# Patient Record
Sex: FEMALE | Race: WHITE | NOT HISPANIC OR LATINO | Employment: OTHER | URBAN - METROPOLITAN AREA
[De-identification: names, ages, dates, MRNs, and addresses within clinical notes are randomized per-mention and may not be internally consistent; named-entity substitution may affect disease eponyms.]

---

## 2018-01-04 ENCOUNTER — OFFICE VISIT CONVERTED (OUTPATIENT)
Dept: SURGERY | Facility: CLINIC | Age: 38
End: 2018-01-04
Attending: SURGERY

## 2018-01-11 ENCOUNTER — CONVERSION ENCOUNTER (OUTPATIENT)
Dept: SURGERY | Facility: CLINIC | Age: 38
End: 2018-01-11

## 2018-01-11 ENCOUNTER — OFFICE VISIT CONVERTED (OUTPATIENT)
Dept: SURGERY | Facility: CLINIC | Age: 38
End: 2018-01-11
Attending: SURGERY

## 2018-01-15 ENCOUNTER — OFFICE VISIT CONVERTED (OUTPATIENT)
Dept: GASTROENTEROLOGY | Facility: CLINIC | Age: 38
End: 2018-01-15
Attending: NURSE PRACTITIONER

## 2018-02-19 ENCOUNTER — CONVERSION ENCOUNTER (OUTPATIENT)
Dept: GASTROENTEROLOGY | Facility: CLINIC | Age: 38
End: 2018-02-19

## 2018-02-19 ENCOUNTER — OFFICE VISIT CONVERTED (OUTPATIENT)
Dept: GASTROENTEROLOGY | Facility: CLINIC | Age: 38
End: 2018-02-19
Attending: INTERNAL MEDICINE

## 2018-02-21 ENCOUNTER — OFFICE VISIT CONVERTED (OUTPATIENT)
Dept: SURGERY | Facility: CLINIC | Age: 38
End: 2018-02-21
Attending: SURGERY

## 2018-02-21 ENCOUNTER — CONVERSION ENCOUNTER (OUTPATIENT)
Dept: SURGERY | Facility: CLINIC | Age: 38
End: 2018-02-21

## 2018-05-21 ENCOUNTER — OFFICE VISIT CONVERTED (OUTPATIENT)
Dept: GASTROENTEROLOGY | Facility: CLINIC | Age: 38
End: 2018-05-21
Attending: INTERNAL MEDICINE

## 2018-05-22 ENCOUNTER — CONVERSION ENCOUNTER (OUTPATIENT)
Dept: SURGERY | Facility: CLINIC | Age: 38
End: 2018-05-22

## 2018-05-22 ENCOUNTER — OFFICE VISIT CONVERTED (OUTPATIENT)
Dept: SURGERY | Facility: CLINIC | Age: 38
End: 2018-05-22
Attending: SURGERY

## 2020-01-02 ENCOUNTER — OFFICE VISIT CONVERTED (OUTPATIENT)
Dept: GASTROENTEROLOGY | Facility: CLINIC | Age: 40
End: 2020-01-02
Attending: NURSE PRACTITIONER

## 2020-01-13 ENCOUNTER — HOSPITAL ENCOUNTER (OUTPATIENT)
Dept: OTHER | Facility: HOSPITAL | Age: 40
Discharge: HOME | End: 2020-01-13
Attending: NURSE PRACTITIONER

## 2020-01-13 LAB
25(OH)D3 SERPL-MCNC: 5.5 NG/ML (ref 30–100)
ALBUMIN SERPL-MCNC: 3.5 G/DL (ref 3.5–5)
ALBUMIN/GLOB SERPL: 0.9 {RATIO} (ref 1.4–2.6)
ALP SERPL-CCNC: 137 U/L (ref 42–98)
ALT SERPL-CCNC: 16 U/L (ref 10–40)
ANION GAP SERPL CALC-SCNC: 17 MMOL/L (ref 8–19)
AST SERPL-CCNC: 21 U/L (ref 15–50)
BASOPHILS # BLD AUTO: 0.08 10*3/UL (ref 0–0.2)
BASOPHILS NFR BLD AUTO: 0.9 % (ref 0–3)
BILIRUB SERPL-MCNC: 0.62 MG/DL (ref 0.2–1.3)
BUN SERPL-MCNC: 10 MG/DL (ref 5–25)
BUN/CREAT SERPL: 12 {RATIO} (ref 6–20)
CALCIUM SERPL-MCNC: 10.3 MG/DL (ref 8.7–10.4)
CHLORIDE SERPL-SCNC: 102 MMOL/L (ref 99–111)
CONV ABS IMM GRAN: 0.05 10*3/UL (ref 0–0.2)
CONV CO2: 22 MMOL/L (ref 22–32)
CONV IMMATURE GRAN: 0.6 % (ref 0–1.8)
CONV TOTAL PROTEIN: 7.5 G/DL (ref 6.3–8.2)
CREAT UR-MCNC: 0.84 MG/DL (ref 0.5–0.9)
DEPRECATED RDW RBC AUTO: 46.7 FL (ref 36.4–46.3)
EOSINOPHIL # BLD AUTO: 0.2 10*3/UL (ref 0–0.7)
EOSINOPHIL # BLD AUTO: 2.4 % (ref 0–7)
ERYTHROCYTE [DISTWIDTH] IN BLOOD BY AUTOMATED COUNT: 15.3 % (ref 11.7–14.4)
GFR SERPLBLD BASED ON 1.73 SQ M-ARVRAT: >60 ML/MIN/{1.73_M2}
GLOBULIN UR ELPH-MCNC: 4 G/DL (ref 2–3.5)
GLUCOSE SERPL-MCNC: 89 MG/DL (ref 65–99)
HCT VFR BLD AUTO: 43.4 % (ref 37–47)
HGB BLD-MCNC: 13.9 G/DL (ref 12–16)
IRON SATN MFR SERPL: 9 % (ref 20–55)
IRON SERPL-MCNC: 35 UG/DL (ref 60–170)
LYMPHOCYTES # BLD AUTO: 2.05 10*3/UL (ref 1–5)
LYMPHOCYTES NFR BLD AUTO: 24.3 % (ref 20–45)
MCH RBC QN AUTO: 27.5 PG (ref 27–31)
MCHC RBC AUTO-ENTMCNC: 32 G/DL (ref 33–37)
MCV RBC AUTO: 85.9 FL (ref 81–99)
MONOCYTES # BLD AUTO: 0.58 10*3/UL (ref 0.2–1.2)
MONOCYTES NFR BLD AUTO: 6.9 % (ref 3–10)
NEUTROPHILS # BLD AUTO: 5.48 10*3/UL (ref 2–8)
NEUTROPHILS NFR BLD AUTO: 64.9 % (ref 30–85)
NRBC CBCN: 0 % (ref 0–0.7)
OSMOLALITY SERPL CALC.SUM OF ELEC: 283 MOSM/KG (ref 273–304)
PLATELET # BLD AUTO: 311 10*3/UL (ref 130–400)
PMV BLD AUTO: 9.9 FL (ref 9.4–12.3)
POTASSIUM SERPL-SCNC: 3.5 MMOL/L (ref 3.5–5.3)
RBC # BLD AUTO: 5.05 10*6/UL (ref 4.2–5.4)
SODIUM SERPL-SCNC: 137 MMOL/L (ref 135–147)
TIBC SERPL-MCNC: 395 UG/DL (ref 245–450)
TRANSFERRIN SERPL-MCNC: 276 MG/DL (ref 250–380)
WBC # BLD AUTO: 8.44 10*3/UL (ref 4.8–10.8)

## 2020-01-15 ENCOUNTER — HOSPITAL ENCOUNTER (OUTPATIENT)
Dept: GASTROENTEROLOGY | Facility: HOSPITAL | Age: 40
Setting detail: HOSPITAL OUTPATIENT SURGERY
Discharge: HOME | End: 2020-01-15
Attending: INTERNAL MEDICINE

## 2020-01-15 LAB — HCG SERPL-SCNC: NEGATIVE MMOL/L

## 2020-01-16 LAB
CONV QUANTIFERON TB GOLD: NEGATIVE
QUANTIFERON CRITERIA: NORMAL
QUANTIFERON MITOGEN VALUE: >10 IU/ML
QUANTIFERON NIL VALUE: 0.05 IU/ML
QUANTIFERON TB1 AG VALUE: 0.05 IU/ML
QUANTIFERON TB2 AG VALUE: 0.06 IU/ML

## 2020-08-18 ENCOUNTER — OFFICE VISIT CONVERTED (OUTPATIENT)
Dept: GASTROENTEROLOGY | Facility: CLINIC | Age: 40
End: 2020-08-18
Attending: NURSE PRACTITIONER

## 2020-08-18 ENCOUNTER — HOSPITAL ENCOUNTER (OUTPATIENT)
Dept: LAB | Facility: HOSPITAL | Age: 40
Discharge: HOME | End: 2020-08-18
Attending: NURSE PRACTITIONER

## 2020-08-18 LAB
25(OH)D3 SERPL-MCNC: 8.6 NG/ML (ref 30–100)
IRON SATN MFR SERPL: 14 % (ref 20–55)
IRON SERPL-MCNC: 53 UG/DL (ref 60–170)
TIBC SERPL-MCNC: 370 UG/DL (ref 245–450)
TRANSFERRIN SERPL-MCNC: 259 MG/DL (ref 250–380)

## 2021-05-15 VITALS
SYSTOLIC BLOOD PRESSURE: 123 MMHG | BODY MASS INDEX: 37.05 KG/M2 | DIASTOLIC BLOOD PRESSURE: 82 MMHG | HEIGHT: 61 IN | WEIGHT: 196.25 LBS | HEART RATE: 88 BPM

## 2021-05-15 VITALS
SYSTOLIC BLOOD PRESSURE: 124 MMHG | WEIGHT: 197.37 LBS | HEIGHT: 62 IN | DIASTOLIC BLOOD PRESSURE: 82 MMHG | BODY MASS INDEX: 36.32 KG/M2 | TEMPERATURE: 97.5 F

## 2021-05-16 VITALS — RESPIRATION RATE: 14 BRPM | HEIGHT: 62 IN | BODY MASS INDEX: 36.65 KG/M2 | WEIGHT: 199.19 LBS

## 2021-05-16 VITALS — RESPIRATION RATE: 12 BRPM | BODY MASS INDEX: 36.62 KG/M2 | WEIGHT: 199 LBS | HEIGHT: 62 IN

## 2021-05-16 VITALS
BODY MASS INDEX: 36.31 KG/M2 | HEIGHT: 62 IN | SYSTOLIC BLOOD PRESSURE: 117 MMHG | WEIGHT: 197.31 LBS | DIASTOLIC BLOOD PRESSURE: 79 MMHG

## 2021-05-16 VITALS — HEIGHT: 62 IN | WEIGHT: 21 LBS | RESPIRATION RATE: 14 BRPM | BODY MASS INDEX: 3.87 KG/M2

## 2021-05-16 VITALS — HEIGHT: 62 IN | RESPIRATION RATE: 16 BRPM | WEIGHT: 205 LBS | BODY MASS INDEX: 37.73 KG/M2

## 2021-05-16 VITALS
BODY MASS INDEX: 39.06 KG/M2 | HEIGHT: 62 IN | SYSTOLIC BLOOD PRESSURE: 119 MMHG | DIASTOLIC BLOOD PRESSURE: 81 MMHG | WEIGHT: 212.25 LBS

## 2021-05-16 VITALS
SYSTOLIC BLOOD PRESSURE: 115 MMHG | BODY MASS INDEX: 37.54 KG/M2 | WEIGHT: 204 LBS | HEIGHT: 62 IN | DIASTOLIC BLOOD PRESSURE: 73 MMHG

## 2022-09-01 ENCOUNTER — HOSPITAL ENCOUNTER (INPATIENT)
Facility: HOSPITAL | Age: 42
LOS: 4 days | Discharge: HOME HEALTH | End: 2022-09-05
Attending: EMERGENCY MEDICINE | Admitting: INTERNAL MEDICINE

## 2022-09-01 ENCOUNTER — APPOINTMENT (OUTPATIENT)
Dept: GENERAL RADIOLOGY | Facility: HOSPITAL | Age: 42
End: 2022-09-01

## 2022-09-01 DIAGNOSIS — D64.9 SEVERE ANEMIA: ICD-10-CM

## 2022-09-01 DIAGNOSIS — E87.6 HYPOKALEMIA: ICD-10-CM

## 2022-09-01 DIAGNOSIS — D50.0 IRON DEFICIENCY ANEMIA DUE TO CHRONIC BLOOD LOSS: ICD-10-CM

## 2022-09-01 DIAGNOSIS — I95.9 HYPOTENSION, UNSPECIFIED HYPOTENSION TYPE: Primary | ICD-10-CM

## 2022-09-01 DIAGNOSIS — D62 ACUTE BLOOD LOSS ANEMIA: ICD-10-CM

## 2022-09-01 DIAGNOSIS — E83.42 HYPOMAGNESEMIA: ICD-10-CM

## 2022-09-01 LAB
ABO GROUP BLD: NORMAL
ABO GROUP BLD: NORMAL
ALBUMIN SERPL-MCNC: 2.6 G/DL (ref 3.5–5.2)
ALBUMIN/GLOB SERPL: 0.8 G/DL
ALP SERPL-CCNC: 115 U/L (ref 39–117)
ALT SERPL W P-5'-P-CCNC: 12 U/L (ref 1–33)
ANION GAP SERPL CALCULATED.3IONS-SCNC: 8.5 MMOL/L (ref 5–15)
ANISOCYTOSIS BLD QL: NORMAL
AST SERPL-CCNC: 27 U/L (ref 1–32)
B-HCG UR QL: NEGATIVE
BACTERIA UR QL AUTO: ABNORMAL /HPF
BASOPHILS # BLD AUTO: 0.01 10*3/MM3 (ref 0–0.2)
BASOPHILS NFR BLD AUTO: 0.3 % (ref 0–1.5)
BILIRUB SERPL-MCNC: 1.1 MG/DL (ref 0–1.2)
BILIRUB UR QL STRIP: NEGATIVE
BLD GP AB SCN SERPL QL: NEGATIVE
BUN SERPL-MCNC: 15 MG/DL (ref 6–20)
BUN/CREAT SERPL: 15.6 (ref 7–25)
CALCIUM SPEC-SCNC: 9.3 MG/DL (ref 8.6–10.5)
CHLORIDE SERPL-SCNC: 107 MMOL/L (ref 98–107)
CLARITY UR: CLEAR
CO2 SERPL-SCNC: 21.5 MMOL/L (ref 22–29)
COLOR UR: YELLOW
CREAT SERPL-MCNC: 0.96 MG/DL (ref 0.57–1)
D-LACTATE SERPL-SCNC: 0.9 MMOL/L (ref 0.5–2)
DACRYOCYTES BLD QL SMEAR: NORMAL
DEPRECATED RDW RBC AUTO: 84.7 FL (ref 37–54)
EGFRCR SERPLBLD CKD-EPI 2021: 75 ML/MIN/1.73
EOSINOPHIL # BLD AUTO: 0.03 10*3/MM3 (ref 0–0.4)
EOSINOPHIL NFR BLD AUTO: 1 % (ref 0.3–6.2)
ERYTHROCYTE [DISTWIDTH] IN BLOOD BY AUTOMATED COUNT: 20.9 % (ref 12.3–15.4)
GLOBULIN UR ELPH-MCNC: 3.2 GM/DL
GLUCOSE BLDC GLUCOMTR-MCNC: 84 MG/DL (ref 70–99)
GLUCOSE SERPL-MCNC: 94 MG/DL (ref 65–99)
GLUCOSE UR STRIP-MCNC: NEGATIVE MG/DL
HCT VFR BLD AUTO: 15.7 % (ref 34–46.6)
HCT VFR BLD AUTO: 28 % (ref 34–46.6)
HCT VFR BLD AUTO: 29 % (ref 34–46.6)
HCT VFR BLD AUTO: 29.7 % (ref 34–46.6)
HEMOCCULT STL QL IA: NEGATIVE
HGB BLD-MCNC: 10.1 G/DL (ref 12–15.9)
HGB BLD-MCNC: 10.1 G/DL (ref 12–15.9)
HGB BLD-MCNC: 5.5 G/DL (ref 12–15.9)
HGB BLD-MCNC: 9.8 G/DL (ref 12–15.9)
HGB UR QL STRIP.AUTO: NEGATIVE
HOLD SPECIMEN: NORMAL
HOLD SPECIMEN: NORMAL
HYALINE CASTS UR QL AUTO: ABNORMAL /LPF
HYPOCHROMIA BLD QL: NORMAL
IMM GRANULOCYTES # BLD AUTO: 0.02 10*3/MM3 (ref 0–0.05)
IMM GRANULOCYTES NFR BLD AUTO: 0.7 % (ref 0–0.5)
IRON 24H UR-MRATE: 46 MCG/DL (ref 37–145)
IRON SATN MFR SERPL: 25 % (ref 20–50)
KETONES UR QL STRIP: NEGATIVE
LEUKOCYTE ESTERASE UR QL STRIP.AUTO: ABNORMAL
LYMPHOCYTES # BLD AUTO: 0.88 10*3/MM3 (ref 0.7–3.1)
LYMPHOCYTES NFR BLD AUTO: 29.2 % (ref 19.6–45.3)
MACROCYTES BLD QL SMEAR: NORMAL
MAGNESIUM SERPL-MCNC: 0.8 MG/DL (ref 1.6–2.6)
MAGNESIUM SERPL-MCNC: 1.5 MG/DL (ref 1.6–2.6)
MCH RBC QN AUTO: 40.1 PG (ref 26.6–33)
MCHC RBC AUTO-ENTMCNC: 35 G/DL (ref 31.5–35.7)
MCV RBC AUTO: 114.6 FL (ref 79–97)
MONOCYTES # BLD AUTO: 0.04 10*3/MM3 (ref 0.1–0.9)
MONOCYTES NFR BLD AUTO: 1.3 % (ref 5–12)
NEUTROPHILS NFR BLD AUTO: 2.03 10*3/MM3 (ref 1.7–7)
NEUTROPHILS NFR BLD AUTO: 67.5 % (ref 42.7–76)
NITRITE UR QL STRIP: NEGATIVE
NRBC BLD AUTO-RTO: 0 /100 WBC (ref 0–0.2)
OVALOCYTES BLD QL SMEAR: NORMAL
PH UR STRIP.AUTO: 6 [PH] (ref 5–8)
PLAT MORPH BLD: NORMAL
PLATELET # BLD AUTO: 186 10*3/MM3 (ref 140–450)
PMV BLD AUTO: 10.3 FL (ref 6–12)
POIKILOCYTOSIS BLD QL SMEAR: NORMAL
POTASSIUM SERPL-SCNC: 2.7 MMOL/L (ref 3.5–5.2)
POTASSIUM SERPL-SCNC: 3.4 MMOL/L (ref 3.5–5.2)
PROT SERPL-MCNC: 5.8 G/DL (ref 6–8.5)
PROT UR QL STRIP: ABNORMAL
QT INTERVAL: 355 MS
RBC # BLD AUTO: 1.37 10*6/MM3 (ref 3.77–5.28)
RBC # UR STRIP: ABNORMAL /HPF
REF LAB TEST METHOD: ABNORMAL
RH BLD: POSITIVE
RH BLD: POSITIVE
SODIUM SERPL-SCNC: 137 MMOL/L (ref 136–145)
SP GR UR STRIP: 1.01 (ref 1–1.03)
SQUAMOUS #/AREA URNS HPF: ABNORMAL /HPF
T&S EXPIRATION DATE: NORMAL
TIBC SERPL-MCNC: 186 MCG/DL (ref 298–536)
TRANSFERRIN SERPL-MCNC: 125 MG/DL (ref 200–360)
TROPONIN T SERPL-MCNC: <0.01 NG/ML (ref 0–0.03)
UROBILINOGEN UR QL STRIP: ABNORMAL
WBC # UR STRIP: ABNORMAL /HPF
WBC MORPH BLD: NORMAL
WBC NRBC COR # BLD: 3.01 10*3/MM3 (ref 3.4–10.8)
WHOLE BLOOD HOLD COAG: NORMAL
WHOLE BLOOD HOLD SPECIMEN: NORMAL

## 2022-09-01 PROCEDURE — 86900 BLOOD TYPING SEROLOGIC ABO: CPT | Performed by: EMERGENCY MEDICINE

## 2022-09-01 PROCEDURE — 83540 ASSAY OF IRON: CPT | Performed by: EMERGENCY MEDICINE

## 2022-09-01 PROCEDURE — 0 MAGNESIUM SULFATE 4 GM/100ML SOLUTION: Performed by: PHYSICIAN ASSISTANT

## 2022-09-01 PROCEDURE — 81001 URINALYSIS AUTO W/SCOPE: CPT | Performed by: EMERGENCY MEDICINE

## 2022-09-01 PROCEDURE — 71045 X-RAY EXAM CHEST 1 VIEW: CPT

## 2022-09-01 PROCEDURE — 86900 BLOOD TYPING SEROLOGIC ABO: CPT

## 2022-09-01 PROCEDURE — 82746 ASSAY OF FOLIC ACID SERUM: CPT | Performed by: NURSE PRACTITIONER

## 2022-09-01 PROCEDURE — 93005 ELECTROCARDIOGRAM TRACING: CPT | Performed by: EMERGENCY MEDICINE

## 2022-09-01 PROCEDURE — 86901 BLOOD TYPING SEROLOGIC RH(D): CPT

## 2022-09-01 PROCEDURE — 85025 COMPLETE CBC W/AUTO DIFF WBC: CPT | Performed by: EMERGENCY MEDICINE

## 2022-09-01 PROCEDURE — 25010000002 MAGNESIUM SULFATE 2 GM/50ML SOLUTION: Performed by: EMERGENCY MEDICINE

## 2022-09-01 PROCEDURE — 25010000002 ONDANSETRON PER 1 MG: Performed by: EMERGENCY MEDICINE

## 2022-09-01 PROCEDURE — 84484 ASSAY OF TROPONIN QUANT: CPT | Performed by: EMERGENCY MEDICINE

## 2022-09-01 PROCEDURE — 84466 ASSAY OF TRANSFERRIN: CPT | Performed by: EMERGENCY MEDICINE

## 2022-09-01 PROCEDURE — 99285 EMERGENCY DEPT VISIT HI MDM: CPT

## 2022-09-01 PROCEDURE — 93010 ELECTROCARDIOGRAM REPORT: CPT | Performed by: INTERNAL MEDICINE

## 2022-09-01 PROCEDURE — 83735 ASSAY OF MAGNESIUM: CPT | Performed by: EMERGENCY MEDICINE

## 2022-09-01 PROCEDURE — 82274 ASSAY TEST FOR BLOOD FECAL: CPT | Performed by: EMERGENCY MEDICINE

## 2022-09-01 PROCEDURE — 80053 COMPREHEN METABOLIC PANEL: CPT | Performed by: EMERGENCY MEDICINE

## 2022-09-01 PROCEDURE — 0 POTASSIUM CHLORIDE 10 MEQ/100ML SOLUTION: Performed by: EMERGENCY MEDICINE

## 2022-09-01 PROCEDURE — 93005 ELECTROCARDIOGRAM TRACING: CPT

## 2022-09-01 PROCEDURE — 84132 ASSAY OF SERUM POTASSIUM: CPT | Performed by: INTERNAL MEDICINE

## 2022-09-01 PROCEDURE — 36415 COLL VENOUS BLD VENIPUNCTURE: CPT | Performed by: EMERGENCY MEDICINE

## 2022-09-01 PROCEDURE — 85018 HEMOGLOBIN: CPT | Performed by: PHYSICIAN ASSISTANT

## 2022-09-01 PROCEDURE — 25010000002 METOCLOPRAMIDE PER 10 MG: Performed by: EMERGENCY MEDICINE

## 2022-09-01 PROCEDURE — 99291 CRITICAL CARE FIRST HOUR: CPT | Performed by: INTERNAL MEDICINE

## 2022-09-01 PROCEDURE — 85018 HEMOGLOBIN: CPT | Performed by: EMERGENCY MEDICINE

## 2022-09-01 PROCEDURE — 87040 BLOOD CULTURE FOR BACTERIA: CPT | Performed by: EMERGENCY MEDICINE

## 2022-09-01 PROCEDURE — 36430 TRANSFUSION BLD/BLD COMPNT: CPT

## 2022-09-01 PROCEDURE — P9016 RBC LEUKOCYTES REDUCED: HCPCS

## 2022-09-01 PROCEDURE — 85007 BL SMEAR W/DIFF WBC COUNT: CPT | Performed by: EMERGENCY MEDICINE

## 2022-09-01 PROCEDURE — 85014 HEMATOCRIT: CPT | Performed by: EMERGENCY MEDICINE

## 2022-09-01 PROCEDURE — 86923 COMPATIBILITY TEST ELECTRIC: CPT

## 2022-09-01 PROCEDURE — 85014 HEMATOCRIT: CPT | Performed by: INTERNAL MEDICINE

## 2022-09-01 PROCEDURE — 86901 BLOOD TYPING SEROLOGIC RH(D): CPT | Performed by: EMERGENCY MEDICINE

## 2022-09-01 PROCEDURE — 83605 ASSAY OF LACTIC ACID: CPT | Performed by: EMERGENCY MEDICINE

## 2022-09-01 PROCEDURE — 82962 GLUCOSE BLOOD TEST: CPT

## 2022-09-01 PROCEDURE — 85018 HEMOGLOBIN: CPT | Performed by: INTERNAL MEDICINE

## 2022-09-01 PROCEDURE — 25010000002 HYDROMORPHONE 1 MG/ML SOLUTION: Performed by: INTERNAL MEDICINE

## 2022-09-01 PROCEDURE — 81025 URINE PREGNANCY TEST: CPT | Performed by: INTERNAL MEDICINE

## 2022-09-01 PROCEDURE — 83735 ASSAY OF MAGNESIUM: CPT | Performed by: INTERNAL MEDICINE

## 2022-09-01 PROCEDURE — 85014 HEMATOCRIT: CPT | Performed by: PHYSICIAN ASSISTANT

## 2022-09-01 PROCEDURE — 86850 RBC ANTIBODY SCREEN: CPT | Performed by: EMERGENCY MEDICINE

## 2022-09-01 RX ORDER — MORPHINE SULFATE 2 MG/ML
2 INJECTION, SOLUTION INTRAMUSCULAR; INTRAVENOUS
Status: ACTIVE | OUTPATIENT
Start: 2022-09-01 | End: 2022-09-04

## 2022-09-01 RX ORDER — PANTOPRAZOLE SODIUM 40 MG/10ML
40 INJECTION, POWDER, LYOPHILIZED, FOR SOLUTION INTRAVENOUS
Status: DISCONTINUED | OUTPATIENT
Start: 2022-09-01 | End: 2022-09-05 | Stop reason: HOSPADM

## 2022-09-01 RX ORDER — POLYETHYLENE GLYCOL 3350 17 G/17G
17 POWDER, FOR SOLUTION ORAL DAILY PRN
Status: DISCONTINUED | OUTPATIENT
Start: 2022-09-01 | End: 2022-09-01

## 2022-09-01 RX ORDER — SODIUM CHLORIDE 0.9 % (FLUSH) 0.9 %
10 SYRINGE (ML) INJECTION AS NEEDED
Status: DISCONTINUED | OUTPATIENT
Start: 2022-09-01 | End: 2022-09-05 | Stop reason: HOSPADM

## 2022-09-01 RX ORDER — POTASSIUM CHLORIDE 750 MG/1
40 CAPSULE, EXTENDED RELEASE ORAL ONCE
Status: COMPLETED | OUTPATIENT
Start: 2022-09-01 | End: 2022-09-01

## 2022-09-01 RX ORDER — NOREPINEPHRINE BIT/0.9 % NACL 8 MG/250ML
.02-.3 INFUSION BOTTLE (ML) INTRAVENOUS
Status: DISCONTINUED | OUTPATIENT
Start: 2022-09-01 | End: 2022-09-05

## 2022-09-01 RX ORDER — BISACODYL 10 MG
10 SUPPOSITORY, RECTAL RECTAL DAILY PRN
Status: DISCONTINUED | OUTPATIENT
Start: 2022-09-01 | End: 2022-09-01

## 2022-09-01 RX ORDER — POTASSIUM CHLORIDE 7.45 MG/ML
10 INJECTION INTRAVENOUS ONCE
Status: COMPLETED | OUTPATIENT
Start: 2022-09-01 | End: 2022-09-01

## 2022-09-01 RX ORDER — MAGNESIUM SULFATE HEPTAHYDRATE 40 MG/ML
2 INJECTION, SOLUTION INTRAVENOUS ONCE
Status: COMPLETED | OUTPATIENT
Start: 2022-09-01 | End: 2022-09-01

## 2022-09-01 RX ORDER — METOCLOPRAMIDE HYDROCHLORIDE 5 MG/ML
10 INJECTION INTRAMUSCULAR; INTRAVENOUS ONCE
Status: COMPLETED | OUTPATIENT
Start: 2022-09-01 | End: 2022-09-01

## 2022-09-01 RX ORDER — NALOXONE HCL 0.4 MG/ML
0.4 VIAL (ML) INJECTION
Status: DISCONTINUED | OUTPATIENT
Start: 2022-09-01 | End: 2022-09-05 | Stop reason: HOSPADM

## 2022-09-01 RX ORDER — ONDANSETRON 2 MG/ML
4 INJECTION INTRAMUSCULAR; INTRAVENOUS ONCE
Status: COMPLETED | OUTPATIENT
Start: 2022-09-01 | End: 2022-09-01

## 2022-09-01 RX ORDER — HYDROCODONE BITARTRATE AND ACETAMINOPHEN 5; 325 MG/1; MG/1
1 TABLET ORAL EVERY 4 HOURS PRN
Status: DISCONTINUED | OUTPATIENT
Start: 2022-09-01 | End: 2022-09-05 | Stop reason: HOSPADM

## 2022-09-01 RX ORDER — ONDANSETRON 2 MG/ML
4 INJECTION INTRAMUSCULAR; INTRAVENOUS EVERY 4 HOURS PRN
Status: DISCONTINUED | OUTPATIENT
Start: 2022-09-01 | End: 2022-09-05 | Stop reason: HOSPADM

## 2022-09-01 RX ORDER — ALUMINA, MAGNESIA, AND SIMETHICONE 2400; 2400; 240 MG/30ML; MG/30ML; MG/30ML
15 SUSPENSION ORAL EVERY 6 HOURS PRN
Status: DISCONTINUED | OUTPATIENT
Start: 2022-09-01 | End: 2022-09-05 | Stop reason: HOSPADM

## 2022-09-01 RX ORDER — BISACODYL 5 MG/1
5 TABLET, DELAYED RELEASE ORAL DAILY PRN
Status: DISCONTINUED | OUTPATIENT
Start: 2022-09-01 | End: 2022-09-01

## 2022-09-01 RX ORDER — SODIUM CHLORIDE 9 MG/ML
INJECTION, SOLUTION INTRAVENOUS
Status: DISPENSED
Start: 2022-09-01 | End: 2022-09-01

## 2022-09-01 RX ORDER — ALPRAZOLAM 0.25 MG/1
0.25 TABLET ORAL EVERY 6 HOURS PRN
Status: DISCONTINUED | OUTPATIENT
Start: 2022-09-01 | End: 2022-09-05 | Stop reason: HOSPADM

## 2022-09-01 RX ORDER — TEMAZEPAM 15 MG/1
15 CAPSULE ORAL NIGHTLY PRN
Status: DISCONTINUED | OUTPATIENT
Start: 2022-09-01 | End: 2022-09-05 | Stop reason: HOSPADM

## 2022-09-01 RX ORDER — NITROGLYCERIN 0.4 MG/1
0.4 TABLET SUBLINGUAL
Status: DISCONTINUED | OUTPATIENT
Start: 2022-09-01 | End: 2022-09-05 | Stop reason: HOSPADM

## 2022-09-01 RX ORDER — NOREPINEPHRINE BIT/0.9 % NACL 8 MG/250ML
INFUSION BOTTLE (ML) INTRAVENOUS
Status: DISPENSED
Start: 2022-09-01 | End: 2022-09-01

## 2022-09-01 RX ORDER — FAMOTIDINE 20 MG/1
40 TABLET, FILM COATED ORAL DAILY
Status: DISCONTINUED | OUTPATIENT
Start: 2022-09-01 | End: 2022-09-01

## 2022-09-01 RX ORDER — MAGNESIUM SULFATE HEPTAHYDRATE 40 MG/ML
4 INJECTION, SOLUTION INTRAVENOUS ONCE
Status: COMPLETED | OUTPATIENT
Start: 2022-09-01 | End: 2022-09-01

## 2022-09-01 RX ORDER — ACETAMINOPHEN 325 MG/1
650 TABLET ORAL EVERY 4 HOURS PRN
Status: DISCONTINUED | OUTPATIENT
Start: 2022-09-01 | End: 2022-09-05 | Stop reason: HOSPADM

## 2022-09-01 RX ORDER — AMOXICILLIN 250 MG
1 CAPSULE ORAL 2 TIMES DAILY
Status: DISCONTINUED | OUTPATIENT
Start: 2022-09-01 | End: 2022-09-01

## 2022-09-01 RX ADMIN — POTASSIUM CHLORIDE 40 MEQ: 750 CAPSULE, EXTENDED RELEASE ORAL at 18:34

## 2022-09-01 RX ADMIN — PANTOPRAZOLE SODIUM 40 MG: 40 INJECTION, POWDER, FOR SOLUTION INTRAVENOUS at 18:34

## 2022-09-01 RX ADMIN — HYDROMORPHONE HYDROCHLORIDE 0.5 MG: 1 INJECTION, SOLUTION INTRAMUSCULAR; INTRAVENOUS; SUBCUTANEOUS at 22:53

## 2022-09-01 RX ADMIN — Medication 0.04 MCG/KG/MIN: at 09:19

## 2022-09-01 RX ADMIN — ONDANSETRON 4 MG: 2 INJECTION INTRAMUSCULAR; INTRAVENOUS at 07:17

## 2022-09-01 RX ADMIN — POTASSIUM CHLORIDE 40 MEQ: 750 CAPSULE, EXTENDED RELEASE ORAL at 06:51

## 2022-09-01 RX ADMIN — METOCLOPRAMIDE HYDROCHLORIDE 10 MG: 5 INJECTION INTRAMUSCULAR; INTRAVENOUS at 09:32

## 2022-09-01 RX ADMIN — ONDANSETRON 4 MG: 2 INJECTION INTRAMUSCULAR; INTRAVENOUS at 05:36

## 2022-09-01 RX ADMIN — POTASSIUM CHLORIDE 10 MEQ: 7.46 INJECTION, SOLUTION INTRAVENOUS at 06:56

## 2022-09-01 RX ADMIN — SODIUM CHLORIDE 2178 ML: 9 INJECTION, SOLUTION INTRAVENOUS at 05:37

## 2022-09-01 RX ADMIN — MAGNESIUM SULFATE 4 G: 4 INJECTION INTRAVENOUS at 18:14

## 2022-09-01 RX ADMIN — MAGNESIUM SULFATE HEPTAHYDRATE 2 G: 2 INJECTION, SOLUTION INTRAVENOUS at 07:04

## 2022-09-02 ENCOUNTER — TELEPHONE (OUTPATIENT)
Dept: GASTROENTEROLOGY | Facility: CLINIC | Age: 42
End: 2022-09-02

## 2022-09-02 PROBLEM — D50.0 IRON DEFICIENCY ANEMIA DUE TO CHRONIC BLOOD LOSS: Status: ACTIVE | Noted: 2022-09-01

## 2022-09-02 LAB
ALBUMIN SERPL-MCNC: 2.5 G/DL (ref 3.5–5.2)
ALBUMIN SERPL-MCNC: 2.5 G/DL (ref 3.5–5.2)
ALBUMIN/GLOB SERPL: 0.9 G/DL
ALP SERPL-CCNC: 116 U/L (ref 39–117)
ALT SERPL W P-5'-P-CCNC: 12 U/L (ref 1–33)
ANION GAP SERPL CALCULATED.3IONS-SCNC: 8 MMOL/L (ref 5–15)
ANION GAP SERPL CALCULATED.3IONS-SCNC: 8 MMOL/L (ref 5–15)
AST SERPL-CCNC: 24 U/L (ref 1–32)
BASOPHILS # BLD AUTO: 0.03 10*3/MM3 (ref 0–0.2)
BASOPHILS NFR BLD AUTO: 0.4 % (ref 0–1.5)
BH BB BLOOD EXPIRATION DATE: NORMAL
BH BB BLOOD EXPIRATION DATE: NORMAL
BH BB BLOOD TYPE BARCODE: 5100
BH BB BLOOD TYPE BARCODE: 5100
BH BB DISPENSE STATUS: NORMAL
BH BB DISPENSE STATUS: NORMAL
BH BB PRODUCT CODE: NORMAL
BH BB PRODUCT CODE: NORMAL
BH BB UNIT NUMBER: NORMAL
BH BB UNIT NUMBER: NORMAL
BILIRUB SERPL-MCNC: 1.8 MG/DL (ref 0–1.2)
BUN SERPL-MCNC: 14 MG/DL (ref 6–20)
BUN SERPL-MCNC: 14 MG/DL (ref 6–20)
BUN/CREAT SERPL: 13.5 (ref 7–25)
BUN/CREAT SERPL: 13.5 (ref 7–25)
CALCIUM SPEC-SCNC: 8.3 MG/DL (ref 8.6–10.5)
CALCIUM SPEC-SCNC: 8.3 MG/DL (ref 8.6–10.5)
CHLORIDE SERPL-SCNC: 115 MMOL/L (ref 98–107)
CHLORIDE SERPL-SCNC: 115 MMOL/L (ref 98–107)
CO2 SERPL-SCNC: 17 MMOL/L (ref 22–29)
CO2 SERPL-SCNC: 17 MMOL/L (ref 22–29)
CREAT SERPL-MCNC: 1.04 MG/DL (ref 0.57–1)
CREAT SERPL-MCNC: 1.04 MG/DL (ref 0.57–1)
CROSSMATCH INTERPRETATION: NORMAL
CROSSMATCH INTERPRETATION: NORMAL
DEPRECATED RDW RBC AUTO: 109.8 FL (ref 37–54)
EGFRCR SERPLBLD CKD-EPI 2021: 68.1 ML/MIN/1.73
EGFRCR SERPLBLD CKD-EPI 2021: 68.1 ML/MIN/1.73
EOSINOPHIL # BLD AUTO: 0.06 10*3/MM3 (ref 0–0.4)
EOSINOPHIL NFR BLD AUTO: 0.8 % (ref 0.3–6.2)
ERYTHROCYTE [DISTWIDTH] IN BLOOD BY AUTOMATED COUNT: 30.7 % (ref 12.3–15.4)
FOLATE SERPL-MCNC: >20 NG/ML (ref 4.78–24.2)
GLOBULIN UR ELPH-MCNC: 2.9 GM/DL
GLUCOSE SERPL-MCNC: 103 MG/DL (ref 65–99)
GLUCOSE SERPL-MCNC: 103 MG/DL (ref 65–99)
HCT VFR BLD AUTO: 21.1 % (ref 34–46.6)
HCT VFR BLD AUTO: 21.4 % (ref 34–46.6)
HCT VFR BLD AUTO: 25.4 % (ref 34–46.6)
HCT VFR BLD AUTO: 26.2 % (ref 34–46.6)
HCT VFR BLD AUTO: 26.4 % (ref 34–46.6)
HGB BLD-MCNC: 7.1 G/DL (ref 12–15.9)
HGB BLD-MCNC: 7.1 G/DL (ref 12–15.9)
HGB BLD-MCNC: 8.7 G/DL (ref 12–15.9)
HGB BLD-MCNC: 8.9 G/DL (ref 12–15.9)
HGB BLD-MCNC: 9.1 G/DL (ref 12–15.9)
IMM GRANULOCYTES # BLD AUTO: 0.04 10*3/MM3 (ref 0–0.05)
IMM GRANULOCYTES NFR BLD AUTO: 0.5 % (ref 0–0.5)
LYMPHOCYTES # BLD AUTO: 1.8 10*3/MM3 (ref 0.7–3.1)
LYMPHOCYTES NFR BLD AUTO: 24.1 % (ref 19.6–45.3)
MAGNESIUM SERPL-MCNC: 2.4 MG/DL (ref 1.6–2.6)
MCH RBC QN AUTO: 32.6 PG (ref 26.6–33)
MCHC RBC AUTO-ENTMCNC: 34 G/DL (ref 31.5–35.7)
MCV RBC AUTO: 96 FL (ref 79–97)
MONOCYTES # BLD AUTO: 0.12 10*3/MM3 (ref 0.1–0.9)
MONOCYTES NFR BLD AUTO: 1.6 % (ref 5–12)
NEUTROPHILS NFR BLD AUTO: 5.42 10*3/MM3 (ref 1.7–7)
NEUTROPHILS NFR BLD AUTO: 72.6 % (ref 42.7–76)
NRBC BLD AUTO-RTO: 0 /100 WBC (ref 0–0.2)
PHOSPHATE SERPL-MCNC: 1.4 MG/DL (ref 2.5–4.5)
PLATELET # BLD AUTO: 259 10*3/MM3 (ref 140–450)
PMV BLD AUTO: 9.9 FL (ref 6–12)
POTASSIUM SERPL-SCNC: 3.9 MMOL/L (ref 3.5–5.2)
POTASSIUM SERPL-SCNC: 3.9 MMOL/L (ref 3.5–5.2)
PROT SERPL-MCNC: 5.4 G/DL (ref 6–8.5)
RBC # BLD AUTO: 2.73 10*6/MM3 (ref 3.77–5.28)
SODIUM SERPL-SCNC: 140 MMOL/L (ref 136–145)
SODIUM SERPL-SCNC: 140 MMOL/L (ref 136–145)
TSH SERPL DL<=0.05 MIU/L-ACNC: 2.44 UIU/ML (ref 0.27–4.2)
UNIT  ABO: NORMAL
UNIT  ABO: NORMAL
UNIT  RH: NORMAL
UNIT  RH: NORMAL
WBC NRBC COR # BLD: 7.47 10*3/MM3 (ref 3.4–10.8)

## 2022-09-02 PROCEDURE — 85014 HEMATOCRIT: CPT | Performed by: INTERNAL MEDICINE

## 2022-09-02 PROCEDURE — 84443 ASSAY THYROID STIM HORMONE: CPT | Performed by: NURSE PRACTITIONER

## 2022-09-02 PROCEDURE — 36430 TRANSFUSION BLD/BLD COMPNT: CPT

## 2022-09-02 PROCEDURE — 02HV33Z INSERTION OF INFUSION DEVICE INTO SUPERIOR VENA CAVA, PERCUTANEOUS APPROACH: ICD-10-PCS | Performed by: INTERNAL MEDICINE

## 2022-09-02 PROCEDURE — 83735 ASSAY OF MAGNESIUM: CPT | Performed by: NURSE PRACTITIONER

## 2022-09-02 PROCEDURE — 86900 BLOOD TYPING SEROLOGIC ABO: CPT

## 2022-09-02 PROCEDURE — P9047 ALBUMIN (HUMAN), 25%, 50ML: HCPCS | Performed by: NURSE PRACTITIONER

## 2022-09-02 PROCEDURE — C1751 CATH, INF, PER/CENT/MIDLINE: HCPCS

## 2022-09-02 PROCEDURE — 25010000002 ALBUMIN HUMAN 25% PER 50 ML: Performed by: NURSE PRACTITIONER

## 2022-09-02 PROCEDURE — 99291 CRITICAL CARE FIRST HOUR: CPT | Performed by: INTERNAL MEDICINE

## 2022-09-02 PROCEDURE — 86923 COMPATIBILITY TEST ELECTRIC: CPT

## 2022-09-02 PROCEDURE — 85025 COMPLETE CBC W/AUTO DIFF WBC: CPT | Performed by: INTERNAL MEDICINE

## 2022-09-02 PROCEDURE — 85018 HEMOGLOBIN: CPT | Performed by: INTERNAL MEDICINE

## 2022-09-02 PROCEDURE — 80053 COMPREHEN METABOLIC PANEL: CPT | Performed by: INTERNAL MEDICINE

## 2022-09-02 PROCEDURE — P9016 RBC LEUKOCYTES REDUCED: HCPCS

## 2022-09-02 PROCEDURE — 84100 ASSAY OF PHOSPHORUS: CPT | Performed by: INTERNAL MEDICINE

## 2022-09-02 RX ORDER — SODIUM CHLORIDE 0.9 % (FLUSH) 0.9 %
10 SYRINGE (ML) INJECTION AS NEEDED
Status: DISCONTINUED | OUTPATIENT
Start: 2022-09-02 | End: 2022-09-05 | Stop reason: HOSPADM

## 2022-09-02 RX ORDER — HYDRALAZINE HYDROCHLORIDE 20 MG/ML
5-10 INJECTION INTRAMUSCULAR; INTRAVENOUS
Status: CANCELLED | OUTPATIENT
Start: 2022-09-02

## 2022-09-02 RX ORDER — MIDODRINE HYDROCHLORIDE 2.5 MG/1
5 TABLET ORAL ONCE
Status: COMPLETED | OUTPATIENT
Start: 2022-09-02 | End: 2022-09-02

## 2022-09-02 RX ORDER — MIDODRINE HYDROCHLORIDE 10 MG/1
10 TABLET ORAL
Status: DISCONTINUED | OUTPATIENT
Start: 2022-09-02 | End: 2022-09-05 | Stop reason: HOSPADM

## 2022-09-02 RX ORDER — LABETALOL HYDROCHLORIDE 5 MG/ML
20-80 INJECTION, SOLUTION INTRAVENOUS
Status: CANCELLED | OUTPATIENT
Start: 2022-09-02

## 2022-09-02 RX ORDER — SODIUM CHLORIDE 0.9 % (FLUSH) 0.9 %
20 SYRINGE (ML) INJECTION AS NEEDED
Status: DISCONTINUED | OUTPATIENT
Start: 2022-09-02 | End: 2022-09-05 | Stop reason: HOSPADM

## 2022-09-02 RX ORDER — NIFEDIPINE 10 MG/1
10-20 CAPSULE ORAL
Status: CANCELLED | OUTPATIENT
Start: 2022-09-02

## 2022-09-02 RX ORDER — MIDODRINE HYDROCHLORIDE 2.5 MG/1
5 TABLET ORAL
Status: DISCONTINUED | OUTPATIENT
Start: 2022-09-02 | End: 2022-09-02

## 2022-09-02 RX ORDER — ALBUMIN (HUMAN) 12.5 G/50ML
25 SOLUTION INTRAVENOUS ONCE
Status: COMPLETED | OUTPATIENT
Start: 2022-09-02 | End: 2022-09-02

## 2022-09-02 RX ORDER — SODIUM PHOSPHATE IN D5W 15MMOL/250
15 PLASTIC BAG, INJECTION (ML) INTRAVENOUS EVERY 4 HOURS
Status: COMPLETED | OUTPATIENT
Start: 2022-09-02 | End: 2022-09-02

## 2022-09-02 RX ADMIN — PANTOPRAZOLE SODIUM 40 MG: 40 INJECTION, POWDER, FOR SOLUTION INTRAVENOUS at 08:15

## 2022-09-02 RX ADMIN — MIDODRINE HYDROCHLORIDE 10 MG: 10 TABLET ORAL at 18:36

## 2022-09-02 RX ADMIN — ALBUMIN HUMAN 25 G: 0.25 SOLUTION INTRAVENOUS at 12:08

## 2022-09-02 RX ADMIN — PANTOPRAZOLE SODIUM 40 MG: 40 INJECTION, POWDER, FOR SOLUTION INTRAVENOUS at 16:31

## 2022-09-02 RX ADMIN — SODIUM PHOSPHATE, MONOBASIC, MONOHYDRATE AND SODIUM PHOSPHATE, DIBASIC, ANHYDROUS 15 MMOL: 276; 142 INJECTION, SOLUTION INTRAVENOUS at 11:49

## 2022-09-02 RX ADMIN — Medication 0.08 MCG/KG/MIN: at 09:11

## 2022-09-02 RX ADMIN — SODIUM PHOSPHATE, MONOBASIC, MONOHYDRATE AND SODIUM PHOSPHATE, DIBASIC, ANHYDROUS 15 MMOL: 276; 142 INJECTION, SOLUTION INTRAVENOUS at 12:36

## 2022-09-02 RX ADMIN — MIDODRINE HYDROCHLORIDE 5 MG: 2.5 TABLET ORAL at 12:08

## 2022-09-02 RX ADMIN — MIDODRINE HYDROCHLORIDE 5 MG: 2.5 TABLET ORAL at 15:41

## 2022-09-02 RX ADMIN — ACETAMINOPHEN 650 MG: 325 TABLET ORAL at 12:35

## 2022-09-03 ENCOUNTER — APPOINTMENT (OUTPATIENT)
Dept: CT IMAGING | Facility: HOSPITAL | Age: 42
End: 2022-09-03

## 2022-09-03 LAB
ALBUMIN SERPL-MCNC: 2.4 G/DL (ref 3.5–5.2)
ALBUMIN/GLOB SERPL: 1 G/DL
ALP SERPL-CCNC: 97 U/L (ref 39–117)
ALT SERPL W P-5'-P-CCNC: 10 U/L (ref 1–33)
ANION GAP SERPL CALCULATED.3IONS-SCNC: 7.9 MMOL/L (ref 5–15)
ANISOCYTOSIS BLD QL: NORMAL
AST SERPL-CCNC: 17 U/L (ref 1–32)
BASOPHILS # BLD AUTO: 0.02 10*3/MM3 (ref 0–0.2)
BASOPHILS NFR BLD AUTO: 0.5 % (ref 0–1.5)
BILIRUB SERPL-MCNC: 2.1 MG/DL (ref 0–1.2)
BUN SERPL-MCNC: 19 MG/DL (ref 6–20)
BUN/CREAT SERPL: 20.4 (ref 7–25)
CALCIUM SPEC-SCNC: 8.7 MG/DL (ref 8.6–10.5)
CHLORIDE SERPL-SCNC: 115 MMOL/L (ref 98–107)
CO2 SERPL-SCNC: 17.1 MMOL/L (ref 22–29)
CREAT SERPL-MCNC: 0.93 MG/DL (ref 0.57–1)
DACRYOCYTES BLD QL SMEAR: NORMAL
DEPRECATED RDW RBC AUTO: 80.2 FL (ref 37–54)
DIMORPHIC RBC: PRESENT
EGFRCR SERPLBLD CKD-EPI 2021: 77.9 ML/MIN/1.73
EOSINOPHIL # BLD AUTO: 0.07 10*3/MM3 (ref 0–0.4)
EOSINOPHIL NFR BLD AUTO: 1.6 % (ref 0.3–6.2)
ERYTHROCYTE [DISTWIDTH] IN BLOOD BY AUTOMATED COUNT: 26.5 % (ref 12.3–15.4)
GLOBULIN UR ELPH-MCNC: 2.5 GM/DL
GLUCOSE SERPL-MCNC: 85 MG/DL (ref 65–99)
HCT VFR BLD AUTO: 31.6 % (ref 34–46.6)
HCT VFR BLD AUTO: 32 % (ref 34–46.6)
HGB BLD-MCNC: 10.7 G/DL (ref 12–15.9)
HGB BLD-MCNC: 10.7 G/DL (ref 12–15.9)
IMM GRANULOCYTES # BLD AUTO: 0.02 10*3/MM3 (ref 0–0.05)
IMM GRANULOCYTES NFR BLD AUTO: 0.5 % (ref 0–0.5)
LYMPHOCYTES # BLD AUTO: 1.14 10*3/MM3 (ref 0.7–3.1)
LYMPHOCYTES NFR BLD AUTO: 26.3 % (ref 19.6–45.3)
MACROCYTES BLD QL SMEAR: NORMAL
MAGNESIUM SERPL-MCNC: 1.8 MG/DL (ref 1.6–2.6)
MCH RBC QN AUTO: 31.8 PG (ref 26.6–33)
MCHC RBC AUTO-ENTMCNC: 33.9 G/DL (ref 31.5–35.7)
MCV RBC AUTO: 94 FL (ref 79–97)
MONOCYTES # BLD AUTO: 0.08 10*3/MM3 (ref 0.1–0.9)
MONOCYTES NFR BLD AUTO: 1.8 % (ref 5–12)
NEUTROPHILS NFR BLD AUTO: 3.01 10*3/MM3 (ref 1.7–7)
NEUTROPHILS NFR BLD AUTO: 69.3 % (ref 42.7–76)
NRBC BLD AUTO-RTO: 0 /100 WBC (ref 0–0.2)
OVALOCYTES BLD QL SMEAR: NORMAL
PHOSPHATE SERPL-MCNC: 3 MG/DL (ref 2.5–4.5)
PLAT MORPH BLD: NORMAL
PLATELET # BLD AUTO: 125 10*3/MM3 (ref 140–450)
PMV BLD AUTO: 9.7 FL (ref 6–12)
POIKILOCYTOSIS BLD QL SMEAR: NORMAL
POTASSIUM SERPL-SCNC: 4.1 MMOL/L (ref 3.5–5.2)
PROT SERPL-MCNC: 4.9 G/DL (ref 6–8.5)
RBC # BLD AUTO: 3.36 10*6/MM3 (ref 3.77–5.28)
SODIUM SERPL-SCNC: 140 MMOL/L (ref 136–145)
WBC MORPH BLD: NORMAL
WBC NRBC COR # BLD: 4.34 10*3/MM3 (ref 3.4–10.8)

## 2022-09-03 PROCEDURE — 84100 ASSAY OF PHOSPHORUS: CPT | Performed by: INTERNAL MEDICINE

## 2022-09-03 PROCEDURE — 85018 HEMOGLOBIN: CPT | Performed by: INTERNAL MEDICINE

## 2022-09-03 PROCEDURE — 99222 1ST HOSP IP/OBS MODERATE 55: CPT | Performed by: INTERNAL MEDICINE

## 2022-09-03 PROCEDURE — 80053 COMPREHEN METABOLIC PANEL: CPT | Performed by: INTERNAL MEDICINE

## 2022-09-03 PROCEDURE — 99291 CRITICAL CARE FIRST HOUR: CPT | Performed by: INTERNAL MEDICINE

## 2022-09-03 PROCEDURE — 85014 HEMATOCRIT: CPT | Performed by: INTERNAL MEDICINE

## 2022-09-03 PROCEDURE — 74176 CT ABD & PELVIS W/O CONTRAST: CPT

## 2022-09-03 PROCEDURE — 85007 BL SMEAR W/DIFF WBC COUNT: CPT | Performed by: INTERNAL MEDICINE

## 2022-09-03 PROCEDURE — 85025 COMPLETE CBC W/AUTO DIFF WBC: CPT | Performed by: INTERNAL MEDICINE

## 2022-09-03 PROCEDURE — 25010000002 ONDANSETRON PER 1 MG: Performed by: INTERNAL MEDICINE

## 2022-09-03 PROCEDURE — 83735 ASSAY OF MAGNESIUM: CPT | Performed by: INTERNAL MEDICINE

## 2022-09-03 RX ADMIN — PANTOPRAZOLE SODIUM 40 MG: 40 INJECTION, POWDER, FOR SOLUTION INTRAVENOUS at 08:45

## 2022-09-03 RX ADMIN — MIDODRINE HYDROCHLORIDE 10 MG: 10 TABLET ORAL at 11:39

## 2022-09-03 RX ADMIN — ONDANSETRON 4 MG: 2 INJECTION INTRAMUSCULAR; INTRAVENOUS at 16:38

## 2022-09-03 RX ADMIN — POLYETHYLENE GLYCOL 3350, SODIUM SULFATE ANHYDROUS, SODIUM BICARBONATE, SODIUM CHLORIDE, POTASSIUM CHLORIDE 4000 ML: 236; 22.74; 6.74; 5.86; 2.97 POWDER, FOR SOLUTION ORAL at 16:07

## 2022-09-03 RX ADMIN — PANTOPRAZOLE SODIUM 40 MG: 40 INJECTION, POWDER, FOR SOLUTION INTRAVENOUS at 16:38

## 2022-09-03 RX ADMIN — MIDODRINE HYDROCHLORIDE 10 MG: 10 TABLET ORAL at 08:45

## 2022-09-04 LAB
ALBUMIN SERPL-MCNC: 3 G/DL (ref 3.5–5.2)
ALBUMIN/GLOB SERPL: 1 G/DL
ALP SERPL-CCNC: 122 U/L (ref 39–117)
ALT SERPL W P-5'-P-CCNC: 10 U/L (ref 1–33)
ANION GAP SERPL CALCULATED.3IONS-SCNC: 10.8 MMOL/L (ref 5–15)
AST SERPL-CCNC: 20 U/L (ref 1–32)
BASOPHILS # BLD AUTO: 0.02 10*3/MM3 (ref 0–0.2)
BASOPHILS NFR BLD AUTO: 0.4 % (ref 0–1.5)
BH BB BLOOD EXPIRATION DATE: NORMAL
BH BB BLOOD EXPIRATION DATE: NORMAL
BH BB BLOOD TYPE BARCODE: 5100
BH BB BLOOD TYPE BARCODE: 5100
BH BB DISPENSE STATUS: NORMAL
BH BB DISPENSE STATUS: NORMAL
BH BB PRODUCT CODE: NORMAL
BH BB PRODUCT CODE: NORMAL
BH BB UNIT NUMBER: NORMAL
BH BB UNIT NUMBER: NORMAL
BILIRUB SERPL-MCNC: 2.1 MG/DL (ref 0–1.2)
BUN SERPL-MCNC: 26 MG/DL (ref 6–20)
BUN/CREAT SERPL: 25.5 (ref 7–25)
CALCIUM SPEC-SCNC: 9.1 MG/DL (ref 8.6–10.5)
CHLORIDE SERPL-SCNC: 110 MMOL/L (ref 98–107)
CO2 SERPL-SCNC: 16.2 MMOL/L (ref 22–29)
CREAT SERPL-MCNC: 1.02 MG/DL (ref 0.57–1)
CROSSMATCH INTERPRETATION: NORMAL
CROSSMATCH INTERPRETATION: NORMAL
DEPRECATED RDW RBC AUTO: 82.8 FL (ref 37–54)
EGFRCR SERPLBLD CKD-EPI 2021: 69.7 ML/MIN/1.73
EOSINOPHIL # BLD AUTO: 0.1 10*3/MM3 (ref 0–0.4)
EOSINOPHIL NFR BLD AUTO: 2.1 % (ref 0.3–6.2)
ERYTHROCYTE [DISTWIDTH] IN BLOOD BY AUTOMATED COUNT: 26.9 % (ref 12.3–15.4)
GLOBULIN UR ELPH-MCNC: 2.9 GM/DL
GLUCOSE SERPL-MCNC: 70 MG/DL (ref 65–99)
HCT VFR BLD AUTO: 33 % (ref 34–46.6)
HGB BLD-MCNC: 11.2 G/DL (ref 12–15.9)
IMM GRANULOCYTES # BLD AUTO: 0.02 10*3/MM3 (ref 0–0.05)
IMM GRANULOCYTES NFR BLD AUTO: 0.4 % (ref 0–0.5)
LYMPHOCYTES # BLD AUTO: 1.45 10*3/MM3 (ref 0.7–3.1)
LYMPHOCYTES NFR BLD AUTO: 30.3 % (ref 19.6–45.3)
MAGNESIUM SERPL-MCNC: 1.6 MG/DL (ref 1.6–2.6)
MCH RBC QN AUTO: 31.5 PG (ref 26.6–33)
MCHC RBC AUTO-ENTMCNC: 33.9 G/DL (ref 31.5–35.7)
MCV RBC AUTO: 93 FL (ref 79–97)
MONOCYTES # BLD AUTO: 0.08 10*3/MM3 (ref 0.1–0.9)
MONOCYTES NFR BLD AUTO: 1.7 % (ref 5–12)
NEUTROPHILS NFR BLD AUTO: 3.12 10*3/MM3 (ref 1.7–7)
NEUTROPHILS NFR BLD AUTO: 65.1 % (ref 42.7–76)
NRBC BLD AUTO-RTO: 0 /100 WBC (ref 0–0.2)
PHOSPHATE SERPL-MCNC: 2.2 MG/DL (ref 2.5–4.5)
PLATELET # BLD AUTO: 140 10*3/MM3 (ref 140–450)
PMV BLD AUTO: 10 FL (ref 6–12)
POTASSIUM SERPL-SCNC: 4.2 MMOL/L (ref 3.5–5.2)
PROT SERPL-MCNC: 5.9 G/DL (ref 6–8.5)
RBC # BLD AUTO: 3.55 10*6/MM3 (ref 3.77–5.28)
SODIUM SERPL-SCNC: 137 MMOL/L (ref 136–145)
UNIT  ABO: NORMAL
UNIT  ABO: NORMAL
UNIT  RH: NORMAL
UNIT  RH: NORMAL
WBC NRBC COR # BLD: 4.79 10*3/MM3 (ref 3.4–10.8)

## 2022-09-04 PROCEDURE — 83735 ASSAY OF MAGNESIUM: CPT | Performed by: INTERNAL MEDICINE

## 2022-09-04 PROCEDURE — 85025 COMPLETE CBC W/AUTO DIFF WBC: CPT | Performed by: INTERNAL MEDICINE

## 2022-09-04 PROCEDURE — 99233 SBSQ HOSP IP/OBS HIGH 50: CPT | Performed by: INTERNAL MEDICINE

## 2022-09-04 PROCEDURE — 80053 COMPREHEN METABOLIC PANEL: CPT | Performed by: INTERNAL MEDICINE

## 2022-09-04 PROCEDURE — 84100 ASSAY OF PHOSPHORUS: CPT | Performed by: INTERNAL MEDICINE

## 2022-09-04 RX ADMIN — MIDODRINE HYDROCHLORIDE 10 MG: 10 TABLET ORAL at 06:42

## 2022-09-04 RX ADMIN — MIDODRINE HYDROCHLORIDE 10 MG: 10 TABLET ORAL at 11:45

## 2022-09-04 RX ADMIN — PANTOPRAZOLE SODIUM 40 MG: 40 INJECTION, POWDER, FOR SOLUTION INTRAVENOUS at 06:43

## 2022-09-04 RX ADMIN — ALPRAZOLAM 0.25 MG: 0.25 TABLET ORAL at 20:20

## 2022-09-04 RX ADMIN — PANTOPRAZOLE SODIUM 40 MG: 40 INJECTION, POWDER, FOR SOLUTION INTRAVENOUS at 17:20

## 2022-09-04 RX ADMIN — MIDODRINE HYDROCHLORIDE 10 MG: 10 TABLET ORAL at 17:20

## 2022-09-05 ENCOUNTER — ANESTHESIA EVENT (OUTPATIENT)
Dept: GASTROENTEROLOGY | Facility: HOSPITAL | Age: 42
End: 2022-09-05

## 2022-09-05 ENCOUNTER — ANESTHESIA (OUTPATIENT)
Dept: GASTROENTEROLOGY | Facility: HOSPITAL | Age: 42
End: 2022-09-05

## 2022-09-05 VITALS
OXYGEN SATURATION: 96 % | HEART RATE: 132 BPM | BODY MASS INDEX: 29.44 KG/M2 | TEMPERATURE: 97.5 F | SYSTOLIC BLOOD PRESSURE: 84 MMHG | HEIGHT: 62 IN | WEIGHT: 160 LBS | DIASTOLIC BLOOD PRESSURE: 51 MMHG | RESPIRATION RATE: 20 BRPM

## 2022-09-05 PROBLEM — D64.9 SEVERE ANEMIA: Status: ACTIVE | Noted: 2022-09-01

## 2022-09-05 PROCEDURE — 25010000002 PROPOFOL 10 MG/ML EMULSION: Performed by: ANESTHESIOLOGY

## 2022-09-05 PROCEDURE — 0DBE8ZX EXCISION OF LARGE INTESTINE, VIA NATURAL OR ARTIFICIAL OPENING ENDOSCOPIC, DIAGNOSTIC: ICD-10-PCS | Performed by: INTERNAL MEDICINE

## 2022-09-05 PROCEDURE — 43239 EGD BIOPSY SINGLE/MULTIPLE: CPT | Performed by: INTERNAL MEDICINE

## 2022-09-05 PROCEDURE — 45380 COLONOSCOPY AND BIOPSY: CPT | Performed by: INTERNAL MEDICINE

## 2022-09-05 PROCEDURE — 88305 TISSUE EXAM BY PATHOLOGIST: CPT | Performed by: INTERNAL MEDICINE

## 2022-09-05 PROCEDURE — 0DBB8ZX EXCISION OF ILEUM, VIA NATURAL OR ARTIFICIAL OPENING ENDOSCOPIC, DIAGNOSTIC: ICD-10-PCS | Performed by: INTERNAL MEDICINE

## 2022-09-05 PROCEDURE — 25010000002 MIDAZOLAM PER 1 MG: Performed by: ANESTHESIOLOGY

## 2022-09-05 PROCEDURE — 0DB68ZX EXCISION OF STOMACH, VIA NATURAL OR ARTIFICIAL OPENING ENDOSCOPIC, DIAGNOSTIC: ICD-10-PCS | Performed by: INTERNAL MEDICINE

## 2022-09-05 PROCEDURE — 25010000002 ATROPINE PER 0.01 MG: Performed by: ANESTHESIOLOGY

## 2022-09-05 PROCEDURE — 88342 IMHCHEM/IMCYTCHM 1ST ANTB: CPT | Performed by: INTERNAL MEDICINE

## 2022-09-05 PROCEDURE — 0DBP8ZX EXCISION OF RECTUM, VIA NATURAL OR ARTIFICIAL OPENING ENDOSCOPIC, DIAGNOSTIC: ICD-10-PCS | Performed by: INTERNAL MEDICINE

## 2022-09-05 RX ORDER — EPHEDRINE SULFATE 50 MG/ML
INJECTION, SOLUTION INTRAVENOUS AS NEEDED
Status: DISCONTINUED | OUTPATIENT
Start: 2022-09-05 | End: 2022-09-05 | Stop reason: SURG

## 2022-09-05 RX ORDER — SUCRALFATE 1 G/1
1 TABLET ORAL
Status: DISCONTINUED | OUTPATIENT
Start: 2022-09-05 | End: 2022-09-05 | Stop reason: HOSPADM

## 2022-09-05 RX ORDER — SUCRALFATE 1 G/1
1 TABLET ORAL
Qty: 60 TABLET | Refills: 0 | Status: SHIPPED | OUTPATIENT
Start: 2022-09-05 | End: 2022-10-05

## 2022-09-05 RX ORDER — LIDOCAINE HYDROCHLORIDE 20 MG/ML
INJECTION, SOLUTION EPIDURAL; INFILTRATION; INTRACAUDAL; PERINEURAL AS NEEDED
Status: DISCONTINUED | OUTPATIENT
Start: 2022-09-05 | End: 2022-09-05 | Stop reason: SURG

## 2022-09-05 RX ORDER — PANTOPRAZOLE SODIUM 40 MG/1
40 TABLET, DELAYED RELEASE ORAL DAILY
Qty: 30 TABLET | Refills: 2 | Status: SHIPPED | OUTPATIENT
Start: 2022-09-05 | End: 2022-10-05

## 2022-09-05 RX ORDER — PHENYLEPHRINE HCL IN 0.9% NACL 1 MG/10 ML
SYRINGE (ML) INTRAVENOUS AS NEEDED
Status: DISCONTINUED | OUTPATIENT
Start: 2022-09-05 | End: 2022-09-05 | Stop reason: SURG

## 2022-09-05 RX ORDER — SODIUM CHLORIDE, SODIUM LACTATE, POTASSIUM CHLORIDE, CALCIUM CHLORIDE 600; 310; 30; 20 MG/100ML; MG/100ML; MG/100ML; MG/100ML
30 INJECTION, SOLUTION INTRAVENOUS CONTINUOUS
Status: DISCONTINUED | OUTPATIENT
Start: 2022-09-05 | End: 2022-09-05

## 2022-09-05 RX ORDER — MIDAZOLAM HYDROCHLORIDE 1 MG/ML
INJECTION INTRAMUSCULAR; INTRAVENOUS AS NEEDED
Status: DISCONTINUED | OUTPATIENT
Start: 2022-09-05 | End: 2022-09-05 | Stop reason: SURG

## 2022-09-05 RX ORDER — ATROPINE SULFATE 0.4 MG/ML
AMPUL (ML) INJECTION AS NEEDED
Status: DISCONTINUED | OUTPATIENT
Start: 2022-09-05 | End: 2022-09-05 | Stop reason: SURG

## 2022-09-05 RX ADMIN — Medication 100 MCG: at 08:59

## 2022-09-05 RX ADMIN — MIDODRINE HYDROCHLORIDE 10 MG: 10 TABLET ORAL at 10:46

## 2022-09-05 RX ADMIN — EPHEDRINE SULFATE 25 MG: 50 INJECTION INTRAVENOUS at 09:18

## 2022-09-05 RX ADMIN — Medication 100 MCG: at 09:03

## 2022-09-05 RX ADMIN — SUCRALFATE 1 G: 1 TABLET ORAL at 11:09

## 2022-09-05 RX ADMIN — MIDAZOLAM HYDROCHLORIDE 2 MG: 1 INJECTION, SOLUTION INTRAMUSCULAR; INTRAVENOUS at 09:00

## 2022-09-05 RX ADMIN — EPHEDRINE SULFATE 25 MG: 50 INJECTION INTRAVENOUS at 09:24

## 2022-09-05 RX ADMIN — LIDOCAINE HYDROCHLORIDE 40 MG: 20 INJECTION, SOLUTION EPIDURAL; INFILTRATION; INTRACAUDAL; PERINEURAL at 08:57

## 2022-09-05 RX ADMIN — PROPOFOL 100 MCG/KG/MIN: 10 INJECTION, EMULSION INTRAVENOUS at 08:56

## 2022-09-05 RX ADMIN — ATROPINE SULFATE 0.4 MG: 0.4 INJECTION, SOLUTION INTRAMUSCULAR; INTRAVENOUS; SUBCUTANEOUS at 08:59

## 2022-09-05 RX ADMIN — PANTOPRAZOLE SODIUM 40 MG: 40 INJECTION, POWDER, FOR SOLUTION INTRAVENOUS at 10:46

## 2022-09-05 RX ADMIN — EPHEDRINE SULFATE 25 MG: 50 INJECTION INTRAVENOUS at 09:16

## 2022-09-05 RX ADMIN — SODIUM CHLORIDE, POTASSIUM CHLORIDE, SODIUM LACTATE AND CALCIUM CHLORIDE: 600; 310; 30; 20 INJECTION, SOLUTION INTRAVENOUS at 08:44

## 2022-09-05 RX ADMIN — Medication 200 MCG: at 09:06

## 2022-09-06 ENCOUNTER — HOSPITAL ENCOUNTER (EMERGENCY)
Facility: HOSPITAL | Age: 42
Discharge: HOME | End: 2022-09-07
Attending: EMERGENCY MEDICINE | Admitting: EMERGENCY MEDICINE

## 2022-09-06 ENCOUNTER — APPOINTMENT (OUTPATIENT)
Dept: CT IMAGING | Facility: HOSPITAL | Age: 42
End: 2022-09-06

## 2022-09-06 ENCOUNTER — READMISSION MANAGEMENT (OUTPATIENT)
Dept: CALL CENTER | Facility: HOSPITAL | Age: 42
End: 2022-09-06

## 2022-09-06 DIAGNOSIS — S00.83XA CONTUSION OF FACE, INITIAL ENCOUNTER: ICD-10-CM
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
## 2022-09-06 LAB
BACTERIA SPEC AEROBE CULT: NORMAL
BACTERIA SPEC AEROBE CULT: NORMAL

## 2022-09-06 PROCEDURE — 72125 CT NECK SPINE W/O DYE: CPT

## 2022-09-06 PROCEDURE — 70486 CT MAXILLOFACIAL W/O DYE: CPT

## 2022-09-06 PROCEDURE — 99283 EMERGENCY DEPT VISIT LOW MDM: CPT

## 2022-09-06 PROCEDURE — 70450 CT HEAD/BRAIN W/O DYE: CPT

## 2022-09-07 VITALS
BODY MASS INDEX: 21.26 KG/M2 | RESPIRATION RATE: 16 BRPM | TEMPERATURE: 98.3 F | OXYGEN SATURATION: 98 % | SYSTOLIC BLOOD PRESSURE: 103 MMHG | HEART RATE: 82 BPM | WEIGHT: 115.52 LBS | HEIGHT: 62 IN | DIASTOLIC BLOOD PRESSURE: 64 MMHG

## 2022-09-07 LAB
CYTO UR: NORMAL
LAB AP CASE REPORT: NORMAL
LAB AP CLINICAL INFORMATION: NORMAL
LAB AP SPECIAL STAINS: NORMAL
PATH REPORT.FINAL DX SPEC: NORMAL
PATH REPORT.GROSS SPEC: NORMAL

## 2022-09-08 ENCOUNTER — TELEPHONE (OUTPATIENT)
Dept: GASTROENTEROLOGY | Facility: CLINIC | Age: 42
End: 2022-09-08

## 2022-09-08 ENCOUNTER — PREP FOR SURGERY (OUTPATIENT)
Dept: OTHER | Facility: HOSPITAL | Age: 42
End: 2022-09-08

## 2022-09-08 DIAGNOSIS — K26.9 DUODENAL ULCER: Primary | ICD-10-CM

## 2022-09-09 ENCOUNTER — READMISSION MANAGEMENT (OUTPATIENT)
Dept: CALL CENTER | Facility: HOSPITAL | Age: 42
End: 2022-09-09

## 2022-09-13 ENCOUNTER — APPOINTMENT (OUTPATIENT)
Dept: CT IMAGING | Facility: HOSPITAL | Age: 42
End: 2022-09-13

## 2022-09-13 ENCOUNTER — APPOINTMENT (OUTPATIENT)
Dept: GENERAL RADIOLOGY | Facility: HOSPITAL | Age: 42
End: 2022-09-13

## 2022-09-13 ENCOUNTER — HOSPITAL ENCOUNTER (INPATIENT)
Facility: HOSPITAL | Age: 42
LOS: 9 days | Discharge: NURSING, REHAB OR LONG-TERM CARE | End: 2022-09-22
Attending: EMERGENCY MEDICINE | Admitting: INTERNAL MEDICINE

## 2022-09-13 DIAGNOSIS — Z78.9 DECREASED ACTIVITIES OF DAILY LIVING (ADL): ICD-10-CM

## 2022-09-13 DIAGNOSIS — R13.12 OROPHARYNGEAL DYSPHAGIA: ICD-10-CM

## 2022-09-13 DIAGNOSIS — R26.2 DIFFICULTY WALKING: ICD-10-CM

## 2022-09-13 DIAGNOSIS — I95.9 HYPOTENSION, UNSPECIFIED HYPOTENSION TYPE: ICD-10-CM

## 2022-09-13 DIAGNOSIS — N39.0 ACUTE UTI: Primary | ICD-10-CM

## 2022-09-13 LAB
ALBUMIN SERPL-MCNC: 3.3 G/DL (ref 3.5–5.2)
ALBUMIN/GLOB SERPL: 0.9 G/DL
ALP SERPL-CCNC: 122 U/L (ref 39–117)
ALT SERPL W P-5'-P-CCNC: 13 U/L (ref 1–33)
AMPHET+METHAMPHET UR QL: NEGATIVE
ANION GAP SERPL CALCULATED.3IONS-SCNC: 19.5 MMOL/L (ref 5–15)
ANISOCYTOSIS BLD QL: NORMAL
AST SERPL-CCNC: 28 U/L (ref 1–32)
BACTERIA UR QL AUTO: ABNORMAL /HPF
BARBITURATES UR QL SCN: NEGATIVE
BASOPHILS # BLD AUTO: 0.01 10*3/MM3 (ref 0–0.2)
BASOPHILS NFR BLD AUTO: 0.2 % (ref 0–1.5)
BENZODIAZ UR QL SCN: NEGATIVE
BILIRUB SERPL-MCNC: 1.3 MG/DL (ref 0–1.2)
BILIRUB UR QL STRIP: NEGATIVE
BUN SERPL-MCNC: 38 MG/DL (ref 6–20)
BUN/CREAT SERPL: 28.4 (ref 7–25)
BURR CELLS BLD QL SMEAR: NORMAL
CALCIUM SPEC-SCNC: 10.5 MG/DL (ref 8.6–10.5)
CANNABINOIDS SERPL QL: NEGATIVE
CHLORIDE SERPL-SCNC: 111 MMOL/L (ref 98–107)
CK SERPL-CCNC: 132 U/L (ref 20–180)
CLARITY UR: CLEAR
CO2 SERPL-SCNC: 16.5 MMOL/L (ref 22–29)
COCAINE UR QL: NEGATIVE
COLOR UR: ABNORMAL
CREAT SERPL-MCNC: 1.34 MG/DL (ref 0.57–1)
D-LACTATE SERPL-SCNC: 0.9 MMOL/L (ref 0.5–2)
DACRYOCYTES BLD QL SMEAR: NORMAL
DEPRECATED RDW RBC AUTO: 100.1 FL (ref 37–54)
EGFRCR SERPLBLD CKD-EPI 2021: 50.9 ML/MIN/1.73
EOSINOPHIL # BLD AUTO: 0.06 10*3/MM3 (ref 0–0.4)
EOSINOPHIL NFR BLD AUTO: 1.2 % (ref 0.3–6.2)
ERYTHROCYTE [DISTWIDTH] IN BLOOD BY AUTOMATED COUNT: 28.5 % (ref 12.3–15.4)
GLOBULIN UR ELPH-MCNC: 3.5 GM/DL
GLUCOSE BLDC GLUCOMTR-MCNC: 122 MG/DL (ref 70–99)
GLUCOSE BLDC GLUCOMTR-MCNC: 35 MG/DL (ref 70–99)
GLUCOSE SERPL-MCNC: 57 MG/DL (ref 65–99)
GLUCOSE UR STRIP-MCNC: NEGATIVE MG/DL
HCT VFR BLD AUTO: 31.6 % (ref 34–46.6)
HGB BLD-MCNC: 10.3 G/DL (ref 12–15.9)
HGB UR QL STRIP.AUTO: NEGATIVE
HYALINE CASTS UR QL AUTO: ABNORMAL /LPF
IMM GRANULOCYTES # BLD AUTO: 0.02 10*3/MM3 (ref 0–0.05)
IMM GRANULOCYTES NFR BLD AUTO: 0.4 % (ref 0–0.5)
INR PPP: 1.45 (ref 0.86–1.15)
KETONES UR QL STRIP: ABNORMAL
LEUKOCYTE ESTERASE UR QL STRIP.AUTO: ABNORMAL
LYMPHOCYTES # BLD AUTO: 1.39 10*3/MM3 (ref 0.7–3.1)
LYMPHOCYTES NFR BLD AUTO: 27.4 % (ref 19.6–45.3)
MACROCYTES BLD QL SMEAR: NORMAL
MAGNESIUM SERPL-MCNC: 1.8 MG/DL (ref 1.6–2.6)
MCH RBC QN AUTO: 32.4 PG (ref 26.6–33)
MCHC RBC AUTO-ENTMCNC: 32.6 G/DL (ref 31.5–35.7)
MCV RBC AUTO: 99.4 FL (ref 79–97)
METHADONE UR QL SCN: NEGATIVE
MONOCYTES # BLD AUTO: 0.09 10*3/MM3 (ref 0.1–0.9)
MONOCYTES NFR BLD AUTO: 1.8 % (ref 5–12)
NEUTROPHILS NFR BLD AUTO: 3.51 10*3/MM3 (ref 1.7–7)
NEUTROPHILS NFR BLD AUTO: 69 % (ref 42.7–76)
NITRITE UR QL STRIP: POSITIVE
NRBC BLD AUTO-RTO: 0 /100 WBC (ref 0–0.2)
OPIATES UR QL: NEGATIVE
OVALOCYTES BLD QL SMEAR: NORMAL
OXYCODONE UR QL SCN: NEGATIVE
PH UR STRIP.AUTO: 6 [PH] (ref 5–8)
PLAT MORPH BLD: NORMAL
PLATELET # BLD AUTO: 117 10*3/MM3 (ref 140–450)
PMV BLD AUTO: 10.3 FL (ref 6–12)
POIKILOCYTOSIS BLD QL SMEAR: NORMAL
POTASSIUM SERPL-SCNC: 3.4 MMOL/L (ref 3.5–5.2)
PROT SERPL-MCNC: 6.8 G/DL (ref 6–8.5)
PROT UR QL STRIP: ABNORMAL
PROTHROMBIN TIME: 17.8 SECONDS (ref 11.8–14.9)
RBC # BLD AUTO: 3.18 10*6/MM3 (ref 3.77–5.28)
RBC # UR STRIP: ABNORMAL /HPF
REF LAB TEST METHOD: ABNORMAL
SODIUM SERPL-SCNC: 147 MMOL/L (ref 136–145)
SP GR UR STRIP: 1.02 (ref 1–1.03)
SQUAMOUS #/AREA URNS HPF: ABNORMAL /HPF
T4 FREE SERPL-MCNC: 1.12 NG/DL (ref 0.93–1.7)
TROPONIN T SERPL-MCNC: <0.01 NG/ML (ref 0–0.03)
TSH SERPL DL<=0.05 MIU/L-ACNC: 0.65 UIU/ML (ref 0.27–4.2)
UROBILINOGEN UR QL STRIP: ABNORMAL
WBC # UR STRIP: ABNORMAL /HPF
WBC MORPH BLD: NORMAL
WBC NRBC COR # BLD: 5.08 10*3/MM3 (ref 3.4–10.8)

## 2022-09-13 PROCEDURE — 84484 ASSAY OF TROPONIN QUANT: CPT | Performed by: FAMILY MEDICINE

## 2022-09-13 PROCEDURE — 80307 DRUG TEST PRSMV CHEM ANLYZR: CPT | Performed by: EMERGENCY MEDICINE

## 2022-09-13 PROCEDURE — 81001 URINALYSIS AUTO W/SCOPE: CPT | Performed by: EMERGENCY MEDICINE

## 2022-09-13 PROCEDURE — 87040 BLOOD CULTURE FOR BACTERIA: CPT | Performed by: EMERGENCY MEDICINE

## 2022-09-13 PROCEDURE — 99223 1ST HOSP IP/OBS HIGH 75: CPT | Performed by: FAMILY MEDICINE

## 2022-09-13 PROCEDURE — 83605 ASSAY OF LACTIC ACID: CPT | Performed by: EMERGENCY MEDICINE

## 2022-09-13 PROCEDURE — 85007 BL SMEAR W/DIFF WBC COUNT: CPT | Performed by: EMERGENCY MEDICINE

## 2022-09-13 PROCEDURE — 80053 COMPREHEN METABOLIC PANEL: CPT | Performed by: EMERGENCY MEDICINE

## 2022-09-13 PROCEDURE — 83735 ASSAY OF MAGNESIUM: CPT | Performed by: EMERGENCY MEDICINE

## 2022-09-13 PROCEDURE — 71045 X-RAY EXAM CHEST 1 VIEW: CPT

## 2022-09-13 PROCEDURE — 99284 EMERGENCY DEPT VISIT MOD MDM: CPT

## 2022-09-13 PROCEDURE — 85025 COMPLETE CBC W/AUTO DIFF WBC: CPT | Performed by: EMERGENCY MEDICINE

## 2022-09-13 PROCEDURE — 87186 SC STD MICRODIL/AGAR DIL: CPT | Performed by: FAMILY MEDICINE

## 2022-09-13 PROCEDURE — 25010000002 CEFTRIAXONE PER 250 MG: Performed by: EMERGENCY MEDICINE

## 2022-09-13 PROCEDURE — 82550 ASSAY OF CK (CPK): CPT | Performed by: EMERGENCY MEDICINE

## 2022-09-13 PROCEDURE — 87077 CULTURE AEROBIC IDENTIFY: CPT | Performed by: FAMILY MEDICINE

## 2022-09-13 PROCEDURE — 84443 ASSAY THYROID STIM HORMONE: CPT | Performed by: EMERGENCY MEDICINE

## 2022-09-13 PROCEDURE — 70486 CT MAXILLOFACIAL W/O DYE: CPT

## 2022-09-13 PROCEDURE — 93005 ELECTROCARDIOGRAM TRACING: CPT | Performed by: FAMILY MEDICINE

## 2022-09-13 PROCEDURE — 82962 GLUCOSE BLOOD TEST: CPT

## 2022-09-13 PROCEDURE — 93010 ELECTROCARDIOGRAM REPORT: CPT | Performed by: INTERNAL MEDICINE

## 2022-09-13 PROCEDURE — 70450 CT HEAD/BRAIN W/O DYE: CPT

## 2022-09-13 PROCEDURE — 85610 PROTHROMBIN TIME: CPT | Performed by: FAMILY MEDICINE

## 2022-09-13 PROCEDURE — 36415 COLL VENOUS BLD VENIPUNCTURE: CPT | Performed by: EMERGENCY MEDICINE

## 2022-09-13 PROCEDURE — P9612 CATHETERIZE FOR URINE SPEC: HCPCS

## 2022-09-13 PROCEDURE — 84439 ASSAY OF FREE THYROXINE: CPT | Performed by: EMERGENCY MEDICINE

## 2022-09-13 PROCEDURE — 87086 URINE CULTURE/COLONY COUNT: CPT | Performed by: FAMILY MEDICINE

## 2022-09-13 RX ORDER — ENOXAPARIN SODIUM 100 MG/ML
40 INJECTION SUBCUTANEOUS DAILY
Status: DISCONTINUED | OUTPATIENT
Start: 2022-09-14 | End: 2022-09-14

## 2022-09-13 RX ORDER — CHOLECALCIFEROL (VITAMIN D3) 125 MCG
5 CAPSULE ORAL NIGHTLY PRN
Status: DISCONTINUED | OUTPATIENT
Start: 2022-09-13 | End: 2022-09-14

## 2022-09-13 RX ORDER — BISACODYL 5 MG/1
5 TABLET, DELAYED RELEASE ORAL DAILY PRN
Status: DISCONTINUED | OUTPATIENT
Start: 2022-09-13 | End: 2022-09-14

## 2022-09-13 RX ORDER — DEXTROSE MONOHYDRATE 25 G/50ML
INJECTION, SOLUTION INTRAVENOUS
Status: COMPLETED
Start: 2022-09-13 | End: 2022-09-13

## 2022-09-13 RX ORDER — POLYETHYLENE GLYCOL 3350 17 G/17G
17 POWDER, FOR SOLUTION ORAL DAILY PRN
Status: DISCONTINUED | OUTPATIENT
Start: 2022-09-13 | End: 2022-09-14

## 2022-09-13 RX ORDER — BISACODYL 10 MG
10 SUPPOSITORY, RECTAL RECTAL DAILY PRN
Status: DISCONTINUED | OUTPATIENT
Start: 2022-09-13 | End: 2022-09-14

## 2022-09-13 RX ORDER — PANTOPRAZOLE SODIUM 40 MG/1
40 TABLET, DELAYED RELEASE ORAL EVERY MORNING
Status: DISCONTINUED | OUTPATIENT
Start: 2022-09-14 | End: 2022-09-14

## 2022-09-13 RX ORDER — SODIUM CHLORIDE, SODIUM LACTATE, POTASSIUM CHLORIDE, CALCIUM CHLORIDE 600; 310; 30; 20 MG/100ML; MG/100ML; MG/100ML; MG/100ML
100 INJECTION, SOLUTION INTRAVENOUS CONTINUOUS
Status: DISCONTINUED | OUTPATIENT
Start: 2022-09-14 | End: 2022-09-14

## 2022-09-13 RX ORDER — ONDANSETRON 2 MG/ML
4 INJECTION INTRAMUSCULAR; INTRAVENOUS EVERY 6 HOURS PRN
Status: DISCONTINUED | OUTPATIENT
Start: 2022-09-13 | End: 2022-09-14

## 2022-09-13 RX ORDER — CALCIUM CARBONATE 200(500)MG
2 TABLET,CHEWABLE ORAL 2 TIMES DAILY PRN
Status: DISCONTINUED | OUTPATIENT
Start: 2022-09-13 | End: 2022-09-14

## 2022-09-13 RX ORDER — ACETAMINOPHEN 160 MG/5ML
650 SOLUTION ORAL EVERY 4 HOURS PRN
Status: DISCONTINUED | OUTPATIENT
Start: 2022-09-13 | End: 2022-09-14

## 2022-09-13 RX ORDER — POTASSIUM CHLORIDE 1.5 G/1.77G
40 POWDER, FOR SOLUTION ORAL ONCE
Status: COMPLETED | OUTPATIENT
Start: 2022-09-13 | End: 2022-09-13

## 2022-09-13 RX ORDER — CEFTRIAXONE SODIUM 1 G/50ML
1 INJECTION, SOLUTION INTRAVENOUS EVERY 24 HOURS
Status: DISCONTINUED | OUTPATIENT
Start: 2022-09-14 | End: 2022-09-14

## 2022-09-13 RX ORDER — AMOXICILLIN 250 MG
2 CAPSULE ORAL 2 TIMES DAILY
Status: DISCONTINUED | OUTPATIENT
Start: 2022-09-14 | End: 2022-09-14

## 2022-09-13 RX ORDER — DEXTROSE MONOHYDRATE 25 G/50ML
25 INJECTION, SOLUTION INTRAVENOUS ONCE
Status: COMPLETED | OUTPATIENT
Start: 2022-09-13 | End: 2022-09-13

## 2022-09-13 RX ORDER — CEFTRIAXONE SODIUM 1 G/50ML
1 INJECTION, SOLUTION INTRAVENOUS ONCE
Status: COMPLETED | OUTPATIENT
Start: 2022-09-13 | End: 2022-09-13

## 2022-09-13 RX ORDER — ONDANSETRON 4 MG/1
4 TABLET, FILM COATED ORAL EVERY 6 HOURS PRN
Status: DISCONTINUED | OUTPATIENT
Start: 2022-09-13 | End: 2022-09-14

## 2022-09-13 RX ORDER — ACETAMINOPHEN 650 MG/1
650 SUPPOSITORY RECTAL EVERY 4 HOURS PRN
Status: DISCONTINUED | OUTPATIENT
Start: 2022-09-13 | End: 2022-09-14

## 2022-09-13 RX ORDER — ACETAMINOPHEN 325 MG/1
650 TABLET ORAL EVERY 4 HOURS PRN
Status: DISCONTINUED | OUTPATIENT
Start: 2022-09-13 | End: 2022-09-14

## 2022-09-13 RX ADMIN — SODIUM BICARBONATE 150 MEQ: 84 INJECTION, SOLUTION INTRAVENOUS at 22:06

## 2022-09-13 RX ADMIN — SODIUM CHLORIDE 1000 ML: 9 INJECTION, SOLUTION INTRAVENOUS at 16:59

## 2022-09-13 RX ADMIN — DEXTROSE MONOHYDRATE 25 G: 25 INJECTION, SOLUTION INTRAVENOUS at 22:49

## 2022-09-13 RX ADMIN — CEFTRIAXONE SODIUM 1 G: 1 INJECTION, SOLUTION INTRAVENOUS at 21:22

## 2022-09-13 RX ADMIN — SODIUM CHLORIDE 1000 ML: 9 INJECTION, SOLUTION INTRAVENOUS at 21:21

## 2022-09-13 RX ADMIN — POTASSIUM CHLORIDE 40 MEQ: 1.5 POWDER, FOR SOLUTION ORAL at 21:25

## 2022-09-14 ENCOUNTER — APPOINTMENT (OUTPATIENT)
Dept: CT IMAGING | Facility: HOSPITAL | Age: 42
End: 2022-09-14

## 2022-09-14 ENCOUNTER — APPOINTMENT (OUTPATIENT)
Dept: CARDIOLOGY | Facility: HOSPITAL | Age: 42
End: 2022-09-14

## 2022-09-14 ENCOUNTER — READMISSION MANAGEMENT (OUTPATIENT)
Dept: CALL CENTER | Facility: HOSPITAL | Age: 42
End: 2022-09-14

## 2022-09-14 PROBLEM — E43 SEVERE MALNUTRITION: Status: ACTIVE | Noted: 2022-09-14

## 2022-09-14 LAB
ABO GROUP BLD: NORMAL
ABO GROUP BLD: NORMAL
ALBUMIN SERPL-MCNC: 2.7 G/DL (ref 3.5–5.2)
ALBUMIN/GLOB SERPL: 0.9 G/DL
ALP SERPL-CCNC: 97 U/L (ref 39–117)
ALT SERPL W P-5'-P-CCNC: 10 U/L (ref 1–33)
AMMONIA BLD-SCNC: 17 UMOL/L (ref 11–51)
ANION GAP SERPL CALCULATED.3IONS-SCNC: 17.5 MMOL/L (ref 5–15)
ANISOCYTOSIS BLD QL: NORMAL
ASCENDING AORTA: 3.1 CM
AST SERPL-CCNC: 24 U/L (ref 1–32)
BASOPHILS # BLD AUTO: 0.01 10*3/MM3 (ref 0–0.2)
BASOPHILS NFR BLD AUTO: 0.2 % (ref 0–1.5)
BH CV ECHO MEAS - AO ROOT DIAM: 2.9 CM
BH CV ECHO MEAS - EDV(MOD-SP2): 115 ML
BH CV ECHO MEAS - EDV(MOD-SP4): 90 ML
BH CV ECHO MEAS - EF(MOD-BP): 71 %
BH CV ECHO MEAS - ESV(MOD-SP2): 33 ML
BH CV ECHO MEAS - ESV(MOD-SP4): 26 ML
BH CV ECHO MEAS - IVSD: 0.9 CM
BH CV ECHO MEAS - LA DIMENSION: 3.6 CM
BH CV ECHO MEAS - LAT PEAK E' VEL: 13.8 CM/SEC
BH CV ECHO MEAS - LVIDD: 4.2 CM
BH CV ECHO MEAS - LVIDS: 2.4 CM
BH CV ECHO MEAS - LVOT DIAM: 2 CM
BH CV ECHO MEAS - LVPWD: 0.8 CM
BH CV ECHO MEAS - MED PEAK E' VEL: 12.4 CM/SEC
BH CV ECHO MEAS - MV A MAX VEL: 89 CM/SEC
BH CV ECHO MEAS - MV DEC TIME: 190 MSEC
BH CV ECHO MEAS - MV E MAX VEL: 131 CM/SEC
BH CV ECHO MEAS - MV E/A: 1.5
BH CV ECHO MEAS - RAP SYSTOLE: 3 MMHG
BH CV ECHO MEAS - RVDD: 2.8 CM
BH CV ECHO MEAS - RVSP: 28 MMHG
BH CV ECHO MEAS - TR MAX PG: 25 MMHG
BH CV ECHO MEAS - TR MAX VEL: 250 CM/SEC
BH CV ECHO MEASUREMENTS AVERAGE E/E' RATIO: 10
BILIRUB SERPL-MCNC: 0.8 MG/DL (ref 0–1.2)
BLD GP AB SCN SERPL QL: NEGATIVE
BUN SERPL-MCNC: 35 MG/DL (ref 6–20)
BUN/CREAT SERPL: 29.7 (ref 7–25)
CALCIUM SPEC-SCNC: 8.9 MG/DL (ref 8.6–10.5)
CHLORIDE SERPL-SCNC: 116 MMOL/L (ref 98–107)
CO2 SERPL-SCNC: 13.5 MMOL/L (ref 22–29)
CREAT SERPL-MCNC: 1.18 MG/DL (ref 0.57–1)
D-LACTATE SERPL-SCNC: 1 MMOL/L (ref 0.5–2)
D-LACTATE SERPL-SCNC: 1 MMOL/L (ref 0.5–2)
DACRYOCYTES BLD QL SMEAR: NORMAL
DEPRECATED RDW RBC AUTO: 97.8 FL (ref 37–54)
EGFRCR SERPLBLD CKD-EPI 2021: 59.3 ML/MIN/1.73
EOSINOPHIL # BLD AUTO: 0.06 10*3/MM3 (ref 0–0.4)
EOSINOPHIL NFR BLD AUTO: 1.4 % (ref 0.3–6.2)
ERYTHROCYTE [DISTWIDTH] IN BLOOD BY AUTOMATED COUNT: 28.5 % (ref 12.3–15.4)
GLOBULIN UR ELPH-MCNC: 2.9 GM/DL
GLUCOSE BLDC GLUCOMTR-MCNC: 106 MG/DL (ref 70–99)
GLUCOSE BLDC GLUCOMTR-MCNC: 113 MG/DL (ref 70–99)
GLUCOSE BLDC GLUCOMTR-MCNC: 93 MG/DL (ref 70–99)
GLUCOSE BLDC GLUCOMTR-MCNC: 96 MG/DL (ref 70–99)
GLUCOSE BLDC GLUCOMTR-MCNC: 99 MG/DL (ref 70–99)
GLUCOSE SERPL-MCNC: 61 MG/DL (ref 65–99)
HCT VFR BLD AUTO: 27.1 % (ref 34–46.6)
HGB BLD-MCNC: 9 G/DL (ref 12–15.9)
IMM GRANULOCYTES # BLD AUTO: 0.01 10*3/MM3 (ref 0–0.05)
IMM GRANULOCYTES NFR BLD AUTO: 0.2 % (ref 0–0.5)
INR PPP: 1.46 (ref 0.86–1.15)
IVRT: 99 MSEC
LEFT ATRIUM VOLUME INDEX: 45.1 ML/M2
LYMPHOCYTES # BLD AUTO: 1.49 10*3/MM3 (ref 0.7–3.1)
LYMPHOCYTES NFR BLD AUTO: 34.6 % (ref 19.6–45.3)
MACROCYTES BLD QL SMEAR: NORMAL
MAGNESIUM SERPL-MCNC: 1.5 MG/DL (ref 1.6–2.6)
MAXIMAL PREDICTED HEART RATE: 178 BPM
MCH RBC QN AUTO: 32.7 PG (ref 26.6–33)
MCHC RBC AUTO-ENTMCNC: 33.2 G/DL (ref 31.5–35.7)
MCV RBC AUTO: 98.5 FL (ref 79–97)
MICROCYTES BLD QL: NORMAL
MONOCYTES # BLD AUTO: 0.07 10*3/MM3 (ref 0.1–0.9)
MONOCYTES NFR BLD AUTO: 1.6 % (ref 5–12)
NEUTROPHILS NFR BLD AUTO: 2.67 10*3/MM3 (ref 1.7–7)
NEUTROPHILS NFR BLD AUTO: 62 % (ref 42.7–76)
NRBC BLD AUTO-RTO: 0 /100 WBC (ref 0–0.2)
OVALOCYTES BLD QL SMEAR: NORMAL
PHOSPHATE SERPL-MCNC: 1.7 MG/DL (ref 2.5–4.5)
PHOSPHATE SERPL-MCNC: 2.9 MG/DL (ref 2.5–4.5)
PLAT MORPH BLD: NORMAL
PLATELET # BLD AUTO: 105 10*3/MM3 (ref 140–450)
PMV BLD AUTO: 10.3 FL (ref 6–12)
POTASSIUM SERPL-SCNC: 3.4 MMOL/L (ref 3.5–5.2)
PROCALCITONIN SERPL-MCNC: 0.64 NG/ML (ref 0–0.25)
PROT SERPL-MCNC: 5.6 G/DL (ref 6–8.5)
PROTHROMBIN TIME: 18 SECONDS (ref 11.8–14.9)
QT INTERVAL: 418 MS
RBC # BLD AUTO: 2.75 10*6/MM3 (ref 3.77–5.28)
RETICS # AUTO: 0.02 10*6/MM3 (ref 0.02–0.13)
RETICS/RBC NFR AUTO: 0.79 % (ref 0.7–1.9)
RH BLD: POSITIVE
RH BLD: POSITIVE
SODIUM SERPL-SCNC: 147 MMOL/L (ref 136–145)
STRESS TARGET HR: 151 BPM
T&S EXPIRATION DATE: NORMAL
TSH SERPL DL<=0.05 MIU/L-ACNC: 0.64 UIU/ML (ref 0.27–4.2)
WBC MORPH BLD: NORMAL
WBC NRBC COR # BLD: 4.31 10*3/MM3 (ref 3.4–10.8)

## 2022-09-14 PROCEDURE — 84100 ASSAY OF PHOSPHORUS: CPT | Performed by: INTERNAL MEDICINE

## 2022-09-14 PROCEDURE — 99291 CRITICAL CARE FIRST HOUR: CPT | Performed by: INTERNAL MEDICINE

## 2022-09-14 PROCEDURE — 82140 ASSAY OF AMMONIA: CPT | Performed by: FAMILY MEDICINE

## 2022-09-14 PROCEDURE — 82962 GLUCOSE BLOOD TEST: CPT

## 2022-09-14 PROCEDURE — 0 MAGNESIUM SULFATE 4 GM/100ML SOLUTION: Performed by: PHYSICIAN ASSISTANT

## 2022-09-14 PROCEDURE — 25010000002 ENOXAPARIN PER 10 MG: Performed by: FAMILY MEDICINE

## 2022-09-14 PROCEDURE — 84100 ASSAY OF PHOSPHORUS: CPT | Performed by: HOSPITALIST

## 2022-09-14 PROCEDURE — 80053 COMPREHEN METABOLIC PANEL: CPT | Performed by: FAMILY MEDICINE

## 2022-09-14 PROCEDURE — 71260 CT THORAX DX C+: CPT

## 2022-09-14 PROCEDURE — 86900 BLOOD TYPING SEROLOGIC ABO: CPT | Performed by: FAMILY MEDICINE

## 2022-09-14 PROCEDURE — 85007 BL SMEAR W/DIFF WBC COUNT: CPT | Performed by: FAMILY MEDICINE

## 2022-09-14 PROCEDURE — 0 IOPAMIDOL PER 1 ML: Performed by: FAMILY MEDICINE

## 2022-09-14 PROCEDURE — 86850 RBC ANTIBODY SCREEN: CPT | Performed by: FAMILY MEDICINE

## 2022-09-14 PROCEDURE — 83605 ASSAY OF LACTIC ACID: CPT | Performed by: FAMILY MEDICINE

## 2022-09-14 PROCEDURE — 87040 BLOOD CULTURE FOR BACTERIA: CPT | Performed by: HOSPITALIST

## 2022-09-14 PROCEDURE — 84443 ASSAY THYROID STIM HORMONE: CPT | Performed by: INTERNAL MEDICINE

## 2022-09-14 PROCEDURE — 25010000002 AZITHROMYCIN PER 500 MG: Performed by: INTERNAL MEDICINE

## 2022-09-14 PROCEDURE — 85025 COMPLETE CBC W/AUTO DIFF WBC: CPT | Performed by: FAMILY MEDICINE

## 2022-09-14 PROCEDURE — 93306 TTE W/DOPPLER COMPLETE: CPT

## 2022-09-14 PROCEDURE — 83735 ASSAY OF MAGNESIUM: CPT | Performed by: INTERNAL MEDICINE

## 2022-09-14 PROCEDURE — 74177 CT ABD & PELVIS W/CONTRAST: CPT

## 2022-09-14 PROCEDURE — 93306 TTE W/DOPPLER COMPLETE: CPT | Performed by: INTERNAL MEDICINE

## 2022-09-14 PROCEDURE — 86901 BLOOD TYPING SEROLOGIC RH(D): CPT | Performed by: FAMILY MEDICINE

## 2022-09-14 PROCEDURE — 94799 UNLISTED PULMONARY SVC/PX: CPT

## 2022-09-14 PROCEDURE — 99233 SBSQ HOSP IP/OBS HIGH 50: CPT | Performed by: INTERNAL MEDICINE

## 2022-09-14 PROCEDURE — 84145 PROCALCITONIN (PCT): CPT | Performed by: FAMILY MEDICINE

## 2022-09-14 PROCEDURE — 85045 AUTOMATED RETICULOCYTE COUNT: CPT | Performed by: INTERNAL MEDICINE

## 2022-09-14 PROCEDURE — 83605 ASSAY OF LACTIC ACID: CPT | Performed by: HOSPITALIST

## 2022-09-14 PROCEDURE — 85610 PROTHROMBIN TIME: CPT | Performed by: FAMILY MEDICINE

## 2022-09-14 RX ORDER — BISACODYL 10 MG
10 SUPPOSITORY, RECTAL RECTAL DAILY PRN
Status: DISCONTINUED | OUTPATIENT
Start: 2022-09-14 | End: 2022-09-22 | Stop reason: HOSPADM

## 2022-09-14 RX ORDER — DEXTROSE MONOHYDRATE 25 G/50ML
25 INJECTION, SOLUTION INTRAVENOUS
Status: DISCONTINUED | OUTPATIENT
Start: 2022-09-14 | End: 2022-09-22 | Stop reason: HOSPADM

## 2022-09-14 RX ORDER — SODIUM CHLORIDE 9 MG/ML
40 INJECTION, SOLUTION INTRAVENOUS AS NEEDED
Status: DISCONTINUED | OUTPATIENT
Start: 2022-09-14 | End: 2022-09-22 | Stop reason: HOSPADM

## 2022-09-14 RX ORDER — NICOTINE POLACRILEX 4 MG
15 LOZENGE BUCCAL
Status: DISCONTINUED | OUTPATIENT
Start: 2022-09-14 | End: 2022-09-22 | Stop reason: HOSPADM

## 2022-09-14 RX ORDER — POTASSIUM CHLORIDE 1.5 G/1.77G
40 POWDER, FOR SOLUTION ORAL ONCE
Status: COMPLETED | OUTPATIENT
Start: 2022-09-14 | End: 2022-09-14

## 2022-09-14 RX ORDER — POLYETHYLENE GLYCOL 3350 17 G/17G
17 POWDER, FOR SOLUTION ORAL DAILY PRN
Status: DISCONTINUED | OUTPATIENT
Start: 2022-09-14 | End: 2022-09-22 | Stop reason: HOSPADM

## 2022-09-14 RX ORDER — ONDANSETRON 4 MG/1
4 TABLET, FILM COATED ORAL EVERY 6 HOURS PRN
Status: DISCONTINUED | OUTPATIENT
Start: 2022-09-14 | End: 2022-09-22 | Stop reason: HOSPADM

## 2022-09-14 RX ORDER — PANTOPRAZOLE SODIUM 40 MG/10ML
40 INJECTION, POWDER, LYOPHILIZED, FOR SOLUTION INTRAVENOUS
Status: DISCONTINUED | OUTPATIENT
Start: 2022-09-15 | End: 2022-09-14

## 2022-09-14 RX ORDER — ACETAMINOPHEN 325 MG/1
650 TABLET ORAL EVERY 6 HOURS PRN
Status: DISCONTINUED | OUTPATIENT
Start: 2022-09-14 | End: 2022-09-22 | Stop reason: HOSPADM

## 2022-09-14 RX ORDER — ACETAMINOPHEN 650 MG/1
650 SUPPOSITORY RECTAL EVERY 4 HOURS PRN
Status: DISCONTINUED | OUTPATIENT
Start: 2022-09-14 | End: 2022-09-22 | Stop reason: HOSPADM

## 2022-09-14 RX ORDER — MAGNESIUM SULFATE HEPTAHYDRATE 40 MG/ML
4 INJECTION, SOLUTION INTRAVENOUS ONCE
Status: COMPLETED | OUTPATIENT
Start: 2022-09-14 | End: 2022-09-14

## 2022-09-14 RX ORDER — NOREPINEPHRINE BIT/0.9 % NACL 8 MG/250ML
.02-.3 INFUSION BOTTLE (ML) INTRAVENOUS
Status: DISCONTINUED | OUTPATIENT
Start: 2022-09-14 | End: 2022-09-14

## 2022-09-14 RX ORDER — AMOXICILLIN 250 MG
2 CAPSULE ORAL 2 TIMES DAILY
Status: DISCONTINUED | OUTPATIENT
Start: 2022-09-14 | End: 2022-09-22 | Stop reason: HOSPADM

## 2022-09-14 RX ORDER — BISACODYL 5 MG/1
5 TABLET, DELAYED RELEASE ORAL DAILY PRN
Status: DISCONTINUED | OUTPATIENT
Start: 2022-09-14 | End: 2022-09-22 | Stop reason: HOSPADM

## 2022-09-14 RX ORDER — HYDROCODONE BITARTRATE AND ACETAMINOPHEN 7.5; 325 MG/1; MG/1
1 TABLET ORAL EVERY 8 HOURS PRN
COMMUNITY
End: 2022-09-14

## 2022-09-14 RX ORDER — LORAZEPAM 2 MG/ML
0.5 INJECTION INTRAMUSCULAR EVERY 6 HOURS PRN
Status: DISCONTINUED | OUTPATIENT
Start: 2022-09-14 | End: 2022-09-22 | Stop reason: HOSPADM

## 2022-09-14 RX ORDER — DEXTROSE MONOHYDRATE 25 G/50ML
50 INJECTION, SOLUTION INTRAVENOUS ONCE
Status: COMPLETED | OUTPATIENT
Start: 2022-09-14 | End: 2022-09-14

## 2022-09-14 RX ORDER — HYDROCODONE BITARTRATE AND ACETAMINOPHEN 5; 325 MG/1; MG/1
1 TABLET ORAL EVERY 4 HOURS PRN
Status: DISCONTINUED | OUTPATIENT
Start: 2022-09-14 | End: 2022-09-22 | Stop reason: HOSPADM

## 2022-09-14 RX ORDER — DEXTROSE MONOHYDRATE 25 G/50ML
INJECTION, SOLUTION INTRAVENOUS
Status: COMPLETED
Start: 2022-09-14 | End: 2022-09-14

## 2022-09-14 RX ORDER — CEFTRIAXONE SODIUM 1 G/50ML
1 INJECTION, SOLUTION INTRAVENOUS EVERY 24 HOURS
Status: COMPLETED | OUTPATIENT
Start: 2022-09-15 | End: 2022-09-18

## 2022-09-14 RX ORDER — ONDANSETRON 2 MG/ML
4 INJECTION INTRAMUSCULAR; INTRAVENOUS EVERY 6 HOURS PRN
Status: DISCONTINUED | OUTPATIENT
Start: 2022-09-14 | End: 2022-09-22 | Stop reason: HOSPADM

## 2022-09-14 RX ORDER — SODIUM CHLORIDE 0.9 % (FLUSH) 0.9 %
10 SYRINGE (ML) INJECTION EVERY 12 HOURS SCHEDULED
Status: DISCONTINUED | OUTPATIENT
Start: 2022-09-14 | End: 2022-09-22 | Stop reason: HOSPADM

## 2022-09-14 RX ORDER — PANTOPRAZOLE SODIUM 40 MG/10ML
40 INJECTION, POWDER, LYOPHILIZED, FOR SOLUTION INTRAVENOUS
Status: DISCONTINUED | OUTPATIENT
Start: 2022-09-15 | End: 2022-09-15

## 2022-09-14 RX ORDER — NOREPINEPHRINE BIT/0.9 % NACL 8 MG/250ML
.02-.5 INFUSION BOTTLE (ML) INTRAVENOUS
Status: DISCONTINUED | OUTPATIENT
Start: 2022-09-14 | End: 2022-09-16

## 2022-09-14 RX ORDER — FENTANYL/ROPIVACAINE/NS/PF 2-625MCG/1
15 PLASTIC BAG, INJECTION (ML) EPIDURAL
Status: COMPLETED | OUTPATIENT
Start: 2022-09-14 | End: 2022-09-14

## 2022-09-14 RX ORDER — SODIUM CHLORIDE 0.9 % (FLUSH) 0.9 %
10 SYRINGE (ML) INJECTION AS NEEDED
Status: DISCONTINUED | OUTPATIENT
Start: 2022-09-14 | End: 2022-09-22 | Stop reason: HOSPADM

## 2022-09-14 RX ADMIN — ENOXAPARIN SODIUM 40 MG: 100 INJECTION SUBCUTANEOUS at 10:30

## 2022-09-14 RX ADMIN — Medication 0.02 MCG/KG/MIN: at 04:11

## 2022-09-14 RX ADMIN — Medication 0.14 MCG/KG/MIN: at 21:57

## 2022-09-14 RX ADMIN — SODIUM CHLORIDE 500 ML: 9 INJECTION, SOLUTION INTRAVENOUS at 01:24

## 2022-09-14 RX ADMIN — POTASSIUM CHLORIDE 40 MEQ: 1.5 POWDER, FOR SOLUTION ORAL at 10:30

## 2022-09-14 RX ADMIN — SODIUM CHLORIDE, POTASSIUM CHLORIDE, SODIUM LACTATE AND CALCIUM CHLORIDE 2000 ML: 600; 310; 30; 20 INJECTION, SOLUTION INTRAVENOUS at 10:31

## 2022-09-14 RX ADMIN — Medication 10 ML: at 23:48

## 2022-09-14 RX ADMIN — DEXTROSE MONOHYDRATE 50 ML: 25 INJECTION, SOLUTION INTRAVENOUS at 02:34

## 2022-09-14 RX ADMIN — POTASSIUM PHOSPHATE, MONOBASIC AND POTASSIUM PHOSPHATE, DIBASIC 15 MMOL: 224; 236 INJECTION, SOLUTION, CONCENTRATE INTRAVENOUS at 09:58

## 2022-09-14 RX ADMIN — SODIUM BICARBONATE 150 MEQ: 84 INJECTION, SOLUTION INTRAVENOUS at 23:32

## 2022-09-14 RX ADMIN — PANTOPRAZOLE SODIUM 40 MG: 40 TABLET, DELAYED RELEASE ORAL at 06:34

## 2022-09-14 RX ADMIN — MAGNESIUM SULFATE 4 G: 4 INJECTION INTRAVENOUS at 09:58

## 2022-09-14 RX ADMIN — POTASSIUM PHOSPHATE, MONOBASIC AND POTASSIUM PHOSPHATE, DIBASIC 15 MMOL: 224; 236 INJECTION, SOLUTION, CONCENTRATE INTRAVENOUS at 13:34

## 2022-09-14 RX ADMIN — HYDROCODONE BITARTRATE AND ACETAMINOPHEN 1 TABLET: 5; 325 TABLET ORAL at 23:32

## 2022-09-14 RX ADMIN — SENNOSIDES AND DOCUSATE SODIUM 2 TABLET: 8.6; 5 TABLET ORAL at 10:31

## 2022-09-14 RX ADMIN — IOPAMIDOL 100 ML: 755 INJECTION, SOLUTION INTRAVENOUS at 03:29

## 2022-09-15 ENCOUNTER — READMISSION MANAGEMENT (OUTPATIENT)
Dept: CALL CENTER | Facility: HOSPITAL | Age: 42
End: 2022-09-15

## 2022-09-15 PROBLEM — A41.9 SEPTIC SHOCK: Status: ACTIVE | Noted: 2022-09-15

## 2022-09-15 PROBLEM — R65.21 SEPTIC SHOCK: Status: ACTIVE | Noted: 2022-09-15

## 2022-09-15 LAB
ALBUMIN SERPL-MCNC: 3 G/DL (ref 3.5–5.2)
ALBUMIN SERPL-MCNC: 3.1 G/DL (ref 3.5–5.2)
ANION GAP SERPL CALCULATED.3IONS-SCNC: 6.7 MMOL/L (ref 5–15)
ANION GAP SERPL CALCULATED.3IONS-SCNC: 8.5 MMOL/L (ref 5–15)
ANION GAP SERPL CALCULATED.3IONS-SCNC: 8.8 MMOL/L (ref 5–15)
ANISOCYTOSIS BLD QL: NORMAL
BACTERIA SPEC AEROBE CULT: ABNORMAL
BASOPHILS # BLD AUTO: 0.02 10*3/MM3 (ref 0–0.2)
BASOPHILS NFR BLD AUTO: 0.3 % (ref 0–1.5)
BUN SERPL-MCNC: 14 MG/DL (ref 6–20)
BUN SERPL-MCNC: 15 MG/DL (ref 6–20)
BUN SERPL-MCNC: 17 MG/DL (ref 6–20)
BUN/CREAT SERPL: 13.9 (ref 7–25)
BUN/CREAT SERPL: 14.3 (ref 7–25)
BUN/CREAT SERPL: 17.2 (ref 7–25)
CALCIUM SPEC-SCNC: 9 MG/DL (ref 8.6–10.5)
CALCIUM SPEC-SCNC: 9.1 MG/DL (ref 8.6–10.5)
CALCIUM SPEC-SCNC: 9.2 MG/DL (ref 8.6–10.5)
CHLORIDE SERPL-SCNC: 116 MMOL/L (ref 98–107)
CHLORIDE SERPL-SCNC: 117 MMOL/L (ref 98–107)
CHLORIDE SERPL-SCNC: 117 MMOL/L (ref 98–107)
CO2 SERPL-SCNC: 22.2 MMOL/L (ref 22–29)
CO2 SERPL-SCNC: 23.5 MMOL/L (ref 22–29)
CO2 SERPL-SCNC: 25.3 MMOL/L (ref 22–29)
CREAT SERPL-MCNC: 0.99 MG/DL (ref 0.57–1)
CREAT SERPL-MCNC: 1.01 MG/DL (ref 0.57–1)
CREAT SERPL-MCNC: 1.05 MG/DL (ref 0.57–1)
DEPRECATED RDW RBC AUTO: 95.3 FL (ref 37–54)
EGFRCR SERPLBLD CKD-EPI 2021: 67.3 ML/MIN/1.73
EGFRCR SERPLBLD CKD-EPI 2021: 70.5 ML/MIN/1.73
EGFRCR SERPLBLD CKD-EPI 2021: 72.3 ML/MIN/1.73
EOSINOPHIL # BLD AUTO: 0.16 10*3/MM3 (ref 0–0.4)
EOSINOPHIL NFR BLD AUTO: 2.5 % (ref 0.3–6.2)
ERYTHROCYTE [DISTWIDTH] IN BLOOD BY AUTOMATED COUNT: 29 % (ref 12.3–15.4)
GLUCOSE SERPL-MCNC: 113 MG/DL (ref 65–99)
GLUCOSE SERPL-MCNC: 121 MG/DL (ref 65–99)
GLUCOSE SERPL-MCNC: 128 MG/DL (ref 65–99)
HCT VFR BLD AUTO: 26.6 % (ref 34–46.6)
HGB BLD-MCNC: 9.1 G/DL (ref 12–15.9)
IMM GRANULOCYTES # BLD AUTO: 0.03 10*3/MM3 (ref 0–0.05)
IMM GRANULOCYTES NFR BLD AUTO: 0.5 % (ref 0–0.5)
LYMPHOCYTES # BLD AUTO: 2.1 10*3/MM3 (ref 0.7–3.1)
LYMPHOCYTES NFR BLD AUTO: 32.9 % (ref 19.6–45.3)
MACROCYTES BLD QL SMEAR: NORMAL
MAGNESIUM SERPL-MCNC: 2 MG/DL (ref 1.6–2.6)
MAGNESIUM SERPL-MCNC: 2.3 MG/DL (ref 1.6–2.6)
MCH RBC QN AUTO: 32.2 PG (ref 26.6–33)
MCHC RBC AUTO-ENTMCNC: 34.2 G/DL (ref 31.5–35.7)
MCV RBC AUTO: 94 FL (ref 79–97)
MONOCYTES # BLD AUTO: 0.1 10*3/MM3 (ref 0.1–0.9)
MONOCYTES NFR BLD AUTO: 1.6 % (ref 5–12)
NEUTROPHILS NFR BLD AUTO: 3.98 10*3/MM3 (ref 1.7–7)
NEUTROPHILS NFR BLD AUTO: 62.2 % (ref 42.7–76)
NRBC BLD AUTO-RTO: 0 /100 WBC (ref 0–0.2)
PHOSPHATE SERPL-MCNC: 2.1 MG/DL (ref 2.5–4.5)
PHOSPHATE SERPL-MCNC: 2.1 MG/DL (ref 2.5–4.5)
PHOSPHATE SERPL-MCNC: 2.3 MG/DL (ref 2.5–4.5)
PHOSPHATE SERPL-MCNC: 3.2 MG/DL (ref 2.5–4.5)
PLATELET # BLD AUTO: 161 10*3/MM3 (ref 140–450)
PMV BLD AUTO: 10 FL (ref 6–12)
POTASSIUM SERPL-SCNC: 2.9 MMOL/L (ref 3.5–5.2)
POTASSIUM SERPL-SCNC: 3 MMOL/L (ref 3.5–5.2)
POTASSIUM SERPL-SCNC: 3.5 MMOL/L (ref 3.5–5.2)
RBC # BLD AUTO: 2.83 10*6/MM3 (ref 3.77–5.28)
SMALL PLATELETS BLD QL SMEAR: ADEQUATE
SODIUM SERPL-SCNC: 148 MMOL/L (ref 136–145)
SODIUM SERPL-SCNC: 148 MMOL/L (ref 136–145)
SODIUM SERPL-SCNC: 149 MMOL/L (ref 136–145)
WBC MORPH BLD: NORMAL
WBC NRBC COR # BLD: 6.39 10*3/MM3 (ref 3.4–10.8)

## 2022-09-15 PROCEDURE — 25010000002 ALBUMIN HUMAN 25% PER 50 ML: Performed by: INTERNAL MEDICINE

## 2022-09-15 PROCEDURE — 99233 SBSQ HOSP IP/OBS HIGH 50: CPT | Performed by: INTERNAL MEDICINE

## 2022-09-15 PROCEDURE — 84100 ASSAY OF PHOSPHORUS: CPT | Performed by: INTERNAL MEDICINE

## 2022-09-15 PROCEDURE — P9047 ALBUMIN (HUMAN), 25%, 50ML: HCPCS | Performed by: INTERNAL MEDICINE

## 2022-09-15 PROCEDURE — 85007 BL SMEAR W/DIFF WBC COUNT: CPT | Performed by: HOSPITALIST

## 2022-09-15 PROCEDURE — 92610 EVALUATE SWALLOWING FUNCTION: CPT

## 2022-09-15 PROCEDURE — 80069 RENAL FUNCTION PANEL: CPT | Performed by: INTERNAL MEDICINE

## 2022-09-15 PROCEDURE — 25010000002 PHENTOLAMINE MESYLATE PER 5 MG: Performed by: INTERNAL MEDICINE

## 2022-09-15 PROCEDURE — 83735 ASSAY OF MAGNESIUM: CPT | Performed by: HOSPITALIST

## 2022-09-15 PROCEDURE — 25010000002 CEFTRIAXONE PER 250 MG: Performed by: HOSPITALIST

## 2022-09-15 PROCEDURE — 84100 ASSAY OF PHOSPHORUS: CPT | Performed by: HOSPITALIST

## 2022-09-15 PROCEDURE — 25010000002 ENOXAPARIN PER 10 MG: Performed by: INTERNAL MEDICINE

## 2022-09-15 PROCEDURE — 85025 COMPLETE CBC W/AUTO DIFF WBC: CPT | Performed by: HOSPITALIST

## 2022-09-15 PROCEDURE — 25010000002 AZITHROMYCIN PER 500 MG: Performed by: HOSPITALIST

## 2022-09-15 PROCEDURE — 80069 RENAL FUNCTION PANEL: CPT | Performed by: PHYSICIAN ASSISTANT

## 2022-09-15 PROCEDURE — 99291 CRITICAL CARE FIRST HOUR: CPT | Performed by: INTERNAL MEDICINE

## 2022-09-15 PROCEDURE — 80048 BASIC METABOLIC PNL TOTAL CA: CPT | Performed by: HOSPITALIST

## 2022-09-15 PROCEDURE — 87040 BLOOD CULTURE FOR BACTERIA: CPT | Performed by: HOSPITALIST

## 2022-09-15 PROCEDURE — 83735 ASSAY OF MAGNESIUM: CPT | Performed by: INTERNAL MEDICINE

## 2022-09-15 RX ORDER — PANTOPRAZOLE SODIUM 40 MG/1
40 TABLET, DELAYED RELEASE ORAL
Status: DISCONTINUED | OUTPATIENT
Start: 2022-09-16 | End: 2022-09-22 | Stop reason: HOSPADM

## 2022-09-15 RX ORDER — AZITHROMYCIN 250 MG/1
250 TABLET, FILM COATED ORAL
Status: COMPLETED | OUTPATIENT
Start: 2022-09-16 | End: 2022-09-19

## 2022-09-15 RX ORDER — ENOXAPARIN SODIUM 100 MG/ML
40 INJECTION SUBCUTANEOUS EVERY 24 HOURS
Status: DISCONTINUED | OUTPATIENT
Start: 2022-09-15 | End: 2022-09-17

## 2022-09-15 RX ORDER — ALBUMIN (HUMAN) 12.5 G/50ML
25 SOLUTION INTRAVENOUS ONCE
Status: COMPLETED | OUTPATIENT
Start: 2022-09-15 | End: 2022-09-15

## 2022-09-15 RX ORDER — CITALOPRAM 20 MG/1
20 TABLET ORAL DAILY
COMMUNITY
End: 2022-09-22 | Stop reason: HOSPADM

## 2022-09-15 RX ORDER — FENTANYL/ROPIVACAINE/NS/PF 2-625MCG/1
15 PLASTIC BAG, INJECTION (ML) EPIDURAL
Status: COMPLETED | OUTPATIENT
Start: 2022-09-15 | End: 2022-09-15

## 2022-09-15 RX ORDER — SODIUM CHLORIDE, SODIUM LACTATE, POTASSIUM CHLORIDE, CALCIUM CHLORIDE 600; 310; 30; 20 MG/100ML; MG/100ML; MG/100ML; MG/100ML
100 INJECTION, SOLUTION INTRAVENOUS CONTINUOUS
Status: DISCONTINUED | OUTPATIENT
Start: 2022-09-15 | End: 2022-09-17

## 2022-09-15 RX ORDER — SUCRALFATE 1 G/1
1 TABLET ORAL
Status: DISCONTINUED | OUTPATIENT
Start: 2022-09-15 | End: 2022-09-22 | Stop reason: HOSPADM

## 2022-09-15 RX ORDER — POTASSIUM CHLORIDE 1.5 G/1.77G
40 POWDER, FOR SOLUTION ORAL ONCE
Status: COMPLETED | OUTPATIENT
Start: 2022-09-15 | End: 2022-09-15

## 2022-09-15 RX ORDER — CITALOPRAM 20 MG/1
20 TABLET ORAL DAILY
Status: DISCONTINUED | OUTPATIENT
Start: 2022-09-15 | End: 2022-09-22 | Stop reason: HOSPADM

## 2022-09-15 RX ORDER — MIDODRINE HYDROCHLORIDE 2.5 MG/1
5 TABLET ORAL
Status: DISCONTINUED | OUTPATIENT
Start: 2022-09-15 | End: 2022-09-21 | Stop reason: SDUPTHER

## 2022-09-15 RX ADMIN — VASOPRESSIN 0.04 UNITS/MIN: 0.2 INJECTION INTRAVENOUS at 17:35

## 2022-09-15 RX ADMIN — POTASSIUM PHOSPHATE, MONOBASIC AND POTASSIUM PHOSPHATE, DIBASIC 15 MMOL: 224; 236 INJECTION, SOLUTION, CONCENTRATE INTRAVENOUS at 22:44

## 2022-09-15 RX ADMIN — Medication 10 ML: at 09:56

## 2022-09-15 RX ADMIN — Medication 10 ML: at 21:00

## 2022-09-15 RX ADMIN — SUCRALFATE 1 G: 1 TABLET ORAL at 18:43

## 2022-09-15 RX ADMIN — HYDROCODONE BITARTRATE AND ACETAMINOPHEN 1 TABLET: 5; 325 TABLET ORAL at 09:55

## 2022-09-15 RX ADMIN — ENOXAPARIN SODIUM 40 MG: 100 INJECTION SUBCUTANEOUS at 09:54

## 2022-09-15 RX ADMIN — POTASSIUM CHLORIDE 40 MEQ: 1.5 POWDER, FOR SOLUTION ORAL at 09:55

## 2022-09-15 RX ADMIN — Medication 0.18 MCG/KG/MIN: at 11:58

## 2022-09-15 RX ADMIN — SODIUM CHLORIDE, POTASSIUM CHLORIDE, SODIUM LACTATE AND CALCIUM CHLORIDE 100 ML/HR: 600; 310; 30; 20 INJECTION, SOLUTION INTRAVENOUS at 22:45

## 2022-09-15 RX ADMIN — CEFTRIAXONE SODIUM 1 G: 1 INJECTION, SOLUTION INTRAVENOUS at 00:15

## 2022-09-15 RX ADMIN — AZITHROMYCIN 500 MG: 500 INJECTION, POWDER, LYOPHILIZED, FOR SOLUTION INTRAVENOUS at 09:55

## 2022-09-15 RX ADMIN — ALBUMIN HUMAN 25 G: 0.25 SOLUTION INTRAVENOUS at 15:04

## 2022-09-15 RX ADMIN — SODIUM CHLORIDE, POTASSIUM CHLORIDE, SODIUM LACTATE AND CALCIUM CHLORIDE 100 ML/HR: 600; 310; 30; 20 INJECTION, SOLUTION INTRAVENOUS at 09:56

## 2022-09-15 RX ADMIN — MIDODRINE HYDROCHLORIDE 5 MG: 2.5 TABLET ORAL at 11:19

## 2022-09-15 RX ADMIN — PANTOPRAZOLE SODIUM 40 MG: 40 INJECTION, POWDER, FOR SOLUTION INTRAVENOUS at 09:54

## 2022-09-15 RX ADMIN — POTASSIUM PHOSPHATE, MONOBASIC AND POTASSIUM PHOSPHATE, DIBASIC 15 MMOL: 224; 236 INJECTION, SOLUTION, CONCENTRATE INTRAVENOUS at 20:04

## 2022-09-15 RX ADMIN — VASOPRESSIN 0.04 UNITS/MIN: 0.2 INJECTION INTRAVENOUS at 09:54

## 2022-09-15 RX ADMIN — SODIUM CHLORIDE 5 MG: 9 INJECTION, SOLUTION INTRAMUSCULAR; INTRAVENOUS; SUBCUTANEOUS at 21:59

## 2022-09-15 RX ADMIN — SODIUM CHLORIDE, POTASSIUM CHLORIDE, SODIUM LACTATE AND CALCIUM CHLORIDE 1000 ML: 600; 310; 30; 20 INJECTION, SOLUTION INTRAVENOUS at 09:56

## 2022-09-15 RX ADMIN — CITALOPRAM HYDROBROMIDE 20 MG: 20 TABLET ORAL at 15:04

## 2022-09-15 RX ADMIN — MIDODRINE HYDROCHLORIDE 5 MG: 2.5 TABLET ORAL at 18:42

## 2022-09-16 PROBLEM — R65.21 SEPTIC SHOCK: Status: RESOLVED | Noted: 2022-09-15 | Resolved: 2022-09-16

## 2022-09-16 PROBLEM — N39.0 ACUTE UTI: Status: ACTIVE | Noted: 2022-09-16

## 2022-09-16 PROBLEM — A41.9 SEPTIC SHOCK: Status: RESOLVED | Noted: 2022-09-15 | Resolved: 2022-09-16

## 2022-09-16 LAB
ANION GAP SERPL CALCULATED.3IONS-SCNC: 8.1 MMOL/L (ref 5–15)
ANISOCYTOSIS BLD QL: NORMAL
BASOPHILS # BLD AUTO: 0.01 10*3/MM3 (ref 0–0.2)
BASOPHILS NFR BLD AUTO: 0.4 % (ref 0–1.5)
BUN SERPL-MCNC: 13 MG/DL (ref 6–20)
BUN/CREAT SERPL: 12.6 (ref 7–25)
CALCIUM SPEC-SCNC: 8.6 MG/DL (ref 8.6–10.5)
CHLORIDE SERPL-SCNC: 116 MMOL/L (ref 98–107)
CO2 SERPL-SCNC: 22.9 MMOL/L (ref 22–29)
CREAT SERPL-MCNC: 1.03 MG/DL (ref 0.57–1)
DEPRECATED RDW RBC AUTO: 95.5 FL (ref 37–54)
EGFRCR SERPLBLD CKD-EPI 2021: 68.9 ML/MIN/1.73
EOSINOPHIL # BLD AUTO: 0.09 10*3/MM3 (ref 0–0.4)
EOSINOPHIL NFR BLD AUTO: 3.7 % (ref 0.3–6.2)
ERYTHROCYTE [DISTWIDTH] IN BLOOD BY AUTOMATED COUNT: 28.8 % (ref 12.3–15.4)
GLUCOSE SERPL-MCNC: 91 MG/DL (ref 65–99)
HCT VFR BLD AUTO: 22.8 % (ref 34–46.6)
HGB BLD-MCNC: 7.7 G/DL (ref 12–15.9)
IMM GRANULOCYTES # BLD AUTO: 0.01 10*3/MM3 (ref 0–0.05)
IMM GRANULOCYTES NFR BLD AUTO: 0.4 % (ref 0–0.5)
LYMPHOCYTES # BLD AUTO: 1.12 10*3/MM3 (ref 0.7–3.1)
LYMPHOCYTES NFR BLD AUTO: 46.1 % (ref 19.6–45.3)
MACROCYTES BLD QL SMEAR: NORMAL
MAGNESIUM SERPL-MCNC: 1.7 MG/DL (ref 1.6–2.6)
MCH RBC QN AUTO: 32 PG (ref 26.6–33)
MCHC RBC AUTO-ENTMCNC: 33.8 G/DL (ref 31.5–35.7)
MCV RBC AUTO: 94.6 FL (ref 79–97)
MICROCYTES BLD QL: NORMAL
MONOCYTES # BLD AUTO: 0.06 10*3/MM3 (ref 0.1–0.9)
MONOCYTES NFR BLD AUTO: 2.5 % (ref 5–12)
NEUTROPHILS NFR BLD AUTO: 1.14 10*3/MM3 (ref 1.7–7)
NEUTROPHILS NFR BLD AUTO: 46.9 % (ref 42.7–76)
NRBC BLD AUTO-RTO: 0 /100 WBC (ref 0–0.2)
PHOSPHATE SERPL-MCNC: 3.5 MG/DL (ref 2.5–4.5)
PLATELET # BLD AUTO: 82 10*3/MM3 (ref 140–450)
PMV BLD AUTO: 10.1 FL (ref 6–12)
POTASSIUM SERPL-SCNC: 3 MMOL/L (ref 3.5–5.2)
RBC # BLD AUTO: 2.41 10*6/MM3 (ref 3.77–5.28)
SMALL PLATELETS BLD QL SMEAR: NORMAL
SODIUM SERPL-SCNC: 147 MMOL/L (ref 136–145)
WBC MORPH BLD: NORMAL
WBC NRBC COR # BLD: 2.43 10*3/MM3 (ref 3.4–10.8)

## 2022-09-16 PROCEDURE — 25010000002 ENOXAPARIN PER 10 MG: Performed by: INTERNAL MEDICINE

## 2022-09-16 PROCEDURE — 99233 SBSQ HOSP IP/OBS HIGH 50: CPT | Performed by: INTERNAL MEDICINE

## 2022-09-16 PROCEDURE — 85007 BL SMEAR W/DIFF WBC COUNT: CPT | Performed by: HOSPITALIST

## 2022-09-16 PROCEDURE — 92526 ORAL FUNCTION THERAPY: CPT

## 2022-09-16 PROCEDURE — 0 POTASSIUM CHLORIDE 10 MEQ/100ML SOLUTION: Performed by: FAMILY MEDICINE

## 2022-09-16 PROCEDURE — 25010000002 CEFTRIAXONE PER 250 MG: Performed by: HOSPITALIST

## 2022-09-16 PROCEDURE — 83735 ASSAY OF MAGNESIUM: CPT | Performed by: HOSPITALIST

## 2022-09-16 PROCEDURE — 84100 ASSAY OF PHOSPHORUS: CPT | Performed by: HOSPITALIST

## 2022-09-16 PROCEDURE — 85025 COMPLETE CBC W/AUTO DIFF WBC: CPT | Performed by: HOSPITALIST

## 2022-09-16 PROCEDURE — 99291 CRITICAL CARE FIRST HOUR: CPT | Performed by: INTERNAL MEDICINE

## 2022-09-16 PROCEDURE — 80048 BASIC METABOLIC PNL TOTAL CA: CPT | Performed by: HOSPITALIST

## 2022-09-16 RX ORDER — POTASSIUM CHLORIDE 750 MG/1
40 CAPSULE, EXTENDED RELEASE ORAL EVERY 4 HOURS
Status: DISPENSED | OUTPATIENT
Start: 2022-09-16 | End: 2022-09-16

## 2022-09-16 RX ORDER — POTASSIUM CHLORIDE 7.45 MG/ML
10 INJECTION INTRAVENOUS
Status: DISCONTINUED | OUTPATIENT
Start: 2022-09-16 | End: 2022-09-16

## 2022-09-16 RX ADMIN — POTASSIUM CHLORIDE 10 MEQ: 7.46 INJECTION, SOLUTION INTRAVENOUS at 07:31

## 2022-09-16 RX ADMIN — CITALOPRAM HYDROBROMIDE 20 MG: 20 TABLET ORAL at 11:36

## 2022-09-16 RX ADMIN — AZITHROMYCIN MONOHYDRATE 250 MG: 250 TABLET ORAL at 11:37

## 2022-09-16 RX ADMIN — MIDODRINE HYDROCHLORIDE 5 MG: 2.5 TABLET ORAL at 11:39

## 2022-09-16 RX ADMIN — PANTOPRAZOLE SODIUM 40 MG: 40 TABLET, DELAYED RELEASE ORAL at 06:14

## 2022-09-16 RX ADMIN — SUCRALFATE 1 G: 1 TABLET ORAL at 17:44

## 2022-09-16 RX ADMIN — ENOXAPARIN SODIUM 40 MG: 100 INJECTION SUBCUTANEOUS at 11:38

## 2022-09-16 RX ADMIN — CEFTRIAXONE SODIUM 1 G: 1 INJECTION, SOLUTION INTRAVENOUS at 00:58

## 2022-09-16 RX ADMIN — MIDODRINE HYDROCHLORIDE 5 MG: 2.5 TABLET ORAL at 17:44

## 2022-09-16 RX ADMIN — POTASSIUM CHLORIDE 10 MEQ: 7.46 INJECTION, SOLUTION INTRAVENOUS at 06:15

## 2022-09-16 RX ADMIN — POTASSIUM CHLORIDE 40 MEQ: 10 CAPSULE, COATED, EXTENDED RELEASE ORAL at 11:39

## 2022-09-16 RX ADMIN — Medication 10 ML: at 11:37

## 2022-09-16 RX ADMIN — SUCRALFATE 1 G: 1 TABLET ORAL at 11:36

## 2022-09-16 RX ADMIN — SODIUM CHLORIDE, POTASSIUM CHLORIDE, SODIUM LACTATE AND CALCIUM CHLORIDE 100 ML/HR: 600; 310; 30; 20 INJECTION, SOLUTION INTRAVENOUS at 19:22

## 2022-09-17 LAB
ANION GAP SERPL CALCULATED.3IONS-SCNC: 5 MMOL/L (ref 5–15)
ANISOCYTOSIS BLD QL: NORMAL
BASOPHILS # BLD AUTO: 0 10*3/MM3 (ref 0–0.2)
BASOPHILS NFR BLD AUTO: 0 % (ref 0–1.5)
BUN SERPL-MCNC: 13 MG/DL (ref 6–20)
BUN/CREAT SERPL: 14 (ref 7–25)
BURR CELLS BLD QL SMEAR: NORMAL
CALCIUM SPEC-SCNC: 9.1 MG/DL (ref 8.6–10.5)
CHLORIDE SERPL-SCNC: 116 MMOL/L (ref 98–107)
CO2 SERPL-SCNC: 24 MMOL/L (ref 22–29)
CREAT SERPL-MCNC: 0.93 MG/DL (ref 0.57–1)
DEPRECATED RDW RBC AUTO: 97 FL (ref 37–54)
EGFRCR SERPLBLD CKD-EPI 2021: 77.9 ML/MIN/1.73
EOSINOPHIL # BLD AUTO: 0.03 10*3/MM3 (ref 0–0.4)
EOSINOPHIL NFR BLD AUTO: 2.2 % (ref 0.3–6.2)
ERYTHROCYTE [DISTWIDTH] IN BLOOD BY AUTOMATED COUNT: 28.8 % (ref 12.3–15.4)
GLUCOSE SERPL-MCNC: 80 MG/DL (ref 65–99)
HCT VFR BLD AUTO: 22.3 % (ref 34–46.6)
HGB BLD-MCNC: 7.5 G/DL (ref 12–15.9)
IMM GRANULOCYTES # BLD AUTO: 0.01 10*3/MM3 (ref 0–0.05)
IMM GRANULOCYTES NFR BLD AUTO: 0.7 % (ref 0–0.5)
LYMPHOCYTES # BLD AUTO: 0.77 10*3/MM3 (ref 0.7–3.1)
LYMPHOCYTES NFR BLD AUTO: 55.8 % (ref 19.6–45.3)
MACROCYTES BLD QL SMEAR: NORMAL
MAGNESIUM SERPL-MCNC: 1.6 MG/DL (ref 1.6–2.6)
MCH RBC QN AUTO: 32.3 PG (ref 26.6–33)
MCHC RBC AUTO-ENTMCNC: 33.6 G/DL (ref 31.5–35.7)
MCV RBC AUTO: 96.1 FL (ref 79–97)
MICROCYTES BLD QL: NORMAL
MONOCYTES # BLD AUTO: 0.04 10*3/MM3 (ref 0.1–0.9)
MONOCYTES NFR BLD AUTO: 2.9 % (ref 5–12)
NEUTROPHILS NFR BLD AUTO: 0.53 10*3/MM3 (ref 1.7–7)
NEUTROPHILS NFR BLD AUTO: 38.4 % (ref 42.7–76)
NRBC BLD AUTO-RTO: 0 /100 WBC (ref 0–0.2)
OVALOCYTES BLD QL SMEAR: NORMAL
PHOSPHATE SERPL-MCNC: 2.7 MG/DL (ref 2.5–4.5)
PLATELET # BLD AUTO: 50 10*3/MM3 (ref 140–450)
PMV BLD AUTO: 10.2 FL (ref 6–12)
POTASSIUM SERPL-SCNC: 3.7 MMOL/L (ref 3.5–5.2)
RBC # BLD AUTO: 2.32 10*6/MM3 (ref 3.77–5.28)
RETICS # AUTO: 0.01 10*6/MM3 (ref 0.02–0.13)
RETICS/RBC NFR AUTO: 0.52 % (ref 0.7–1.9)
SMALL PLATELETS BLD QL SMEAR: NORMAL
SODIUM SERPL-SCNC: 145 MMOL/L (ref 136–145)
WBC MORPH BLD: NORMAL
WBC NRBC COR # BLD: 1.38 10*3/MM3 (ref 3.4–10.8)

## 2022-09-17 PROCEDURE — 97110 THERAPEUTIC EXERCISES: CPT

## 2022-09-17 PROCEDURE — 83735 ASSAY OF MAGNESIUM: CPT | Performed by: HOSPITALIST

## 2022-09-17 PROCEDURE — 85025 COMPLETE CBC W/AUTO DIFF WBC: CPT | Performed by: HOSPITALIST

## 2022-09-17 PROCEDURE — 97161 PT EVAL LOW COMPLEX 20 MIN: CPT

## 2022-09-17 PROCEDURE — 25010000002 ENOXAPARIN PER 10 MG: Performed by: INTERNAL MEDICINE

## 2022-09-17 PROCEDURE — 97530 THERAPEUTIC ACTIVITIES: CPT

## 2022-09-17 PROCEDURE — 84100 ASSAY OF PHOSPHORUS: CPT | Performed by: HOSPITALIST

## 2022-09-17 PROCEDURE — 99233 SBSQ HOSP IP/OBS HIGH 50: CPT | Performed by: INTERNAL MEDICINE

## 2022-09-17 PROCEDURE — 85045 AUTOMATED RETICULOCYTE COUNT: CPT | Performed by: INTERNAL MEDICINE

## 2022-09-17 PROCEDURE — 85007 BL SMEAR W/DIFF WBC COUNT: CPT | Performed by: HOSPITALIST

## 2022-09-17 PROCEDURE — 25010000002 CEFTRIAXONE PER 250 MG: Performed by: HOSPITALIST

## 2022-09-17 PROCEDURE — 97165 OT EVAL LOW COMPLEX 30 MIN: CPT

## 2022-09-17 PROCEDURE — 0 MAGNESIUM SULFATE 4 GM/100ML SOLUTION: Performed by: INTERNAL MEDICINE

## 2022-09-17 PROCEDURE — 80048 BASIC METABOLIC PNL TOTAL CA: CPT | Performed by: HOSPITALIST

## 2022-09-17 RX ORDER — MAGNESIUM SULFATE HEPTAHYDRATE 40 MG/ML
4 INJECTION, SOLUTION INTRAVENOUS ONCE
Status: COMPLETED | OUTPATIENT
Start: 2022-09-17 | End: 2022-09-17

## 2022-09-17 RX ORDER — POTASSIUM CHLORIDE 750 MG/1
40 CAPSULE, EXTENDED RELEASE ORAL ONCE
Status: COMPLETED | OUTPATIENT
Start: 2022-09-17 | End: 2022-09-17

## 2022-09-17 RX ADMIN — SODIUM CHLORIDE, POTASSIUM CHLORIDE, SODIUM LACTATE AND CALCIUM CHLORIDE 100 ML/HR: 600; 310; 30; 20 INJECTION, SOLUTION INTRAVENOUS at 06:16

## 2022-09-17 RX ADMIN — Medication 10 ML: at 21:08

## 2022-09-17 RX ADMIN — SENNOSIDES AND DOCUSATE SODIUM 2 TABLET: 8.6; 5 TABLET ORAL at 21:08

## 2022-09-17 RX ADMIN — SUCRALFATE 1 G: 1 TABLET ORAL at 06:15

## 2022-09-17 RX ADMIN — MIDODRINE HYDROCHLORIDE 5 MG: 2.5 TABLET ORAL at 12:08

## 2022-09-17 RX ADMIN — MIDODRINE HYDROCHLORIDE 5 MG: 2.5 TABLET ORAL at 17:28

## 2022-09-17 RX ADMIN — SENNOSIDES AND DOCUSATE SODIUM 2 TABLET: 8.6; 5 TABLET ORAL at 09:38

## 2022-09-17 RX ADMIN — CEFTRIAXONE SODIUM 1 G: 1 INJECTION, SOLUTION INTRAVENOUS at 01:31

## 2022-09-17 RX ADMIN — ENOXAPARIN SODIUM 40 MG: 100 INJECTION SUBCUTANEOUS at 09:37

## 2022-09-17 RX ADMIN — CITALOPRAM HYDROBROMIDE 20 MG: 20 TABLET ORAL at 09:38

## 2022-09-17 RX ADMIN — MIDODRINE HYDROCHLORIDE 5 MG: 2.5 TABLET ORAL at 06:15

## 2022-09-17 RX ADMIN — SUCRALFATE 1 G: 1 TABLET ORAL at 17:29

## 2022-09-17 RX ADMIN — AZITHROMYCIN MONOHYDRATE 250 MG: 250 TABLET ORAL at 09:38

## 2022-09-17 RX ADMIN — PANTOPRAZOLE SODIUM 40 MG: 40 TABLET, DELAYED RELEASE ORAL at 06:15

## 2022-09-17 RX ADMIN — MAGNESIUM SULFATE 4 G: 4 INJECTION INTRAVENOUS at 09:38

## 2022-09-17 RX ADMIN — POTASSIUM CHLORIDE 40 MEQ: 10 CAPSULE, COATED, EXTENDED RELEASE ORAL at 09:38

## 2022-09-18 LAB
ANION GAP SERPL CALCULATED.3IONS-SCNC: 6.6 MMOL/L (ref 5–15)
APTT PPP: 37.2 SECONDS (ref 24.2–34.2)
BACTERIA SPEC AEROBE CULT: NORMAL
BACTERIA SPEC AEROBE CULT: NORMAL
BASOPHILS # BLD AUTO: 0.01 10*3/MM3 (ref 0–0.2)
BASOPHILS NFR BLD AUTO: 0.6 % (ref 0–1.5)
BUN SERPL-MCNC: 15 MG/DL (ref 6–20)
BUN/CREAT SERPL: 14.4 (ref 7–25)
CALCIUM SPEC-SCNC: 8.7 MG/DL (ref 8.6–10.5)
CHLORIDE SERPL-SCNC: 117 MMOL/L (ref 98–107)
CO2 SERPL-SCNC: 20.4 MMOL/L (ref 22–29)
CREAT SERPL-MCNC: 1.04 MG/DL (ref 0.57–1)
DEPRECATED RDW RBC AUTO: 104.4 FL (ref 37–54)
EGFRCR SERPLBLD CKD-EPI 2021: 68.1 ML/MIN/1.73
EOSINOPHIL # BLD AUTO: 0.04 10*3/MM3 (ref 0–0.4)
EOSINOPHIL NFR BLD AUTO: 2.6 % (ref 0.3–6.2)
ERYTHROCYTE [DISTWIDTH] IN BLOOD BY AUTOMATED COUNT: 28.9 % (ref 12.3–15.4)
GLUCOSE SERPL-MCNC: 80 MG/DL (ref 65–99)
HAPTOGLOB SERPL-MCNC: 21 MG/DL (ref 30–200)
HCT VFR BLD AUTO: 23.7 % (ref 34–46.6)
HGB BLD-MCNC: 7.5 G/DL (ref 12–15.9)
HIV1+2 AB SER QL: NORMAL
IMM GRANULOCYTES # BLD AUTO: 0.01 10*3/MM3 (ref 0–0.05)
IMM GRANULOCYTES NFR BLD AUTO: 0.6 % (ref 0–0.5)
INR PPP: 1.54 (ref 0.86–1.15)
LDH SERPL-CCNC: 248 U/L (ref 135–214)
LYMPHOCYTES # BLD AUTO: 0.72 10*3/MM3 (ref 0.7–3.1)
LYMPHOCYTES NFR BLD AUTO: 46.8 % (ref 19.6–45.3)
MAGNESIUM SERPL-MCNC: 2.4 MG/DL (ref 1.6–2.6)
MCH RBC QN AUTO: 32.8 PG (ref 26.6–33)
MCHC RBC AUTO-ENTMCNC: 31.6 G/DL (ref 31.5–35.7)
MCV RBC AUTO: 103.5 FL (ref 79–97)
MONOCYTES # BLD AUTO: 0.06 10*3/MM3 (ref 0.1–0.9)
MONOCYTES NFR BLD AUTO: 3.9 % (ref 5–12)
NEUTROPHILS NFR BLD AUTO: 0.7 10*3/MM3 (ref 1.7–7)
NEUTROPHILS NFR BLD AUTO: 45.5 % (ref 42.7–76)
NRBC BLD AUTO-RTO: 1.9 /100 WBC (ref 0–0.2)
PHOSPHATE SERPL-MCNC: 2.1 MG/DL (ref 2.5–4.5)
PLATELET # BLD AUTO: 67 10*3/MM3 (ref 140–450)
PMV BLD AUTO: 11.4 FL (ref 6–12)
POTASSIUM SERPL-SCNC: 3.7 MMOL/L (ref 3.5–5.2)
PROTHROMBIN TIME: 18.7 SECONDS (ref 11.8–14.9)
RBC # BLD AUTO: 2.29 10*6/MM3 (ref 3.77–5.28)
SODIUM SERPL-SCNC: 144 MMOL/L (ref 136–145)
VIT B12 BLD-MCNC: <150 PG/ML (ref 211–946)
WBC NRBC COR # BLD: 1.54 10*3/MM3 (ref 3.4–10.8)

## 2022-09-18 PROCEDURE — 83010 ASSAY OF HAPTOGLOBIN QUANT: CPT | Performed by: INTERNAL MEDICINE

## 2022-09-18 PROCEDURE — 85610 PROTHROMBIN TIME: CPT | Performed by: INTERNAL MEDICINE

## 2022-09-18 PROCEDURE — 85730 THROMBOPLASTIN TIME PARTIAL: CPT | Performed by: INTERNAL MEDICINE

## 2022-09-18 PROCEDURE — 82607 VITAMIN B-12: CPT | Performed by: INTERNAL MEDICINE

## 2022-09-18 PROCEDURE — 83615 LACTATE (LD) (LDH) ENZYME: CPT | Performed by: INTERNAL MEDICINE

## 2022-09-18 PROCEDURE — 99232 SBSQ HOSP IP/OBS MODERATE 35: CPT | Performed by: INTERNAL MEDICINE

## 2022-09-18 PROCEDURE — 86225 DNA ANTIBODY NATIVE: CPT | Performed by: INTERNAL MEDICINE

## 2022-09-18 PROCEDURE — 86038 ANTINUCLEAR ANTIBODIES: CPT | Performed by: INTERNAL MEDICINE

## 2022-09-18 PROCEDURE — 25010000002 CEFTRIAXONE PER 250 MG: Performed by: HOSPITALIST

## 2022-09-18 PROCEDURE — 80048 BASIC METABOLIC PNL TOTAL CA: CPT | Performed by: HOSPITALIST

## 2022-09-18 PROCEDURE — G0432 EIA HIV-1/HIV-2 SCREEN: HCPCS | Performed by: INTERNAL MEDICINE

## 2022-09-18 PROCEDURE — 83735 ASSAY OF MAGNESIUM: CPT | Performed by: HOSPITALIST

## 2022-09-18 PROCEDURE — 85025 COMPLETE CBC W/AUTO DIFF WBC: CPT | Performed by: HOSPITALIST

## 2022-09-18 PROCEDURE — 84100 ASSAY OF PHOSPHORUS: CPT | Performed by: HOSPITALIST

## 2022-09-18 RX ORDER — FENTANYL/ROPIVACAINE/NS/PF 2-625MCG/1
15 PLASTIC BAG, INJECTION (ML) EPIDURAL
Status: COMPLETED | OUTPATIENT
Start: 2022-09-18 | End: 2022-09-18

## 2022-09-18 RX ADMIN — POTASSIUM PHOSPHATE, MONOBASIC AND POTASSIUM PHOSPHATE, DIBASIC 15 MMOL: 224; 236 INJECTION, SOLUTION, CONCENTRATE INTRAVENOUS at 08:58

## 2022-09-18 RX ADMIN — HYDROCODONE BITARTRATE AND ACETAMINOPHEN 1 TABLET: 5; 325 TABLET ORAL at 13:33

## 2022-09-18 RX ADMIN — CITALOPRAM HYDROBROMIDE 20 MG: 20 TABLET ORAL at 08:58

## 2022-09-18 RX ADMIN — MIDODRINE HYDROCHLORIDE 5 MG: 2.5 TABLET ORAL at 11:31

## 2022-09-18 RX ADMIN — CEFTRIAXONE SODIUM 1 G: 1 INJECTION, SOLUTION INTRAVENOUS at 23:21

## 2022-09-18 RX ADMIN — POTASSIUM PHOSPHATE, MONOBASIC AND POTASSIUM PHOSPHATE, DIBASIC 15 MMOL: 224; 236 INJECTION, SOLUTION, CONCENTRATE INTRAVENOUS at 11:30

## 2022-09-18 RX ADMIN — Medication 10 ML: at 23:22

## 2022-09-18 RX ADMIN — Medication 10 ML: at 08:58

## 2022-09-18 RX ADMIN — SENNOSIDES AND DOCUSATE SODIUM 2 TABLET: 8.6; 5 TABLET ORAL at 08:58

## 2022-09-18 RX ADMIN — PANTOPRAZOLE SODIUM 40 MG: 40 TABLET, DELAYED RELEASE ORAL at 07:03

## 2022-09-18 RX ADMIN — SUCRALFATE 1 G: 1 TABLET ORAL at 07:03

## 2022-09-18 RX ADMIN — MIDODRINE HYDROCHLORIDE 5 MG: 2.5 TABLET ORAL at 16:49

## 2022-09-18 RX ADMIN — SUCRALFATE 1 G: 1 TABLET ORAL at 16:49

## 2022-09-18 RX ADMIN — MIDODRINE HYDROCHLORIDE 5 MG: 2.5 TABLET ORAL at 07:03

## 2022-09-18 RX ADMIN — AZITHROMYCIN MONOHYDRATE 250 MG: 250 TABLET ORAL at 08:58

## 2022-09-18 RX ADMIN — CEFTRIAXONE SODIUM 1 G: 1 INJECTION, SOLUTION INTRAVENOUS at 00:51

## 2022-09-19 LAB
ANION GAP SERPL CALCULATED.3IONS-SCNC: 7.8 MMOL/L (ref 5–15)
ANISOCYTOSIS BLD QL: NORMAL
BACTERIA SPEC AEROBE CULT: NORMAL
BASOPHILS # BLD AUTO: 0 10*3/MM3 (ref 0–0.2)
BASOPHILS NFR BLD AUTO: 0 % (ref 0–1.5)
BUN SERPL-MCNC: 15 MG/DL (ref 6–20)
BUN/CREAT SERPL: 14.6 (ref 7–25)
BURR CELLS BLD QL SMEAR: NORMAL
CALCIUM SPEC-SCNC: 8.8 MG/DL (ref 8.6–10.5)
CHLORIDE SERPL-SCNC: 116 MMOL/L (ref 98–107)
CO2 SERPL-SCNC: 22.2 MMOL/L (ref 22–29)
CREAT SERPL-MCNC: 1.03 MG/DL (ref 0.57–1)
DEPRECATED RDW RBC AUTO: 97.5 FL (ref 37–54)
DSDNA IGG SERPL IA-ACNC: NEGATIVE [IU]/ML
EGFRCR SERPLBLD CKD-EPI 2021: 68.9 ML/MIN/1.73
EOSINOPHIL # BLD AUTO: 0.04 10*3/MM3 (ref 0–0.4)
EOSINOPHIL NFR BLD AUTO: 2.6 % (ref 0.3–6.2)
ERYTHROCYTE [DISTWIDTH] IN BLOOD BY AUTOMATED COUNT: 28.7 % (ref 12.3–15.4)
GLUCOSE SERPL-MCNC: 69 MG/DL (ref 65–99)
HCT VFR BLD AUTO: 21.3 % (ref 34–46.6)
HGB BLD-MCNC: 7.1 G/DL (ref 12–15.9)
IMM GRANULOCYTES # BLD AUTO: 0.01 10*3/MM3 (ref 0–0.05)
IMM GRANULOCYTES NFR BLD AUTO: 0.6 % (ref 0–0.5)
LYMPHOCYTES # BLD AUTO: 0.85 10*3/MM3 (ref 0.7–3.1)
LYMPHOCYTES NFR BLD AUTO: 55.2 % (ref 19.6–45.3)
MACROCYTES BLD QL SMEAR: NORMAL
MAGNESIUM SERPL-MCNC: 2 MG/DL (ref 1.6–2.6)
MCH RBC QN AUTO: 32.6 PG (ref 26.6–33)
MCHC RBC AUTO-ENTMCNC: 33.3 G/DL (ref 31.5–35.7)
MCV RBC AUTO: 97.7 FL (ref 79–97)
MONOCYTES # BLD AUTO: 0.03 10*3/MM3 (ref 0.1–0.9)
MONOCYTES NFR BLD AUTO: 1.9 % (ref 5–12)
NEUTROPHILS NFR BLD AUTO: 0.61 10*3/MM3 (ref 1.7–7)
NEUTROPHILS NFR BLD AUTO: 39.7 % (ref 42.7–76)
NRBC BLD AUTO-RTO: 0 /100 WBC (ref 0–0.2)
NUCLEAR IGG SER IA-RTO: NEGATIVE
OVALOCYTES BLD QL SMEAR: NORMAL
PHOSPHATE SERPL-MCNC: 3.1 MG/DL (ref 2.5–4.5)
PLATELET # BLD AUTO: 77 10*3/MM3 (ref 140–450)
PMV BLD AUTO: 12.3 FL (ref 6–12)
POIKILOCYTOSIS BLD QL SMEAR: NORMAL
POTASSIUM SERPL-SCNC: 3.9 MMOL/L (ref 3.5–5.2)
RBC # BLD AUTO: 2.18 10*6/MM3 (ref 3.77–5.28)
SMALL PLATELETS BLD QL SMEAR: NORMAL
SODIUM SERPL-SCNC: 146 MMOL/L (ref 136–145)
WBC MORPH BLD: NORMAL
WBC NRBC COR # BLD: 1.54 10*3/MM3 (ref 3.4–10.8)

## 2022-09-19 PROCEDURE — 83735 ASSAY OF MAGNESIUM: CPT | Performed by: HOSPITALIST

## 2022-09-19 PROCEDURE — 99233 SBSQ HOSP IP/OBS HIGH 50: CPT | Performed by: INTERNAL MEDICINE

## 2022-09-19 PROCEDURE — 97110 THERAPEUTIC EXERCISES: CPT

## 2022-09-19 PROCEDURE — 85025 COMPLETE CBC W/AUTO DIFF WBC: CPT | Performed by: HOSPITALIST

## 2022-09-19 PROCEDURE — 97530 THERAPEUTIC ACTIVITIES: CPT

## 2022-09-19 PROCEDURE — 85007 BL SMEAR W/DIFF WBC COUNT: CPT | Performed by: HOSPITALIST

## 2022-09-19 PROCEDURE — 84100 ASSAY OF PHOSPHORUS: CPT | Performed by: HOSPITALIST

## 2022-09-19 PROCEDURE — 80048 BASIC METABOLIC PNL TOTAL CA: CPT | Performed by: HOSPITALIST

## 2022-09-19 RX ORDER — CHOLECALCIFEROL (VITAMIN D3) 125 MCG
1000 CAPSULE ORAL DAILY
Status: DISCONTINUED | OUTPATIENT
Start: 2022-09-19 | End: 2022-09-20

## 2022-09-19 RX ADMIN — HYDROCODONE BITARTRATE AND ACETAMINOPHEN 1 TABLET: 5; 325 TABLET ORAL at 17:08

## 2022-09-19 RX ADMIN — PANTOPRAZOLE SODIUM 40 MG: 40 TABLET, DELAYED RELEASE ORAL at 09:08

## 2022-09-19 RX ADMIN — MIDODRINE HYDROCHLORIDE 5 MG: 2.5 TABLET ORAL at 09:08

## 2022-09-19 RX ADMIN — ZINC OXIDE 1 APPLICATION: 200 OINTMENT TOPICAL at 15:27

## 2022-09-19 RX ADMIN — CYANOCOBALAMIN TAB 500 MCG 1000 MCG: 500 TAB at 12:34

## 2022-09-19 RX ADMIN — MIDODRINE HYDROCHLORIDE 5 MG: 2.5 TABLET ORAL at 12:34

## 2022-09-19 RX ADMIN — MIDODRINE HYDROCHLORIDE 5 MG: 2.5 TABLET ORAL at 17:06

## 2022-09-19 RX ADMIN — SUCRALFATE 1 G: 1 TABLET ORAL at 09:08

## 2022-09-19 RX ADMIN — AZITHROMYCIN MONOHYDRATE 250 MG: 250 TABLET ORAL at 09:08

## 2022-09-19 RX ADMIN — SUCRALFATE 1 G: 1 TABLET ORAL at 17:06

## 2022-09-19 RX ADMIN — SENNOSIDES AND DOCUSATE SODIUM 2 TABLET: 8.6; 5 TABLET ORAL at 09:10

## 2022-09-19 RX ADMIN — Medication 10 ML: at 09:10

## 2022-09-19 RX ADMIN — CITALOPRAM HYDROBROMIDE 20 MG: 20 TABLET ORAL at 09:08

## 2022-09-20 LAB
ANION GAP SERPL CALCULATED.3IONS-SCNC: 7.1 MMOL/L (ref 5–15)
ANISOCYTOSIS BLD QL: NORMAL
BACTERIA SPEC AEROBE CULT: NORMAL
BASOPHILS # BLD AUTO: 0.01 10*3/MM3 (ref 0–0.2)
BASOPHILS NFR BLD AUTO: 0.5 % (ref 0–1.5)
BLD GP AB SCN SERPL QL: NEGATIVE
BUN SERPL-MCNC: 16 MG/DL (ref 6–20)
BUN/CREAT SERPL: 13.9 (ref 7–25)
C3 FRG RBC-MCNC: NORMAL
CALCIUM SPEC-SCNC: 8.8 MG/DL (ref 8.6–10.5)
CHLORIDE SERPL-SCNC: 115 MMOL/L (ref 98–107)
CHROMATIN AB SERPL-ACNC: <10 IU/ML (ref 0–14)
CO2 SERPL-SCNC: 22.9 MMOL/L (ref 22–29)
CREAT SERPL-MCNC: 1.15 MG/DL (ref 0.57–1)
DAT C3: NEGATIVE
DAT IGG GEL: NEGATIVE
DEPRECATED RDW RBC AUTO: 96.9 FL (ref 37–54)
EGFRCR SERPLBLD CKD-EPI 2021: 60.4 ML/MIN/1.73
EOSINOPHIL # BLD AUTO: 0.04 10*3/MM3 (ref 0–0.4)
EOSINOPHIL NFR BLD AUTO: 2 % (ref 0.3–6.2)
ERYTHROCYTE [DISTWIDTH] IN BLOOD BY AUTOMATED COUNT: 28.3 % (ref 12.3–15.4)
GLUCOSE SERPL-MCNC: 78 MG/DL (ref 65–99)
HCT VFR BLD AUTO: 22.5 % (ref 34–46.6)
HGB BLD-MCNC: 7.3 G/DL (ref 12–15.9)
HYPOCHROMIA BLD QL: NORMAL
IMM GRANULOCYTES # BLD AUTO: 0.01 10*3/MM3 (ref 0–0.05)
IMM GRANULOCYTES NFR BLD AUTO: 0.5 % (ref 0–0.5)
LYMPHOCYTES # BLD AUTO: 0.97 10*3/MM3 (ref 0.7–3.1)
LYMPHOCYTES NFR BLD AUTO: 48.3 % (ref 19.6–45.3)
MAGNESIUM SERPL-MCNC: 2 MG/DL (ref 1.6–2.6)
MCH RBC QN AUTO: 32 PG (ref 26.6–33)
MCHC RBC AUTO-ENTMCNC: 32.4 G/DL (ref 31.5–35.7)
MCV RBC AUTO: 98.7 FL (ref 79–97)
MONOCYTES # BLD AUTO: 0.07 10*3/MM3 (ref 0.1–0.9)
MONOCYTES NFR BLD AUTO: 3.5 % (ref 5–12)
NEUTROPHILS NFR BLD AUTO: 0.91 10*3/MM3 (ref 1.7–7)
NEUTROPHILS NFR BLD AUTO: 45.2 % (ref 42.7–76)
NRBC BLD AUTO-RTO: 0 /100 WBC (ref 0–0.2)
OVALOCYTES BLD QL SMEAR: NORMAL
PHOSPHATE SERPL-MCNC: 2.4 MG/DL (ref 2.5–4.5)
PLATELET # BLD AUTO: 87 10*3/MM3 (ref 140–450)
PMV BLD AUTO: 12.4 FL (ref 6–12)
POIKILOCYTOSIS BLD QL SMEAR: NORMAL
POTASSIUM SERPL-SCNC: 3.7 MMOL/L (ref 3.5–5.2)
RBC # BLD AUTO: 2.28 10*6/MM3 (ref 3.77–5.28)
SMALL PLATELETS BLD QL SMEAR: NORMAL
SMUDGE CELLS BLD QL SMEAR: NORMAL
SODIUM SERPL-SCNC: 145 MMOL/L (ref 136–145)
WBC NRBC COR # BLD: 2.01 10*3/MM3 (ref 3.4–10.8)

## 2022-09-20 PROCEDURE — 86880 COOMBS TEST DIRECT: CPT | Performed by: INTERNAL MEDICINE

## 2022-09-20 PROCEDURE — 85025 COMPLETE CBC W/AUTO DIFF WBC: CPT | Performed by: INTERNAL MEDICINE

## 2022-09-20 PROCEDURE — 97110 THERAPEUTIC EXERCISES: CPT

## 2022-09-20 PROCEDURE — 80048 BASIC METABOLIC PNL TOTAL CA: CPT | Performed by: INTERNAL MEDICINE

## 2022-09-20 PROCEDURE — 86431 RHEUMATOID FACTOR QUANT: CPT | Performed by: INTERNAL MEDICINE

## 2022-09-20 PROCEDURE — 86235 NUCLEAR ANTIGEN ANTIBODY: CPT | Performed by: INTERNAL MEDICINE

## 2022-09-20 PROCEDURE — 83735 ASSAY OF MAGNESIUM: CPT | Performed by: INTERNAL MEDICINE

## 2022-09-20 PROCEDURE — 84100 ASSAY OF PHOSPHORUS: CPT | Performed by: INTERNAL MEDICINE

## 2022-09-20 PROCEDURE — 86850 RBC ANTIBODY SCREEN: CPT | Performed by: INTERNAL MEDICINE

## 2022-09-20 PROCEDURE — 99233 SBSQ HOSP IP/OBS HIGH 50: CPT | Performed by: INTERNAL MEDICINE

## 2022-09-20 PROCEDURE — 25010000002 CYANOCOBALAMIN PER 1000 MCG: Performed by: INTERNAL MEDICINE

## 2022-09-20 PROCEDURE — 85007 BL SMEAR W/DIFF WBC COUNT: CPT | Performed by: INTERNAL MEDICINE

## 2022-09-20 PROCEDURE — 97530 THERAPEUTIC ACTIVITIES: CPT

## 2022-09-20 RX ORDER — CYANOCOBALAMIN 1000 UG/ML
1000 INJECTION, SOLUTION INTRAMUSCULAR; SUBCUTANEOUS DAILY
Status: DISCONTINUED | OUTPATIENT
Start: 2022-09-21 | End: 2022-09-22 | Stop reason: HOSPADM

## 2022-09-20 RX ORDER — CYANOCOBALAMIN 1000 UG/ML
1000 INJECTION, SOLUTION INTRAMUSCULAR; SUBCUTANEOUS
Status: DISCONTINUED | OUTPATIENT
Start: 2022-09-20 | End: 2022-09-22 | Stop reason: HOSPADM

## 2022-09-20 RX ADMIN — ZINC OXIDE 1 APPLICATION: 200 OINTMENT TOPICAL at 15:32

## 2022-09-20 RX ADMIN — CITALOPRAM HYDROBROMIDE 20 MG: 20 TABLET ORAL at 09:16

## 2022-09-20 RX ADMIN — MIDODRINE HYDROCHLORIDE 5 MG: 2.5 TABLET ORAL at 04:53

## 2022-09-20 RX ADMIN — PANTOPRAZOLE SODIUM 40 MG: 40 TABLET, DELAYED RELEASE ORAL at 04:52

## 2022-09-20 RX ADMIN — Medication 10 ML: at 09:16

## 2022-09-20 RX ADMIN — MIDODRINE HYDROCHLORIDE 5 MG: 2.5 TABLET ORAL at 11:57

## 2022-09-20 RX ADMIN — CYANOCOBALAMIN 1000 MCG: 1000 INJECTION, SOLUTION INTRAMUSCULAR at 15:32

## 2022-09-20 RX ADMIN — SUCRALFATE 1 G: 1 TABLET ORAL at 04:53

## 2022-09-20 RX ADMIN — MIDODRINE HYDROCHLORIDE 5 MG: 2.5 TABLET ORAL at 18:06

## 2022-09-20 RX ADMIN — ZINC OXIDE 1 APPLICATION: 200 OINTMENT TOPICAL at 09:16

## 2022-09-20 RX ADMIN — SUCRALFATE 1 G: 1 TABLET ORAL at 18:06

## 2022-09-20 RX ADMIN — CYANOCOBALAMIN TAB 500 MCG 1000 MCG: 500 TAB at 09:16

## 2022-09-21 LAB
ALBUMIN SERPL-MCNC: 2.2 G/DL (ref 3.5–5.2)
ALBUMIN/GLOB SERPL: 0.8 G/DL
ALP SERPL-CCNC: 82 U/L (ref 39–117)
ALT SERPL W P-5'-P-CCNC: 10 U/L (ref 1–33)
ANION GAP SERPL CALCULATED.3IONS-SCNC: 9.3 MMOL/L (ref 5–15)
AST SERPL-CCNC: 17 U/L (ref 1–32)
BASOPHILS # BLD MANUAL: 0.03 10*3/MM3 (ref 0–0.2)
BASOPHILS NFR BLD MANUAL: 2 % (ref 0–1.5)
BILIRUB SERPL-MCNC: 0.5 MG/DL (ref 0–1.2)
BUN SERPL-MCNC: 14 MG/DL (ref 6–20)
BUN/CREAT SERPL: 10.6 (ref 7–25)
BURR CELLS BLD QL SMEAR: ABNORMAL
CALCIUM SPEC-SCNC: 9.1 MG/DL (ref 8.6–10.5)
CD59 CELLS BLD QL: NORMAL
CHLORIDE SERPL-SCNC: 117 MMOL/L (ref 98–107)
CHROMATIN AB SERPL-ACNC: <0.2 AI (ref 0–0.9)
CO2 SERPL-SCNC: 21.7 MMOL/L (ref 22–29)
CREAT SERPL-MCNC: 1.32 MG/DL (ref 0.57–1)
DEPRECATED RDW RBC AUTO: 94.5 FL (ref 37–54)
DSDNA AB SER-ACNC: <1 IU/ML (ref 0–9)
EGFRCR SERPLBLD CKD-EPI 2021: 51.2 ML/MIN/1.73
ENA RNP AB SER-ACNC: 0.3 AI (ref 0–0.9)
ENA SM AB SER-ACNC: <0.2 AI (ref 0–0.9)
ENA SS-A AB SER-ACNC: <0.2 AI (ref 0–0.9)
ENA SS-B AB SER-ACNC: <0.2 AI (ref 0–0.9)
ERYTHROCYTE [DISTWIDTH] IN BLOOD BY AUTOMATED COUNT: 28.1 % (ref 12.3–15.4)
FERRITIN SERPL-MCNC: 801.8 NG/ML (ref 13–150)
GLOBULIN UR ELPH-MCNC: 2.8 GM/DL
GLUCOSE SERPL-MCNC: 88 MG/DL (ref 65–99)
HCT VFR BLD AUTO: 21.8 % (ref 34–46.6)
HCYS SERPL-MCNC: 153.1 UMOL/L (ref 0–15)
HGB BLD-MCNC: 7.1 G/DL (ref 12–15.9)
LARGE PLATELETS: ABNORMAL
LYMPHOCYTES # BLD MANUAL: 0.71 10*3/MM3 (ref 0.7–3.1)
MACROCYTES BLD QL SMEAR: ABNORMAL
MAGNESIUM SERPL-MCNC: 1.7 MG/DL (ref 1.6–2.6)
MCH RBC QN AUTO: 31.7 PG (ref 26.6–33)
MCHC RBC AUTO-ENTMCNC: 32.6 G/DL (ref 31.5–35.7)
MCV RBC AUTO: 97.3 FL (ref 79–97)
NEUTROPHILS # BLD AUTO: 0.87 10*3/MM3 (ref 1.7–7)
NEUTROPHILS NFR BLD MANUAL: 52 % (ref 42.7–76)
NEUTS BAND NFR BLD MANUAL: 2 % (ref 0–5)
NRBC SPEC MANUAL: 4 /100 WBC (ref 0–0.2)
PHOSPHATE SERPL-MCNC: 1.4 MG/DL (ref 2.5–4.5)
PLATELET # BLD AUTO: 95 10*3/MM3 (ref 140–450)
PMV BLD AUTO: 12 FL (ref 6–12)
POTASSIUM SERPL-SCNC: 2.9 MMOL/L (ref 3.5–5.2)
PROT SERPL-MCNC: 5 G/DL (ref 6–8.5)
RBC # BLD AUTO: 2.24 10*6/MM3 (ref 3.77–5.28)
RETICS # AUTO: 0.02 10*6/MM3 (ref 0.02–0.13)
RETICS/RBC NFR AUTO: 0.7 % (ref 0.7–1.9)
RHEUMATOID FACT SERPL-ACNC: <10 IU/ML
SCAN SLIDE: NORMAL
SCHISTOCYTES BLD QL SMEAR: ABNORMAL
SMALL PLATELETS BLD QL SMEAR: ABNORMAL
SODIUM SERPL-SCNC: 148 MMOL/L (ref 136–145)
TOXIC GRANULATION: ABNORMAL
VARIANT LYMPHS NFR BLD MANUAL: 44 % (ref 19.6–45.3)
WBC NRBC COR # BLD: 1.61 10*3/MM3 (ref 3.4–10.8)

## 2022-09-21 PROCEDURE — 85045 AUTOMATED RETICULOCYTE COUNT: CPT | Performed by: INTERNAL MEDICINE

## 2022-09-21 PROCEDURE — 83090 ASSAY OF HOMOCYSTEINE: CPT | Performed by: INTERNAL MEDICINE

## 2022-09-21 PROCEDURE — 85025 COMPLETE CBC W/AUTO DIFF WBC: CPT | Performed by: INTERNAL MEDICINE

## 2022-09-21 PROCEDURE — 99239 HOSP IP/OBS DSCHRG MGMT >30: CPT | Performed by: INTERNAL MEDICINE

## 2022-09-21 PROCEDURE — 85007 BL SMEAR W/DIFF WBC COUNT: CPT | Performed by: INTERNAL MEDICINE

## 2022-09-21 PROCEDURE — 88185 FLOWCYTOMETRY/TC ADD-ON: CPT

## 2022-09-21 PROCEDURE — 82728 ASSAY OF FERRITIN: CPT | Performed by: INTERNAL MEDICINE

## 2022-09-21 PROCEDURE — 88184 FLOWCYTOMETRY/ TC 1 MARKER: CPT

## 2022-09-21 PROCEDURE — 86334 IMMUNOFIX E-PHORESIS SERUM: CPT | Performed by: INTERNAL MEDICINE

## 2022-09-21 PROCEDURE — 82784 ASSAY IGA/IGD/IGG/IGM EACH: CPT | Performed by: INTERNAL MEDICINE

## 2022-09-21 PROCEDURE — 83521 IG LIGHT CHAINS FREE EACH: CPT | Performed by: INTERNAL MEDICINE

## 2022-09-21 PROCEDURE — 83735 ASSAY OF MAGNESIUM: CPT | Performed by: INTERNAL MEDICINE

## 2022-09-21 PROCEDURE — 83921 ORGANIC ACID SINGLE QUANT: CPT | Performed by: INTERNAL MEDICINE

## 2022-09-21 PROCEDURE — 84100 ASSAY OF PHOSPHORUS: CPT | Performed by: INTERNAL MEDICINE

## 2022-09-21 PROCEDURE — 80053 COMPREHEN METABOLIC PANEL: CPT | Performed by: INTERNAL MEDICINE

## 2022-09-21 RX ORDER — MULTIVITAMIN
1 CAPSULE ORAL DAILY
Qty: 30 CAPSULE | Refills: 11
Start: 2022-09-21 | End: 2023-09-21

## 2022-09-21 RX ORDER — FENTANYL/ROPIVACAINE/NS/PF 2-625MCG/1
15 PLASTIC BAG, INJECTION (ML) EPIDURAL ONCE
Status: COMPLETED | OUTPATIENT
Start: 2022-09-21 | End: 2022-09-21

## 2022-09-21 RX ORDER — LANOLIN ALCOHOL/MO/W.PET/CERES
1000 CREAM (GRAM) TOPICAL DAILY
Start: 2022-09-21

## 2022-09-21 RX ORDER — MIDODRINE HYDROCHLORIDE 10 MG/1
10 TABLET ORAL
Status: DISCONTINUED | OUTPATIENT
Start: 2022-09-21 | End: 2022-09-22 | Stop reason: HOSPADM

## 2022-09-21 RX ORDER — POTASSIUM CHLORIDE 750 MG/1
40 CAPSULE, EXTENDED RELEASE ORAL ONCE
Status: COMPLETED | OUTPATIENT
Start: 2022-09-21 | End: 2022-09-21

## 2022-09-21 RX ORDER — MIDODRINE HYDROCHLORIDE 5 MG/1
5 TABLET ORAL
Start: 2022-09-21 | End: 2022-09-22 | Stop reason: SDUPTHER

## 2022-09-21 RX ADMIN — ZINC OXIDE 1 APPLICATION: 200 OINTMENT TOPICAL at 09:14

## 2022-09-21 RX ADMIN — SENNOSIDES AND DOCUSATE SODIUM 2 TABLET: 8.6; 5 TABLET ORAL at 09:13

## 2022-09-21 RX ADMIN — SUCRALFATE 1 G: 1 TABLET ORAL at 16:28

## 2022-09-21 RX ADMIN — POTASSIUM CHLORIDE 40 MEQ: 10 CAPSULE, COATED, EXTENDED RELEASE ORAL at 16:26

## 2022-09-21 RX ADMIN — POTASSIUM PHOSPHATE, MONOBASIC AND POTASSIUM PHOSPHATE, DIBASIC 15 MMOL: 224; 236 INJECTION, SOLUTION, CONCENTRATE INTRAVENOUS at 17:24

## 2022-09-21 RX ADMIN — MIDODRINE HYDROCHLORIDE 10 MG: 10 TABLET ORAL at 21:49

## 2022-09-21 RX ADMIN — MIDODRINE HYDROCHLORIDE 5 MG: 2.5 TABLET ORAL at 16:28

## 2022-09-21 RX ADMIN — ZINC OXIDE 1 APPLICATION: 200 OINTMENT TOPICAL at 16:26

## 2022-09-21 RX ADMIN — MIDODRINE HYDROCHLORIDE 5 MG: 2.5 TABLET ORAL at 05:34

## 2022-09-21 RX ADMIN — SODIUM CHLORIDE 500 ML: 9 INJECTION, SOLUTION INTRAVENOUS at 23:02

## 2022-09-21 RX ADMIN — PANTOPRAZOLE SODIUM 40 MG: 40 TABLET, DELAYED RELEASE ORAL at 05:34

## 2022-09-21 RX ADMIN — SUCRALFATE 1 G: 1 TABLET ORAL at 16:32

## 2022-09-21 RX ADMIN — HYDROCODONE BITARTRATE AND ACETAMINOPHEN 1 TABLET: 5; 325 TABLET ORAL at 09:18

## 2022-09-21 RX ADMIN — SODIUM CHLORIDE 500 ML: 9 INJECTION, SOLUTION INTRAVENOUS at 19:16

## 2022-09-21 RX ADMIN — MIDODRINE HYDROCHLORIDE 5 MG: 2.5 TABLET ORAL at 11:12

## 2022-09-21 RX ADMIN — CITALOPRAM HYDROBROMIDE 20 MG: 20 TABLET ORAL at 09:15

## 2022-09-21 RX ADMIN — SUCRALFATE 1 G: 1 TABLET ORAL at 05:34

## 2022-09-21 RX ADMIN — ZINC OXIDE 1 APPLICATION: 200 OINTMENT TOPICAL at 21:04

## 2022-09-21 RX ADMIN — MIDODRINE HYDROCHLORIDE 5 MG: 2.5 TABLET ORAL at 16:32

## 2022-09-21 RX ADMIN — Medication 10 ML: at 21:04

## 2022-09-21 RX ADMIN — Medication 10 ML: at 09:14

## 2022-09-22 VITALS
BODY MASS INDEX: 23.41 KG/M2 | WEIGHT: 127.21 LBS | SYSTOLIC BLOOD PRESSURE: 102 MMHG | OXYGEN SATURATION: 98 % | DIASTOLIC BLOOD PRESSURE: 60 MMHG | TEMPERATURE: 97.8 F | HEART RATE: 62 BPM | RESPIRATION RATE: 16 BRPM | HEIGHT: 62 IN

## 2022-09-22 LAB
ANION GAP SERPL CALCULATED.3IONS-SCNC: 9.7 MMOL/L (ref 5–15)
BASOPHILS # BLD AUTO: 0 10*3/MM3 (ref 0–0.2)
BASOPHILS NFR BLD AUTO: 0 % (ref 0–1.5)
BUN SERPL-MCNC: 14 MG/DL (ref 6–20)
BUN/CREAT SERPL: 11.8 (ref 7–25)
CALCIUM SPEC-SCNC: 8.7 MG/DL (ref 8.6–10.5)
CHLORIDE SERPL-SCNC: 115 MMOL/L (ref 98–107)
CO2 SERPL-SCNC: 19.3 MMOL/L (ref 22–29)
CREAT SERPL-MCNC: 1.19 MG/DL (ref 0.57–1)
DEPRECATED RDW RBC AUTO: 97 FL (ref 37–54)
EGFRCR SERPLBLD CKD-EPI 2021: 57.9 ML/MIN/1.73
EOSINOPHIL # BLD AUTO: 0.06 10*3/MM3 (ref 0–0.4)
EOSINOPHIL NFR BLD AUTO: 2.9 % (ref 0.3–6.2)
ERYTHROCYTE [DISTWIDTH] IN BLOOD BY AUTOMATED COUNT: 28.2 % (ref 12.3–15.4)
GLUCOSE SERPL-MCNC: 79 MG/DL (ref 65–99)
HCT VFR BLD AUTO: 22.4 % (ref 34–46.6)
HGB BLD-MCNC: 7 G/DL (ref 12–15.9)
IGA SERPL-MCNC: 516 MG/DL (ref 87–352)
IGG SERPL-MCNC: 1187 MG/DL (ref 586–1602)
IGM SERPL-MCNC: 112 MG/DL (ref 26–217)
IMM GRANULOCYTES # BLD AUTO: 0.02 10*3/MM3 (ref 0–0.05)
IMM GRANULOCYTES NFR BLD AUTO: 1 % (ref 0–0.5)
LYMPHOCYTES # BLD AUTO: 0.82 10*3/MM3 (ref 0.7–3.1)
LYMPHOCYTES NFR BLD AUTO: 39.6 % (ref 19.6–45.3)
MAGNESIUM SERPL-MCNC: 1.6 MG/DL (ref 1.6–2.6)
MCH RBC QN AUTO: 31.5 PG (ref 26.6–33)
MCHC RBC AUTO-ENTMCNC: 31.3 G/DL (ref 31.5–35.7)
MCV RBC AUTO: 100.9 FL (ref 79–97)
MONOCYTES # BLD AUTO: 0.22 10*3/MM3 (ref 0.1–0.9)
MONOCYTES NFR BLD AUTO: 10.6 % (ref 5–12)
NEUTROPHILS NFR BLD AUTO: 0.95 10*3/MM3 (ref 1.7–7)
NEUTROPHILS NFR BLD AUTO: 45.9 % (ref 42.7–76)
NRBC BLD AUTO-RTO: 1 /100 WBC (ref 0–0.2)
PHOSPHATE SERPL-MCNC: 1.4 MG/DL (ref 2.5–4.5)
PLATELET # BLD AUTO: 108 10*3/MM3 (ref 140–450)
PMV BLD AUTO: 12.8 FL (ref 6–12)
POTASSIUM SERPL-SCNC: 3.4 MMOL/L (ref 3.5–5.2)
PROT PATTERN SERPL IFE-IMP: ABNORMAL
RBC # BLD AUTO: 2.22 10*6/MM3 (ref 3.77–5.28)
SODIUM SERPL-SCNC: 144 MMOL/L (ref 136–145)
WBC NRBC COR # BLD: 2.07 10*3/MM3 (ref 3.4–10.8)

## 2022-09-22 PROCEDURE — 92526 ORAL FUNCTION THERAPY: CPT

## 2022-09-22 PROCEDURE — 83735 ASSAY OF MAGNESIUM: CPT | Performed by: INTERNAL MEDICINE

## 2022-09-22 PROCEDURE — 97110 THERAPEUTIC EXERCISES: CPT

## 2022-09-22 PROCEDURE — 80048 BASIC METABOLIC PNL TOTAL CA: CPT | Performed by: INTERNAL MEDICINE

## 2022-09-22 PROCEDURE — 84100 ASSAY OF PHOSPHORUS: CPT | Performed by: INTERNAL MEDICINE

## 2022-09-22 PROCEDURE — 97530 THERAPEUTIC ACTIVITIES: CPT

## 2022-09-22 PROCEDURE — 25010000002 CYANOCOBALAMIN PER 1000 MCG: Performed by: INTERNAL MEDICINE

## 2022-09-22 PROCEDURE — 97116 GAIT TRAINING THERAPY: CPT

## 2022-09-22 PROCEDURE — 85025 COMPLETE CBC W/AUTO DIFF WBC: CPT | Performed by: INTERNAL MEDICINE

## 2022-09-22 RX ORDER — MIDODRINE HYDROCHLORIDE 10 MG/1
10 TABLET ORAL
Start: 2022-09-22

## 2022-09-22 RX ORDER — FENTANYL/ROPIVACAINE/NS/PF 2-625MCG/1
15 PLASTIC BAG, INJECTION (ML) EPIDURAL ONCE
Status: COMPLETED | OUTPATIENT
Start: 2022-09-22 | End: 2022-09-22

## 2022-09-22 RX ORDER — POTASSIUM CHLORIDE 750 MG/1
20 TABLET, FILM COATED, EXTENDED RELEASE ORAL DAILY
Start: 2022-09-22

## 2022-09-22 RX ADMIN — Medication 10 ML: at 08:27

## 2022-09-22 RX ADMIN — CYANOCOBALAMIN 1000 MCG: 1000 INJECTION, SOLUTION INTRAMUSCULAR at 08:26

## 2022-09-22 RX ADMIN — MIDODRINE HYDROCHLORIDE 10 MG: 10 TABLET ORAL at 06:31

## 2022-09-22 RX ADMIN — SUCRALFATE 1 G: 1 TABLET ORAL at 06:31

## 2022-09-22 RX ADMIN — PANTOPRAZOLE SODIUM 40 MG: 40 TABLET, DELAYED RELEASE ORAL at 06:31

## 2022-09-22 RX ADMIN — CITALOPRAM HYDROBROMIDE 20 MG: 20 TABLET ORAL at 08:26

## 2022-09-22 RX ADMIN — POTASSIUM PHOSPHATE, MONOBASIC AND POTASSIUM PHOSPHATE, DIBASIC 15 MMOL: 224; 236 INJECTION, SOLUTION, CONCENTRATE INTRAVENOUS at 08:25

## 2022-09-22 RX ADMIN — MIDODRINE HYDROCHLORIDE 10 MG: 10 TABLET ORAL at 11:35

## 2022-09-22 RX ADMIN — ZINC OXIDE 1 APPLICATION: 200 OINTMENT TOPICAL at 08:27

## 2022-09-23 LAB
KAPPA LC FREE SER-MCNC: 87 MG/L (ref 3.3–19.4)
KAPPA LC FREE/LAMBDA FREE SER: 1.09 {RATIO} (ref 0.26–1.65)
LAMBDA LC FREE SERPL-MCNC: 79.5 MG/L (ref 5.7–26.3)
REF LAB TEST METHOD: NORMAL

## 2022-10-03 LAB — METHYLMALONATE SERPL-SCNC: ABNORMAL NMOL/L (ref 0–378)

## 2022-10-20 ENCOUNTER — TELEPHONE (OUTPATIENT)
Dept: GASTROENTEROLOGY | Facility: CLINIC | Age: 42
End: 2022-10-20

## 2023-06-29 PROBLEM — R07.89 CHEST PAIN, ATYPICAL: Status: ACTIVE | Noted: 2023-06-29

## 2023-07-04 PROBLEM — E87.6 HYPOKALEMIA: Status: RESOLVED | Noted: 2022-09-01 | Resolved: 2023-07-04

## 2023-07-04 PROBLEM — R07.89 CHEST PAIN, ATYPICAL: Status: RESOLVED | Noted: 2023-06-29 | Resolved: 2023-07-04

## 2023-08-02 ENCOUNTER — TELEPHONE (OUTPATIENT)
Dept: GASTROENTEROLOGY | Facility: CLINIC | Age: 43
End: 2023-08-02
Payer: MEDICARE

## 2023-08-05 ENCOUNTER — APPOINTMENT (OUTPATIENT)
Dept: GENERAL RADIOLOGY | Facility: HOSPITAL | Age: 43
End: 2023-08-05
Payer: MEDICARE

## 2023-08-05 ENCOUNTER — HOSPITAL ENCOUNTER (OUTPATIENT)
Facility: HOSPITAL | Age: 43
Setting detail: OBSERVATION
Discharge: HOME HEALTH | End: 2023-08-07
Attending: EMERGENCY MEDICINE | Admitting: INTERNAL MEDICINE
Payer: MEDICARE

## 2023-08-05 DIAGNOSIS — R26.2 DIFFICULTY WALKING: ICD-10-CM

## 2023-08-05 DIAGNOSIS — E83.42 HYPOMAGNESEMIA: Primary | ICD-10-CM

## 2023-08-05 LAB
ALBUMIN SERPL-MCNC: 3.4 G/DL (ref 3.5–5.2)
ALBUMIN/GLOB SERPL: 0.8 G/DL
ALP SERPL-CCNC: 232 U/L (ref 39–117)
ALT SERPL W P-5'-P-CCNC: 14 U/L (ref 1–33)
ANION GAP SERPL CALCULATED.3IONS-SCNC: 12.9 MMOL/L (ref 5–15)
AST SERPL-CCNC: 21 U/L (ref 1–32)
BACTERIA UR QL AUTO: ABNORMAL /HPF
BASOPHILS # BLD AUTO: 0.07 10*3/MM3 (ref 0–0.2)
BASOPHILS NFR BLD AUTO: 0.9 % (ref 0–1.5)
BILIRUB SERPL-MCNC: 0.2 MG/DL (ref 0–1.2)
BILIRUB UR QL STRIP: NEGATIVE
BUN SERPL-MCNC: 23 MG/DL (ref 6–20)
BUN/CREAT SERPL: 23.7 (ref 7–25)
CALCIUM SPEC-SCNC: 10.7 MG/DL (ref 8.6–10.5)
CHLORIDE SERPL-SCNC: 104 MMOL/L (ref 98–107)
CLARITY UR: ABNORMAL
CO2 SERPL-SCNC: 21.1 MMOL/L (ref 22–29)
COLOR UR: YELLOW
CREAT SERPL-MCNC: 0.97 MG/DL (ref 0.57–1)
CREAT UR-MCNC: 91.4 MG/DL
DEPRECATED RDW RBC AUTO: 45 FL (ref 37–54)
EGFRCR SERPLBLD CKD-EPI 2021: 73.6 ML/MIN/1.73
EOSINOPHIL # BLD AUTO: 0.1 10*3/MM3 (ref 0–0.4)
EOSINOPHIL NFR BLD AUTO: 1.3 % (ref 0.3–6.2)
ERYTHROCYTE [DISTWIDTH] IN BLOOD BY AUTOMATED COUNT: 14.1 % (ref 12.3–15.4)
GEN 5 2HR TROPONIN T REFLEX: 8 NG/L
GLOBULIN UR ELPH-MCNC: 4.5 GM/DL
GLUCOSE SERPL-MCNC: 104 MG/DL (ref 65–99)
GLUCOSE UR STRIP-MCNC: NEGATIVE MG/DL
HCT VFR BLD AUTO: 36.7 % (ref 34–46.6)
HGB BLD-MCNC: 12.1 G/DL (ref 12–15.9)
HGB UR QL STRIP.AUTO: NEGATIVE
HOLD SPECIMEN: NORMAL
HOLD SPECIMEN: NORMAL
HYALINE CASTS UR QL AUTO: ABNORMAL /LPF
IMM GRANULOCYTES # BLD AUTO: 0.03 10*3/MM3 (ref 0–0.05)
IMM GRANULOCYTES NFR BLD AUTO: 0.4 % (ref 0–0.5)
KETONES UR QL STRIP: NEGATIVE
LEUKOCYTE ESTERASE UR QL STRIP.AUTO: ABNORMAL
LIPASE SERPL-CCNC: 23 U/L (ref 13–60)
LYMPHOCYTES # BLD AUTO: 1.66 10*3/MM3 (ref 0.7–3.1)
LYMPHOCYTES NFR BLD AUTO: 21.1 % (ref 19.6–45.3)
MAGNESIUM SERPL-MCNC: 1.2 MG/DL (ref 1.6–2.6)
MCH RBC QN AUTO: 29.1 PG (ref 26.6–33)
MCHC RBC AUTO-ENTMCNC: 33 G/DL (ref 31.5–35.7)
MCV RBC AUTO: 88.2 FL (ref 79–97)
MONOCYTES # BLD AUTO: 0.85 10*3/MM3 (ref 0.1–0.9)
MONOCYTES NFR BLD AUTO: 10.8 % (ref 5–12)
NEUTROPHILS NFR BLD AUTO: 5.16 10*3/MM3 (ref 1.7–7)
NEUTROPHILS NFR BLD AUTO: 65.5 % (ref 42.7–76)
NITRITE UR QL STRIP: POSITIVE
NRBC BLD AUTO-RTO: 0 /100 WBC (ref 0–0.2)
NT-PROBNP SERPL-MCNC: 113.5 PG/ML (ref 0–450)
PH UR STRIP.AUTO: 6 [PH] (ref 5–8)
PLATELET # BLD AUTO: 360 10*3/MM3 (ref 140–450)
PMV BLD AUTO: 9.8 FL (ref 6–12)
POTASSIUM SERPL-SCNC: 2.6 MMOL/L (ref 3.5–5.2)
POTASSIUM UR-SCNC: 26 MMOL/L
PROT ?TM UR-MCNC: 46 MG/DL
PROT SERPL-MCNC: 7.9 G/DL (ref 6–8.5)
PROT UR QL STRIP: ABNORMAL
PROT/CREAT UR: 0.5 MG/G{CREAT}
PTH-INTACT SERPL-MCNC: 103.6 PG/ML (ref 15–65)
RBC # BLD AUTO: 4.16 10*6/MM3 (ref 3.77–5.28)
RBC # UR STRIP: ABNORMAL /HPF
REF LAB TEST METHOD: ABNORMAL
SODIUM SERPL-SCNC: 138 MMOL/L (ref 136–145)
SP GR UR STRIP: 1.02 (ref 1–1.03)
SQUAMOUS #/AREA URNS HPF: ABNORMAL /HPF
TROPONIN T DELTA: -2 NG/L
TROPONIN T SERPL HS-MCNC: 10 NG/L
UROBILINOGEN UR QL STRIP: ABNORMAL
WBC # UR STRIP: ABNORMAL /HPF
WBC NRBC COR # BLD: 7.87 10*3/MM3 (ref 3.4–10.8)
WHOLE BLOOD HOLD COAG: NORMAL
WHOLE BLOOD HOLD SPECIMEN: NORMAL

## 2023-08-05 PROCEDURE — 84484 ASSAY OF TROPONIN QUANT: CPT

## 2023-08-05 PROCEDURE — G0378 HOSPITAL OBSERVATION PER HR: HCPCS

## 2023-08-05 PROCEDURE — 83735 ASSAY OF MAGNESIUM: CPT | Performed by: EMERGENCY MEDICINE

## 2023-08-05 PROCEDURE — 96375 TX/PRO/DX INJ NEW DRUG ADDON: CPT

## 2023-08-05 PROCEDURE — 84133 ASSAY OF URINE POTASSIUM: CPT | Performed by: EMERGENCY MEDICINE

## 2023-08-05 PROCEDURE — 84156 ASSAY OF PROTEIN URINE: CPT | Performed by: STUDENT IN AN ORGANIZED HEALTH CARE EDUCATION/TRAINING PROGRAM

## 2023-08-05 PROCEDURE — 83690 ASSAY OF LIPASE: CPT

## 2023-08-05 PROCEDURE — 96366 THER/PROPH/DIAG IV INF ADDON: CPT

## 2023-08-05 PROCEDURE — 80053 COMPREHEN METABOLIC PANEL: CPT | Performed by: EMERGENCY MEDICINE

## 2023-08-05 PROCEDURE — 93010 ELECTROCARDIOGRAM REPORT: CPT | Performed by: SPECIALIST

## 2023-08-05 PROCEDURE — 93005 ELECTROCARDIOGRAM TRACING: CPT

## 2023-08-05 PROCEDURE — 81001 URINALYSIS AUTO W/SCOPE: CPT | Performed by: EMERGENCY MEDICINE

## 2023-08-05 PROCEDURE — 85025 COMPLETE CBC W/AUTO DIFF WBC: CPT

## 2023-08-05 PROCEDURE — 96367 TX/PROPH/DG ADDL SEQ IV INF: CPT

## 2023-08-05 PROCEDURE — 83880 ASSAY OF NATRIURETIC PEPTIDE: CPT

## 2023-08-05 PROCEDURE — 82570 ASSAY OF URINE CREATININE: CPT | Performed by: STUDENT IN AN ORGANIZED HEALTH CARE EDUCATION/TRAINING PROGRAM

## 2023-08-05 PROCEDURE — 80299 QUANTITATIVE ASSAY DRUG: CPT | Performed by: EMERGENCY MEDICINE

## 2023-08-05 PROCEDURE — 25010000002 MAGNESIUM SULFATE IN D5W 1G/100ML (PREMIX) 1-5 GM/100ML-% SOLUTION: Performed by: INTERNAL MEDICINE

## 2023-08-05 PROCEDURE — 83970 ASSAY OF PARATHORMONE: CPT | Performed by: INTERNAL MEDICINE

## 2023-08-05 PROCEDURE — 25010000002 LORAZEPAM PER 2 MG: Performed by: EMERGENCY MEDICINE

## 2023-08-05 PROCEDURE — 93005 ELECTROCARDIOGRAM TRACING: CPT | Performed by: EMERGENCY MEDICINE

## 2023-08-05 PROCEDURE — 25010000002 MAGNESIUM SULFATE 2 GM/50ML SOLUTION: Performed by: EMERGENCY MEDICINE

## 2023-08-05 PROCEDURE — 96365 THER/PROPH/DIAG IV INF INIT: CPT

## 2023-08-05 PROCEDURE — 99284 EMERGENCY DEPT VISIT MOD MDM: CPT

## 2023-08-05 PROCEDURE — 71045 X-RAY EXAM CHEST 1 VIEW: CPT

## 2023-08-05 PROCEDURE — 36415 COLL VENOUS BLD VENIPUNCTURE: CPT

## 2023-08-05 PROCEDURE — 0 POTASSIUM CHLORIDE 10 MEQ/100ML SOLUTION: Performed by: EMERGENCY MEDICINE

## 2023-08-05 PROCEDURE — 84484 ASSAY OF TROPONIN QUANT: CPT | Performed by: EMERGENCY MEDICINE

## 2023-08-05 RX ORDER — ONDANSETRON 2 MG/ML
4 INJECTION INTRAMUSCULAR; INTRAVENOUS EVERY 6 HOURS PRN
Status: DISCONTINUED | OUTPATIENT
Start: 2023-08-05 | End: 2023-08-07 | Stop reason: HOSPADM

## 2023-08-05 RX ORDER — POTASSIUM CHLORIDE 7.45 MG/ML
10 INJECTION INTRAVENOUS ONCE
Status: COMPLETED | OUTPATIENT
Start: 2023-08-05 | End: 2023-08-05

## 2023-08-05 RX ORDER — MAGNESIUM SULFATE 1 G/100ML
2 INJECTION INTRAVENOUS ONCE
Status: DISCONTINUED | OUTPATIENT
Start: 2023-08-05 | End: 2023-08-05

## 2023-08-05 RX ORDER — ALUMINA, MAGNESIA, AND SIMETHICONE 2400; 2400; 240 MG/30ML; MG/30ML; MG/30ML
15 SUSPENSION ORAL EVERY 6 HOURS PRN
Status: DISCONTINUED | OUTPATIENT
Start: 2023-08-05 | End: 2023-08-07 | Stop reason: HOSPADM

## 2023-08-05 RX ORDER — POTASSIUM CHLORIDE 750 MG/1
40 CAPSULE, EXTENDED RELEASE ORAL ONCE
Status: COMPLETED | OUTPATIENT
Start: 2023-08-05 | End: 2023-08-05

## 2023-08-05 RX ORDER — ACETAMINOPHEN 500 MG
500-1000 TABLET ORAL EVERY 6 HOURS PRN
COMMUNITY

## 2023-08-05 RX ORDER — MAGNESIUM SULFATE 1 G/100ML
2 INJECTION INTRAVENOUS ONCE
Status: COMPLETED | OUTPATIENT
Start: 2023-08-05 | End: 2023-08-06

## 2023-08-05 RX ORDER — LORAZEPAM 2 MG/ML
0.5 INJECTION INTRAMUSCULAR ONCE
Status: COMPLETED | OUTPATIENT
Start: 2023-08-05 | End: 2023-08-05

## 2023-08-05 RX ORDER — SUCRALFATE 1 G/1
1 TABLET ORAL
Status: DISCONTINUED | OUTPATIENT
Start: 2023-08-05 | End: 2023-08-07 | Stop reason: HOSPADM

## 2023-08-05 RX ORDER — PANTOPRAZOLE SODIUM 40 MG/1
40 TABLET, DELAYED RELEASE ORAL
Status: DISCONTINUED | OUTPATIENT
Start: 2023-08-05 | End: 2023-08-07 | Stop reason: HOSPADM

## 2023-08-05 RX ORDER — SODIUM CHLORIDE 0.9 % (FLUSH) 0.9 %
10 SYRINGE (ML) INJECTION AS NEEDED
Status: DISCONTINUED | OUTPATIENT
Start: 2023-08-05 | End: 2023-08-07 | Stop reason: HOSPADM

## 2023-08-05 RX ORDER — MAGNESIUM SULFATE HEPTAHYDRATE 40 MG/ML
2 INJECTION, SOLUTION INTRAVENOUS ONCE
Status: COMPLETED | OUTPATIENT
Start: 2023-08-05 | End: 2023-08-05

## 2023-08-05 RX ADMIN — MAGNESIUM SULFATE 2 G: 1 INJECTION INTRAVENOUS at 21:00

## 2023-08-05 RX ADMIN — MAGNESIUM SULFATE IN WATER 2 G: 40 INJECTION, SOLUTION INTRAVENOUS at 12:09

## 2023-08-05 RX ADMIN — SUCRALFATE 1 G: 1 TABLET ORAL at 17:55

## 2023-08-05 RX ADMIN — PANTOPRAZOLE SODIUM 40 MG: 40 TABLET, DELAYED RELEASE ORAL at 17:55

## 2023-08-05 RX ADMIN — SODIUM CHLORIDE 500 ML: 9 INJECTION, SOLUTION INTRAVENOUS at 11:02

## 2023-08-05 RX ADMIN — POTASSIUM CHLORIDE 10 MEQ: 7.46 INJECTION, SOLUTION INTRAVENOUS at 10:56

## 2023-08-05 RX ADMIN — POTASSIUM CHLORIDE 40 MEQ: 750 CAPSULE, EXTENDED RELEASE ORAL at 12:13

## 2023-08-05 RX ADMIN — LORAZEPAM 0.5 MG: 2 INJECTION INTRAMUSCULAR; INTRAVENOUS at 11:00

## 2023-08-05 RX ADMIN — SUCRALFATE 1 G: 1 TABLET ORAL at 21:08

## 2023-08-06 ENCOUNTER — APPOINTMENT (OUTPATIENT)
Dept: ULTRASOUND IMAGING | Facility: HOSPITAL | Age: 43
End: 2023-08-06
Payer: MEDICARE

## 2023-08-06 LAB
25(OH)D3 SERPL-MCNC: 10.2 NG/ML (ref 30–100)
ALBUMIN SERPL-MCNC: 2.9 G/DL (ref 3.5–5.2)
ALBUMIN/GLOB SERPL: 0.6 G/DL
ALP SERPL-CCNC: 244 U/L (ref 39–117)
ALT SERPL W P-5'-P-CCNC: 17 U/L (ref 1–33)
ANION GAP SERPL CALCULATED.3IONS-SCNC: 12 MMOL/L (ref 5–15)
AST SERPL-CCNC: 34 U/L (ref 1–32)
BILIRUB SERPL-MCNC: 0.3 MG/DL (ref 0–1.2)
BUN SERPL-MCNC: 23 MG/DL (ref 6–20)
BUN/CREAT SERPL: 25 (ref 7–25)
CALCIUM SPEC-SCNC: 9.5 MG/DL (ref 8.6–10.5)
CHLORIDE SERPL-SCNC: 105 MMOL/L (ref 98–107)
CHLORIDE SERPL-SCNC: 111 MMOL/L (ref 98–107)
CO2 SERPL-SCNC: 17 MMOL/L (ref 22–29)
CORTIS SERPL-MCNC: 25.7 MCG/DL
CREAT SERPL-MCNC: 0.92 MG/DL (ref 0.57–1)
EGFRCR SERPLBLD CKD-EPI 2021: 78.4 ML/MIN/1.73
FERRITIN SERPL-MCNC: 454.5 NG/ML (ref 13–150)
FOLATE SERPL-MCNC: 17.8 NG/ML (ref 4.78–24.2)
GLOBULIN UR ELPH-MCNC: 4.5 GM/DL
GLUCOSE SERPL-MCNC: 94 MG/DL (ref 65–99)
IRON 24H UR-MRATE: 22 MCG/DL (ref 37–145)
IRON SATN MFR SERPL: 8 % (ref 20–50)
MAGNESIUM SERPL-MCNC: 2.6 MG/DL (ref 1.6–2.6)
PHOSPHATE SERPL-MCNC: 2 MG/DL (ref 2.5–4.5)
POTASSIUM SERPL-SCNC: 3.6 MMOL/L (ref 3.5–5.2)
PROT SERPL-MCNC: 7.4 G/DL (ref 6–8.5)
SODIUM SERPL-SCNC: 134 MMOL/L (ref 136–145)
TIBC SERPL-MCNC: 289 MCG/DL (ref 298–536)
TRANSFERRIN SERPL-MCNC: 194 MG/DL (ref 200–360)
VIT B12 BLD-MCNC: <150 PG/ML (ref 211–946)

## 2023-08-06 PROCEDURE — 84100 ASSAY OF PHOSPHORUS: CPT | Performed by: INTERNAL MEDICINE

## 2023-08-06 PROCEDURE — 80053 COMPREHEN METABOLIC PANEL: CPT | Performed by: INTERNAL MEDICINE

## 2023-08-06 PROCEDURE — 83516 IMMUNOASSAY NONANTIBODY: CPT | Performed by: INTERNAL MEDICINE

## 2023-08-06 PROCEDURE — 96372 THER/PROPH/DIAG INJ SC/IM: CPT

## 2023-08-06 PROCEDURE — 82306 VITAMIN D 25 HYDROXY: CPT | Performed by: INTERNAL MEDICINE

## 2023-08-06 PROCEDURE — 76775 US EXAM ABDO BACK WALL LIM: CPT

## 2023-08-06 PROCEDURE — 82728 ASSAY OF FERRITIN: CPT | Performed by: INTERNAL MEDICINE

## 2023-08-06 PROCEDURE — 82746 ASSAY OF FOLIC ACID SERUM: CPT | Performed by: INTERNAL MEDICINE

## 2023-08-06 PROCEDURE — 82435 ASSAY OF BLOOD CHLORIDE: CPT | Performed by: STUDENT IN AN ORGANIZED HEALTH CARE EDUCATION/TRAINING PROGRAM

## 2023-08-06 PROCEDURE — 84466 ASSAY OF TRANSFERRIN: CPT | Performed by: INTERNAL MEDICINE

## 2023-08-06 PROCEDURE — 82533 TOTAL CORTISOL: CPT | Performed by: INTERNAL MEDICINE

## 2023-08-06 PROCEDURE — 82607 VITAMIN B-12: CPT | Performed by: INTERNAL MEDICINE

## 2023-08-06 PROCEDURE — 97161 PT EVAL LOW COMPLEX 20 MIN: CPT

## 2023-08-06 PROCEDURE — 84075 ASSAY ALKALINE PHOSPHATASE: CPT | Performed by: INTERNAL MEDICINE

## 2023-08-06 PROCEDURE — G0378 HOSPITAL OBSERVATION PER HR: HCPCS

## 2023-08-06 PROCEDURE — 83735 ASSAY OF MAGNESIUM: CPT | Performed by: INTERNAL MEDICINE

## 2023-08-06 PROCEDURE — 83540 ASSAY OF IRON: CPT | Performed by: INTERNAL MEDICINE

## 2023-08-06 PROCEDURE — 25010000002 CYANOCOBALAMIN PER 1000 MCG: Performed by: INTERNAL MEDICINE

## 2023-08-06 PROCEDURE — 83915 ASSAY OF NUCLEOTIDASE: CPT | Performed by: INTERNAL MEDICINE

## 2023-08-06 PROCEDURE — 83921 ORGANIC ACID SINGLE QUANT: CPT | Performed by: INTERNAL MEDICINE

## 2023-08-06 PROCEDURE — 83090 ASSAY OF HOMOCYSTEINE: CPT | Performed by: INTERNAL MEDICINE

## 2023-08-06 PROCEDURE — 84080 ASSAY ALKALINE PHOSPHATASES: CPT | Performed by: INTERNAL MEDICINE

## 2023-08-06 RX ORDER — AMILORIDE HYDROCHLORIDE 5 MG/1
5 TABLET ORAL DAILY
Status: DISCONTINUED | OUTPATIENT
Start: 2023-08-06 | End: 2023-08-07 | Stop reason: HOSPADM

## 2023-08-06 RX ORDER — EPLERENONE 25 MG/1
25 TABLET, FILM COATED ORAL
Status: DISCONTINUED | OUTPATIENT
Start: 2023-08-06 | End: 2023-08-06 | Stop reason: ALTCHOICE

## 2023-08-06 RX ORDER — AMILORIDE HYDROCHLORIDE 5 MG/1
5 TABLET ORAL DAILY
Status: DISCONTINUED | OUTPATIENT
Start: 2023-08-06 | End: 2023-08-06

## 2023-08-06 RX ORDER — CYANOCOBALAMIN 1000 UG/ML
1000 INJECTION, SOLUTION INTRAMUSCULAR; SUBCUTANEOUS DAILY
Status: DISCONTINUED | OUTPATIENT
Start: 2023-08-06 | End: 2023-08-07 | Stop reason: HOSPADM

## 2023-08-06 RX ORDER — SUCRALFATE 1 G/1
1 TABLET ORAL
Qty: 120 TABLET | Refills: 2 | OUTPATIENT
Start: 2023-08-06 | End: 2023-11-04

## 2023-08-06 RX ORDER — SODIUM CHLORIDE 1 G/1
2 TABLET ORAL
Status: DISCONTINUED | OUTPATIENT
Start: 2023-08-06 | End: 2023-08-07 | Stop reason: HOSPADM

## 2023-08-06 RX ORDER — PANTOPRAZOLE SODIUM 40 MG/1
40 TABLET, DELAYED RELEASE ORAL
Qty: 60 TABLET | Refills: 2 | OUTPATIENT
Start: 2023-08-06 | End: 2023-11-04

## 2023-08-06 RX ADMIN — AMILORIDE HYDROCLORIDE 5 MG: 5 TABLET ORAL at 22:44

## 2023-08-06 RX ADMIN — SUCRALFATE 1 G: 1 TABLET ORAL at 10:40

## 2023-08-06 RX ADMIN — CYANOCOBALAMIN 1000 MCG: 1000 INJECTION, SOLUTION INTRAMUSCULAR at 22:44

## 2023-08-06 RX ADMIN — SODIUM CHLORIDE TAB 1 GM 2 G: 1 TAB at 20:05

## 2023-08-06 RX ADMIN — SUCRALFATE 1 G: 1 TABLET ORAL at 20:05

## 2023-08-06 RX ADMIN — SUCRALFATE 1 G: 1 TABLET ORAL at 07:31

## 2023-08-06 RX ADMIN — PANTOPRAZOLE SODIUM 40 MG: 40 TABLET, DELAYED RELEASE ORAL at 07:31

## 2023-08-06 RX ADMIN — PANTOPRAZOLE SODIUM 40 MG: 40 TABLET, DELAYED RELEASE ORAL at 17:30

## 2023-08-06 RX ADMIN — Medication 5000 UNITS: at 22:44

## 2023-08-06 RX ADMIN — Medication 10 ML: at 07:31

## 2023-08-06 RX ADMIN — SUCRALFATE 1 G: 1 TABLET ORAL at 17:29

## 2023-08-07 ENCOUNTER — READMISSION MANAGEMENT (OUTPATIENT)
Dept: CALL CENTER | Facility: HOSPITAL | Age: 43
End: 2023-08-07
Payer: MEDICARE

## 2023-08-07 VITALS
HEIGHT: 62 IN | DIASTOLIC BLOOD PRESSURE: 70 MMHG | HEART RATE: 93 BPM | WEIGHT: 105 LBS | OXYGEN SATURATION: 100 % | TEMPERATURE: 98.5 F | SYSTOLIC BLOOD PRESSURE: 97 MMHG | BODY MASS INDEX: 19.32 KG/M2 | RESPIRATION RATE: 18 BRPM

## 2023-08-07 PROBLEM — E83.42 HYPOMAGNESEMIA: Status: RESOLVED | Noted: 2023-08-05 | Resolved: 2023-08-07

## 2023-08-07 PROBLEM — I95.9 HYPOTENSION, UNSPECIFIED HYPOTENSION TYPE: Status: RESOLVED | Noted: 2022-09-01 | Resolved: 2023-08-07

## 2023-08-07 PROBLEM — E87.6 HYPOKALEMIA: Status: RESOLVED | Noted: 2022-09-01 | Resolved: 2023-08-07

## 2023-08-07 LAB
ALBUMIN SERPL-MCNC: 2.7 G/DL (ref 3.5–5.2)
ALBUMIN/GLOB SERPL: 0.7 G/DL
ALP SERPL-CCNC: 209 U/L (ref 39–117)
ALT SERPL W P-5'-P-CCNC: 10 U/L (ref 1–33)
ANION GAP SERPL CALCULATED.3IONS-SCNC: 10.7 MMOL/L (ref 5–15)
AST SERPL-CCNC: 15 U/L (ref 1–32)
BILIRUB SERPL-MCNC: 0.2 MG/DL (ref 0–1.2)
BUN SERPL-MCNC: 19 MG/DL (ref 6–20)
BUN/CREAT SERPL: 22.9 (ref 7–25)
CALCIUM SPEC-SCNC: 9.9 MG/DL (ref 8.6–10.5)
CHLORIDE SERPL-SCNC: 114 MMOL/L (ref 98–107)
CO2 SERPL-SCNC: 14.3 MMOL/L (ref 22–29)
CREAT SERPL-MCNC: 0.83 MG/DL (ref 0.57–1)
EGFRCR SERPLBLD CKD-EPI 2021: 88.7 ML/MIN/1.73
GLOBULIN UR ELPH-MCNC: 4 GM/DL
GLUCOSE SERPL-MCNC: 85 MG/DL (ref 65–99)
HCYS SERPL-MCNC: 29.7 UMOL/L (ref 0–15)
MAGNESIUM SERPL-MCNC: 1.8 MG/DL (ref 1.6–2.6)
POTASSIUM SERPL-SCNC: 4.1 MMOL/L (ref 3.5–5.2)
PROT SERPL-MCNC: 6.7 G/DL (ref 6–8.5)
QT INTERVAL: 323 MS
QT INTERVAL: 334 MS
SODIUM SERPL-SCNC: 139 MMOL/L (ref 136–145)

## 2023-08-07 PROCEDURE — G0378 HOSPITAL OBSERVATION PER HR: HCPCS

## 2023-08-07 PROCEDURE — 83735 ASSAY OF MAGNESIUM: CPT | Performed by: STUDENT IN AN ORGANIZED HEALTH CARE EDUCATION/TRAINING PROGRAM

## 2023-08-07 PROCEDURE — 80053 COMPREHEN METABOLIC PANEL: CPT | Performed by: STUDENT IN AN ORGANIZED HEALTH CARE EDUCATION/TRAINING PROGRAM

## 2023-08-07 RX ORDER — MAGNESIUM OXIDE 400 MG/1
400 TABLET ORAL DAILY
Qty: 30 TABLET | Refills: 0 | Status: SHIPPED | OUTPATIENT
Start: 2023-08-07 | End: 2023-09-06

## 2023-08-07 RX ORDER — SUCRALFATE 1 G/1
1 TABLET ORAL
Qty: 120 TABLET | Refills: 0 | Status: SHIPPED | OUTPATIENT
Start: 2023-08-07 | End: 2023-09-06

## 2023-08-07 RX ORDER — MIDODRINE HYDROCHLORIDE 5 MG/1
5 TABLET ORAL
Status: DISCONTINUED | OUTPATIENT
Start: 2023-08-07 | End: 2023-08-07 | Stop reason: HOSPADM

## 2023-08-07 RX ORDER — LANOLIN ALCOHOL/MO/W.PET/CERES
1000 CREAM (GRAM) TOPICAL DAILY
Qty: 30 TABLET | Refills: 0 | Status: SHIPPED | OUTPATIENT
Start: 2023-08-07 | End: 2023-09-06

## 2023-08-07 RX ORDER — MIDODRINE HYDROCHLORIDE 5 MG/1
5 TABLET ORAL 2 TIMES DAILY
Qty: 60 TABLET | Refills: 0 | Status: SHIPPED | OUTPATIENT
Start: 2023-08-07 | End: 2023-09-06

## 2023-08-07 RX ADMIN — SUCRALFATE 1 G: 1 TABLET ORAL at 16:57

## 2023-08-07 RX ADMIN — SODIUM CHLORIDE TAB 1 GM 2 G: 1 TAB at 16:57

## 2023-08-07 RX ADMIN — PANTOPRAZOLE SODIUM 40 MG: 40 TABLET, DELAYED RELEASE ORAL at 16:57

## 2023-08-07 RX ADMIN — SUCRALFATE 1 G: 1 TABLET ORAL at 12:18

## 2023-08-07 RX ADMIN — SODIUM CHLORIDE TAB 1 GM 2 G: 1 TAB at 08:32

## 2023-08-07 RX ADMIN — AMILORIDE HYDROCLORIDE 5 MG: 5 TABLET ORAL at 11:44

## 2023-08-07 RX ADMIN — PANTOPRAZOLE SODIUM 40 MG: 40 TABLET, DELAYED RELEASE ORAL at 08:32

## 2023-08-07 RX ADMIN — SUCRALFATE 1 G: 1 TABLET ORAL at 08:32

## 2023-08-07 RX ADMIN — MIDODRINE HYDROCHLORIDE 5 MG: 5 TABLET ORAL at 16:57

## 2023-08-07 RX ADMIN — SODIUM CHLORIDE TAB 1 GM 2 G: 1 TAB at 11:45

## 2023-08-07 RX ADMIN — MIDODRINE HYDROCHLORIDE 5 MG: 5 TABLET ORAL at 11:45

## 2023-08-08 LAB
MAGNESIUM UR-MCNC: <1.4 MG/DL
PCA AB SER-ACNC: 8.4 UNITS (ref 0–20)

## 2023-08-09 LAB
5NT SERPL-CCNC: 4 IU/L (ref 0–10)
ALP BONE CFR SERPL: 41 % (ref 14–68)
ALP INTEST CFR SERPL: 2 % (ref 0–18)
ALP LIVER CFR SERPL: 57 % (ref 18–85)
ALP SERPL-CCNC: 235 IU/L (ref 44–121)

## 2023-08-10 LAB — ALP BONE SERPL-MCNC: 41.7 UG/L

## 2023-08-11 ENCOUNTER — TELEPHONE (OUTPATIENT)
Dept: GASTROENTEROLOGY | Facility: CLINIC | Age: 43
End: 2023-08-11
Payer: MEDICARE

## 2023-08-11 LAB — METHYLMALONATE SERPL-SCNC: 1622 NMOL/L (ref 0–378)

## 2023-08-16 ENCOUNTER — TELEPHONE (OUTPATIENT)
Dept: GASTROENTEROLOGY | Facility: CLINIC | Age: 43
End: 2023-08-16
Payer: MEDICARE

## 2023-08-24 ENCOUNTER — LAB REQUISITION (OUTPATIENT)
Dept: LAB | Facility: HOSPITAL | Age: 43
End: 2023-08-24
Payer: MEDICARE

## 2023-08-24 DIAGNOSIS — I95.9 HYPOTENSION, UNSPECIFIED: ICD-10-CM

## 2023-08-24 DIAGNOSIS — K50.90 CROHN'S DISEASE, UNSPECIFIED, WITHOUT COMPLICATIONS: ICD-10-CM

## 2023-08-24 DIAGNOSIS — F41.9 ANXIETY DISORDER, UNSPECIFIED: ICD-10-CM

## 2023-08-24 DIAGNOSIS — M62.81 MUSCLE WEAKNESS (GENERALIZED): ICD-10-CM

## 2023-08-24 DIAGNOSIS — E83.42 HYPOMAGNESEMIA: ICD-10-CM

## 2023-08-24 LAB
ALBUMIN SERPL-MCNC: 3.3 G/DL (ref 3.5–5.2)
ALBUMIN/GLOB SERPL: 0.9 G/DL
ALP SERPL-CCNC: 204 U/L (ref 39–117)
ALT SERPL W P-5'-P-CCNC: 10 U/L (ref 1–33)
ANION GAP SERPL CALCULATED.3IONS-SCNC: 10.4 MMOL/L (ref 5–15)
AST SERPL-CCNC: 17 U/L (ref 1–32)
BASOPHILS # BLD AUTO: 0.09 10*3/MM3 (ref 0–0.2)
BASOPHILS NFR BLD AUTO: 1.1 % (ref 0–1.5)
BILIRUB SERPL-MCNC: 0.2 MG/DL (ref 0–1.2)
BUN SERPL-MCNC: 15 MG/DL (ref 6–20)
BUN/CREAT SERPL: 19.7 (ref 7–25)
CALCIUM SPEC-SCNC: 10.4 MG/DL (ref 8.6–10.5)
CHLORIDE SERPL-SCNC: 102 MMOL/L (ref 98–107)
CO2 SERPL-SCNC: 25.6 MMOL/L (ref 22–29)
CREAT SERPL-MCNC: 0.76 MG/DL (ref 0.57–1)
CREAT UR-MCNC: 37.8 MG/DL
DEPRECATED RDW RBC AUTO: 44.2 FL (ref 37–54)
EGFRCR SERPLBLD CKD-EPI 2021: 98.6 ML/MIN/1.73
EOSINOPHIL # BLD AUTO: 0.15 10*3/MM3 (ref 0–0.4)
EOSINOPHIL NFR BLD AUTO: 1.9 % (ref 0.3–6.2)
ERYTHROCYTE [DISTWIDTH] IN BLOOD BY AUTOMATED COUNT: 13.4 % (ref 12.3–15.4)
GLOBULIN UR ELPH-MCNC: 3.6 GM/DL
GLUCOSE SERPL-MCNC: 89 MG/DL (ref 65–99)
HCT VFR BLD AUTO: 35.3 % (ref 34–46.6)
HGB BLD-MCNC: 11.2 G/DL (ref 12–15.9)
IMM GRANULOCYTES # BLD AUTO: 0.02 10*3/MM3 (ref 0–0.05)
IMM GRANULOCYTES NFR BLD AUTO: 0.3 % (ref 0–0.5)
LYMPHOCYTES # BLD AUTO: 1.89 10*3/MM3 (ref 0.7–3.1)
LYMPHOCYTES NFR BLD AUTO: 23.7 % (ref 19.6–45.3)
MCH RBC QN AUTO: 28.4 PG (ref 26.6–33)
MCHC RBC AUTO-ENTMCNC: 31.7 G/DL (ref 31.5–35.7)
MCV RBC AUTO: 89.4 FL (ref 79–97)
MONOCYTES # BLD AUTO: 0.58 10*3/MM3 (ref 0.1–0.9)
MONOCYTES NFR BLD AUTO: 7.3 % (ref 5–12)
NEUTROPHILS NFR BLD AUTO: 5.25 10*3/MM3 (ref 1.7–7)
NEUTROPHILS NFR BLD AUTO: 65.7 % (ref 42.7–76)
NRBC BLD AUTO-RTO: 0 /100 WBC (ref 0–0.2)
PLATELET # BLD AUTO: 375 10*3/MM3 (ref 140–450)
PMV BLD AUTO: 10.9 FL (ref 6–12)
POTASSIUM SERPL-SCNC: 3.5 MMOL/L (ref 3.5–5.2)
PROT ?TM UR-MCNC: 9.7 MG/DL
PROT SERPL-MCNC: 6.9 G/DL (ref 6–8.5)
PROT/CREAT UR: 0.26 MG/G{CREAT}
RBC # BLD AUTO: 3.95 10*6/MM3 (ref 3.77–5.28)
SODIUM SERPL-SCNC: 138 MMOL/L (ref 136–145)
WBC NRBC COR # BLD: 7.98 10*3/MM3 (ref 3.4–10.8)

## 2023-08-24 PROCEDURE — 85025 COMPLETE CBC W/AUTO DIFF WBC: CPT | Performed by: STUDENT IN AN ORGANIZED HEALTH CARE EDUCATION/TRAINING PROGRAM

## 2023-08-24 PROCEDURE — 84133 ASSAY OF URINE POTASSIUM: CPT | Performed by: STUDENT IN AN ORGANIZED HEALTH CARE EDUCATION/TRAINING PROGRAM

## 2023-08-24 PROCEDURE — 84156 ASSAY OF PROTEIN URINE: CPT | Performed by: STUDENT IN AN ORGANIZED HEALTH CARE EDUCATION/TRAINING PROGRAM

## 2023-08-24 PROCEDURE — 82043 UR ALBUMIN QUANTITATIVE: CPT | Performed by: STUDENT IN AN ORGANIZED HEALTH CARE EDUCATION/TRAINING PROGRAM

## 2023-08-24 PROCEDURE — 82570 ASSAY OF URINE CREATININE: CPT | Performed by: STUDENT IN AN ORGANIZED HEALTH CARE EDUCATION/TRAINING PROGRAM

## 2023-08-24 PROCEDURE — 87077 CULTURE AEROBIC IDENTIFY: CPT | Performed by: STUDENT IN AN ORGANIZED HEALTH CARE EDUCATION/TRAINING PROGRAM

## 2023-08-24 PROCEDURE — 82436 ASSAY OF URINE CHLORIDE: CPT | Performed by: STUDENT IN AN ORGANIZED HEALTH CARE EDUCATION/TRAINING PROGRAM

## 2023-08-24 PROCEDURE — 87086 URINE CULTURE/COLONY COUNT: CPT | Performed by: STUDENT IN AN ORGANIZED HEALTH CARE EDUCATION/TRAINING PROGRAM

## 2023-08-24 PROCEDURE — 84300 ASSAY OF URINE SODIUM: CPT | Performed by: STUDENT IN AN ORGANIZED HEALTH CARE EDUCATION/TRAINING PROGRAM

## 2023-08-24 PROCEDURE — 80053 COMPREHEN METABOLIC PANEL: CPT | Performed by: STUDENT IN AN ORGANIZED HEALTH CARE EDUCATION/TRAINING PROGRAM

## 2023-08-24 PROCEDURE — 87186 SC STD MICRODIL/AGAR DIL: CPT | Performed by: STUDENT IN AN ORGANIZED HEALTH CARE EDUCATION/TRAINING PROGRAM

## 2023-08-25 LAB
ALBUMIN UR-MCNC: <1.2 MG/DL
CHLORIDE UR-SCNC: 51 MMOL/L
CREAT UR-MCNC: 33.1 MG/DL
FUROSEMIDE [MASS/VOLUME] IN SERUM OR PLASMA: <0.1 UG/ML
MICROALBUMIN/CREAT UR: NORMAL MG/G{CREAT}
POTASSIUM UR-SCNC: 8.5 MMOL/L
SODIUM UR-SCNC: 74 MMOL/L

## 2023-08-26 LAB — BACTERIA SPEC AEROBE CULT: ABNORMAL

## 2023-09-08 ENCOUNTER — TRANSCRIBE ORDERS (OUTPATIENT)
Dept: LAB | Facility: HOSPITAL | Age: 43
End: 2023-09-08
Payer: MEDICARE

## 2023-09-08 ENCOUNTER — LAB (OUTPATIENT)
Dept: LAB | Facility: HOSPITAL | Age: 43
End: 2023-09-08
Payer: MEDICARE

## 2023-09-08 DIAGNOSIS — N18.30 STAGE 3 CHRONIC KIDNEY DISEASE, UNSPECIFIED WHETHER STAGE 3A OR 3B CKD: Primary | ICD-10-CM

## 2023-09-08 DIAGNOSIS — N18.30 STAGE 3 CHRONIC KIDNEY DISEASE, UNSPECIFIED WHETHER STAGE 3A OR 3B CKD: ICD-10-CM

## 2023-09-08 DIAGNOSIS — I10 HYPERTENSION, UNSPECIFIED TYPE: ICD-10-CM

## 2023-09-08 LAB
ALBUMIN SERPL-MCNC: 3 G/DL (ref 3.5–5.2)
ALBUMIN/GLOB SERPL: 0.8 G/DL
ALP SERPL-CCNC: 193 U/L (ref 39–117)
ALT SERPL W P-5'-P-CCNC: 10 U/L (ref 1–33)
ANION GAP SERPL CALCULATED.3IONS-SCNC: 14 MMOL/L (ref 5–15)
AST SERPL-CCNC: 16 U/L (ref 1–32)
BASOPHILS # BLD AUTO: 0.08 10*3/MM3 (ref 0–0.2)
BASOPHILS NFR BLD AUTO: 1.2 % (ref 0–1.5)
BILIRUB SERPL-MCNC: <0.2 MG/DL (ref 0–1.2)
BUN SERPL-MCNC: 21 MG/DL (ref 6–20)
BUN/CREAT SERPL: 28.8 (ref 7–25)
CALCIUM SPEC-SCNC: 9.9 MG/DL (ref 8.6–10.5)
CHLORIDE 24H UR-SRATE: 154 MMOL/24HR (ref 110–250)
CHLORIDE SERPL-SCNC: 105 MMOL/L (ref 98–107)
CHLORIDE UR-SCNC: 154 MMOL/L
CO2 SERPL-SCNC: 24 MMOL/L (ref 22–29)
COLLECT DURATION TIME UR: 24 HRS
CREAT SERPL-MCNC: 0.73 MG/DL (ref 0.57–1)
CREAT UR-MCNC: 35.3 MG/DL
CREATINE 24H UR-MRATE: 0.35 G/24 HR (ref 0.7–1.6)
DEPRECATED RDW RBC AUTO: 40.4 FL (ref 37–54)
EGFRCR SERPLBLD CKD-EPI 2021: 103.5 ML/MIN/1.73
EOSINOPHIL # BLD AUTO: 0.24 10*3/MM3 (ref 0–0.4)
EOSINOPHIL NFR BLD AUTO: 3.6 % (ref 0.3–6.2)
ERYTHROCYTE [DISTWIDTH] IN BLOOD BY AUTOMATED COUNT: 13 % (ref 12.3–15.4)
GLOBULIN UR ELPH-MCNC: 3.9 GM/DL
GLUCOSE SERPL-MCNC: 85 MG/DL (ref 65–99)
HCT VFR BLD AUTO: 35.3 % (ref 34–46.6)
HGB BLD-MCNC: 11.1 G/DL (ref 12–15.9)
IMM GRANULOCYTES # BLD AUTO: 0.03 10*3/MM3 (ref 0–0.05)
IMM GRANULOCYTES NFR BLD AUTO: 0.4 % (ref 0–0.5)
LYMPHOCYTES # BLD AUTO: 1.92 10*3/MM3 (ref 0.7–3.1)
LYMPHOCYTES NFR BLD AUTO: 28.8 % (ref 19.6–45.3)
MCH RBC QN AUTO: 27.1 PG (ref 26.6–33)
MCHC RBC AUTO-ENTMCNC: 31.4 G/DL (ref 31.5–35.7)
MCV RBC AUTO: 86.1 FL (ref 79–97)
MONOCYTES # BLD AUTO: 0.74 10*3/MM3 (ref 0.1–0.9)
MONOCYTES NFR BLD AUTO: 11.1 % (ref 5–12)
NEUTROPHILS NFR BLD AUTO: 3.66 10*3/MM3 (ref 1.7–7)
NEUTROPHILS NFR BLD AUTO: 54.9 % (ref 42.7–76)
NRBC BLD AUTO-RTO: 0 /100 WBC (ref 0–0.2)
PLATELET # BLD AUTO: 476 10*3/MM3 (ref 140–450)
PMV BLD AUTO: 10.6 FL (ref 6–12)
POTASSIUM 24H UR-SRATE: 24.7 MMOL/24HRS (ref 25–125)
POTASSIUM SERPL-SCNC: 4 MMOL/L (ref 3.5–5.2)
POTASSIUM UR-SCNC: 24.7 MMOL/L
PROT SERPL-MCNC: 6.9 G/DL (ref 6–8.5)
RBC # BLD AUTO: 4.1 10*6/MM3 (ref 3.77–5.28)
SODIUM 24H UR-SRATE: 176 MMOL/24HRS (ref 40–220)
SODIUM SERPL-SCNC: 143 MMOL/L (ref 136–145)
SODIUM UR-SCNC: 176 MMOL/L
SPECIMEN VOL 24H UR: 1000 ML
WBC NRBC COR # BLD: 6.67 10*3/MM3 (ref 3.4–10.8)

## 2023-09-08 PROCEDURE — 84133 ASSAY OF URINE POTASSIUM: CPT

## 2023-09-08 PROCEDURE — 80053 COMPREHEN METABOLIC PANEL: CPT

## 2023-09-08 PROCEDURE — 85025 COMPLETE CBC W/AUTO DIFF WBC: CPT

## 2023-09-08 PROCEDURE — 81050 URINALYSIS VOLUME MEASURE: CPT

## 2023-09-08 PROCEDURE — 84300 ASSAY OF URINE SODIUM: CPT

## 2023-09-08 PROCEDURE — 83735 ASSAY OF MAGNESIUM: CPT

## 2023-09-08 PROCEDURE — 36415 COLL VENOUS BLD VENIPUNCTURE: CPT

## 2023-09-08 PROCEDURE — 82570 ASSAY OF URINE CREATININE: CPT

## 2023-09-08 PROCEDURE — 82436 ASSAY OF URINE CHLORIDE: CPT

## 2023-09-10 LAB
MAGNESIUM 24H UR-MRATE: <14 MG/24 HR (ref 12–293)
MAGNESIUM UR-MCNC: <1.4 MG/DL

## 2023-10-17 ENCOUNTER — ANESTHESIA EVENT (OUTPATIENT)
Dept: GASTROENTEROLOGY | Facility: HOSPITAL | Age: 43
End: 2023-10-17
Payer: MEDICARE

## 2023-10-18 ENCOUNTER — ANESTHESIA (OUTPATIENT)
Dept: GASTROENTEROLOGY | Facility: HOSPITAL | Age: 43
End: 2023-10-18
Payer: MEDICARE

## 2023-10-18 ENCOUNTER — HOSPITAL ENCOUNTER (OUTPATIENT)
Facility: HOSPITAL | Age: 43
Setting detail: HOSPITAL OUTPATIENT SURGERY
Discharge: HOME | End: 2023-10-18
Attending: INTERNAL MEDICINE | Admitting: INTERNAL MEDICINE
Payer: MEDICARE

## 2023-10-18 VITALS
TEMPERATURE: 98.1 F | DIASTOLIC BLOOD PRESSURE: 85 MMHG | BODY MASS INDEX: 21.77 KG/M2 | HEART RATE: 83 BPM | WEIGHT: 119.05 LBS | SYSTOLIC BLOOD PRESSURE: 113 MMHG | RESPIRATION RATE: 16 BRPM | OXYGEN SATURATION: 100 %

## 2023-10-18 DIAGNOSIS — K50.919 CROHN'S DISEASE WITH COMPLICATION, UNSPECIFIED GASTROINTESTINAL TRACT LOCATION: ICD-10-CM

## 2023-10-18 PROCEDURE — 88305 TISSUE EXAM BY PATHOLOGIST: CPT | Performed by: INTERNAL MEDICINE

## 2023-10-18 PROCEDURE — 45380 COLONOSCOPY AND BIOPSY: CPT | Performed by: INTERNAL MEDICINE

## 2023-10-18 PROCEDURE — 25810000003 LACTATED RINGERS PER 1000 ML: Performed by: ANESTHESIOLOGY

## 2023-10-18 PROCEDURE — 25810000003 SODIUM CHLORIDE 0.9 % SOLUTION 250 ML FLEX CONT: Performed by: NURSE ANESTHETIST, CERTIFIED REGISTERED

## 2023-10-18 PROCEDURE — 25010000002 PROPOFOL 10 MG/ML EMULSION: Performed by: NURSE ANESTHETIST, CERTIFIED REGISTERED

## 2023-10-18 PROCEDURE — 25010000002 PHENYLEPHRINE 10 MG/ML SOLUTION 1 ML VIAL: Performed by: NURSE ANESTHETIST, CERTIFIED REGISTERED

## 2023-10-18 RX ORDER — PROPOFOL 10 MG/ML
VIAL (ML) INTRAVENOUS AS NEEDED
Status: DISCONTINUED | OUTPATIENT
Start: 2023-10-18 | End: 2023-10-18 | Stop reason: SURG

## 2023-10-18 RX ORDER — SODIUM CHLORIDE, SODIUM LACTATE, POTASSIUM CHLORIDE, CALCIUM CHLORIDE 600; 310; 30; 20 MG/100ML; MG/100ML; MG/100ML; MG/100ML
30 INJECTION, SOLUTION INTRAVENOUS CONTINUOUS
Status: DISCONTINUED | OUTPATIENT
Start: 2023-10-18 | End: 2023-10-18 | Stop reason: SDUPTHER

## 2023-10-18 RX ORDER — LIDOCAINE HYDROCHLORIDE 20 MG/ML
INJECTION, SOLUTION EPIDURAL; INFILTRATION; INTRACAUDAL; PERINEURAL AS NEEDED
Status: DISCONTINUED | OUTPATIENT
Start: 2023-10-18 | End: 2023-10-18 | Stop reason: SURG

## 2023-10-18 RX ORDER — SODIUM CHLORIDE, SODIUM LACTATE, POTASSIUM CHLORIDE, CALCIUM CHLORIDE 600; 310; 30; 20 MG/100ML; MG/100ML; MG/100ML; MG/100ML
30 INJECTION, SOLUTION INTRAVENOUS CONTINUOUS
Status: DISCONTINUED | OUTPATIENT
Start: 2023-10-18 | End: 2023-10-18 | Stop reason: HOSPADM

## 2023-10-18 RX ADMIN — PROPOFOL 150 MCG/KG/MIN: 10 INJECTION, EMULSION INTRAVENOUS at 12:34

## 2023-10-18 RX ADMIN — PHENYLEPHRINE HYDROCHLORIDE 50 MG: 10 INJECTION INTRAVENOUS at 12:44

## 2023-10-18 RX ADMIN — PHENYLEPHRINE HYDROCHLORIDE 50 MG: 10 INJECTION INTRAVENOUS at 12:52

## 2023-10-18 RX ADMIN — SODIUM CHLORIDE, POTASSIUM CHLORIDE, SODIUM LACTATE AND CALCIUM CHLORIDE 30 ML/HR: 600; 310; 30; 20 INJECTION, SOLUTION INTRAVENOUS at 11:02

## 2023-10-18 RX ADMIN — PHENYLEPHRINE HYDROCHLORIDE 50 MG: 10 INJECTION INTRAVENOUS at 12:48

## 2023-10-18 RX ADMIN — PHENYLEPHRINE HYDROCHLORIDE 50 MG: 10 INJECTION INTRAVENOUS at 12:45

## 2023-10-18 RX ADMIN — PROPOFOL 100 MG: 10 INJECTION, EMULSION INTRAVENOUS at 12:34

## 2023-10-18 RX ADMIN — LIDOCAINE HYDROCHLORIDE 30 MG: 20 INJECTION, SOLUTION EPIDURAL; INFILTRATION; INTRACAUDAL; PERINEURAL at 12:34

## 2023-10-20 DIAGNOSIS — K50.00 CROHN'S DISEASE OF SMALL INTESTINE WITHOUT COMPLICATION: Primary | Chronic | ICD-10-CM

## 2023-10-20 DIAGNOSIS — Z11.59 ENCOUNTER FOR SCREENING FOR OTHER VIRAL DISEASES: ICD-10-CM

## 2023-10-20 LAB
CYTO UR: NORMAL
LAB AP CASE REPORT: NORMAL
LAB AP CLINICAL INFORMATION: NORMAL
PATH REPORT.FINAL DX SPEC: NORMAL
PATH REPORT.GROSS SPEC: NORMAL

## 2023-10-24 ENCOUNTER — TELEPHONE (OUTPATIENT)
Dept: GASTROENTEROLOGY | Facility: CLINIC | Age: 43
End: 2023-10-24
Payer: MEDICARE

## 2023-12-15 ENCOUNTER — TELEPHONE (OUTPATIENT)
Dept: GASTROENTEROLOGY | Facility: CLINIC | Age: 43
End: 2023-12-15
Payer: MEDICARE

## 2024-02-13 ENCOUNTER — HOSPITAL ENCOUNTER (INPATIENT)
Facility: HOSPITAL | Age: 44
LOS: 6 days | Discharge: HOME | DRG: 641 | End: 2024-02-20
Attending: EMERGENCY MEDICINE | Admitting: INTERNAL MEDICINE
Payer: MEDICARE

## 2024-02-13 DIAGNOSIS — R11.2 NAUSEA AND VOMITING, UNSPECIFIED VOMITING TYPE: ICD-10-CM

## 2024-02-13 DIAGNOSIS — R10.84 GENERALIZED ABDOMINAL PAIN: Primary | ICD-10-CM

## 2024-02-13 DIAGNOSIS — R19.7 DIARRHEA, UNSPECIFIED TYPE: ICD-10-CM

## 2024-02-13 DIAGNOSIS — E87.6 HYPOKALEMIA: ICD-10-CM

## 2024-02-13 DIAGNOSIS — I47.10 PSVT (PAROXYSMAL SUPRAVENTRICULAR TACHYCARDIA): ICD-10-CM

## 2024-02-13 DIAGNOSIS — K56.609 SMALL BOWEL OBSTRUCTION: ICD-10-CM

## 2024-02-13 DIAGNOSIS — E83.42 HYPOMAGNESEMIA: ICD-10-CM

## 2024-02-13 DIAGNOSIS — N30.00 ACUTE CYSTITIS WITHOUT HEMATURIA: ICD-10-CM

## 2024-02-13 PROCEDURE — 99285 EMERGENCY DEPT VISIT HI MDM: CPT

## 2024-02-14 ENCOUNTER — APPOINTMENT (OUTPATIENT)
Dept: CT IMAGING | Facility: HOSPITAL | Age: 44
DRG: 641 | End: 2024-02-14
Payer: MEDICARE

## 2024-02-14 PROBLEM — K56.609 SBO (SMALL BOWEL OBSTRUCTION): Status: ACTIVE | Noted: 2024-02-14

## 2024-02-14 PROBLEM — I95.9 HYPOTENSION: Status: ACTIVE | Noted: 2024-02-14

## 2024-02-14 PROBLEM — I47.10 SVT (SUPRAVENTRICULAR TACHYCARDIA): Status: ACTIVE | Noted: 2024-02-14

## 2024-02-14 LAB
ALBUMIN SERPL-MCNC: 2.1 G/DL (ref 3.5–5.2)
ALBUMIN SERPL-MCNC: 2.8 G/DL (ref 3.5–5.2)
ALBUMIN/GLOB SERPL: 0.6 G/DL
ALBUMIN/GLOB SERPL: 0.7 G/DL
ALP SERPL-CCNC: 225 U/L (ref 39–117)
ALP SERPL-CCNC: 273 U/L (ref 39–117)
ALT SERPL W P-5'-P-CCNC: 14 U/L (ref 1–33)
ALT SERPL W P-5'-P-CCNC: 17 U/L (ref 1–33)
ANION GAP SERPL CALCULATED.3IONS-SCNC: 13.6 MMOL/L (ref 5–15)
ANION GAP SERPL CALCULATED.3IONS-SCNC: 17 MMOL/L (ref 5–15)
AST SERPL-CCNC: 43 U/L (ref 1–32)
AST SERPL-CCNC: 45 U/L (ref 1–32)
BACTERIA UR QL AUTO: ABNORMAL /HPF
BASOPHILS # BLD AUTO: 0.05 10*3/MM3 (ref 0–0.2)
BASOPHILS # BLD AUTO: 0.09 10*3/MM3 (ref 0–0.2)
BASOPHILS NFR BLD AUTO: 0.6 % (ref 0–1.5)
BASOPHILS NFR BLD AUTO: 1 % (ref 0–1.5)
BILIRUB SERPL-MCNC: 0.2 MG/DL (ref 0–1.2)
BILIRUB SERPL-MCNC: 0.4 MG/DL (ref 0–1.2)
BILIRUB UR QL STRIP: NEGATIVE
BUN SERPL-MCNC: 14 MG/DL (ref 6–20)
BUN SERPL-MCNC: 17 MG/DL (ref 6–20)
BUN/CREAT SERPL: 13.3 (ref 7–25)
BUN/CREAT SERPL: 16.3 (ref 7–25)
CA-I BLDA-SCNC: 1.06 MMOL/L (ref 1.13–1.32)
CALCIUM SPEC-SCNC: 10 MG/DL (ref 8.6–10.5)
CALCIUM SPEC-SCNC: 8.3 MG/DL (ref 8.6–10.5)
CHLORIDE SERPL-SCNC: 100 MMOL/L (ref 98–107)
CHLORIDE SERPL-SCNC: 95 MMOL/L (ref 98–107)
CLARITY UR: ABNORMAL
CO2 SERPL-SCNC: 23.4 MMOL/L (ref 22–29)
CO2 SERPL-SCNC: 31 MMOL/L (ref 22–29)
COLOR UR: YELLOW
CREAT SERPL-MCNC: 1.04 MG/DL (ref 0.57–1)
CREAT SERPL-MCNC: 1.05 MG/DL (ref 0.57–1)
D-LACTATE SERPL-SCNC: 1.8 MMOL/L (ref 0.5–2)
DEPRECATED RDW RBC AUTO: 45.7 FL (ref 37–54)
DEPRECATED RDW RBC AUTO: 47.8 FL (ref 37–54)
EGFRCR SERPLBLD CKD-EPI 2021: 66.9 ML/MIN/1.73
EGFRCR SERPLBLD CKD-EPI 2021: 67.7 ML/MIN/1.73
EOSINOPHIL # BLD AUTO: 0.01 10*3/MM3 (ref 0–0.4)
EOSINOPHIL # BLD AUTO: 0.05 10*3/MM3 (ref 0–0.4)
EOSINOPHIL NFR BLD AUTO: 0.1 % (ref 0.3–6.2)
EOSINOPHIL NFR BLD AUTO: 0.6 % (ref 0.3–6.2)
ERYTHROCYTE [DISTWIDTH] IN BLOOD BY AUTOMATED COUNT: 15.7 % (ref 12.3–15.4)
ERYTHROCYTE [DISTWIDTH] IN BLOOD BY AUTOMATED COUNT: 15.7 % (ref 12.3–15.4)
GLOBULIN UR ELPH-MCNC: 3.6 GM/DL
GLOBULIN UR ELPH-MCNC: 4.3 GM/DL
GLUCOSE SERPL-MCNC: 100 MG/DL (ref 65–99)
GLUCOSE SERPL-MCNC: 110 MG/DL (ref 65–99)
GLUCOSE UR STRIP-MCNC: NEGATIVE MG/DL
HCT VFR BLD AUTO: 38.9 % (ref 34–46.6)
HCT VFR BLD AUTO: 43 % (ref 34–46.6)
HGB BLD-MCNC: 11.8 G/DL (ref 12–15.9)
HGB BLD-MCNC: 13.7 G/DL (ref 12–15.9)
HGB UR QL STRIP.AUTO: NEGATIVE
HOLD SPECIMEN: NORMAL
HYALINE CASTS UR QL AUTO: ABNORMAL /LPF
IMM GRANULOCYTES # BLD AUTO: 0.04 10*3/MM3 (ref 0–0.05)
IMM GRANULOCYTES # BLD AUTO: 0.05 10*3/MM3 (ref 0–0.05)
IMM GRANULOCYTES NFR BLD AUTO: 0.5 % (ref 0–0.5)
IMM GRANULOCYTES NFR BLD AUTO: 0.6 % (ref 0–0.5)
KETONES UR QL STRIP: NEGATIVE
LEUKOCYTE ESTERASE UR QL STRIP.AUTO: ABNORMAL
LIPASE SERPL-CCNC: 13 U/L (ref 13–60)
LIPASE SERPL-CCNC: 9 U/L (ref 13–60)
LYMPHOCYTES # BLD AUTO: 0.8 10*3/MM3 (ref 0.7–3.1)
LYMPHOCYTES # BLD AUTO: 2.34 10*3/MM3 (ref 0.7–3.1)
LYMPHOCYTES NFR BLD AUTO: 26.5 % (ref 19.6–45.3)
LYMPHOCYTES NFR BLD AUTO: 9.9 % (ref 19.6–45.3)
MAGNESIUM SERPL-MCNC: 0.8 MG/DL (ref 1.6–2.6)
MAGNESIUM SERPL-MCNC: 2.1 MG/DL (ref 1.6–2.6)
MCH RBC QN AUTO: 26.8 PG (ref 26.6–33)
MCH RBC QN AUTO: 27 PG (ref 26.6–33)
MCHC RBC AUTO-ENTMCNC: 30.3 G/DL (ref 31.5–35.7)
MCHC RBC AUTO-ENTMCNC: 31.9 G/DL (ref 31.5–35.7)
MCV RBC AUTO: 84.8 FL (ref 79–97)
MCV RBC AUTO: 88.2 FL (ref 79–97)
MONOCYTES # BLD AUTO: 0.11 10*3/MM3 (ref 0.1–0.9)
MONOCYTES # BLD AUTO: 0.7 10*3/MM3 (ref 0.1–0.9)
MONOCYTES NFR BLD AUTO: 1.4 % (ref 5–12)
MONOCYTES NFR BLD AUTO: 7.9 % (ref 5–12)
NEUTROPHILS NFR BLD AUTO: 5.6 10*3/MM3 (ref 1.7–7)
NEUTROPHILS NFR BLD AUTO: 63.4 % (ref 42.7–76)
NEUTROPHILS NFR BLD AUTO: 7.08 10*3/MM3 (ref 1.7–7)
NEUTROPHILS NFR BLD AUTO: 87.5 % (ref 42.7–76)
NITRITE UR QL STRIP: POSITIVE
NRBC BLD AUTO-RTO: 0 /100 WBC (ref 0–0.2)
NRBC BLD AUTO-RTO: 0 /100 WBC (ref 0–0.2)
PH UR STRIP.AUTO: >=9 [PH] (ref 5–8)
PHOSPHATE SERPL-MCNC: 1.7 MG/DL (ref 2.5–4.5)
PLATELET # BLD AUTO: 303 10*3/MM3 (ref 140–450)
PLATELET # BLD AUTO: 456 10*3/MM3 (ref 140–450)
PMV BLD AUTO: 9.8 FL (ref 6–12)
PMV BLD AUTO: 9.8 FL (ref 6–12)
POTASSIUM SERPL-SCNC: 2.9 MMOL/L (ref 3.5–5.2)
POTASSIUM SERPL-SCNC: 4.2 MMOL/L (ref 3.5–5.2)
PROCALCITONIN SERPL-MCNC: 0.57 NG/ML (ref 0–0.25)
PROT SERPL-MCNC: 5.7 G/DL (ref 6–8.5)
PROT SERPL-MCNC: 7.1 G/DL (ref 6–8.5)
PROT UR QL STRIP: ABNORMAL
QT INTERVAL: 335 MS
QTC INTERVAL: 432 MS
RBC # BLD AUTO: 4.41 10*6/MM3 (ref 3.77–5.28)
RBC # BLD AUTO: 5.07 10*6/MM3 (ref 3.77–5.28)
RBC # UR STRIP: ABNORMAL /HPF
REF LAB TEST METHOD: ABNORMAL
SODIUM SERPL-SCNC: 137 MMOL/L (ref 136–145)
SODIUM SERPL-SCNC: 143 MMOL/L (ref 136–145)
SP GR UR STRIP: 1.02 (ref 1–1.03)
SQUAMOUS #/AREA URNS HPF: ABNORMAL /HPF
UROBILINOGEN UR QL STRIP: ABNORMAL
WBC # UR STRIP: ABNORMAL /HPF
WBC NRBC COR # BLD AUTO: 8.09 10*3/MM3 (ref 3.4–10.8)
WBC NRBC COR # BLD AUTO: 8.83 10*3/MM3 (ref 3.4–10.8)
WHOLE BLOOD HOLD COAG: NORMAL
WHOLE BLOOD HOLD SPECIMEN: NORMAL

## 2024-02-14 PROCEDURE — 99222 1ST HOSP IP/OBS MODERATE 55: CPT | Performed by: INTERNAL MEDICINE

## 2024-02-14 PROCEDURE — 87040 BLOOD CULTURE FOR BACTERIA: CPT | Performed by: NURSE PRACTITIONER

## 2024-02-14 PROCEDURE — 25810000003 LACTATED RINGERS SOLUTION: Performed by: EMERGENCY MEDICINE

## 2024-02-14 PROCEDURE — 36415 COLL VENOUS BLD VENIPUNCTURE: CPT | Performed by: INTERNAL MEDICINE

## 2024-02-14 PROCEDURE — 25010000002 MORPHINE PER 10 MG: Performed by: EMERGENCY MEDICINE

## 2024-02-14 PROCEDURE — 80053 COMPREHEN METABOLIC PANEL: CPT | Performed by: INTERNAL MEDICINE

## 2024-02-14 PROCEDURE — 25010000002 MAGNESIUM SULFATE 2 GM/50ML SOLUTION: Performed by: NURSE PRACTITIONER

## 2024-02-14 PROCEDURE — 83690 ASSAY OF LIPASE: CPT | Performed by: INTERNAL MEDICINE

## 2024-02-14 PROCEDURE — 83735 ASSAY OF MAGNESIUM: CPT | Performed by: NURSE PRACTITIONER

## 2024-02-14 PROCEDURE — 25010000002 POTASSIUM CHLORIDE 10 MEQ/100ML SOLUTION: Performed by: INTERNAL MEDICINE

## 2024-02-14 PROCEDURE — 25010000002 ONDANSETRON PER 1 MG: Performed by: EMERGENCY MEDICINE

## 2024-02-14 PROCEDURE — 74176 CT ABD & PELVIS W/O CONTRAST: CPT

## 2024-02-14 PROCEDURE — 84100 ASSAY OF PHOSPHORUS: CPT | Performed by: INTERNAL MEDICINE

## 2024-02-14 PROCEDURE — 25010000002 ENOXAPARIN PER 10 MG: Performed by: INTERNAL MEDICINE

## 2024-02-14 PROCEDURE — 25010000002 ADENOSINE PER 6 MG

## 2024-02-14 PROCEDURE — 25810000003 SODIUM CHLORIDE 0.9 % SOLUTION: Performed by: INTERNAL MEDICINE

## 2024-02-14 PROCEDURE — 25010000002 CEFTRIAXONE PER 250 MG: Performed by: NURSE PRACTITIONER

## 2024-02-14 PROCEDURE — 25010000002 MORPHINE PER 10 MG: Performed by: INTERNAL MEDICINE

## 2024-02-14 PROCEDURE — 82330 ASSAY OF CALCIUM: CPT | Performed by: INTERNAL MEDICINE

## 2024-02-14 PROCEDURE — 87186 SC STD MICRODIL/AGAR DIL: CPT | Performed by: NURSE PRACTITIONER

## 2024-02-14 PROCEDURE — 93005 ELECTROCARDIOGRAM TRACING: CPT | Performed by: EMERGENCY MEDICINE

## 2024-02-14 PROCEDURE — 87077 CULTURE AEROBIC IDENTIFY: CPT | Performed by: NURSE PRACTITIONER

## 2024-02-14 PROCEDURE — 85025 COMPLETE CBC W/AUTO DIFF WBC: CPT | Performed by: INTERNAL MEDICINE

## 2024-02-14 PROCEDURE — 84145 PROCALCITONIN (PCT): CPT | Performed by: INTERNAL MEDICINE

## 2024-02-14 PROCEDURE — 25010000002 ONDANSETRON PER 1 MG: Performed by: INTERNAL MEDICINE

## 2024-02-14 PROCEDURE — 83690 ASSAY OF LIPASE: CPT | Performed by: EMERGENCY MEDICINE

## 2024-02-14 PROCEDURE — 25010000002 METHYLPREDNISOLONE PER 40 MG: Performed by: INTERNAL MEDICINE

## 2024-02-14 PROCEDURE — 87086 URINE CULTURE/COLONY COUNT: CPT | Performed by: NURSE PRACTITIONER

## 2024-02-14 PROCEDURE — 99291 CRITICAL CARE FIRST HOUR: CPT | Performed by: INTERNAL MEDICINE

## 2024-02-14 PROCEDURE — 25010000002 MAGNESIUM SULFATE 2 GM/50ML SOLUTION: Performed by: INTERNAL MEDICINE

## 2024-02-14 PROCEDURE — 0 DEXTROSE 5 % SOLUTION: Performed by: NURSE PRACTITIONER

## 2024-02-14 PROCEDURE — 83605 ASSAY OF LACTIC ACID: CPT | Performed by: NURSE PRACTITIONER

## 2024-02-14 PROCEDURE — 85025 COMPLETE CBC W/AUTO DIFF WBC: CPT | Performed by: EMERGENCY MEDICINE

## 2024-02-14 PROCEDURE — 25810000003 LACTATED RINGERS SOLUTION: Performed by: NURSE PRACTITIONER

## 2024-02-14 PROCEDURE — 25010000002 ONDANSETRON PER 1 MG: Performed by: NURSE PRACTITIONER

## 2024-02-14 PROCEDURE — 80053 COMPREHEN METABOLIC PANEL: CPT | Performed by: EMERGENCY MEDICINE

## 2024-02-14 PROCEDURE — 81001 URINALYSIS AUTO W/SCOPE: CPT | Performed by: EMERGENCY MEDICINE

## 2024-02-14 PROCEDURE — 25810000003 SODIUM CHLORIDE 0.9 % SOLUTION: Performed by: EMERGENCY MEDICINE

## 2024-02-14 PROCEDURE — 82306 VITAMIN D 25 HYDROXY: CPT | Performed by: INTERNAL MEDICINE

## 2024-02-14 PROCEDURE — 25010000002 POTASSIUM CHLORIDE 10 MEQ/100ML SOLUTION: Performed by: NURSE PRACTITIONER

## 2024-02-14 PROCEDURE — 25810000003 SODIUM CHLORIDE 0.9 % SOLUTION: Performed by: NURSE PRACTITIONER

## 2024-02-14 RX ORDER — SODIUM CHLORIDE 0.9 % (FLUSH) 0.9 %
10 SYRINGE (ML) INJECTION AS NEEDED
Status: DISCONTINUED | OUTPATIENT
Start: 2024-02-14 | End: 2024-02-17

## 2024-02-14 RX ORDER — POTASSIUM CHLORIDE 7.45 MG/ML
10 INJECTION INTRAVENOUS
Status: DISPENSED | OUTPATIENT
Start: 2024-02-14 | End: 2024-02-14

## 2024-02-14 RX ORDER — ACETAMINOPHEN 325 MG/1
650 TABLET ORAL EVERY 4 HOURS PRN
Status: DISCONTINUED | OUTPATIENT
Start: 2024-02-14 | End: 2024-02-20 | Stop reason: HOSPADM

## 2024-02-14 RX ORDER — NOREPINEPHRINE BITARTRATE 0.03 MG/ML
.02-.3 INJECTION, SOLUTION INTRAVENOUS
Status: DISCONTINUED | OUTPATIENT
Start: 2024-02-14 | End: 2024-02-15

## 2024-02-14 RX ORDER — TEMAZEPAM 15 MG/1
15 CAPSULE ORAL NIGHTLY PRN
Status: DISCONTINUED | OUTPATIENT
Start: 2024-02-14 | End: 2024-02-17

## 2024-02-14 RX ORDER — POLYETHYLENE GLYCOL 3350 17 G/17G
17 POWDER, FOR SOLUTION ORAL DAILY PRN
Status: DISCONTINUED | OUTPATIENT
Start: 2024-02-14 | End: 2024-02-17

## 2024-02-14 RX ORDER — DEXTROSE MONOHYDRATE, SODIUM CHLORIDE, AND POTASSIUM CHLORIDE 50; 1.49; 4.5 G/1000ML; G/1000ML; G/1000ML
50 INJECTION, SOLUTION INTRAVENOUS CONTINUOUS
Status: DISCONTINUED | OUTPATIENT
Start: 2024-02-14 | End: 2024-02-17

## 2024-02-14 RX ORDER — ADENOSINE 3 MG/ML
6 INJECTION, SOLUTION INTRAVENOUS ONCE
Status: COMPLETED | OUTPATIENT
Start: 2024-02-14 | End: 2024-02-14

## 2024-02-14 RX ORDER — METHYLPREDNISOLONE SODIUM SUCCINATE 40 MG/ML
40 INJECTION, POWDER, LYOPHILIZED, FOR SOLUTION INTRAMUSCULAR; INTRAVENOUS EVERY 8 HOURS
Status: DISCONTINUED | OUTPATIENT
Start: 2024-02-14 | End: 2024-02-17

## 2024-02-14 RX ORDER — MAGNESIUM SULFATE HEPTAHYDRATE 40 MG/ML
2 INJECTION, SOLUTION INTRAVENOUS ONCE
Status: COMPLETED | OUTPATIENT
Start: 2024-02-14 | End: 2024-02-14

## 2024-02-14 RX ORDER — DICYCLOMINE HYDROCHLORIDE 10 MG/1
40 CAPSULE ORAL ONCE
Status: COMPLETED | OUTPATIENT
Start: 2024-02-14 | End: 2024-02-14

## 2024-02-14 RX ORDER — ADENOSINE 3 MG/ML
INJECTION, SOLUTION INTRAVENOUS
Status: COMPLETED
Start: 2024-02-14 | End: 2024-02-14

## 2024-02-14 RX ORDER — ALPRAZOLAM 0.25 MG/1
0.25 TABLET ORAL EVERY 6 HOURS PRN
Status: DISCONTINUED | OUTPATIENT
Start: 2024-02-14 | End: 2024-02-17

## 2024-02-14 RX ORDER — BISACODYL 10 MG
10 SUPPOSITORY, RECTAL RECTAL DAILY PRN
Status: DISCONTINUED | OUTPATIENT
Start: 2024-02-14 | End: 2024-02-17

## 2024-02-14 RX ORDER — HYDROCODONE BITARTRATE AND ACETAMINOPHEN 5; 325 MG/1; MG/1
1 TABLET ORAL EVERY 4 HOURS PRN
Status: DISCONTINUED | OUTPATIENT
Start: 2024-02-14 | End: 2024-02-17

## 2024-02-14 RX ORDER — SODIUM CHLORIDE 9 MG/ML
125 INJECTION, SOLUTION INTRAVENOUS CONTINUOUS
Status: DISCONTINUED | OUTPATIENT
Start: 2024-02-14 | End: 2024-02-14

## 2024-02-14 RX ORDER — ONDANSETRON 2 MG/ML
4 INJECTION INTRAMUSCULAR; INTRAVENOUS ONCE
Status: COMPLETED | OUTPATIENT
Start: 2024-02-14 | End: 2024-02-14

## 2024-02-14 RX ORDER — SODIUM CHLORIDE 0.9 % (FLUSH) 0.9 %
10 SYRINGE (ML) INJECTION EVERY 12 HOURS SCHEDULED
Status: DISCONTINUED | OUTPATIENT
Start: 2024-02-14 | End: 2024-02-20 | Stop reason: HOSPADM

## 2024-02-14 RX ORDER — ADENOSINE 3 MG/ML
INJECTION, SOLUTION INTRAVENOUS
Status: DISCONTINUED
Start: 2024-02-14 | End: 2024-02-14 | Stop reason: WASHOUT

## 2024-02-14 RX ORDER — POTASSIUM CHLORIDE 7.45 MG/ML
10 INJECTION INTRAVENOUS ONCE
Status: COMPLETED | OUTPATIENT
Start: 2024-02-14 | End: 2024-02-14

## 2024-02-14 RX ORDER — ENOXAPARIN SODIUM 100 MG/ML
40 INJECTION SUBCUTANEOUS NIGHTLY
Status: DISCONTINUED | OUTPATIENT
Start: 2024-02-14 | End: 2024-02-20 | Stop reason: HOSPADM

## 2024-02-14 RX ORDER — FAMOTIDINE 20 MG/1
40 TABLET, FILM COATED ORAL DAILY
Status: DISCONTINUED | OUTPATIENT
Start: 2024-02-14 | End: 2024-02-14 | Stop reason: SDUPTHER

## 2024-02-14 RX ORDER — NALOXONE HCL 0.4 MG/ML
0.4 VIAL (ML) INJECTION
Status: DISCONTINUED | OUTPATIENT
Start: 2024-02-14 | End: 2024-02-17

## 2024-02-14 RX ORDER — SODIUM CHLORIDE 9 MG/ML
40 INJECTION, SOLUTION INTRAVENOUS AS NEEDED
Status: DISCONTINUED | OUTPATIENT
Start: 2024-02-14 | End: 2024-02-17

## 2024-02-14 RX ORDER — ALUMINA, MAGNESIA, AND SIMETHICONE 2400; 2400; 240 MG/30ML; MG/30ML; MG/30ML
15 SUSPENSION ORAL EVERY 6 HOURS PRN
Status: DISCONTINUED | OUTPATIENT
Start: 2024-02-14 | End: 2024-02-17

## 2024-02-14 RX ORDER — FAMOTIDINE 10 MG/ML
20 INJECTION, SOLUTION INTRAVENOUS EVERY 12 HOURS SCHEDULED
Status: DISCONTINUED | OUTPATIENT
Start: 2024-02-14 | End: 2024-02-17

## 2024-02-14 RX ORDER — MORPHINE SULFATE 2 MG/ML
2 INJECTION, SOLUTION INTRAMUSCULAR; INTRAVENOUS
Status: DISCONTINUED | OUTPATIENT
Start: 2024-02-14 | End: 2024-02-17

## 2024-02-14 RX ORDER — BISACODYL 5 MG/1
5 TABLET, DELAYED RELEASE ORAL DAILY PRN
Status: DISCONTINUED | OUTPATIENT
Start: 2024-02-14 | End: 2024-02-17

## 2024-02-14 RX ORDER — AMOXICILLIN 250 MG
2 CAPSULE ORAL 2 TIMES DAILY PRN
Status: DISCONTINUED | OUTPATIENT
Start: 2024-02-14 | End: 2024-02-17

## 2024-02-14 RX ORDER — ONDANSETRON 2 MG/ML
4 INJECTION INTRAMUSCULAR; INTRAVENOUS EVERY 4 HOURS PRN
Status: DISCONTINUED | OUTPATIENT
Start: 2024-02-14 | End: 2024-02-17

## 2024-02-14 RX ADMIN — MAGNESIUM SULFATE HEPTAHYDRATE 2 G: 2 INJECTION, SOLUTION INTRAVENOUS at 05:46

## 2024-02-14 RX ADMIN — FAMOTIDINE 20 MG: 10 INJECTION INTRAVENOUS at 20:45

## 2024-02-14 RX ADMIN — METHYLPREDNISOLONE SODIUM SUCCINATE 40 MG: 40 INJECTION INTRAMUSCULAR; INTRAVENOUS at 22:48

## 2024-02-14 RX ADMIN — POTASSIUM CHLORIDE 10 MEQ: 10 INJECTION, SOLUTION INTRAVENOUS at 04:38

## 2024-02-14 RX ADMIN — ADENOSINE 6 MG: 3 INJECTION INTRAVENOUS at 03:33

## 2024-02-14 RX ADMIN — ONDANSETRON 4 MG: 2 INJECTION INTRAMUSCULAR; INTRAVENOUS at 07:30

## 2024-02-14 RX ADMIN — METHYLPREDNISOLONE SODIUM SUCCINATE 40 MG: 40 INJECTION INTRAMUSCULAR; INTRAVENOUS at 15:48

## 2024-02-14 RX ADMIN — SODIUM CHLORIDE 125 ML/HR: 9 INJECTION, SOLUTION INTRAVENOUS at 01:51

## 2024-02-14 RX ADMIN — FAMOTIDINE 20 MG: 10 INJECTION INTRAVENOUS at 09:35

## 2024-02-14 RX ADMIN — SODIUM CHLORIDE 125 ML/HR: 9 INJECTION, SOLUTION INTRAVENOUS at 11:18

## 2024-02-14 RX ADMIN — POTASSIUM CHLORIDE, DEXTROSE MONOHYDRATE AND SODIUM CHLORIDE 100 ML/HR: 150; 5; 450 INJECTION, SOLUTION INTRAVENOUS at 09:35

## 2024-02-14 RX ADMIN — CEFTRIAXONE SODIUM 1000 MG: 1 INJECTION, POWDER, FOR SOLUTION INTRAMUSCULAR; INTRAVENOUS at 05:15

## 2024-02-14 RX ADMIN — SODIUM PHOSPHATE, MONOBASIC, MONOHYDRATE AND SODIUM PHOSPHATE, DIBASIC, ANHYDROUS 15 MMOL: 142; 276 INJECTION, SOLUTION INTRAVENOUS at 20:11

## 2024-02-14 RX ADMIN — SODIUM PHOSPHATE, MONOBASIC, MONOHYDRATE AND SODIUM PHOSPHATE, DIBASIC, ANHYDROUS 15 MMOL: 142; 276 INJECTION, SOLUTION INTRAVENOUS at 14:59

## 2024-02-14 RX ADMIN — NOREPINEPHRINE BITARTRATE 0.02 MCG/KG/MIN: 0.03 INJECTION, SOLUTION INTRAVENOUS at 16:37

## 2024-02-14 RX ADMIN — POTASSIUM CHLORIDE 10 MEQ: 10 INJECTION, SOLUTION INTRAVENOUS at 07:30

## 2024-02-14 RX ADMIN — POTASSIUM CHLORIDE 10 MEQ: 10 INJECTION, SOLUTION INTRAVENOUS at 09:34

## 2024-02-14 RX ADMIN — SODIUM CHLORIDE, POTASSIUM CHLORIDE, SODIUM LACTATE AND CALCIUM CHLORIDE 500 ML: 600; 310; 30; 20 INJECTION, SOLUTION INTRAVENOUS at 15:47

## 2024-02-14 RX ADMIN — SODIUM CHLORIDE, POTASSIUM CHLORIDE, SODIUM LACTATE AND CALCIUM CHLORIDE 1000 ML: 600; 310; 30; 20 INJECTION, SOLUTION INTRAVENOUS at 09:35

## 2024-02-14 RX ADMIN — MORPHINE SULFATE 4 MG: 4 INJECTION, SOLUTION INTRAMUSCULAR; INTRAVENOUS at 01:59

## 2024-02-14 RX ADMIN — DICYCLOMINE HYDROCHLORIDE 40 MG: 10 CAPSULE ORAL at 00:18

## 2024-02-14 RX ADMIN — POTASSIUM CHLORIDE 10 MEQ: 10 INJECTION, SOLUTION INTRAVENOUS at 06:40

## 2024-02-14 RX ADMIN — POTASSIUM CHLORIDE, DEXTROSE MONOHYDRATE AND SODIUM CHLORIDE 100 ML/HR: 150; 5; 450 INJECTION, SOLUTION INTRAVENOUS at 19:40

## 2024-02-14 RX ADMIN — MORPHINE SULFATE 2 MG: 2 INJECTION, SOLUTION INTRAMUSCULAR; INTRAVENOUS at 08:34

## 2024-02-14 RX ADMIN — POTASSIUM CHLORIDE 10 MEQ: 7.46 INJECTION, SOLUTION INTRAVENOUS at 02:07

## 2024-02-14 RX ADMIN — SODIUM CHLORIDE, POTASSIUM CHLORIDE, SODIUM LACTATE AND CALCIUM CHLORIDE 500 ML: 600; 310; 30; 20 INJECTION, SOLUTION INTRAVENOUS at 16:50

## 2024-02-14 RX ADMIN — ONDANSETRON 4 MG: 2 INJECTION INTRAMUSCULAR; INTRAVENOUS at 01:58

## 2024-02-14 RX ADMIN — SODIUM CHLORIDE 1000 ML: 9 INJECTION, SOLUTION INTRAVENOUS at 00:14

## 2024-02-14 RX ADMIN — ADENOSINE 6 MG: 3 INJECTION, SOLUTION INTRAVENOUS at 03:33

## 2024-02-14 RX ADMIN — ONDANSETRON 4 MG: 2 INJECTION INTRAMUSCULAR; INTRAVENOUS at 00:16

## 2024-02-14 RX ADMIN — MAGNESIUM SULFATE HEPTAHYDRATE 2 G: 2 INJECTION, SOLUTION INTRAVENOUS at 03:13

## 2024-02-14 RX ADMIN — METHYLPREDNISOLONE SODIUM SUCCINATE 40 MG: 40 INJECTION INTRAMUSCULAR; INTRAVENOUS at 07:30

## 2024-02-14 RX ADMIN — ENOXAPARIN SODIUM 40 MG: 100 INJECTION SUBCUTANEOUS at 20:45

## 2024-02-14 RX ADMIN — Medication 10 ML: at 20:44

## 2024-02-15 LAB
ALBUMIN SERPL-MCNC: 2.1 G/DL (ref 3.5–5.2)
ALBUMIN/GLOB SERPL: 0.7 G/DL
ALP SERPL-CCNC: 185 U/L (ref 39–117)
ALT SERPL W P-5'-P-CCNC: 13 U/L (ref 1–33)
ANION GAP SERPL CALCULATED.3IONS-SCNC: 10.2 MMOL/L (ref 5–15)
AST SERPL-CCNC: 26 U/L (ref 1–32)
BILIRUB SERPL-MCNC: 0.2 MG/DL (ref 0–1.2)
BUN SERPL-MCNC: 10 MG/DL (ref 6–20)
BUN/CREAT SERPL: 11.6 (ref 7–25)
CA-I BLDA-SCNC: 1.11 MMOL/L (ref 1.13–1.32)
CALCIUM SPEC-SCNC: 7.4 MG/DL (ref 8.6–10.5)
CHLORIDE SERPL-SCNC: 105 MMOL/L (ref 98–107)
CO2 SERPL-SCNC: 22.8 MMOL/L (ref 22–29)
CREAT SERPL-MCNC: 0.86 MG/DL (ref 0.57–1)
DEPRECATED RDW RBC AUTO: 49.4 FL (ref 37–54)
EGFRCR SERPLBLD CKD-EPI 2021: 85 ML/MIN/1.73
ERYTHROCYTE [DISTWIDTH] IN BLOOD BY AUTOMATED COUNT: 15.9 % (ref 12.3–15.4)
GLOBULIN UR ELPH-MCNC: 3.2 GM/DL
GLUCOSE SERPL-MCNC: 142 MG/DL (ref 65–99)
HCT VFR BLD AUTO: 32.8 % (ref 34–46.6)
HGB BLD-MCNC: 10 G/DL (ref 12–15.9)
MAGNESIUM SERPL-MCNC: 1.6 MG/DL (ref 1.6–2.6)
MCH RBC QN AUTO: 26.9 PG (ref 26.6–33)
MCHC RBC AUTO-ENTMCNC: 30.5 G/DL (ref 31.5–35.7)
MCV RBC AUTO: 88.2 FL (ref 79–97)
PHOSPHATE SERPL-MCNC: 3.8 MG/DL (ref 2.5–4.5)
PLATELET # BLD AUTO: 423 10*3/MM3 (ref 140–450)
PMV BLD AUTO: 9.7 FL (ref 6–12)
POTASSIUM SERPL-SCNC: 4.1 MMOL/L (ref 3.5–5.2)
POTASSIUM SERPL-SCNC: 4.6 MMOL/L (ref 3.5–5.2)
PROT SERPL-MCNC: 5.3 G/DL (ref 6–8.5)
RBC # BLD AUTO: 3.72 10*6/MM3 (ref 3.77–5.28)
SODIUM SERPL-SCNC: 138 MMOL/L (ref 136–145)
WBC NRBC COR # BLD AUTO: 9.51 10*3/MM3 (ref 3.4–10.8)

## 2024-02-15 PROCEDURE — 85027 COMPLETE CBC AUTOMATED: CPT | Performed by: NURSE PRACTITIONER

## 2024-02-15 PROCEDURE — 83735 ASSAY OF MAGNESIUM: CPT | Performed by: INTERNAL MEDICINE

## 2024-02-15 PROCEDURE — 25010000002 ENOXAPARIN PER 10 MG: Performed by: INTERNAL MEDICINE

## 2024-02-15 PROCEDURE — 82330 ASSAY OF CALCIUM: CPT | Performed by: NURSE PRACTITIONER

## 2024-02-15 PROCEDURE — 25010000002 METHYLPREDNISOLONE PER 40 MG: Performed by: INTERNAL MEDICINE

## 2024-02-15 PROCEDURE — 25010000002 CALCIUM GLUCONATE-NACL 1-0.675 GM/50ML-% SOLUTION: Performed by: NURSE PRACTITIONER

## 2024-02-15 PROCEDURE — 84100 ASSAY OF PHOSPHORUS: CPT | Performed by: INTERNAL MEDICINE

## 2024-02-15 PROCEDURE — 99232 SBSQ HOSP IP/OBS MODERATE 35: CPT | Performed by: INTERNAL MEDICINE

## 2024-02-15 PROCEDURE — 80053 COMPREHEN METABOLIC PANEL: CPT | Performed by: NURSE PRACTITIONER

## 2024-02-15 PROCEDURE — 25010000002 CEFTRIAXONE PER 250 MG: Performed by: INTERNAL MEDICINE

## 2024-02-15 PROCEDURE — 25010000002 MAGNESIUM SULFATE 2 GM/50ML SOLUTION: Performed by: NURSE PRACTITIONER

## 2024-02-15 PROCEDURE — 99291 CRITICAL CARE FIRST HOUR: CPT | Performed by: INTERNAL MEDICINE

## 2024-02-15 PROCEDURE — 25010000002 MAGNESIUM SULFATE 2 GM/50ML SOLUTION: Performed by: INTERNAL MEDICINE

## 2024-02-15 PROCEDURE — 25810000003 LACTATED RINGERS SOLUTION: Performed by: NURSE PRACTITIONER

## 2024-02-15 PROCEDURE — 84132 ASSAY OF SERUM POTASSIUM: CPT | Performed by: INTERNAL MEDICINE

## 2024-02-15 RX ORDER — MIDODRINE HYDROCHLORIDE 10 MG/1
10 TABLET ORAL
Status: DISCONTINUED | OUTPATIENT
Start: 2024-02-15 | End: 2024-02-20 | Stop reason: HOSPADM

## 2024-02-15 RX ORDER — NOREPINEPHRINE BITARTRATE 0.03 MG/ML
.02-.3 INJECTION, SOLUTION INTRAVENOUS
Status: DISCONTINUED | OUTPATIENT
Start: 2024-02-15 | End: 2024-02-16

## 2024-02-15 RX ORDER — MAGNESIUM SULFATE HEPTAHYDRATE 40 MG/ML
2 INJECTION, SOLUTION INTRAVENOUS ONCE
Status: COMPLETED | OUTPATIENT
Start: 2024-02-15 | End: 2024-02-15

## 2024-02-15 RX ORDER — CALCIUM GLUCONATE 20 MG/ML
1000 INJECTION, SOLUTION INTRAVENOUS ONCE
Status: COMPLETED | OUTPATIENT
Start: 2024-02-15 | End: 2024-02-15

## 2024-02-15 RX ADMIN — CEFTRIAXONE SODIUM 1000 MG: 1 INJECTION, POWDER, FOR SOLUTION INTRAMUSCULAR; INTRAVENOUS at 05:34

## 2024-02-15 RX ADMIN — SODIUM CHLORIDE, POTASSIUM CHLORIDE, SODIUM LACTATE AND CALCIUM CHLORIDE 1000 ML: 600; 310; 30; 20 INJECTION, SOLUTION INTRAVENOUS at 09:19

## 2024-02-15 RX ADMIN — FAMOTIDINE 20 MG: 10 INJECTION INTRAVENOUS at 08:54

## 2024-02-15 RX ADMIN — METHYLPREDNISOLONE SODIUM SUCCINATE 40 MG: 40 INJECTION INTRAMUSCULAR; INTRAVENOUS at 08:34

## 2024-02-15 RX ADMIN — CALCIUM GLUCONATE 1000 MG: 20 INJECTION, SOLUTION INTRAVENOUS at 18:14

## 2024-02-15 RX ADMIN — METHYLPREDNISOLONE SODIUM SUCCINATE 40 MG: 40 INJECTION INTRAMUSCULAR; INTRAVENOUS at 16:30

## 2024-02-15 RX ADMIN — Medication 10 ML: at 20:40

## 2024-02-15 RX ADMIN — METHYLPREDNISOLONE SODIUM SUCCINATE 40 MG: 40 INJECTION INTRAMUSCULAR; INTRAVENOUS at 23:24

## 2024-02-15 RX ADMIN — MAGNESIUM SULFATE HEPTAHYDRATE 2 G: 2 INJECTION, SOLUTION INTRAVENOUS at 09:24

## 2024-02-15 RX ADMIN — ENOXAPARIN SODIUM 40 MG: 100 INJECTION SUBCUTANEOUS at 20:40

## 2024-02-15 RX ADMIN — POTASSIUM CHLORIDE, DEXTROSE MONOHYDRATE AND SODIUM CHLORIDE 100 ML/HR: 150; 5; 450 INJECTION, SOLUTION INTRAVENOUS at 18:16

## 2024-02-15 RX ADMIN — ACETAMINOPHEN 650 MG: 325 TABLET ORAL at 16:34

## 2024-02-15 RX ADMIN — MAGNESIUM SULFATE HEPTAHYDRATE 2 G: 2 INJECTION, SOLUTION INTRAVENOUS at 01:51

## 2024-02-15 RX ADMIN — FAMOTIDINE 20 MG: 10 INJECTION INTRAVENOUS at 20:40

## 2024-02-15 RX ADMIN — ALPRAZOLAM 0.25 MG: 0.25 TABLET ORAL at 08:54

## 2024-02-15 RX ADMIN — MIDODRINE HYDROCHLORIDE 10 MG: 10 TABLET ORAL at 18:12

## 2024-02-15 RX ADMIN — Medication 10 ML: at 08:55

## 2024-02-16 ENCOUNTER — TELEPHONE (OUTPATIENT)
Dept: GASTROENTEROLOGY | Facility: CLINIC | Age: 44
End: 2024-02-16
Payer: MEDICARE

## 2024-02-16 LAB
25(OH)D3 SERPL-MCNC: 10 NG/ML (ref 30–100)
ALBUMIN SERPL-MCNC: 1.9 G/DL (ref 3.5–5.2)
ALBUMIN/GLOB SERPL: 0.7 G/DL
ALP SERPL-CCNC: 153 U/L (ref 39–117)
ALT SERPL W P-5'-P-CCNC: 10 U/L (ref 1–33)
ANION GAP SERPL CALCULATED.3IONS-SCNC: 7.4 MMOL/L (ref 5–15)
AST SERPL-CCNC: 22 U/L (ref 1–32)
BACTERIA SPEC AEROBE CULT: ABNORMAL
BASOPHILS # BLD AUTO: 0.02 10*3/MM3 (ref 0–0.2)
BASOPHILS NFR BLD AUTO: 0.2 % (ref 0–1.5)
BILIRUB SERPL-MCNC: <0.2 MG/DL (ref 0–1.2)
BUN SERPL-MCNC: 8 MG/DL (ref 6–20)
BUN/CREAT SERPL: 9.2 (ref 7–25)
CA-I BLDA-SCNC: 1.15 MMOL/L (ref 1.13–1.32)
CALCIUM SPEC-SCNC: 8 MG/DL (ref 8.6–10.5)
CHLORIDE SERPL-SCNC: 106 MMOL/L (ref 98–107)
CO2 SERPL-SCNC: 21.6 MMOL/L (ref 22–29)
CREAT SERPL-MCNC: 0.87 MG/DL (ref 0.57–1)
DEPRECATED RDW RBC AUTO: 50.9 FL (ref 37–54)
EGFRCR SERPLBLD CKD-EPI 2021: 83.9 ML/MIN/1.73
EOSINOPHIL # BLD AUTO: 0 10*3/MM3 (ref 0–0.4)
EOSINOPHIL NFR BLD AUTO: 0 % (ref 0.3–6.2)
ERYTHROCYTE [DISTWIDTH] IN BLOOD BY AUTOMATED COUNT: 16.1 % (ref 12.3–15.4)
GLOBULIN UR ELPH-MCNC: 2.8 GM/DL
GLUCOSE SERPL-MCNC: 98 MG/DL (ref 65–99)
HCT VFR BLD AUTO: 31.7 % (ref 34–46.6)
HGB BLD-MCNC: 9.7 G/DL (ref 12–15.9)
IMM GRANULOCYTES # BLD AUTO: 0.08 10*3/MM3 (ref 0–0.05)
IMM GRANULOCYTES NFR BLD AUTO: 1 % (ref 0–0.5)
LYMPHOCYTES # BLD AUTO: 0.88 10*3/MM3 (ref 0.7–3.1)
LYMPHOCYTES NFR BLD AUTO: 10.6 % (ref 19.6–45.3)
MAGNESIUM SERPL-MCNC: 2.2 MG/DL (ref 1.6–2.6)
MCH RBC QN AUTO: 27.6 PG (ref 26.6–33)
MCHC RBC AUTO-ENTMCNC: 30.6 G/DL (ref 31.5–35.7)
MCV RBC AUTO: 90.1 FL (ref 79–97)
MONOCYTES # BLD AUTO: 0.18 10*3/MM3 (ref 0.1–0.9)
MONOCYTES NFR BLD AUTO: 2.2 % (ref 5–12)
NEUTROPHILS NFR BLD AUTO: 7.12 10*3/MM3 (ref 1.7–7)
NEUTROPHILS NFR BLD AUTO: 86 % (ref 42.7–76)
NRBC BLD AUTO-RTO: 0 /100 WBC (ref 0–0.2)
PHOSPHATE SERPL-MCNC: 2.6 MG/DL (ref 2.5–4.5)
PLATELET # BLD AUTO: 277 10*3/MM3 (ref 140–450)
PMV BLD AUTO: 9.7 FL (ref 6–12)
POTASSIUM SERPL-SCNC: 4.7 MMOL/L (ref 3.5–5.2)
PROT SERPL-MCNC: 4.7 G/DL (ref 6–8.5)
RBC # BLD AUTO: 3.52 10*6/MM3 (ref 3.77–5.28)
SODIUM SERPL-SCNC: 135 MMOL/L (ref 136–145)
WBC NRBC COR # BLD AUTO: 8.28 10*3/MM3 (ref 3.4–10.8)

## 2024-02-16 PROCEDURE — 25010000002 CEFTRIAXONE PER 250 MG: Performed by: INTERNAL MEDICINE

## 2024-02-16 PROCEDURE — 25010000002 ENOXAPARIN PER 10 MG: Performed by: INTERNAL MEDICINE

## 2024-02-16 PROCEDURE — 83735 ASSAY OF MAGNESIUM: CPT | Performed by: INTERNAL MEDICINE

## 2024-02-16 PROCEDURE — 99232 SBSQ HOSP IP/OBS MODERATE 35: CPT | Performed by: INTERNAL MEDICINE

## 2024-02-16 PROCEDURE — 84100 ASSAY OF PHOSPHORUS: CPT | Performed by: NURSE PRACTITIONER

## 2024-02-16 PROCEDURE — 80053 COMPREHEN METABOLIC PANEL: CPT | Performed by: INTERNAL MEDICINE

## 2024-02-16 PROCEDURE — 85025 COMPLETE CBC W/AUTO DIFF WBC: CPT | Performed by: INTERNAL MEDICINE

## 2024-02-16 PROCEDURE — 82330 ASSAY OF CALCIUM: CPT | Performed by: NURSE PRACTITIONER

## 2024-02-16 PROCEDURE — 25010000002 METHYLPREDNISOLONE PER 40 MG: Performed by: INTERNAL MEDICINE

## 2024-02-16 RX ADMIN — MIDODRINE HYDROCHLORIDE 10 MG: 10 TABLET ORAL at 16:33

## 2024-02-16 RX ADMIN — Medication 10 ML: at 08:02

## 2024-02-16 RX ADMIN — METHYLPREDNISOLONE SODIUM SUCCINATE 40 MG: 40 INJECTION INTRAMUSCULAR; INTRAVENOUS at 14:57

## 2024-02-16 RX ADMIN — POTASSIUM CHLORIDE, DEXTROSE MONOHYDRATE AND SODIUM CHLORIDE 100 ML/HR: 150; 5; 450 INJECTION, SOLUTION INTRAVENOUS at 03:00

## 2024-02-16 RX ADMIN — MIDODRINE HYDROCHLORIDE 10 MG: 10 TABLET ORAL at 11:10

## 2024-02-16 RX ADMIN — ENOXAPARIN SODIUM 40 MG: 100 INJECTION SUBCUTANEOUS at 20:28

## 2024-02-16 RX ADMIN — FAMOTIDINE 20 MG: 10 INJECTION INTRAVENOUS at 08:01

## 2024-02-16 RX ADMIN — MIDODRINE HYDROCHLORIDE 10 MG: 10 TABLET ORAL at 08:01

## 2024-02-16 RX ADMIN — FAMOTIDINE 20 MG: 10 INJECTION INTRAVENOUS at 20:28

## 2024-02-16 RX ADMIN — CEFTRIAXONE SODIUM 1000 MG: 1 INJECTION, POWDER, FOR SOLUTION INTRAMUSCULAR; INTRAVENOUS at 05:59

## 2024-02-16 RX ADMIN — Medication 10 ML: at 20:28

## 2024-02-16 RX ADMIN — METHYLPREDNISOLONE SODIUM SUCCINATE 40 MG: 40 INJECTION INTRAMUSCULAR; INTRAVENOUS at 08:02

## 2024-02-17 LAB
ALBUMIN SERPL-MCNC: 2.1 G/DL (ref 3.5–5.2)
ALBUMIN/GLOB SERPL: 0.8 G/DL
ALP SERPL-CCNC: 143 U/L (ref 39–117)
ALT SERPL W P-5'-P-CCNC: 9 U/L (ref 1–33)
ANION GAP SERPL CALCULATED.3IONS-SCNC: 7.5 MMOL/L (ref 5–15)
AST SERPL-CCNC: 16 U/L (ref 1–32)
BASOPHILS # BLD AUTO: 0.01 10*3/MM3 (ref 0–0.2)
BASOPHILS NFR BLD AUTO: 0.1 % (ref 0–1.5)
BILIRUB SERPL-MCNC: 0.2 MG/DL (ref 0–1.2)
BUN SERPL-MCNC: 8 MG/DL (ref 6–20)
BUN/CREAT SERPL: 8.2 (ref 7–25)
CALCIUM SPEC-SCNC: 8.6 MG/DL (ref 8.6–10.5)
CHLORIDE SERPL-SCNC: 109 MMOL/L (ref 98–107)
CO2 SERPL-SCNC: 20.5 MMOL/L (ref 22–29)
CREAT SERPL-MCNC: 0.97 MG/DL (ref 0.57–1)
DEPRECATED RDW RBC AUTO: 51.8 FL (ref 37–54)
EGFRCR SERPLBLD CKD-EPI 2021: 73.6 ML/MIN/1.73
EOSINOPHIL # BLD AUTO: 0 10*3/MM3 (ref 0–0.4)
EOSINOPHIL NFR BLD AUTO: 0 % (ref 0.3–6.2)
ERYTHROCYTE [DISTWIDTH] IN BLOOD BY AUTOMATED COUNT: 16.3 % (ref 12.3–15.4)
FOLATE SERPL-MCNC: 12.7 NG/ML (ref 4.78–24.2)
GLOBULIN UR ELPH-MCNC: 2.6 GM/DL
GLUCOSE SERPL-MCNC: 75 MG/DL (ref 65–99)
HCT VFR BLD AUTO: 31.5 % (ref 34–46.6)
HGB BLD-MCNC: 9.4 G/DL (ref 12–15.9)
IMM GRANULOCYTES # BLD AUTO: 0.06 10*3/MM3 (ref 0–0.05)
IMM GRANULOCYTES NFR BLD AUTO: 0.8 % (ref 0–0.5)
LYMPHOCYTES # BLD AUTO: 1.1 10*3/MM3 (ref 0.7–3.1)
LYMPHOCYTES NFR BLD AUTO: 15.5 % (ref 19.6–45.3)
MAGNESIUM SERPL-MCNC: 2 MG/DL (ref 1.6–2.6)
MCH RBC QN AUTO: 27.2 PG (ref 26.6–33)
MCHC RBC AUTO-ENTMCNC: 29.8 G/DL (ref 31.5–35.7)
MCV RBC AUTO: 91 FL (ref 79–97)
MONOCYTES # BLD AUTO: 0.46 10*3/MM3 (ref 0.1–0.9)
MONOCYTES NFR BLD AUTO: 6.5 % (ref 5–12)
NEUTROPHILS NFR BLD AUTO: 5.48 10*3/MM3 (ref 1.7–7)
NEUTROPHILS NFR BLD AUTO: 77.1 % (ref 42.7–76)
NRBC BLD AUTO-RTO: 0 /100 WBC (ref 0–0.2)
PHOSPHATE SERPL-MCNC: 2.3 MG/DL (ref 2.5–4.5)
PLATELET # BLD AUTO: 244 10*3/MM3 (ref 140–450)
PMV BLD AUTO: 9.7 FL (ref 6–12)
POTASSIUM SERPL-SCNC: 4.8 MMOL/L (ref 3.5–5.2)
PROT SERPL-MCNC: 4.7 G/DL (ref 6–8.5)
PTH-INTACT SERPL-MCNC: 281 PG/ML (ref 15–65)
RBC # BLD AUTO: 3.46 10*6/MM3 (ref 3.77–5.28)
SODIUM SERPL-SCNC: 137 MMOL/L (ref 136–145)
VIT B12 BLD-MCNC: 225 PG/ML (ref 211–946)
WBC NRBC COR # BLD AUTO: 7.11 10*3/MM3 (ref 3.4–10.8)

## 2024-02-17 PROCEDURE — 84207 ASSAY OF VITAMIN B-6: CPT | Performed by: INTERNAL MEDICINE

## 2024-02-17 PROCEDURE — 83735 ASSAY OF MAGNESIUM: CPT | Performed by: INTERNAL MEDICINE

## 2024-02-17 PROCEDURE — 82607 VITAMIN B-12: CPT | Performed by: INTERNAL MEDICINE

## 2024-02-17 PROCEDURE — 84630 ASSAY OF ZINC: CPT | Performed by: INTERNAL MEDICINE

## 2024-02-17 PROCEDURE — 84255 ASSAY OF SELENIUM: CPT | Performed by: INTERNAL MEDICINE

## 2024-02-17 PROCEDURE — 25010000002 CYANOCOBALAMIN PER 1000 MCG: Performed by: INTERNAL MEDICINE

## 2024-02-17 PROCEDURE — 25010000002 ENOXAPARIN PER 10 MG: Performed by: INTERNAL MEDICINE

## 2024-02-17 PROCEDURE — 80053 COMPREHEN METABOLIC PANEL: CPT | Performed by: INTERNAL MEDICINE

## 2024-02-17 PROCEDURE — 82746 ASSAY OF FOLIC ACID SERUM: CPT | Performed by: INTERNAL MEDICINE

## 2024-02-17 PROCEDURE — 83970 ASSAY OF PARATHORMONE: CPT | Performed by: INTERNAL MEDICINE

## 2024-02-17 PROCEDURE — 25010000002 METHYLPREDNISOLONE PER 40 MG: Performed by: INTERNAL MEDICINE

## 2024-02-17 PROCEDURE — 85025 COMPLETE CBC W/AUTO DIFF WBC: CPT | Performed by: INTERNAL MEDICINE

## 2024-02-17 PROCEDURE — 84100 ASSAY OF PHOSPHORUS: CPT | Performed by: INTERNAL MEDICINE

## 2024-02-17 PROCEDURE — 82525 ASSAY OF COPPER: CPT | Performed by: INTERNAL MEDICINE

## 2024-02-17 RX ORDER — ESCITALOPRAM OXALATE 10 MG/1
10 TABLET ORAL DAILY
Status: DISCONTINUED | OUTPATIENT
Start: 2024-02-17 | End: 2024-02-20 | Stop reason: HOSPADM

## 2024-02-17 RX ORDER — FOLIC ACID 1 MG/1
1 TABLET ORAL DAILY
Status: DISCONTINUED | OUTPATIENT
Start: 2024-02-17 | End: 2024-02-20 | Stop reason: HOSPADM

## 2024-02-17 RX ORDER — PREDNISONE 20 MG/1
20 TABLET ORAL
Status: DISCONTINUED | OUTPATIENT
Start: 2024-02-18 | End: 2024-02-18

## 2024-02-17 RX ORDER — CYANOCOBALAMIN 1000 UG/ML
1000 INJECTION, SOLUTION INTRAMUSCULAR; SUBCUTANEOUS DAILY
Status: DISCONTINUED | OUTPATIENT
Start: 2024-02-17 | End: 2024-02-20 | Stop reason: HOSPADM

## 2024-02-17 RX ADMIN — ENOXAPARIN SODIUM 40 MG: 100 INJECTION SUBCUTANEOUS at 22:28

## 2024-02-17 RX ADMIN — MIDODRINE HYDROCHLORIDE 10 MG: 10 TABLET ORAL at 08:11

## 2024-02-17 RX ADMIN — ESCITALOPRAM OXALATE 10 MG: 10 TABLET ORAL at 22:28

## 2024-02-17 RX ADMIN — MIDODRINE HYDROCHLORIDE 10 MG: 10 TABLET ORAL at 17:05

## 2024-02-17 RX ADMIN — FOLIC ACID 1 MG: 1 TABLET ORAL at 22:28

## 2024-02-17 RX ADMIN — MIDODRINE HYDROCHLORIDE 10 MG: 10 TABLET ORAL at 11:42

## 2024-02-17 RX ADMIN — CYANOCOBALAMIN 1000 MCG: 1000 INJECTION, SOLUTION INTRAMUSCULAR at 22:28

## 2024-02-17 RX ADMIN — ACETAMINOPHEN 650 MG: 325 TABLET ORAL at 22:27

## 2024-02-17 RX ADMIN — METHYLPREDNISOLONE SODIUM SUCCINATE 40 MG: 40 INJECTION INTRAMUSCULAR; INTRAVENOUS at 01:04

## 2024-02-17 RX ADMIN — Medication 400 MG: at 22:27

## 2024-02-18 LAB
FERRITIN SERPL-MCNC: 147.8 NG/ML (ref 13–150)
IRON 24H UR-MRATE: 18 MCG/DL (ref 37–145)
IRON SATN MFR SERPL: 8 % (ref 20–50)
MAGNESIUM SERPL-MCNC: 1.7 MG/DL (ref 1.6–2.6)
PHOSPHATE SERPL-MCNC: 2.1 MG/DL (ref 2.5–4.5)
TIBC SERPL-MCNC: 219 MCG/DL (ref 298–536)
TRANSFERRIN SERPL-MCNC: 147 MG/DL (ref 200–360)

## 2024-02-18 PROCEDURE — 84466 ASSAY OF TRANSFERRIN: CPT | Performed by: INTERNAL MEDICINE

## 2024-02-18 PROCEDURE — 25010000002 METHYLPREDNISOLONE PER 125 MG: Performed by: INTERNAL MEDICINE

## 2024-02-18 PROCEDURE — 83735 ASSAY OF MAGNESIUM: CPT | Performed by: INTERNAL MEDICINE

## 2024-02-18 PROCEDURE — 25010000002 CEFTRIAXONE PER 250 MG: Performed by: INTERNAL MEDICINE

## 2024-02-18 PROCEDURE — 83540 ASSAY OF IRON: CPT | Performed by: INTERNAL MEDICINE

## 2024-02-18 PROCEDURE — 25010000002 ENOXAPARIN PER 10 MG: Performed by: INTERNAL MEDICINE

## 2024-02-18 PROCEDURE — 25010000002 ONDANSETRON PER 1 MG: Performed by: INTERNAL MEDICINE

## 2024-02-18 PROCEDURE — 63710000001 PREDNISONE PER 1 MG: Performed by: INTERNAL MEDICINE

## 2024-02-18 PROCEDURE — 82728 ASSAY OF FERRITIN: CPT | Performed by: INTERNAL MEDICINE

## 2024-02-18 PROCEDURE — 84100 ASSAY OF PHOSPHORUS: CPT | Performed by: INTERNAL MEDICINE

## 2024-02-18 RX ORDER — METHYLPREDNISOLONE SODIUM SUCCINATE 125 MG/2ML
60 INJECTION, POWDER, LYOPHILIZED, FOR SOLUTION INTRAMUSCULAR; INTRAVENOUS EVERY 6 HOURS
Status: DISCONTINUED | OUTPATIENT
Start: 2024-02-18 | End: 2024-02-20 | Stop reason: HOSPADM

## 2024-02-18 RX ORDER — FENTANYL/ROPIVACAINE/NS/PF 2-625MCG/1
15 PLASTIC BAG, INJECTION (ML) EPIDURAL ONCE
Status: DISCONTINUED | OUTPATIENT
Start: 2024-02-18 | End: 2024-02-18

## 2024-02-18 RX ORDER — ONDANSETRON 2 MG/ML
4 INJECTION INTRAMUSCULAR; INTRAVENOUS EVERY 4 HOURS PRN
Status: DISCONTINUED | OUTPATIENT
Start: 2024-02-18 | End: 2024-02-20 | Stop reason: HOSPADM

## 2024-02-18 RX ADMIN — Medication 5000 UNITS: at 08:15

## 2024-02-18 RX ADMIN — MIDODRINE HYDROCHLORIDE 10 MG: 10 TABLET ORAL at 08:15

## 2024-02-18 RX ADMIN — METHYLPREDNISOLONE SODIUM SUCCINATE 60 MG: 125 INJECTION INTRAMUSCULAR; INTRAVENOUS at 21:00

## 2024-02-18 RX ADMIN — MIDODRINE HYDROCHLORIDE 10 MG: 10 TABLET ORAL at 16:36

## 2024-02-18 RX ADMIN — ONDANSETRON 4 MG: 2 INJECTION INTRAMUSCULAR; INTRAVENOUS at 22:52

## 2024-02-18 RX ADMIN — MIDODRINE HYDROCHLORIDE 10 MG: 10 TABLET ORAL at 10:52

## 2024-02-18 RX ADMIN — ACETAMINOPHEN 650 MG: 325 TABLET ORAL at 15:23

## 2024-02-18 RX ADMIN — PREDNISONE 20 MG: 20 TABLET ORAL at 08:15

## 2024-02-18 RX ADMIN — Medication 400 MG: at 08:15

## 2024-02-18 RX ADMIN — CEFTRIAXONE SODIUM 1000 MG: 1 INJECTION, POWDER, FOR SOLUTION INTRAMUSCULAR; INTRAVENOUS at 06:25

## 2024-02-18 RX ADMIN — ENOXAPARIN SODIUM 40 MG: 100 INJECTION SUBCUTANEOUS at 21:00

## 2024-02-18 RX ADMIN — ACETAMINOPHEN 650 MG: 325 TABLET ORAL at 04:20

## 2024-02-18 RX ADMIN — ACETAMINOPHEN 650 MG: 325 TABLET ORAL at 21:02

## 2024-02-18 RX ADMIN — Medication 10 ML: at 21:01

## 2024-02-18 RX ADMIN — Medication 10 ML: at 08:16

## 2024-02-19 LAB
BACTERIA SPEC AEROBE CULT: NORMAL
BACTERIA SPEC AEROBE CULT: NORMAL
COLLECT DURATION TIME UR: 24 HRS
COLLECT DURATION TIME UR: 24 HRS
POTASSIUM 24H UR-SRATE: 13.6 MMOL/24HRS (ref 25–125)
POTASSIUM UR-SCNC: 10 MMOL/L
SODIUM 24H UR-SRATE: 232 MMOL/24HRS (ref 40–220)
SODIUM UR-SCNC: 171 MMOL/L
SPECIMEN VOL 24H UR: 1355 ML
SPECIMEN VOL 24H UR: 1355 ML

## 2024-02-19 PROCEDURE — 81050 URINALYSIS VOLUME MEASURE: CPT | Performed by: INTERNAL MEDICINE

## 2024-02-19 PROCEDURE — 84300 ASSAY OF URINE SODIUM: CPT | Performed by: INTERNAL MEDICINE

## 2024-02-19 PROCEDURE — 25010000002 ONDANSETRON PER 1 MG: Performed by: INTERNAL MEDICINE

## 2024-02-19 PROCEDURE — 25010000002 CYANOCOBALAMIN PER 1000 MCG: Performed by: INTERNAL MEDICINE

## 2024-02-19 PROCEDURE — 25010000002 METHYLPREDNISOLONE PER 125 MG: Performed by: INTERNAL MEDICINE

## 2024-02-19 PROCEDURE — 83735 ASSAY OF MAGNESIUM: CPT | Performed by: INTERNAL MEDICINE

## 2024-02-19 PROCEDURE — 25010000002 CEFTRIAXONE PER 250 MG: Performed by: INTERNAL MEDICINE

## 2024-02-19 PROCEDURE — 25010000002 ENOXAPARIN PER 10 MG: Performed by: INTERNAL MEDICINE

## 2024-02-19 PROCEDURE — 84133 ASSAY OF URINE POTASSIUM: CPT | Performed by: INTERNAL MEDICINE

## 2024-02-19 RX ORDER — CHOLESTYRAMINE LIGHT 4 G/5.7G
1 POWDER, FOR SUSPENSION ORAL EVERY 12 HOURS SCHEDULED
Status: DISCONTINUED | OUTPATIENT
Start: 2024-02-19 | End: 2024-02-20 | Stop reason: HOSPADM

## 2024-02-19 RX ORDER — SIMETHICONE 80 MG
80 TABLET,CHEWABLE ORAL 4 TIMES DAILY PRN
Status: DISCONTINUED | OUTPATIENT
Start: 2024-02-19 | End: 2024-02-20 | Stop reason: HOSPADM

## 2024-02-19 RX ORDER — SACCHAROMYCES BOULARDII 250 MG
250 CAPSULE ORAL 2 TIMES DAILY
Status: DISCONTINUED | OUTPATIENT
Start: 2024-02-19 | End: 2024-02-20 | Stop reason: HOSPADM

## 2024-02-19 RX ADMIN — MIDODRINE HYDROCHLORIDE 10 MG: 10 TABLET ORAL at 16:54

## 2024-02-19 RX ADMIN — METHYLPREDNISOLONE SODIUM SUCCINATE 60 MG: 125 INJECTION INTRAMUSCULAR; INTRAVENOUS at 13:06

## 2024-02-19 RX ADMIN — METHYLPREDNISOLONE SODIUM SUCCINATE 60 MG: 125 INJECTION INTRAMUSCULAR; INTRAVENOUS at 01:01

## 2024-02-19 RX ADMIN — MIDODRINE HYDROCHLORIDE 10 MG: 10 TABLET ORAL at 06:31

## 2024-02-19 RX ADMIN — ACETAMINOPHEN 650 MG: 325 TABLET ORAL at 22:21

## 2024-02-19 RX ADMIN — ONDANSETRON 4 MG: 2 INJECTION INTRAMUSCULAR; INTRAVENOUS at 20:14

## 2024-02-19 RX ADMIN — ACETAMINOPHEN 650 MG: 325 TABLET ORAL at 06:08

## 2024-02-19 RX ADMIN — CHOLESTYRAMINE 4 G: 4 POWDER, FOR SUSPENSION ORAL at 20:13

## 2024-02-19 RX ADMIN — Medication 10 ML: at 09:00

## 2024-02-19 RX ADMIN — SIMETHICONE 80 MG: 80 TABLET, CHEWABLE ORAL at 16:54

## 2024-02-19 RX ADMIN — CEFTRIAXONE SODIUM 1000 MG: 1 INJECTION, POWDER, FOR SOLUTION INTRAMUSCULAR; INTRAVENOUS at 06:07

## 2024-02-19 RX ADMIN — Medication 250 MG: at 20:13

## 2024-02-19 RX ADMIN — ESCITALOPRAM OXALATE 10 MG: 10 TABLET ORAL at 08:59

## 2024-02-19 RX ADMIN — ACETAMINOPHEN 650 MG: 325 TABLET ORAL at 11:49

## 2024-02-19 RX ADMIN — CYANOCOBALAMIN 1000 MCG: 1000 INJECTION, SOLUTION INTRAMUSCULAR at 08:59

## 2024-02-19 RX ADMIN — ACETAMINOPHEN 650 MG: 325 TABLET ORAL at 01:05

## 2024-02-19 RX ADMIN — Medication 10 ML: at 20:14

## 2024-02-19 RX ADMIN — MIDODRINE HYDROCHLORIDE 10 MG: 10 TABLET ORAL at 11:19

## 2024-02-19 RX ADMIN — Medication 5000 UNITS: at 08:59

## 2024-02-19 RX ADMIN — METHYLPREDNISOLONE SODIUM SUCCINATE 60 MG: 125 INJECTION INTRAMUSCULAR; INTRAVENOUS at 06:31

## 2024-02-19 RX ADMIN — FOLIC ACID 1 MG: 1 TABLET ORAL at 08:59

## 2024-02-19 RX ADMIN — METHYLPREDNISOLONE SODIUM SUCCINATE 60 MG: 125 INJECTION INTRAMUSCULAR; INTRAVENOUS at 18:33

## 2024-02-19 RX ADMIN — ACETAMINOPHEN 650 MG: 325 TABLET ORAL at 17:48

## 2024-02-19 RX ADMIN — ENOXAPARIN SODIUM 40 MG: 100 INJECTION SUBCUTANEOUS at 20:13

## 2024-02-20 ENCOUNTER — READMISSION MANAGEMENT (OUTPATIENT)
Dept: CALL CENTER | Facility: HOSPITAL | Age: 44
End: 2024-02-20
Payer: MEDICARE

## 2024-02-20 VITALS
TEMPERATURE: 98.4 F | BODY MASS INDEX: 25.64 KG/M2 | SYSTOLIC BLOOD PRESSURE: 110 MMHG | OXYGEN SATURATION: 99 % | DIASTOLIC BLOOD PRESSURE: 73 MMHG | HEART RATE: 71 BPM | WEIGHT: 139.33 LBS | HEIGHT: 62 IN | RESPIRATION RATE: 16 BRPM

## 2024-02-20 PROBLEM — I47.10 SVT (SUPRAVENTRICULAR TACHYCARDIA): Status: RESOLVED | Noted: 2024-02-14 | Resolved: 2024-02-20

## 2024-02-20 PROBLEM — K56.609 SBO (SMALL BOWEL OBSTRUCTION): Status: RESOLVED | Noted: 2024-02-14 | Resolved: 2024-02-20

## 2024-02-20 PROBLEM — E87.6 HYPOKALEMIA: Status: RESOLVED | Noted: 2022-09-01 | Resolved: 2024-02-20

## 2024-02-20 PROBLEM — I95.9 HYPOTENSION: Status: RESOLVED | Noted: 2024-02-14 | Resolved: 2024-02-20

## 2024-02-20 PROBLEM — E83.42 HYPOMAGNESEMIA: Status: RESOLVED | Noted: 2023-08-05 | Resolved: 2024-02-20

## 2024-02-20 LAB
ALBUMIN SERPL-MCNC: 2.8 G/DL (ref 3.5–5.2)
ALBUMIN/GLOB SERPL: 0.9 G/DL
ALP SERPL-CCNC: 151 U/L (ref 39–117)
ALT SERPL W P-5'-P-CCNC: 18 U/L (ref 1–33)
ANION GAP SERPL CALCULATED.3IONS-SCNC: 8.9 MMOL/L (ref 5–15)
AST SERPL-CCNC: 23 U/L (ref 1–32)
BASOPHILS # BLD AUTO: 0.01 10*3/MM3 (ref 0–0.2)
BASOPHILS NFR BLD AUTO: 0.1 % (ref 0–1.5)
BILIRUB SERPL-MCNC: 0.2 MG/DL (ref 0–1.2)
BUN SERPL-MCNC: 16 MG/DL (ref 6–20)
BUN/CREAT SERPL: 16.8 (ref 7–25)
CALCIUM SPEC-SCNC: 9.4 MG/DL (ref 8.6–10.5)
CHLORIDE SERPL-SCNC: 105 MMOL/L (ref 98–107)
CO2 SERPL-SCNC: 23.1 MMOL/L (ref 22–29)
CREAT SERPL-MCNC: 0.95 MG/DL (ref 0.57–1)
DEPRECATED RDW RBC AUTO: 53.1 FL (ref 37–54)
EGFRCR SERPLBLD CKD-EPI 2021: 75.5 ML/MIN/1.73
EOSINOPHIL # BLD AUTO: 0 10*3/MM3 (ref 0–0.4)
EOSINOPHIL NFR BLD AUTO: 0 % (ref 0.3–6.2)
ERYTHROCYTE [DISTWIDTH] IN BLOOD BY AUTOMATED COUNT: 16.4 % (ref 12.3–15.4)
GLOBULIN UR ELPH-MCNC: 3.2 GM/DL
GLUCOSE SERPL-MCNC: 110 MG/DL (ref 65–99)
HCT VFR BLD AUTO: 33.1 % (ref 34–46.6)
HGB BLD-MCNC: 9.9 G/DL (ref 12–15.9)
IMM GRANULOCYTES # BLD AUTO: 0.09 10*3/MM3 (ref 0–0.05)
IMM GRANULOCYTES NFR BLD AUTO: 0.9 % (ref 0–0.5)
LYMPHOCYTES # BLD AUTO: 0.59 10*3/MM3 (ref 0.7–3.1)
LYMPHOCYTES NFR BLD AUTO: 5.6 % (ref 19.6–45.3)
MAGNESIUM SERPL-MCNC: 1.6 MG/DL (ref 1.6–2.6)
MCH RBC QN AUTO: 26.8 PG (ref 26.6–33)
MCHC RBC AUTO-ENTMCNC: 29.9 G/DL (ref 31.5–35.7)
MCV RBC AUTO: 89.7 FL (ref 79–97)
MONOCYTES # BLD AUTO: 0.13 10*3/MM3 (ref 0.1–0.9)
MONOCYTES NFR BLD AUTO: 1.2 % (ref 5–12)
NEUTROPHILS NFR BLD AUTO: 9.75 10*3/MM3 (ref 1.7–7)
NEUTROPHILS NFR BLD AUTO: 92.2 % (ref 42.7–76)
NRBC BLD AUTO-RTO: 0 /100 WBC (ref 0–0.2)
PLATELET # BLD AUTO: 301 10*3/MM3 (ref 140–450)
PMV BLD AUTO: 10 FL (ref 6–12)
POTASSIUM SERPL-SCNC: 4.1 MMOL/L (ref 3.5–5.2)
PROT SERPL-MCNC: 6 G/DL (ref 6–8.5)
RBC # BLD AUTO: 3.69 10*6/MM3 (ref 3.77–5.28)
SELENIUM SERPL-MCNC: 79 UG/L (ref 93–198)
SODIUM SERPL-SCNC: 137 MMOL/L (ref 136–145)
WBC NRBC COR # BLD AUTO: 10.57 10*3/MM3 (ref 3.4–10.8)

## 2024-02-20 PROCEDURE — 85025 COMPLETE CBC W/AUTO DIFF WBC: CPT | Performed by: INTERNAL MEDICINE

## 2024-02-20 PROCEDURE — 25010000002 CYANOCOBALAMIN PER 1000 MCG: Performed by: INTERNAL MEDICINE

## 2024-02-20 PROCEDURE — 25010000002 ONDANSETRON PER 1 MG: Performed by: INTERNAL MEDICINE

## 2024-02-20 PROCEDURE — 83735 ASSAY OF MAGNESIUM: CPT | Performed by: INTERNAL MEDICINE

## 2024-02-20 PROCEDURE — 80053 COMPREHEN METABOLIC PANEL: CPT | Performed by: INTERNAL MEDICINE

## 2024-02-20 PROCEDURE — 25010000002 METHYLPREDNISOLONE PER 125 MG: Performed by: INTERNAL MEDICINE

## 2024-02-20 RX ORDER — SACCHAROMYCES BOULARDII 250 MG
250 CAPSULE ORAL 2 TIMES DAILY
Qty: 29 CAPSULE | Refills: 0 | Status: SHIPPED | OUTPATIENT
Start: 2024-02-20 | End: 2024-02-29

## 2024-02-20 RX ORDER — ESCITALOPRAM OXALATE 10 MG/1
10 TABLET ORAL DAILY
Qty: 30 TABLET | Refills: 0 | Status: SHIPPED | OUTPATIENT
Start: 2024-02-20 | End: 2024-02-29

## 2024-02-20 RX ORDER — MIDODRINE HYDROCHLORIDE 10 MG/1
10 TABLET ORAL
Qty: 90 TABLET | Refills: 0 | Status: SHIPPED | OUTPATIENT
Start: 2024-02-20 | End: 2024-02-29

## 2024-02-20 RX ORDER — PREDNISONE 20 MG/1
20 TABLET ORAL 2 TIMES DAILY
Qty: 14 TABLET | Refills: 0 | Status: SHIPPED | OUTPATIENT
Start: 2024-02-20 | End: 2024-02-27

## 2024-02-20 RX ORDER — FOLIC ACID 1 MG/1
1 TABLET ORAL DAILY
Qty: 30 TABLET | Refills: 0 | Status: SHIPPED | OUTPATIENT
Start: 2024-02-20 | End: 2024-02-29

## 2024-02-20 RX ORDER — CHOLESTYRAMINE LIGHT 4 G/5.7G
1 POWDER, FOR SUSPENSION ORAL EVERY 12 HOURS SCHEDULED
Qty: 60 PACKET | Refills: 0 | Status: SHIPPED | OUTPATIENT
Start: 2024-02-20 | End: 2024-02-29

## 2024-02-20 RX ORDER — ONDANSETRON 4 MG/1
4 TABLET, ORALLY DISINTEGRATING ORAL EVERY 8 HOURS PRN
Qty: 30 TABLET | Refills: 0 | Status: SHIPPED | OUTPATIENT
Start: 2024-02-20 | End: 2024-02-29

## 2024-02-20 RX ADMIN — ESCITALOPRAM OXALATE 10 MG: 10 TABLET ORAL at 09:37

## 2024-02-20 RX ADMIN — FOLIC ACID 1 MG: 1 TABLET ORAL at 09:37

## 2024-02-20 RX ADMIN — METHYLPREDNISOLONE SODIUM SUCCINATE 60 MG: 125 INJECTION INTRAMUSCULAR; INTRAVENOUS at 09:38

## 2024-02-20 RX ADMIN — Medication 250 MG: at 09:37

## 2024-02-20 RX ADMIN — CYANOCOBALAMIN 1000 MCG: 1000 INJECTION, SOLUTION INTRAMUSCULAR at 09:38

## 2024-02-20 RX ADMIN — Medication 5000 UNITS: at 09:37

## 2024-02-20 RX ADMIN — ONDANSETRON 4 MG: 2 INJECTION INTRAMUSCULAR; INTRAVENOUS at 07:51

## 2024-02-20 RX ADMIN — MIDODRINE HYDROCHLORIDE 10 MG: 10 TABLET ORAL at 09:37

## 2024-02-20 RX ADMIN — ONDANSETRON 4 MG: 2 INJECTION INTRAMUSCULAR; INTRAVENOUS at 03:44

## 2024-02-20 RX ADMIN — CHOLESTYRAMINE 4 G: 4 POWDER, FOR SUSPENSION ORAL at 09:37

## 2024-02-20 RX ADMIN — ACETAMINOPHEN 650 MG: 325 TABLET ORAL at 09:49

## 2024-02-20 RX ADMIN — METHYLPREDNISOLONE SODIUM SUCCINATE 60 MG: 125 INJECTION INTRAMUSCULAR; INTRAVENOUS at 01:10

## 2024-02-21 ENCOUNTER — READMISSION MANAGEMENT (OUTPATIENT)
Dept: CALL CENTER | Facility: HOSPITAL | Age: 44
End: 2024-02-21
Payer: MEDICARE

## 2024-02-21 LAB
MAGNESIUM 24H UR-MRATE: 21.7 MG/24 HR (ref 12–293)
MAGNESIUM UR-MCNC: 1.6 MG/DL
ZINC BLD-MCNC: 514 UG/DL (ref 440–860)

## 2024-02-24 LAB
COPPER SERPL-MCNC: 54 UG/DL (ref 80–158)
PYRIDOXAL PHOS SERPL-MCNC: 1.2 UG/L (ref 3.4–65.2)

## 2024-02-28 ENCOUNTER — READMISSION MANAGEMENT (OUTPATIENT)
Dept: CALL CENTER | Facility: HOSPITAL | Age: 44
End: 2024-02-28
Payer: MEDICARE

## 2024-02-29 ENCOUNTER — APPOINTMENT (OUTPATIENT)
Dept: CT IMAGING | Facility: HOSPITAL | Age: 44
DRG: 853 | End: 2024-02-29
Payer: MEDICARE

## 2024-02-29 ENCOUNTER — ANESTHESIA (OUTPATIENT)
Dept: PERIOP | Facility: HOSPITAL | Age: 44
End: 2024-02-29
Payer: MEDICARE

## 2024-02-29 ENCOUNTER — HOSPITAL ENCOUNTER (INPATIENT)
Facility: HOSPITAL | Age: 44
LOS: 1 days | Discharge: ANOTHER HOSPITAL | DRG: 853 | End: 2024-02-29
Attending: EMERGENCY MEDICINE | Admitting: INTERNAL MEDICINE
Payer: MEDICARE

## 2024-02-29 ENCOUNTER — ANESTHESIA EVENT (OUTPATIENT)
Dept: PERIOP | Facility: HOSPITAL | Age: 44
End: 2024-02-29
Payer: MEDICARE

## 2024-02-29 ENCOUNTER — APPOINTMENT (OUTPATIENT)
Dept: GENERAL RADIOLOGY | Facility: HOSPITAL | Age: 44
DRG: 853 | End: 2024-02-29
Payer: MEDICARE

## 2024-02-29 VITALS
OXYGEN SATURATION: 100 % | HEART RATE: 106 BPM | TEMPERATURE: 97 F | HEIGHT: 61 IN | DIASTOLIC BLOOD PRESSURE: 66 MMHG | RESPIRATION RATE: 20 BRPM | SYSTOLIC BLOOD PRESSURE: 110 MMHG | BODY MASS INDEX: 22.68 KG/M2 | WEIGHT: 120.15 LBS

## 2024-02-29 DIAGNOSIS — K26.9 DUODENAL ULCER: Primary | ICD-10-CM

## 2024-02-29 DIAGNOSIS — A41.9 SEPSIS, DUE TO UNSPECIFIED ORGANISM, UNSPECIFIED WHETHER ACUTE ORGAN DYSFUNCTION PRESENT: ICD-10-CM

## 2024-02-29 DIAGNOSIS — K63.1 BOWEL PERFORATION: ICD-10-CM

## 2024-02-29 DIAGNOSIS — K66.8 PNEUMOPERITONEUM: ICD-10-CM

## 2024-02-29 PROBLEM — R19.8 PERFORATED ABDOMINAL VISCUS: Status: ACTIVE | Noted: 2024-02-29

## 2024-02-29 LAB
ALBUMIN SERPL-MCNC: 3.2 G/DL (ref 3.5–5.2)
ALBUMIN/GLOB SERPL: 1 G/DL
ALP SERPL-CCNC: 129 U/L (ref 39–117)
ALT SERPL W P-5'-P-CCNC: 8 U/L (ref 1–33)
ANION GAP SERPL CALCULATED.3IONS-SCNC: 17.8 MMOL/L (ref 5–15)
ANISOCYTOSIS BLD QL: ABNORMAL
ARTERIAL PATENCY WRIST A: ABNORMAL
ARTERIAL PATENCY WRIST A: ABNORMAL
AST SERPL-CCNC: 16 U/L (ref 1–32)
BASE EXCESS BLDA CALC-SCNC: -6.9 MMOL/L (ref -2–2)
BASE EXCESS BLDA CALC-SCNC: -7.4 MMOL/L (ref -2–2)
BDY SITE: ABNORMAL
BDY SITE: ABNORMAL
BILIRUB SERPL-MCNC: 0.6 MG/DL (ref 0–1.2)
BUN SERPL-MCNC: 25 MG/DL (ref 6–20)
BUN/CREAT SERPL: 17.7 (ref 7–25)
CA-I BLDA-SCNC: 0.99 MMOL/L (ref 1.13–1.32)
CA-I BLDA-SCNC: 1.07 MMOL/L (ref 1.13–1.32)
CALCIUM SPEC-SCNC: 10.4 MG/DL (ref 8.6–10.5)
CHLORIDE BLDA-SCNC: 116 MMOL/L (ref 98–106)
CHLORIDE BLDA-SCNC: 119 MMOL/L (ref 98–106)
CHLORIDE SERPL-SCNC: 102 MMOL/L (ref 98–107)
CLUMPED PLATELETS: PRESENT
CO2 SERPL-SCNC: 20.2 MMOL/L (ref 22–29)
COHGB MFR BLD: 0.5 % (ref 0–1.5)
COHGB MFR BLD: 0.9 % (ref 0–1.5)
CREAT SERPL-MCNC: 1.41 MG/DL (ref 0.57–1)
D-LACTATE SERPL-SCNC: 5.7 MMOL/L (ref 0.5–2)
DEPRECATED RDW RBC AUTO: 50.6 FL (ref 37–54)
EGFRCR SERPLBLD CKD-EPI 2021: 47 ML/MIN/1.73
ERYTHROCYTE [DISTWIDTH] IN BLOOD BY AUTOMATED COUNT: 15.9 % (ref 12.3–15.4)
FHHB: 2 % (ref 0–5)
FHHB: 2.8 % (ref 0–5)
GAS FLOW AIRWAY: ABNORMAL L/MIN
GAS FLOW AIRWAY: ABNORMAL L/MIN
GIANT PLATELETS: ABNORMAL
GLOBULIN UR ELPH-MCNC: 3.3 GM/DL
GLUCOSE BLDA-MCNC: 127 MG/DL (ref 65–99)
GLUCOSE BLDA-MCNC: 133 MG/DL (ref 65–99)
GLUCOSE SERPL-MCNC: 156 MG/DL (ref 65–99)
HCO3 BLDA-SCNC: 19 MMOL/L (ref 22–26)
HCO3 BLDA-SCNC: 19.6 MMOL/L (ref 22–26)
HCT VFR BLD AUTO: 36.4 % (ref 34–46.6)
HGB BLD-MCNC: 11.2 G/DL (ref 12–15.9)
HGB BLDA-MCNC: 7.2 G/DL (ref 11.7–14.6)
HGB BLDA-MCNC: 7.4 G/DL (ref 11.7–14.6)
HOLD SPECIMEN: NORMAL
HOLD SPECIMEN: NORMAL
HYPOCHROMIA BLD QL: ABNORMAL
INHALED O2 CONCENTRATION: 28 %
INHALED O2 CONCENTRATION: 50 %
LACTATE BLDA-SCNC: 0.91 MMOL/L (ref 0.5–2)
LACTATE BLDA-SCNC: 1.3 MMOL/L (ref 0.5–2)
LARGE PLATELETS: ABNORMAL
LIPASE SERPL-CCNC: 819 U/L (ref 13–60)
LYMPHOCYTES # BLD MANUAL: 0.3 10*3/MM3 (ref 0.7–3.1)
LYMPHOCYTES NFR BLD MANUAL: 2 % (ref 5–12)
MACROCYTES BLD QL SMEAR: ABNORMAL
MCH RBC QN AUTO: 27.1 PG (ref 26.6–33)
MCHC RBC AUTO-ENTMCNC: 30.8 G/DL (ref 31.5–35.7)
MCV RBC AUTO: 87.9 FL (ref 79–97)
METHGB BLD QL: 0.5 % (ref 0–1.5)
METHGB BLD QL: 0.6 % (ref 0–1.5)
MODALITY: ABNORMAL
MODALITY: ABNORMAL
MONOCYTES # BLD: 0.6 10*3/MM3 (ref 0.1–0.9)
NEUTROPHILS # BLD AUTO: 29.05 10*3/MM3 (ref 1.7–7)
NEUTROPHILS NFR BLD MANUAL: 90 % (ref 42.7–76)
NEUTS BAND NFR BLD MANUAL: 7 % (ref 0–5)
NEUTS VAC BLD QL SMEAR: ABNORMAL
NOTE: ABNORMAL
NOTE: ABNORMAL
OXYHGB MFR BLDV: 95.7 % (ref 94–99)
OXYHGB MFR BLDV: 97 % (ref 94–99)
PCO2 BLDA: 39.7 MM HG (ref 35–45)
PCO2 BLDA: 47.2 MM HG (ref 35–45)
PH BLDA: 7.24 PH UNITS (ref 7.35–7.45)
PH BLDA: 7.3 PH UNITS (ref 7.35–7.45)
PLATELET # BLD AUTO: 817 10*3/MM3 (ref 140–450)
PMV BLD AUTO: 9.9 FL (ref 6–12)
PO2 BLD: 438 MM[HG] (ref 0–500)
PO2 BLD: 514 MM[HG] (ref 0–500)
PO2 BLDA: 144 MM HG (ref 80–100)
PO2 BLDA: 219.1 MM HG (ref 80–100)
POLYCHROMASIA BLD QL SMEAR: ABNORMAL
POTASSIUM BLDA-SCNC: 2.73 MMOL/L (ref 3.5–5)
POTASSIUM BLDA-SCNC: 2.8 MMOL/L (ref 3.5–5)
POTASSIUM SERPL-SCNC: 3.2 MMOL/L (ref 3.5–5.2)
PROT SERPL-MCNC: 6.5 G/DL (ref 6–8.5)
RBC # BLD AUTO: 4.14 10*6/MM3 (ref 3.77–5.28)
SAO2 % BLDCOA: 97.2 % (ref 95–99)
SAO2 % BLDCOA: 98 % (ref 95–99)
SMALL PLATELETS BLD QL SMEAR: ABNORMAL
SMUDGE CELLS BLD QL SMEAR: ABNORMAL
SODIUM BLDA-SCNC: 141.5 MMOL/L (ref 136–146)
SODIUM BLDA-SCNC: 145.4 MMOL/L (ref 136–146)
SODIUM SERPL-SCNC: 140 MMOL/L (ref 136–145)
VARIANT LYMPHS NFR BLD MANUAL: 1 % (ref 19.6–45.3)
WBC NRBC COR # BLD AUTO: 29.95 10*3/MM3 (ref 3.4–10.8)
WHOLE BLOOD HOLD COAG: NORMAL
WHOLE BLOOD HOLD SPECIMEN: NORMAL

## 2024-02-29 PROCEDURE — 25010000002 VASOPRESSIN 20 UNIT/ML SOLUTION: Performed by: NURSE ANESTHETIST, CERTIFIED REGISTERED

## 2024-02-29 PROCEDURE — 99223 1ST HOSP IP/OBS HIGH 75: CPT | Performed by: NURSE PRACTITIONER

## 2024-02-29 PROCEDURE — 25810000003 SODIUM CHLORIDE 0.9 % SOLUTION: Performed by: NURSE ANESTHETIST, CERTIFIED REGISTERED

## 2024-02-29 PROCEDURE — 25010000002 PROPOFOL 10 MG/ML EMULSION: Performed by: SURGERY

## 2024-02-29 PROCEDURE — 25010000002 DICYCLOMINE PER 20 MG: Performed by: EMERGENCY MEDICINE

## 2024-02-29 PROCEDURE — 25010000002 PROPOFOL 10 MG/ML EMULSION: Performed by: NURSE ANESTHETIST, CERTIFIED REGISTERED

## 2024-02-29 PROCEDURE — 82805 BLOOD GASES W/O2 SATURATION: CPT | Performed by: SURGERY

## 2024-02-29 PROCEDURE — 82805 BLOOD GASES W/O2 SATURATION: CPT | Performed by: NURSE PRACTITIONER

## 2024-02-29 PROCEDURE — 94799 UNLISTED PULMONARY SVC/PX: CPT

## 2024-02-29 PROCEDURE — 99291 CRITICAL CARE FIRST HOUR: CPT

## 2024-02-29 PROCEDURE — 83050 HGB METHEMOGLOBIN QUAN: CPT | Performed by: SURGERY

## 2024-02-29 PROCEDURE — 83690 ASSAY OF LIPASE: CPT | Performed by: EMERGENCY MEDICINE

## 2024-02-29 PROCEDURE — 25810000003 LACTATED RINGERS PER 1000 ML: Performed by: ANESTHESIOLOGY

## 2024-02-29 PROCEDURE — 85007 BL SMEAR W/DIFF WBC COUNT: CPT | Performed by: EMERGENCY MEDICINE

## 2024-02-29 PROCEDURE — 25010000002 FENTANYL CITRATE (PF) 50 MCG/ML SOLUTION: Performed by: NURSE ANESTHETIST, CERTIFIED REGISTERED

## 2024-02-29 PROCEDURE — P9041 ALBUMIN (HUMAN),5%, 50ML: HCPCS | Performed by: NURSE ANESTHETIST, CERTIFIED REGISTERED

## 2024-02-29 PROCEDURE — 25010000002 ONDANSETRON PER 1 MG: Performed by: EMERGENCY MEDICINE

## 2024-02-29 PROCEDURE — 25010000002 PHENYLEPHRINE 10 MG/ML SOLUTION 1 ML VIAL: Performed by: NURSE ANESTHETIST, CERTIFIED REGISTERED

## 2024-02-29 PROCEDURE — 82375 ASSAY CARBOXYHB QUANT: CPT | Performed by: SURGERY

## 2024-02-29 PROCEDURE — 25810000003 SODIUM CHLORIDE 0.9 % SOLUTION: Performed by: EMERGENCY MEDICINE

## 2024-02-29 PROCEDURE — 80053 COMPREHEN METABOLIC PANEL: CPT | Performed by: EMERGENCY MEDICINE

## 2024-02-29 PROCEDURE — 25810000003 SODIUM CHLORIDE 0.9 % SOLUTION 250 ML FLEX CONT: Performed by: NURSE ANESTHETIST, CERTIFIED REGISTERED

## 2024-02-29 PROCEDURE — 74176 CT ABD & PELVIS W/O CONTRAST: CPT

## 2024-02-29 PROCEDURE — 0D9900Z DRAINAGE OF DUODENUM WITH DRAINAGE DEVICE, OPEN APPROACH: ICD-10-PCS | Performed by: SURGERY

## 2024-02-29 PROCEDURE — 94002 VENT MGMT INPAT INIT DAY: CPT

## 2024-02-29 PROCEDURE — 25010000002 MIDAZOLAM PER 1MG: Performed by: ANESTHESIOLOGY

## 2024-02-29 PROCEDURE — 49999 UNLISTED PX ABD PERTM&OMN: CPT | Performed by: SURGERY

## 2024-02-29 PROCEDURE — 36415 COLL VENOUS BLD VENIPUNCTURE: CPT

## 2024-02-29 PROCEDURE — 83050 HGB METHEMOGLOBIN QUAN: CPT | Performed by: NURSE PRACTITIONER

## 2024-02-29 PROCEDURE — 25010000002 CEFEPIME PER 500 MG: Performed by: EMERGENCY MEDICINE

## 2024-02-29 PROCEDURE — 25010000002 DEXAMETHASONE PER 1 MG: Performed by: NURSE ANESTHETIST, CERTIFIED REGISTERED

## 2024-02-29 PROCEDURE — 71045 X-RAY EXAM CHEST 1 VIEW: CPT

## 2024-02-29 PROCEDURE — 85025 COMPLETE CBC W/AUTO DIFF WBC: CPT | Performed by: EMERGENCY MEDICINE

## 2024-02-29 PROCEDURE — 25010000002 ALBUMIN HUMAN 5% PER 50 ML: Performed by: NURSE ANESTHETIST, CERTIFIED REGISTERED

## 2024-02-29 PROCEDURE — 99291 CRITICAL CARE FIRST HOUR: CPT | Performed by: INTERNAL MEDICINE

## 2024-02-29 PROCEDURE — 83605 ASSAY OF LACTIC ACID: CPT | Performed by: EMERGENCY MEDICINE

## 2024-02-29 PROCEDURE — 82375 ASSAY CARBOXYHB QUANT: CPT | Performed by: NURSE PRACTITIONER

## 2024-02-29 PROCEDURE — 25810000003 LACTATED RINGERS PER 1000 ML: Performed by: INTERNAL MEDICINE

## 2024-02-29 RX ORDER — PHENYLEPHRINE HCL IN 0.9% NACL 0.5 MG/5ML
.5-3 SYRINGE (ML) INTRAVENOUS
Start: 2024-02-29

## 2024-02-29 RX ORDER — ACETAMINOPHEN 325 MG/1
650 TABLET ORAL EVERY 4 HOURS PRN
Status: DISCONTINUED | OUTPATIENT
Start: 2024-02-29 | End: 2024-02-29 | Stop reason: HOSPADM

## 2024-02-29 RX ORDER — NALOXONE HCL 0.4 MG/ML
0.4 VIAL (ML) INJECTION
Status: DISCONTINUED | OUTPATIENT
Start: 2024-02-29 | End: 2024-02-29 | Stop reason: HOSPADM

## 2024-02-29 RX ORDER — PANTOPRAZOLE SODIUM 40 MG/10ML
40 INJECTION, POWDER, LYOPHILIZED, FOR SOLUTION INTRAVENOUS 2 TIMES DAILY
Status: DISCONTINUED | OUTPATIENT
Start: 2024-02-29 | End: 2024-02-29 | Stop reason: HOSPADM

## 2024-02-29 RX ORDER — FENTANYL CITRATE 50 UG/ML
INJECTION, SOLUTION INTRAMUSCULAR; INTRAVENOUS AS NEEDED
Status: DISCONTINUED | OUTPATIENT
Start: 2024-02-29 | End: 2024-02-29 | Stop reason: SURG

## 2024-02-29 RX ORDER — FENTANYL CITRATE-0.9 % NACL/PF 10 MCG/ML
50-300 PLASTIC BAG, INJECTION (ML) INTRAVENOUS
Status: DISCONTINUED | OUTPATIENT
Start: 2024-02-29 | End: 2024-02-29 | Stop reason: HOSPADM

## 2024-02-29 RX ORDER — MAGNESIUM HYDROXIDE 1200 MG/15ML
LIQUID ORAL AS NEEDED
Status: DISCONTINUED | OUTPATIENT
Start: 2024-02-29 | End: 2024-02-29 | Stop reason: HOSPADM

## 2024-02-29 RX ORDER — MORPHINE SULFATE 2 MG/ML
2 INJECTION, SOLUTION INTRAMUSCULAR; INTRAVENOUS
Start: 2024-02-29 | End: 2024-03-03

## 2024-02-29 RX ORDER — SUCCINYLCHOLINE/SOD CL,ISO/PF 100 MG/5ML
SYRINGE (ML) INTRAVENOUS AS NEEDED
Status: DISCONTINUED | OUTPATIENT
Start: 2024-02-29 | End: 2024-02-29 | Stop reason: SURG

## 2024-02-29 RX ORDER — MORPHINE SULFATE 2 MG/ML
2 INJECTION, SOLUTION INTRAMUSCULAR; INTRAVENOUS
Status: DISCONTINUED | OUTPATIENT
Start: 2024-02-29 | End: 2024-02-29 | Stop reason: HOSPADM

## 2024-02-29 RX ORDER — KETAMINE HCL IN NACL, ISO-OSM 100MG/10ML
SYRINGE (ML) INJECTION AS NEEDED
Status: DISCONTINUED | OUTPATIENT
Start: 2024-02-29 | End: 2024-02-29 | Stop reason: SURG

## 2024-02-29 RX ORDER — TEMAZEPAM 7.5 MG/1
15 CAPSULE ORAL NIGHTLY PRN
Status: DISCONTINUED | OUTPATIENT
Start: 2024-02-29 | End: 2024-02-29 | Stop reason: HOSPADM

## 2024-02-29 RX ORDER — ALPRAZOLAM 0.25 MG/1
0.25 TABLET ORAL EVERY 6 HOURS PRN
Status: DISCONTINUED | OUTPATIENT
Start: 2024-02-29 | End: 2024-02-29 | Stop reason: HOSPADM

## 2024-02-29 RX ORDER — SODIUM CHLORIDE 0.9 % (FLUSH) 0.9 %
10 SYRINGE (ML) INJECTION AS NEEDED
Status: DISCONTINUED | OUTPATIENT
Start: 2024-02-29 | End: 2024-02-29 | Stop reason: HOSPADM

## 2024-02-29 RX ORDER — HYDROCODONE BITARTRATE AND ACETAMINOPHEN 5; 325 MG/1; MG/1
1 TABLET ORAL EVERY 4 HOURS PRN
Status: DISCONTINUED | OUTPATIENT
Start: 2024-02-29 | End: 2024-02-29 | Stop reason: HOSPADM

## 2024-02-29 RX ORDER — PANTOPRAZOLE SODIUM 40 MG/10ML
40 INJECTION, POWDER, LYOPHILIZED, FOR SOLUTION INTRAVENOUS
Status: DISCONTINUED | OUTPATIENT
Start: 2024-03-01 | End: 2024-02-29

## 2024-02-29 RX ORDER — ALBUMIN, HUMAN INJ 5% 5 %
SOLUTION INTRAVENOUS
Status: COMPLETED
Start: 2024-02-29 | End: 2024-02-29

## 2024-02-29 RX ORDER — ONDANSETRON 2 MG/ML
4 INJECTION INTRAMUSCULAR; INTRAVENOUS EVERY 4 HOURS PRN
Start: 2024-02-29

## 2024-02-29 RX ORDER — ACETAMINOPHEN 650 MG/1
650 SUPPOSITORY RECTAL EVERY 4 HOURS PRN
Status: DISCONTINUED | OUTPATIENT
Start: 2024-02-29 | End: 2024-02-29 | Stop reason: HOSPADM

## 2024-02-29 RX ORDER — PROMETHAZINE HYDROCHLORIDE 12.5 MG/1
25 TABLET ORAL ONCE AS NEEDED
Status: DISCONTINUED | OUTPATIENT
Start: 2024-02-29 | End: 2024-02-29

## 2024-02-29 RX ORDER — PROMETHAZINE HYDROCHLORIDE 25 MG/1
25 SUPPOSITORY RECTAL ONCE AS NEEDED
Status: DISCONTINUED | OUTPATIENT
Start: 2024-02-29 | End: 2024-02-29

## 2024-02-29 RX ORDER — PROCHLORPERAZINE EDISYLATE 5 MG/ML
10 INJECTION INTRAMUSCULAR; INTRAVENOUS ONCE
Start: 2024-02-29 | End: 2024-02-29

## 2024-02-29 RX ORDER — SODIUM CHLORIDE 9 MG/ML
INJECTION, SOLUTION INTRAVENOUS CONTINUOUS PRN
Status: DISCONTINUED | OUTPATIENT
Start: 2024-02-29 | End: 2024-02-29 | Stop reason: SURG

## 2024-02-29 RX ORDER — METRONIDAZOLE 500 MG/100ML
500 INJECTION, SOLUTION INTRAVENOUS EVERY 8 HOURS
Start: 2024-02-29 | End: 2024-03-07

## 2024-02-29 RX ORDER — PHENYLEPHRINE HCL IN 0.9% NACL 1 MG/10 ML
SYRINGE (ML) INTRAVENOUS AS NEEDED
Status: DISCONTINUED | OUTPATIENT
Start: 2024-02-29 | End: 2024-02-29 | Stop reason: SDUPTHER

## 2024-02-29 RX ORDER — NALOXONE HCL 0.4 MG/ML
0.4 VIAL (ML) INJECTION
Start: 2024-02-29

## 2024-02-29 RX ORDER — METRONIDAZOLE 500 MG/100ML
500 INJECTION, SOLUTION INTRAVENOUS ONCE
Status: COMPLETED | OUTPATIENT
Start: 2024-02-29 | End: 2024-02-29

## 2024-02-29 RX ORDER — ALUMINA, MAGNESIA, AND SIMETHICONE 2400; 2400; 240 MG/30ML; MG/30ML; MG/30ML
15 SUSPENSION ORAL EVERY 6 HOURS PRN
Status: DISCONTINUED | OUTPATIENT
Start: 2024-02-29 | End: 2024-02-29 | Stop reason: HOSPADM

## 2024-02-29 RX ORDER — ACETAMINOPHEN 650 MG/1
650 SUPPOSITORY RECTAL EVERY 4 HOURS PRN
Qty: 15 SUPPOSITORY | Refills: 0
Start: 2024-02-29 | End: 2024-03-05

## 2024-02-29 RX ORDER — ALBUMIN, HUMAN INJ 5% 5 %
SOLUTION INTRAVENOUS CONTINUOUS PRN
Status: DISCONTINUED | OUTPATIENT
Start: 2024-02-29 | End: 2024-02-29 | Stop reason: SURG

## 2024-02-29 RX ORDER — ONDANSETRON 2 MG/ML
4 INJECTION INTRAMUSCULAR; INTRAVENOUS ONCE
Status: COMPLETED | OUTPATIENT
Start: 2024-02-29 | End: 2024-02-29

## 2024-02-29 RX ORDER — METRONIDAZOLE 500 MG/100ML
500 INJECTION, SOLUTION INTRAVENOUS EVERY 8 HOURS
Qty: 2100 ML | Refills: 0 | Status: DISCONTINUED | OUTPATIENT
Start: 2024-02-29 | End: 2024-02-29 | Stop reason: HOSPADM

## 2024-02-29 RX ORDER — ONDANSETRON 2 MG/ML
4 INJECTION INTRAMUSCULAR; INTRAVENOUS EVERY 4 HOURS PRN
Status: DISCONTINUED | OUTPATIENT
Start: 2024-02-29 | End: 2024-02-29 | Stop reason: HOSPADM

## 2024-02-29 RX ORDER — DEXTROSE MONOHYDRATE, SODIUM CHLORIDE, AND POTASSIUM CHLORIDE 50; 1.49; 4.5 G/1000ML; G/1000ML; G/1000ML
100 INJECTION, SOLUTION INTRAVENOUS CONTINUOUS
Status: DISCONTINUED | OUTPATIENT
Start: 2024-02-29 | End: 2024-02-29

## 2024-02-29 RX ORDER — OXYCODONE HYDROCHLORIDE 5 MG/1
5 TABLET ORAL
Status: DISCONTINUED | OUTPATIENT
Start: 2024-02-29 | End: 2024-02-29

## 2024-02-29 RX ORDER — SODIUM CHLORIDE 0.9 % (FLUSH) 0.9 %
10 SYRINGE (ML) INJECTION EVERY 12 HOURS SCHEDULED
Status: DISCONTINUED | OUTPATIENT
Start: 2024-02-29 | End: 2024-02-29 | Stop reason: HOSPADM

## 2024-02-29 RX ORDER — PHENYLEPHRINE HCL IN 0.9% NACL 0.5 MG/5ML
.5-3 SYRINGE (ML) INTRAVENOUS
Status: DISCONTINUED | OUTPATIENT
Start: 2024-02-29 | End: 2024-02-29 | Stop reason: HOSPADM

## 2024-02-29 RX ORDER — PROCHLORPERAZINE EDISYLATE 5 MG/ML
10 INJECTION INTRAMUSCULAR; INTRAVENOUS ONCE
Status: DISCONTINUED | OUTPATIENT
Start: 2024-02-29 | End: 2024-02-29 | Stop reason: HOSPADM

## 2024-02-29 RX ORDER — MORPHINE SULFATE 2 MG/ML
2 INJECTION, SOLUTION INTRAMUSCULAR; INTRAVENOUS
Start: 2024-02-29 | End: 2024-03-07

## 2024-02-29 RX ORDER — SODIUM CHLORIDE, SODIUM LACTATE, POTASSIUM CHLORIDE, CALCIUM CHLORIDE 600; 310; 30; 20 MG/100ML; MG/100ML; MG/100ML; MG/100ML
9 INJECTION, SOLUTION INTRAVENOUS CONTINUOUS PRN
Status: DISCONTINUED | OUTPATIENT
Start: 2024-02-29 | End: 2024-02-29

## 2024-02-29 RX ORDER — ROCURONIUM BROMIDE 10 MG/ML
INJECTION, SOLUTION INTRAVENOUS AS NEEDED
Status: DISCONTINUED | OUTPATIENT
Start: 2024-02-29 | End: 2024-02-29 | Stop reason: SURG

## 2024-02-29 RX ORDER — ONDANSETRON 2 MG/ML
4 INJECTION INTRAMUSCULAR; INTRAVENOUS ONCE AS NEEDED
Status: DISCONTINUED | OUTPATIENT
Start: 2024-02-29 | End: 2024-02-29

## 2024-02-29 RX ORDER — SODIUM CHLORIDE, SODIUM LACTATE, POTASSIUM CHLORIDE, CALCIUM CHLORIDE 600; 310; 30; 20 MG/100ML; MG/100ML; MG/100ML; MG/100ML
100 INJECTION, SOLUTION INTRAVENOUS CONTINUOUS
Status: DISCONTINUED | OUTPATIENT
Start: 2024-02-29 | End: 2024-02-29 | Stop reason: HOSPADM

## 2024-02-29 RX ORDER — DICYCLOMINE HYDROCHLORIDE 10 MG/ML
20 INJECTION INTRAMUSCULAR ONCE
Status: COMPLETED | OUTPATIENT
Start: 2024-02-29 | End: 2024-02-29

## 2024-02-29 RX ORDER — PROPOFOL 10 MG/ML
VIAL (ML) INTRAVENOUS AS NEEDED
Status: DISCONTINUED | OUTPATIENT
Start: 2024-02-29 | End: 2024-02-29 | Stop reason: SURG

## 2024-02-29 RX ORDER — SODIUM CHLORIDE, SODIUM LACTATE, POTASSIUM CHLORIDE, CALCIUM CHLORIDE 600; 310; 30; 20 MG/100ML; MG/100ML; MG/100ML; MG/100ML
100 INJECTION, SOLUTION INTRAVENOUS CONTINUOUS
Start: 2024-02-29

## 2024-02-29 RX ORDER — DEXAMETHASONE SODIUM PHOSPHATE 4 MG/ML
INJECTION, SOLUTION INTRA-ARTICULAR; INTRALESIONAL; INTRAMUSCULAR; INTRAVENOUS; SOFT TISSUE AS NEEDED
Status: DISCONTINUED | OUTPATIENT
Start: 2024-02-29 | End: 2024-02-29 | Stop reason: SURG

## 2024-02-29 RX ORDER — MIDAZOLAM HYDROCHLORIDE 2 MG/2ML
2 INJECTION, SOLUTION INTRAMUSCULAR; INTRAVENOUS ONCE
Status: COMPLETED | OUTPATIENT
Start: 2024-02-29 | End: 2024-02-29

## 2024-02-29 RX ORDER — SODIUM CHLORIDE 9 MG/ML
40 INJECTION, SOLUTION INTRAVENOUS AS NEEDED
Status: DISCONTINUED | OUTPATIENT
Start: 2024-02-29 | End: 2024-02-29 | Stop reason: HOSPADM

## 2024-02-29 RX ORDER — ONDANSETRON 2 MG/ML
4 INJECTION INTRAMUSCULAR; INTRAVENOUS EVERY 6 HOURS PRN
Status: DISCONTINUED | OUTPATIENT
Start: 2024-02-29 | End: 2024-02-29

## 2024-02-29 RX ORDER — VASOPRESSIN 20 U/ML
INJECTION PARENTERAL AS NEEDED
Status: DISCONTINUED | OUTPATIENT
Start: 2024-02-29 | End: 2024-02-29 | Stop reason: SURG

## 2024-02-29 RX ORDER — PANTOPRAZOLE SODIUM 40 MG/10ML
40 INJECTION, POWDER, LYOPHILIZED, FOR SOLUTION INTRAVENOUS 2 TIMES DAILY
Start: 2024-02-29

## 2024-02-29 RX ADMIN — Medication 200 MCG: at 12:50

## 2024-02-29 RX ADMIN — ALBUMIN (HUMAN): 12.5 INJECTION, SOLUTION INTRAVENOUS at 13:40

## 2024-02-29 RX ADMIN — DICYCLOMINE HYDROCHLORIDE 20 MG: 20 INJECTION, SOLUTION INTRAMUSCULAR at 09:51

## 2024-02-29 RX ADMIN — Medication 100 MCG: at 12:41

## 2024-02-29 RX ADMIN — SODIUM CHLORIDE 1000 ML: 9 INJECTION, SOLUTION INTRAVENOUS at 09:45

## 2024-02-29 RX ADMIN — Medication 20 MG: at 13:19

## 2024-02-29 RX ADMIN — Medication 75 MCG/HR: at 16:45

## 2024-02-29 RX ADMIN — Medication 60 MG: at 12:43

## 2024-02-29 RX ADMIN — SODIUM CHLORIDE, POTASSIUM CHLORIDE, SODIUM LACTATE AND CALCIUM CHLORIDE: 600; 310; 30; 20 INJECTION, SOLUTION INTRAVENOUS at 14:03

## 2024-02-29 RX ADMIN — PROPOFOL 30 MCG/KG/MIN: 10 INJECTION, EMULSION INTRAVENOUS at 16:46

## 2024-02-29 RX ADMIN — FENTANYL CITRATE 100 MCG: 50 INJECTION, SOLUTION INTRAMUSCULAR; INTRAVENOUS at 12:39

## 2024-02-29 RX ADMIN — ONDANSETRON 4 MG: 2 INJECTION INTRAMUSCULAR; INTRAVENOUS at 09:49

## 2024-02-29 RX ADMIN — PROPOFOL 20 MCG/KG/MIN: 10 INJECTION, EMULSION INTRAVENOUS at 14:11

## 2024-02-29 RX ADMIN — PHENYLEPHRINE HYDROCHLORIDE 40 MCG/MIN: 10 INJECTION INTRAVENOUS at 13:05

## 2024-02-29 RX ADMIN — SODIUM CHLORIDE, POTASSIUM CHLORIDE, SODIUM LACTATE AND CALCIUM CHLORIDE 100 ML/HR: 600; 310; 30; 20 INJECTION, SOLUTION INTRAVENOUS at 15:00

## 2024-02-29 RX ADMIN — Medication 200 MCG: at 12:56

## 2024-02-29 RX ADMIN — VASOPRESSIN 4 UNITS: 20 INJECTION INTRAVENOUS at 14:02

## 2024-02-29 RX ADMIN — MIDAZOLAM HYDROCHLORIDE 2 MG: 1 INJECTION, SOLUTION INTRAMUSCULAR; INTRAVENOUS at 12:21

## 2024-02-29 RX ADMIN — CEFEPIME 2000 MG: 2 INJECTION, POWDER, FOR SOLUTION INTRAVENOUS at 11:36

## 2024-02-29 RX ADMIN — ALBUMIN (HUMAN): 12.5 INJECTION, SOLUTION INTRAVENOUS at 12:52

## 2024-02-29 RX ADMIN — METRONIDAZOLE 500 MG: 500 INJECTION, SOLUTION INTRAVENOUS at 13:15

## 2024-02-29 RX ADMIN — ROCURONIUM BROMIDE 50 MG: 10 INJECTION, SOLUTION INTRAVENOUS at 13:00

## 2024-02-29 RX ADMIN — Medication 1.2 MCG/KG/MIN: at 14:50

## 2024-02-29 RX ADMIN — SODIUM CHLORIDE: 9 INJECTION, SOLUTION INTRAVENOUS at 12:40

## 2024-02-29 RX ADMIN — DEXAMETHASONE SODIUM PHOSPHATE 4 MG: 4 INJECTION, SOLUTION INTRAMUSCULAR; INTRAVENOUS at 13:15

## 2024-02-29 RX ADMIN — PROPOFOL 30 MCG/KG/MIN: 10 INJECTION, EMULSION INTRAVENOUS at 14:50

## 2024-02-29 RX ADMIN — PROPOFOL 90 MG: 10 INJECTION, EMULSION INTRAVENOUS at 12:43

## 2024-02-29 RX ADMIN — SODIUM CHLORIDE, POTASSIUM CHLORIDE, SODIUM LACTATE AND CALCIUM CHLORIDE 9 ML/HR: 600; 310; 30; 20 INJECTION, SOLUTION INTRAVENOUS at 12:15

## 2024-02-29 RX ADMIN — Medication 200 MCG: at 13:00

## 2024-02-29 RX ADMIN — ROCURONIUM BROMIDE 50 MG: 10 INJECTION, SOLUTION INTRAVENOUS at 14:02

## 2024-02-29 RX ADMIN — SODIUM CHLORIDE 1635 ML: 9 INJECTION, SOLUTION INTRAVENOUS at 10:57

## 2024-02-29 RX ADMIN — Medication 200 MCG: at 13:03

## 2024-03-08 ENCOUNTER — READMISSION MANAGEMENT (OUTPATIENT)
Dept: CALL CENTER | Facility: HOSPITAL | Age: 44
End: 2024-03-08
Payer: MEDICARE

## 2024-03-10 LAB
QT INTERVAL: 335 MS
QTC INTERVAL: 432 MS

## 2024-03-13 ENCOUNTER — READMISSION MANAGEMENT (OUTPATIENT)
Dept: CALL CENTER | Facility: HOSPITAL | Age: 44
End: 2024-03-13
Payer: MEDICARE

## 2024-05-29 ENCOUNTER — READMISSION MANAGEMENT (OUTPATIENT)
Dept: CALL CENTER | Facility: HOSPITAL | Age: 44
End: 2024-05-29
Payer: MEDICARE

## 2024-06-18 ENCOUNTER — HOSPITAL ENCOUNTER (EMERGENCY)
Facility: HOSPITAL | Age: 44
Discharge: OTHER INSTITUTION | End: 2024-06-18
Attending: EMERGENCY MEDICINE
Payer: MEDICARE

## 2024-06-18 VITALS
HEART RATE: 73 BPM | HEIGHT: 61 IN | TEMPERATURE: 98.6 F | BODY MASS INDEX: 23.81 KG/M2 | RESPIRATION RATE: 15 BRPM | DIASTOLIC BLOOD PRESSURE: 56 MMHG | SYSTOLIC BLOOD PRESSURE: 88 MMHG | WEIGHT: 126.1 LBS | OXYGEN SATURATION: 98 %

## 2024-06-18 DIAGNOSIS — T81.31XA OPEN ABDOMINAL INCISION WITH DRAINAGE, INITIAL ENCOUNTER: Primary | ICD-10-CM

## 2024-06-18 LAB
ANION GAP SERPL CALCULATED.3IONS-SCNC: 8 MMOL/L (ref 5–15)
BASOPHILS # BLD AUTO: 0.07 10*3/MM3 (ref 0–0.2)
BASOPHILS NFR BLD AUTO: 0.9 % (ref 0–1.5)
BUN SERPL-MCNC: 16 MG/DL (ref 6–20)
BUN/CREAT SERPL: 22.9 (ref 7–25)
CALCIUM SPEC-SCNC: 9.7 MG/DL (ref 8.6–10.5)
CHLORIDE SERPL-SCNC: 104 MMOL/L (ref 98–107)
CO2 SERPL-SCNC: 25 MMOL/L (ref 22–29)
CREAT SERPL-MCNC: 0.7 MG/DL (ref 0.57–1)
DEPRECATED RDW RBC AUTO: 47.8 FL (ref 37–54)
EGFRCR SERPLBLD CKD-EPI 2021: 108.2 ML/MIN/1.73
EOSINOPHIL # BLD AUTO: 0.26 10*3/MM3 (ref 0–0.4)
EOSINOPHIL NFR BLD AUTO: 3.5 % (ref 0.3–6.2)
ERYTHROCYTE [DISTWIDTH] IN BLOOD BY AUTOMATED COUNT: 16.8 % (ref 12.3–15.4)
GLUCOSE SERPL-MCNC: 84 MG/DL (ref 65–99)
HCT VFR BLD AUTO: 33.4 % (ref 34–46.6)
HGB BLD-MCNC: 10.3 G/DL (ref 12–15.9)
IMM GRANULOCYTES # BLD AUTO: 0.03 10*3/MM3 (ref 0–0.05)
IMM GRANULOCYTES NFR BLD AUTO: 0.4 % (ref 0–0.5)
LYMPHOCYTES # BLD AUTO: 2.02 10*3/MM3 (ref 0.7–3.1)
LYMPHOCYTES NFR BLD AUTO: 26.9 % (ref 19.6–45.3)
MCH RBC QN AUTO: 24.5 PG (ref 26.6–33)
MCHC RBC AUTO-ENTMCNC: 30.8 G/DL (ref 31.5–35.7)
MCV RBC AUTO: 79.5 FL (ref 79–97)
MONOCYTES # BLD AUTO: 0.62 10*3/MM3 (ref 0.1–0.9)
MONOCYTES NFR BLD AUTO: 8.3 % (ref 5–12)
NEUTROPHILS NFR BLD AUTO: 4.51 10*3/MM3 (ref 1.7–7)
NEUTROPHILS NFR BLD AUTO: 60 % (ref 42.7–76)
NRBC BLD AUTO-RTO: 0 /100 WBC (ref 0–0.2)
PLATELET # BLD AUTO: 360 10*3/MM3 (ref 140–450)
PMV BLD AUTO: 11 FL (ref 6–12)
POTASSIUM SERPL-SCNC: 4 MMOL/L (ref 3.5–5.2)
RBC # BLD AUTO: 4.2 10*6/MM3 (ref 3.77–5.28)
SODIUM SERPL-SCNC: 137 MMOL/L (ref 136–145)
WBC NRBC COR # BLD AUTO: 7.51 10*3/MM3 (ref 3.4–10.8)

## 2024-06-18 PROCEDURE — 85025 COMPLETE CBC W/AUTO DIFF WBC: CPT | Performed by: EMERGENCY MEDICINE

## 2024-06-18 PROCEDURE — 80048 BASIC METABOLIC PNL TOTAL CA: CPT | Performed by: EMERGENCY MEDICINE

## 2024-06-18 PROCEDURE — 99285 EMERGENCY DEPT VISIT HI MDM: CPT

## 2024-06-18 PROCEDURE — 25810000003 SODIUM CHLORIDE 0.9 % SOLUTION: Performed by: EMERGENCY MEDICINE

## 2024-06-18 RX ORDER — SODIUM CHLORIDE 0.9 % (FLUSH) 0.9 %
10 SYRINGE (ML) INJECTION AS NEEDED
Status: DISCONTINUED | OUTPATIENT
Start: 2024-06-18 | End: 2024-06-18 | Stop reason: HOSPADM

## 2024-06-18 RX ADMIN — SODIUM CHLORIDE 1000 ML: 9 INJECTION, SOLUTION INTRAVENOUS at 17:01

## 2024-07-11 ENCOUNTER — TELEPHONE (OUTPATIENT)
Dept: GASTROENTEROLOGY | Facility: CLINIC | Age: 44
End: 2024-07-11
Payer: MEDICARE

## 2024-08-01 ENCOUNTER — APPOINTMENT (OUTPATIENT)
Dept: CT IMAGING | Facility: HOSPITAL | Age: 44
End: 2024-08-01
Payer: MEDICARE

## 2024-08-01 ENCOUNTER — APPOINTMENT (OUTPATIENT)
Dept: GENERAL RADIOLOGY | Facility: HOSPITAL | Age: 44
End: 2024-08-01
Payer: MEDICARE

## 2024-08-01 ENCOUNTER — HOSPITAL ENCOUNTER (EMERGENCY)
Facility: HOSPITAL | Age: 44
Discharge: OTHER INSTITUTION | End: 2024-08-02
Attending: EMERGENCY MEDICINE
Payer: MEDICARE

## 2024-08-01 DIAGNOSIS — N13.30 HYDRONEPHROSIS, UNSPECIFIED HYDRONEPHROSIS TYPE: ICD-10-CM

## 2024-08-01 DIAGNOSIS — K66.8 PNEUMOPERITONEUM: ICD-10-CM

## 2024-08-01 DIAGNOSIS — R65.21 SEPSIS WITH ACUTE RENAL FAILURE AND SEPTIC SHOCK, DUE TO UNSPECIFIED ORGANISM, UNSPECIFIED ACUTE RENAL FAILURE TYPE: Primary | ICD-10-CM

## 2024-08-01 DIAGNOSIS — A41.9 SEPSIS WITH ACUTE RENAL FAILURE AND SEPTIC SHOCK, DUE TO UNSPECIFIED ORGANISM, UNSPECIFIED ACUTE RENAL FAILURE TYPE: Primary | ICD-10-CM

## 2024-08-01 DIAGNOSIS — N17.9 SEPSIS WITH ACUTE RENAL FAILURE AND SEPTIC SHOCK, DUE TO UNSPECIFIED ORGANISM, UNSPECIFIED ACUTE RENAL FAILURE TYPE: Primary | ICD-10-CM

## 2024-08-01 DIAGNOSIS — J90 PLEURAL EFFUSION: ICD-10-CM

## 2024-08-01 DIAGNOSIS — N39.0 URINARY TRACT INFECTION WITHOUT HEMATURIA, SITE UNSPECIFIED: ICD-10-CM

## 2024-08-01 DIAGNOSIS — N17.9 ACUTE KIDNEY INJURY: ICD-10-CM

## 2024-08-01 DIAGNOSIS — J18.9 PNEUMONIA OF RIGHT LOWER LOBE DUE TO INFECTIOUS ORGANISM: ICD-10-CM

## 2024-08-01 LAB
ALBUMIN SERPL-MCNC: 1.8 G/DL (ref 3.5–5.2)
ALBUMIN/GLOB SERPL: 0.4 G/DL
ALP SERPL-CCNC: 177 U/L (ref 39–117)
ALT SERPL W P-5'-P-CCNC: 6 U/L (ref 1–33)
ANION GAP SERPL CALCULATED.3IONS-SCNC: 10.4 MMOL/L (ref 5–15)
AST SERPL-CCNC: 12 U/L (ref 1–32)
BACTERIA UR QL AUTO: ABNORMAL /HPF
BASOPHILS # BLD AUTO: 0.03 10*3/MM3 (ref 0–0.2)
BASOPHILS NFR BLD AUTO: 0.2 % (ref 0–1.5)
BILIRUB SERPL-MCNC: 0.8 MG/DL (ref 0–1.2)
BILIRUB UR QL STRIP: NEGATIVE
BUN SERPL-MCNC: 29 MG/DL (ref 6–20)
BUN/CREAT SERPL: 16.5 (ref 7–25)
CALCIUM SPEC-SCNC: 8.8 MG/DL (ref 8.6–10.5)
CHLORIDE SERPL-SCNC: 101 MMOL/L (ref 98–107)
CLARITY UR: ABNORMAL
CO2 SERPL-SCNC: 23.6 MMOL/L (ref 22–29)
COLOR UR: YELLOW
CREAT SERPL-MCNC: 1.76 MG/DL (ref 0.57–1)
D-LACTATE SERPL-SCNC: 1.5 MMOL/L (ref 0.5–2)
DEPRECATED RDW RBC AUTO: 65.1 FL (ref 37–54)
EGFRCR SERPLBLD CKD-EPI 2021: 35.8 ML/MIN/1.73
EOSINOPHIL # BLD AUTO: 0.02 10*3/MM3 (ref 0–0.4)
EOSINOPHIL NFR BLD AUTO: 0.1 % (ref 0.3–6.2)
ERYTHROCYTE [DISTWIDTH] IN BLOOD BY AUTOMATED COUNT: 23.3 % (ref 12.3–15.4)
FLUAV SUBTYP SPEC NAA+PROBE: NOT DETECTED
FLUBV RNA ISLT QL NAA+PROBE: NOT DETECTED
GLOBULIN UR ELPH-MCNC: 4.3 GM/DL
GLUCOSE SERPL-MCNC: 89 MG/DL (ref 65–99)
GLUCOSE UR STRIP-MCNC: NEGATIVE MG/DL
HCT VFR BLD AUTO: 26.4 % (ref 34–46.6)
HGB BLD-MCNC: 8.4 G/DL (ref 12–15.9)
HGB UR QL STRIP.AUTO: ABNORMAL
HOLD SPECIMEN: NORMAL
HOLD SPECIMEN: NORMAL
HYALINE CASTS UR QL AUTO: ABNORMAL /LPF
IMM GRANULOCYTES # BLD AUTO: 0.09 10*3/MM3 (ref 0–0.05)
IMM GRANULOCYTES NFR BLD AUTO: 0.6 % (ref 0–0.5)
KETONES UR QL STRIP: NEGATIVE
LEUKOCYTE ESTERASE UR QL STRIP.AUTO: ABNORMAL
LYMPHOCYTES # BLD AUTO: 0.42 10*3/MM3 (ref 0.7–3.1)
LYMPHOCYTES NFR BLD AUTO: 3 % (ref 19.6–45.3)
MCH RBC QN AUTO: 24.6 PG (ref 26.6–33)
MCHC RBC AUTO-ENTMCNC: 31.8 G/DL (ref 31.5–35.7)
MCV RBC AUTO: 77.2 FL (ref 79–97)
MONOCYTES # BLD AUTO: 0.81 10*3/MM3 (ref 0.1–0.9)
MONOCYTES NFR BLD AUTO: 5.8 % (ref 5–12)
NEUTROPHILS NFR BLD AUTO: 12.63 10*3/MM3 (ref 1.7–7)
NEUTROPHILS NFR BLD AUTO: 90.3 % (ref 42.7–76)
NITRITE UR QL STRIP: POSITIVE
NRBC BLD AUTO-RTO: 0 /100 WBC (ref 0–0.2)
PH UR STRIP.AUTO: 6 [PH] (ref 5–8)
PLATELET # BLD AUTO: 214 10*3/MM3 (ref 140–450)
PMV BLD AUTO: 10.7 FL (ref 6–12)
POTASSIUM SERPL-SCNC: 3.2 MMOL/L (ref 3.5–5.2)
PROCALCITONIN SERPL-MCNC: 1.91 NG/ML (ref 0–0.25)
PROT SERPL-MCNC: 6.1 G/DL (ref 6–8.5)
PROT UR QL STRIP: ABNORMAL
RBC # BLD AUTO: 3.42 10*6/MM3 (ref 3.77–5.28)
RBC # UR STRIP: ABNORMAL /HPF
REF LAB TEST METHOD: ABNORMAL
RSV RNA NPH QL NAA+NON-PROBE: NOT DETECTED
SARS-COV-2 RNA RESP QL NAA+PROBE: NOT DETECTED
SODIUM SERPL-SCNC: 135 MMOL/L (ref 136–145)
SP GR UR STRIP: 1.01 (ref 1–1.03)
SQUAMOUS #/AREA URNS HPF: ABNORMAL /HPF
UROBILINOGEN UR QL STRIP: ABNORMAL
WBC # UR STRIP: ABNORMAL /HPF
WBC NRBC COR # BLD AUTO: 14 10*3/MM3 (ref 3.4–10.8)
WHOLE BLOOD HOLD COAG: NORMAL
WHOLE BLOOD HOLD SPECIMEN: NORMAL

## 2024-08-01 PROCEDURE — 96366 THER/PROPH/DIAG IV INF ADDON: CPT

## 2024-08-01 PROCEDURE — 88312 SPECIAL STAINS GROUP 1: CPT | Performed by: EMERGENCY MEDICINE

## 2024-08-01 PROCEDURE — 83605 ASSAY OF LACTIC ACID: CPT

## 2024-08-01 PROCEDURE — 96365 THER/PROPH/DIAG IV INF INIT: CPT

## 2024-08-01 PROCEDURE — 85025 COMPLETE CBC W/AUTO DIFF WBC: CPT

## 2024-08-01 PROCEDURE — 25010000002 ACETAMINOPHEN 10 MG/ML SOLUTION: Performed by: EMERGENCY MEDICINE

## 2024-08-01 PROCEDURE — 96367 TX/PROPH/DG ADDL SEQ IV INF: CPT

## 2024-08-01 PROCEDURE — 96375 TX/PRO/DX INJ NEW DRUG ADDON: CPT

## 2024-08-01 PROCEDURE — 84145 PROCALCITONIN (PCT): CPT | Performed by: EMERGENCY MEDICINE

## 2024-08-01 PROCEDURE — 81001 URINALYSIS AUTO W/SCOPE: CPT | Performed by: EMERGENCY MEDICINE

## 2024-08-01 PROCEDURE — 87040 BLOOD CULTURE FOR BACTERIA: CPT

## 2024-08-01 PROCEDURE — 99291 CRITICAL CARE FIRST HOUR: CPT

## 2024-08-01 PROCEDURE — 87077 CULTURE AEROBIC IDENTIFY: CPT | Performed by: EMERGENCY MEDICINE

## 2024-08-01 PROCEDURE — 80053 COMPREHEN METABOLIC PANEL: CPT

## 2024-08-01 PROCEDURE — 71045 X-RAY EXAM CHEST 1 VIEW: CPT

## 2024-08-01 PROCEDURE — 93005 ELECTROCARDIOGRAM TRACING: CPT | Performed by: EMERGENCY MEDICINE

## 2024-08-01 PROCEDURE — P9612 CATHETERIZE FOR URINE SPEC: HCPCS

## 2024-08-01 PROCEDURE — 87637 SARSCOV2&INF A&B&RSV AMP PRB: CPT | Performed by: EMERGENCY MEDICINE

## 2024-08-01 PROCEDURE — 25010000002 CEFEPIME PER 500 MG: Performed by: EMERGENCY MEDICINE

## 2024-08-01 PROCEDURE — 87186 SC STD MICRODIL/AGAR DIL: CPT | Performed by: EMERGENCY MEDICINE

## 2024-08-01 PROCEDURE — 36415 COLL VENOUS BLD VENIPUNCTURE: CPT

## 2024-08-01 PROCEDURE — 74176 CT ABD & PELVIS W/O CONTRAST: CPT

## 2024-08-01 RX ORDER — SODIUM CHLORIDE 0.9 % (FLUSH) 0.9 %
10 SYRINGE (ML) INJECTION AS NEEDED
Status: DISCONTINUED | OUTPATIENT
Start: 2024-08-01 | End: 2024-08-02 | Stop reason: HOSPADM

## 2024-08-01 RX ORDER — ACETAMINOPHEN 10 MG/ML
1000 INJECTION, SOLUTION INTRAVENOUS ONCE
Status: COMPLETED | OUTPATIENT
Start: 2024-08-01 | End: 2024-08-01

## 2024-08-01 RX ORDER — SODIUM CHLORIDE 0.9 % (FLUSH) 0.9 %
10 SYRINGE (ML) INJECTION AS NEEDED
Status: CANCELLED | OUTPATIENT
Start: 2024-08-01

## 2024-08-01 RX ORDER — NOREPINEPHRINE BITARTRATE 0.03 MG/ML
.02-.3 INJECTION, SOLUTION INTRAVENOUS
Status: DISCONTINUED | OUTPATIENT
Start: 2024-08-01 | End: 2024-08-02 | Stop reason: HOSPADM

## 2024-08-01 RX ADMIN — SODIUM CHLORIDE, POTASSIUM CHLORIDE, SODIUM LACTATE AND CALCIUM CHLORIDE 1689 ML: 600; 310; 30; 20 INJECTION, SOLUTION INTRAVENOUS at 20:34

## 2024-08-01 RX ADMIN — NOREPINEPHRINE BITARTRATE 0.02 MCG/KG/MIN: 0.03 INJECTION, SOLUTION INTRAVENOUS at 22:23

## 2024-08-01 RX ADMIN — CEFEPIME 2000 MG: 2 INJECTION, POWDER, FOR SOLUTION INTRAVENOUS at 20:23

## 2024-08-01 RX ADMIN — ACETAMINOPHEN 1000 MG: 10 INJECTION INTRAVENOUS at 21:09

## 2024-08-02 VITALS
BODY MASS INDEX: 23.43 KG/M2 | RESPIRATION RATE: 16 BRPM | TEMPERATURE: 101.2 F | HEIGHT: 61 IN | OXYGEN SATURATION: 99 % | HEART RATE: 57 BPM | DIASTOLIC BLOOD PRESSURE: 57 MMHG | WEIGHT: 124.12 LBS | SYSTOLIC BLOOD PRESSURE: 99 MMHG

## 2024-08-02 LAB
QT INTERVAL: 361 MS
QTC INTERVAL: 446 MS

## 2024-08-02 PROCEDURE — 96366 THER/PROPH/DIAG IV INF ADDON: CPT

## 2024-08-04 LAB
BACTERIA SPEC AEROBE CULT: ABNORMAL
BACTERIA SPEC AEROBE CULT: ABNORMAL
GRAM STN SPEC: ABNORMAL
ISOLATED FROM: ABNORMAL
ISOLATED FROM: ABNORMAL

## 2024-08-05 ENCOUNTER — TELEPHONE (OUTPATIENT)
Dept: EMERGENCY DEPT | Facility: HOSPITAL | Age: 44
End: 2024-08-05

## 2024-09-10 ENCOUNTER — APPOINTMENT (OUTPATIENT)
Dept: CT IMAGING | Facility: HOSPITAL | Age: 44
End: 2024-09-10
Payer: MEDICARE

## 2024-09-10 ENCOUNTER — HOSPITAL ENCOUNTER (EMERGENCY)
Facility: HOSPITAL | Age: 44
Discharge: OTHER INSTITUTION | End: 2024-09-11
Attending: EMERGENCY MEDICINE
Payer: MEDICARE

## 2024-09-10 DIAGNOSIS — K63.2 ENTEROCUTANEOUS FISTULA: Primary | ICD-10-CM

## 2024-09-10 LAB
ALBUMIN SERPL-MCNC: 2.2 G/DL (ref 3.5–5.2)
ALBUMIN/GLOB SERPL: 0.4 G/DL
ALP SERPL-CCNC: 272 U/L (ref 39–117)
ALT SERPL W P-5'-P-CCNC: 7 U/L (ref 1–33)
ANION GAP SERPL CALCULATED.3IONS-SCNC: 11.3 MMOL/L (ref 5–15)
AST SERPL-CCNC: 14 U/L (ref 1–32)
BACTERIA UR QL AUTO: ABNORMAL /HPF
BASOPHILS # BLD AUTO: 0.11 10*3/MM3 (ref 0–0.2)
BASOPHILS NFR BLD AUTO: 1.4 % (ref 0–1.5)
BILIRUB SERPL-MCNC: 0.4 MG/DL (ref 0–1.2)
BILIRUB UR QL STRIP: NEGATIVE
BUN SERPL-MCNC: 12 MG/DL (ref 6–20)
BUN/CREAT SERPL: 13.5 (ref 7–25)
CALCIUM SPEC-SCNC: 9.3 MG/DL (ref 8.6–10.5)
CHLORIDE SERPL-SCNC: 105 MMOL/L (ref 98–107)
CLARITY UR: ABNORMAL
CO2 SERPL-SCNC: 21.7 MMOL/L (ref 22–29)
COLOR UR: YELLOW
CREAT SERPL-MCNC: 0.89 MG/DL (ref 0.57–1)
D-LACTATE SERPL-SCNC: 1.4 MMOL/L (ref 0.5–2)
DEPRECATED RDW RBC AUTO: 55 FL (ref 37–54)
EGFRCR SERPLBLD CKD-EPI 2021: 81.1 ML/MIN/1.73
EOSINOPHIL # BLD AUTO: 0.15 10*3/MM3 (ref 0–0.4)
EOSINOPHIL NFR BLD AUTO: 1.9 % (ref 0.3–6.2)
ERYTHROCYTE [DISTWIDTH] IN BLOOD BY AUTOMATED COUNT: 17.3 % (ref 12.3–15.4)
GLOBULIN UR ELPH-MCNC: 5.4 GM/DL
GLUCOSE SERPL-MCNC: 75 MG/DL (ref 65–99)
GLUCOSE UR STRIP-MCNC: NEGATIVE MG/DL
HCT VFR BLD AUTO: 37.1 % (ref 34–46.6)
HGB BLD-MCNC: 11.7 G/DL (ref 12–15.9)
HGB UR QL STRIP.AUTO: NEGATIVE
HOLD SPECIMEN: NORMAL
HYALINE CASTS UR QL AUTO: ABNORMAL /LPF
IMM GRANULOCYTES # BLD AUTO: 0.02 10*3/MM3 (ref 0–0.05)
IMM GRANULOCYTES NFR BLD AUTO: 0.3 % (ref 0–0.5)
KETONES UR QL STRIP: NEGATIVE
LEUKOCYTE ESTERASE UR QL STRIP.AUTO: ABNORMAL
LIPASE SERPL-CCNC: 7 U/L (ref 13–60)
LYMPHOCYTES # BLD AUTO: 2.38 10*3/MM3 (ref 0.7–3.1)
LYMPHOCYTES NFR BLD AUTO: 29.9 % (ref 19.6–45.3)
MCH RBC QN AUTO: 27.4 PG (ref 26.6–33)
MCHC RBC AUTO-ENTMCNC: 31.5 G/DL (ref 31.5–35.7)
MCV RBC AUTO: 86.9 FL (ref 79–97)
MONOCYTES # BLD AUTO: 0.49 10*3/MM3 (ref 0.1–0.9)
MONOCYTES NFR BLD AUTO: 6.2 % (ref 5–12)
NEUTROPHILS NFR BLD AUTO: 4.8 10*3/MM3 (ref 1.7–7)
NEUTROPHILS NFR BLD AUTO: 60.3 % (ref 42.7–76)
NITRITE UR QL STRIP: NEGATIVE
NRBC BLD AUTO-RTO: 0 /100 WBC (ref 0–0.2)
PH UR STRIP.AUTO: 6 [PH] (ref 5–8)
PLATELET # BLD AUTO: 372 10*3/MM3 (ref 140–450)
PMV BLD AUTO: 10.6 FL (ref 6–12)
POTASSIUM SERPL-SCNC: 3.3 MMOL/L (ref 3.5–5.2)
PROT SERPL-MCNC: 7.6 G/DL (ref 6–8.5)
PROT UR QL STRIP: NEGATIVE
RBC # BLD AUTO: 4.27 10*6/MM3 (ref 3.77–5.28)
RBC # UR STRIP: ABNORMAL /HPF
REF LAB TEST METHOD: ABNORMAL
SODIUM SERPL-SCNC: 138 MMOL/L (ref 136–145)
SP GR UR STRIP: 1.01 (ref 1–1.03)
SQUAMOUS #/AREA URNS HPF: ABNORMAL /HPF
UROBILINOGEN UR QL STRIP: ABNORMAL
WBC # UR STRIP: ABNORMAL /HPF
WBC NRBC COR # BLD AUTO: 7.95 10*3/MM3 (ref 3.4–10.8)
WHOLE BLOOD HOLD COAG: NORMAL
WHOLE BLOOD HOLD COAG: NORMAL
WHOLE BLOOD HOLD SPECIMEN: NORMAL
WHOLE BLOOD HOLD SPECIMEN: NORMAL

## 2024-09-10 PROCEDURE — 87040 BLOOD CULTURE FOR BACTERIA: CPT | Performed by: EMERGENCY MEDICINE

## 2024-09-10 PROCEDURE — 99285 EMERGENCY DEPT VISIT HI MDM: CPT

## 2024-09-10 PROCEDURE — 25010000002 ONDANSETRON PER 1 MG: Performed by: EMERGENCY MEDICINE

## 2024-09-10 PROCEDURE — 96361 HYDRATE IV INFUSION ADD-ON: CPT

## 2024-09-10 PROCEDURE — 83690 ASSAY OF LIPASE: CPT

## 2024-09-10 PROCEDURE — 96374 THER/PROPH/DIAG INJ IV PUSH: CPT

## 2024-09-10 PROCEDURE — 80053 COMPREHEN METABOLIC PANEL: CPT

## 2024-09-10 PROCEDURE — 36415 COLL VENOUS BLD VENIPUNCTURE: CPT

## 2024-09-10 PROCEDURE — 74176 CT ABD & PELVIS W/O CONTRAST: CPT

## 2024-09-10 PROCEDURE — 25810000003 SODIUM CHLORIDE 0.9 % SOLUTION: Performed by: EMERGENCY MEDICINE

## 2024-09-10 PROCEDURE — 83605 ASSAY OF LACTIC ACID: CPT

## 2024-09-10 PROCEDURE — 85025 COMPLETE CBC W/AUTO DIFF WBC: CPT

## 2024-09-10 PROCEDURE — 81001 URINALYSIS AUTO W/SCOPE: CPT | Performed by: EMERGENCY MEDICINE

## 2024-09-10 RX ORDER — MIDODRINE HYDROCHLORIDE 10 MG/1
10 TABLET ORAL ONCE
Status: COMPLETED | OUTPATIENT
Start: 2024-09-10 | End: 2024-09-10

## 2024-09-10 RX ORDER — MIDODRINE HYDROCHLORIDE 10 MG/1
10 TABLET ORAL 3 TIMES DAILY
COMMUNITY
Start: 2024-08-08 | End: 2024-10-07

## 2024-09-10 RX ORDER — ONDANSETRON 2 MG/ML
4 INJECTION INTRAMUSCULAR; INTRAVENOUS ONCE
Status: COMPLETED | OUTPATIENT
Start: 2024-09-10 | End: 2024-09-10

## 2024-09-10 RX ORDER — CALCIUM CARBONATE 500 MG/1
1 TABLET, CHEWABLE ORAL DAILY
COMMUNITY

## 2024-09-10 RX ORDER — SODIUM CHLORIDE 0.9 % (FLUSH) 0.9 %
10 SYRINGE (ML) INJECTION AS NEEDED
Status: DISCONTINUED | OUTPATIENT
Start: 2024-09-10 | End: 2024-09-11 | Stop reason: HOSPADM

## 2024-09-10 RX ADMIN — MIDODRINE HYDROCHLORIDE 10 MG: 10 TABLET ORAL at 22:06

## 2024-09-10 RX ADMIN — SODIUM CHLORIDE 1000 ML: 9 INJECTION, SOLUTION INTRAVENOUS at 17:10

## 2024-09-10 RX ADMIN — ONDANSETRON 4 MG: 2 INJECTION INTRAMUSCULAR; INTRAVENOUS at 18:54

## 2024-09-10 RX ADMIN — SODIUM CHLORIDE 1000 ML: 9 INJECTION, SOLUTION INTRAVENOUS at 21:44

## 2024-09-11 VITALS
SYSTOLIC BLOOD PRESSURE: 90 MMHG | HEIGHT: 61 IN | OXYGEN SATURATION: 96 % | WEIGHT: 117.28 LBS | TEMPERATURE: 97.7 F | DIASTOLIC BLOOD PRESSURE: 64 MMHG | HEART RATE: 56 BPM | RESPIRATION RATE: 18 BRPM | BODY MASS INDEX: 22.14 KG/M2

## 2024-09-15 LAB
BACTERIA SPEC AEROBE CULT: NORMAL
BACTERIA SPEC AEROBE CULT: NORMAL

## 2024-12-11 ENCOUNTER — TRANSCRIBE ORDERS (OUTPATIENT)
Dept: ADMINISTRATIVE | Facility: HOSPITAL | Age: 44
End: 2024-12-11
Payer: MEDICARE

## 2024-12-11 DIAGNOSIS — L02.211 ABDOMINAL WALL ABSCESS: Primary | ICD-10-CM

## 2025-01-18 ENCOUNTER — HOSPITAL ENCOUNTER (EMERGENCY)
Facility: HOSPITAL | Age: 45
Discharge: HOME | End: 2025-01-18
Attending: EMERGENCY MEDICINE
Payer: MEDICARE

## 2025-01-18 ENCOUNTER — APPOINTMENT (OUTPATIENT)
Dept: CT IMAGING | Facility: HOSPITAL | Age: 45
End: 2025-01-18
Payer: MEDICARE

## 2025-01-18 VITALS
TEMPERATURE: 97.5 F | RESPIRATION RATE: 18 BRPM | OXYGEN SATURATION: 98 % | HEART RATE: 89 BPM | SYSTOLIC BLOOD PRESSURE: 81 MMHG | WEIGHT: 129.19 LBS | BODY MASS INDEX: 23.77 KG/M2 | DIASTOLIC BLOOD PRESSURE: 58 MMHG | HEIGHT: 62 IN

## 2025-01-18 DIAGNOSIS — K63.2 ENTEROCUTANEOUS FISTULA: Primary | ICD-10-CM

## 2025-01-18 DIAGNOSIS — N39.0 ACUTE UTI: ICD-10-CM

## 2025-01-18 LAB
ALBUMIN SERPL-MCNC: 2 G/DL (ref 3.5–5.2)
ALBUMIN/GLOB SERPL: 0.5 G/DL
ALP SERPL-CCNC: 215 U/L (ref 39–117)
ALT SERPL W P-5'-P-CCNC: 7 U/L (ref 1–33)
ANION GAP SERPL CALCULATED.3IONS-SCNC: 8.5 MMOL/L (ref 5–15)
AST SERPL-CCNC: 17 U/L (ref 1–32)
BACTERIA UR QL AUTO: ABNORMAL /HPF
BASOPHILS # BLD AUTO: 0.08 10*3/MM3 (ref 0–0.2)
BASOPHILS NFR BLD AUTO: 0.5 % (ref 0–1.5)
BILIRUB SERPL-MCNC: 0.6 MG/DL (ref 0–1.2)
BILIRUB UR QL STRIP: NEGATIVE
BUN SERPL-MCNC: 17 MG/DL (ref 6–20)
BUN/CREAT SERPL: 16.7 (ref 7–25)
CALCIUM SPEC-SCNC: 8.9 MG/DL (ref 8.6–10.5)
CHLORIDE SERPL-SCNC: 107 MMOL/L (ref 98–107)
CLARITY UR: ABNORMAL
CO2 SERPL-SCNC: 21.5 MMOL/L (ref 22–29)
COLOR UR: ABNORMAL
CREAT SERPL-MCNC: 1.02 MG/DL (ref 0.57–1)
DEPRECATED RDW RBC AUTO: 50.5 FL (ref 37–54)
EGFRCR SERPLBLD CKD-EPI 2021: 68.9 ML/MIN/1.73
EOSINOPHIL # BLD AUTO: 0.06 10*3/MM3 (ref 0–0.4)
EOSINOPHIL NFR BLD AUTO: 0.4 % (ref 0.3–6.2)
ERYTHROCYTE [DISTWIDTH] IN BLOOD BY AUTOMATED COUNT: 14.9 % (ref 12.3–15.4)
GLOBULIN UR ELPH-MCNC: 4.1 GM/DL
GLUCOSE SERPL-MCNC: 95 MG/DL (ref 65–99)
GLUCOSE UR STRIP-MCNC: NEGATIVE MG/DL
HCT VFR BLD AUTO: 33.2 % (ref 34–46.6)
HGB BLD-MCNC: 10.5 G/DL (ref 12–15.9)
HGB UR QL STRIP.AUTO: ABNORMAL
HOLD SPECIMEN: NORMAL
HOLD SPECIMEN: NORMAL
HYALINE CASTS UR QL AUTO: ABNORMAL /LPF
IMM GRANULOCYTES # BLD AUTO: 0.08 10*3/MM3 (ref 0–0.05)
IMM GRANULOCYTES NFR BLD AUTO: 0.5 % (ref 0–0.5)
KETONES UR QL STRIP: NEGATIVE
LEUKOCYTE ESTERASE UR QL STRIP.AUTO: ABNORMAL
LIPASE SERPL-CCNC: 6 U/L (ref 13–60)
LYMPHOCYTES # BLD AUTO: 1.96 10*3/MM3 (ref 0.7–3.1)
LYMPHOCYTES NFR BLD AUTO: 12.9 % (ref 19.6–45.3)
MCH RBC QN AUTO: 29.1 PG (ref 26.6–33)
MCHC RBC AUTO-ENTMCNC: 31.6 G/DL (ref 31.5–35.7)
MCV RBC AUTO: 92 FL (ref 79–97)
MONOCYTES # BLD AUTO: 1.28 10*3/MM3 (ref 0.1–0.9)
MONOCYTES NFR BLD AUTO: 8.4 % (ref 5–12)
NEUTROPHILS NFR BLD AUTO: 11.75 10*3/MM3 (ref 1.7–7)
NEUTROPHILS NFR BLD AUTO: 77.3 % (ref 42.7–76)
NITRITE UR QL STRIP: POSITIVE
NRBC BLD AUTO-RTO: 0 /100 WBC (ref 0–0.2)
PH UR STRIP.AUTO: 6 [PH] (ref 5–8)
PLATELET # BLD AUTO: 230 10*3/MM3 (ref 140–450)
PMV BLD AUTO: 11.4 FL (ref 6–12)
POTASSIUM SERPL-SCNC: 4 MMOL/L (ref 3.5–5.2)
PROT SERPL-MCNC: 6.1 G/DL (ref 6–8.5)
PROT UR QL STRIP: ABNORMAL
RBC # BLD AUTO: 3.61 10*6/MM3 (ref 3.77–5.28)
RBC # UR STRIP: ABNORMAL /HPF
REF LAB TEST METHOD: ABNORMAL
SODIUM SERPL-SCNC: 137 MMOL/L (ref 136–145)
SP GR UR STRIP: 1.02 (ref 1–1.03)
SQUAMOUS #/AREA URNS HPF: ABNORMAL /HPF
UROBILINOGEN UR QL STRIP: ABNORMAL
WBC # UR STRIP: ABNORMAL /HPF
WBC NRBC COR # BLD AUTO: 15.21 10*3/MM3 (ref 3.4–10.8)
WHOLE BLOOD HOLD COAG: NORMAL
WHOLE BLOOD HOLD SPECIMEN: NORMAL

## 2025-01-18 PROCEDURE — 80053 COMPREHEN METABOLIC PANEL: CPT | Performed by: EMERGENCY MEDICINE

## 2025-01-18 PROCEDURE — P9612 CATHETERIZE FOR URINE SPEC: HCPCS

## 2025-01-18 PROCEDURE — 85025 COMPLETE CBC W/AUTO DIFF WBC: CPT

## 2025-01-18 PROCEDURE — 81001 URINALYSIS AUTO W/SCOPE: CPT | Performed by: EMERGENCY MEDICINE

## 2025-01-18 PROCEDURE — 25810000003 SODIUM CHLORIDE 0.9 % SOLUTION: Performed by: EMERGENCY MEDICINE

## 2025-01-18 PROCEDURE — 99284 EMERGENCY DEPT VISIT MOD MDM: CPT

## 2025-01-18 PROCEDURE — 83690 ASSAY OF LIPASE: CPT | Performed by: EMERGENCY MEDICINE

## 2025-01-18 PROCEDURE — 74176 CT ABD & PELVIS W/O CONTRAST: CPT

## 2025-01-18 RX ORDER — SODIUM CHLORIDE 0.9 % (FLUSH) 0.9 %
10 SYRINGE (ML) INJECTION AS NEEDED
Status: DISCONTINUED | OUTPATIENT
Start: 2025-01-18 | End: 2025-01-18 | Stop reason: HOSPADM

## 2025-01-18 RX ORDER — CEPHALEXIN 500 MG/1
500 CAPSULE ORAL 4 TIMES DAILY
Qty: 28 CAPSULE | Refills: 0 | Status: SHIPPED | OUTPATIENT
Start: 2025-01-18

## 2025-01-18 RX ADMIN — SODIUM CHLORIDE 1000 ML: 9 INJECTION, SOLUTION INTRAVENOUS at 02:48

## 2025-01-18 NOTE — ED PROVIDER NOTES
Time: 2:36 AM EST  Date of encounter:  1/18/2025  Independent Historian/Clinical History and Information was obtained by:   Patient, Nursing Staff, and EMS    History is limited by: N/A    Chief Complaint: Drainage from abdominal wound.      History of Present Illness:  Patient is a 44 y.o. year old female who presents to the emergency department for evaluation of drainage from abdominal wound.  This patient presents to the emergency department with drainage from her incision for her previous feeding tube in the left upper quadrant.  The patient has had no nausea or vomiting, no fever and she does have chronic diarrhea.  The patient has a prior history of Crohn's disease and complex abdominal surgery in the past.  She was seen at [facility] in September 2024 for increasing purulent drainage from the left abdominal wall wound.  The patient had CT of the abdomen which revealed a possible fistula at the anterior abdominal wall and concern for p.o. contrast leak at the site of drainage.  After a wound consultation the patient was treated conservatively and discharged back to [facility].  The patient currently has no significant abdominal pain and states that this is similar to her previous episodes where there was a small amount of drainage.  The patient has been eating and drinking well and she has a good appetite.      Patient Care Team  Primary Care Provider: Provider, No Known    Past Medical History:     Allergies   Allergen Reactions    Iodinated Contrast Media Anaphylaxis    Penicillins Anaphylaxis     1. When was your reaction? > 10 years ago  2. Did your reaction happen after the first dose or after several doses? Do not know  3. Did your reaction require ED or hospital care to manage your reaction? Do not know  4. Did your reaction require treatment with epinephrine? Do not know  5. Have you taken amoxicillin (Amoxil) or amoxicillin-clavulanate (Augmentin) without issue since? Do not  know  6. Have you taken cephalexin (Keflex) without issue since? Do not know     Contrast Dye (Echo Or Unknown Ct/Mr) Itching and Rash     Past Medical History:   Diagnosis Date    Anemia     Anxiety and depression     Crohn's disease      Past Surgical History:   Procedure Laterality Date    COLONOSCOPY  2017, 2020    COLONOSCOPY N/A 9/5/2022    Procedure: COLONOSCOPY WITH BIOPSIES;  Surgeon: Ruth Ann Pascal MD;  Location: [facility] ENDOSCOPY;  Service: Gastroenterology;  Laterality: N/A;  COLITIS  ILEITIS    COLONOSCOPY N/A 10/18/2023    Procedure: COLONOSCOPY WITH BIOPSIES;  Surgeon: Ruth Ann Pascal MD;  Location: [facility] ENDOSCOPY;  Service: Gastroenterology;  Laterality: N/A;  ILEITIS, RECTAL LESION    ENDOSCOPY N/A 9/5/2022    Procedure: ESOPHAGOGASTRODUODENOSCOPY WITH BIOPSIES;  Surgeon: Ruth Ann Pascal MD;  Location: [facility] ENDOSCOPY;  Service: Gastroenterology;  Laterality: N/A;  DUODENAL ULCER  HIATAL HERNIA  ESOPHAGITIS    EXPLORATORY LAPAROTOMY N/A 2/29/2024    Procedure: LAPAROTOMY EXPLORATORY W/ CONTROLLED DRAINAGE OF PERFORATED ULCER;  Surgeon: Chace Taylor MD;  Location: [facility] MAIN OR;  Service: General;  Laterality: N/A;    FECAL IMPACTION REMOVAL      Small    INCISIONAL HERNIA REPAIR      With mesh     Family History   Problem Relation Age of Onset    Esophageal cancer Father        Home Medications:  Prior to Admission medications    Medication Sig Start Date End Date Taking? Authorizing Provider   calcium carbonate (Antacid Calcium) 500 MG chewable tablet Chew 1 tablet Daily.    Provider, MD Juan   lactated ringers infusion Infuse 100 mL/hr into a venous catheter Continuous. 2/29/24   Roderick Marley MD   naloxone (NARCAN) 0.4 MG/ML injection Infuse 1 mL into a venous catheter Every 5 (Five) Minutes As Needed for Respiratory Depression. 2/29/24   Roderick Marley MD   naloxone (NARCAN) 0.4 MG/ML injection Infuse 1 mL into a venous catheter Every 5 (Five) Minutes As  "Needed for Respiratory Depression. 2/29/24   Roderick Marley MD   ondansetron (ZOFRAN) 2 mg/mL injection Infuse 2 mL into a venous catheter Every 4 (Four) Hours As Needed for Nausea or Vomiting. 2/29/24   Roderick Marley MD   pantoprazole (PROTONIX) 40 MG injection Infuse 10 mL into a venous catheter 2 (Two) Times a Day. Indications: Gastroesophageal Reflux Disease 2/29/24   Roderick Marley MD   phenylephrine (MITCHELL-SYNEPHRINE) 200 MCG/ML solution Infuse 27..5 mcg/min into a venous catheter Dose Adjusted By Provider As Needed. 2/29/24   Roderick Marley MD   propofol (DIPRIVAN) 10 mg/mL emulsion infusion Infuse 272.5-2,725 mcg/min into a venous catheter Dose Adjusted By Provider As Needed. 2/29/24   Roderick Marley MD        Social History:   Social History     Tobacco Use    Smoking status: Never    Smokeless tobacco: Never   Vaping Use    Vaping status: Never Used   Substance Use Topics    Alcohol use: Never    Drug use: Never         Review of Systems:  Review of Systems   Constitutional:  Negative for chills and fever.   HENT:  Negative for congestion, ear pain and sore throat.    Eyes:  Negative for pain.   Respiratory:  Negative for cough, chest tightness and shortness of breath.    Cardiovascular:  Negative for chest pain.   Gastrointestinal:  Positive for abdominal pain. Negative for diarrhea, nausea and vomiting.   Genitourinary:  Negative for flank pain and hematuria.   Musculoskeletal:  Negative for joint swelling.   Skin:  Positive for wound. Negative for pallor.        Drainage from prior PEG tube site   Neurological:  Negative for seizures and headaches.   All other systems reviewed and are negative.       Physical Exam:  BP (!) 81/58 (BP Location: Left arm, Patient Position: Lying)   Pulse 89   Temp 97.5 °F (36.4 °C) (Oral)   Resp 18   Ht 157.5 cm (62\")   Wt 58.6 kg (129 lb 3 oz)   SpO2 98%   BMI 23.63 kg/m²     Physical Exam  Vitals and nursing note reviewed.   Constitutional:       General: She is not in " acute distress.     Appearance: Normal appearance. She is not toxic-appearing.   HENT:      Head: Normocephalic and atraumatic.      Mouth/Throat:      Mouth: Mucous membranes are moist.   Eyes:      General: No scleral icterus.  Cardiovascular:      Rate and Rhythm: Normal rate and regular rhythm.      Pulses: Normal pulses.      Heart sounds: Normal heart sounds.   Pulmonary:      Effort: Pulmonary effort is normal. No respiratory distress.      Breath sounds: Normal breath sounds.   Abdominal:      General: Abdomen is flat.      Palpations: Abdomen is soft.      Tenderness: There is abdominal tenderness in the left upper quadrant. There is no guarding or rebound.      Comments: There is a small amount of opaque white drainage coming from the prior PEG tube site in the left upper quadrant.  There is no guarding or rebound the patient has excellent bowel sounds   Musculoskeletal:         General: Normal range of motion.      Cervical back: Normal range of motion and neck supple.   Skin:     General: Skin is warm and dry.   Neurological:      Mental Status: She is alert and oriented to person, place, and time. Mental status is at baseline.                    Medical Decision Making:      Comorbidities that affect care:    Crohn's disease    External Notes reviewed:    Admission at [facility] for similar symptoms.      The following orders were placed and all results were independently analyzed by me:  Orders Placed This Encounter   Procedures    CT Abdomen Pelvis Without Contrast    [place] Draw    Comprehensive Metabolic Panel    Lipase    Urinalysis With Microscopic If Indicated (No Culture) - Urine, Clean Catch    CBC Auto Differential    Urinalysis, Microscopic Only - Urine, Clean Catch    NPO Diet NPO Type: Strict NPO    Undress & Gown    Straight cath    Insert Peripheral IV    CBC & Differential    Green Top (Gel)    Lavender Top    Gold Top - SST    Light Blue Top       Medications Given in the  Emergency Department:  Medications   sodium chloride 0.9 % flush 10 mL (has no administration in time range)   sodium chloride 0.9 % bolus 1,000 mL (1,000 mL Intravenous New Bag 1/18/25 0248)        ED Course:         Labs:    Lab Results (last 24 hours)       Procedure Component Value Units Date/Time    CBC & Differential [[id number]]  (Abnormal) Collected: 01/18/25 0037    Specimen: Blood Updated: 01/18/25 0044    Narrative:      The following orders were created for panel order CBC & Differential.  Procedure                               Abnormality         Status                     ---------                               -----------         ------                     CBC Auto Differential[[id number]]        Abnormal            Final result                 Please view results for these tests on the individual orders.    Comprehensive Metabolic Panel [[id number]]  (Abnormal) Collected: 01/18/25 0037    Specimen: Blood Updated: 01/18/25 0109     Glucose 95 mg/dL      BUN 17 mg/dL      Creatinine 1.02 mg/dL      Sodium 137 mmol/L      Potassium 4.0 mmol/L      Comment: Slight hemolysis detected by analyzer. Result may be falsely elevated.        Chloride 107 mmol/L      CO2 21.5 mmol/L      Calcium 8.9 mg/dL      Total Protein 6.1 g/dL      Albumin 2.0 g/dL      ALT (SGPT) 7 U/L      AST (SGOT) 17 U/L      Comment: Slight hemolysis detected by analyzer. Result may be falsely elevated.        Alkaline Phosphatase 215 U/L      Total Bilirubin 0.6 mg/dL      Globulin 4.1 gm/dL      A/G Ratio 0.5 g/dL      BUN/Creatinine Ratio 16.7     Anion Gap 8.5 mmol/L      eGFR 68.9 mL/min/1.73     Narrative:      GFR Categories in Chronic Kidney Disease (CKD)      GFR Category          GFR (mL/min/1.73)    Interpretation  G1                     90 or greater         Normal or high (1)  G2                      60-89                Mild decrease (1)  G3a                   45-59                Mild to moderate decrease  G3b                    30-44                Moderate to severe decrease  G4                    15-29                Severe decrease  G5                    14 or less           Kidney failure          (1)In the absence of evidence of kidney disease, neither GFR category G1 or G2 fulfill the criteria for CKD.    eGFR calculation 2021 CKD-EPI creatinine equation, which does not include race as a factor    Lipase [[id number]]  (Abnormal) Collected: 01/18/25 0037    Specimen: Blood Updated: 01/18/25 0104     Lipase 6 U/L     CBC Auto Differential [[id number]]  (Abnormal) Collected: 01/18/25 0037    Specimen: Blood Updated: 01/18/25 0044     WBC 15.21 10*3/mm3      RBC 3.61 10*6/mm3      Hemoglobin 10.5 g/dL      Hematocrit 33.2 %      MCV 92.0 fL      MCH 29.1 pg      MCHC 31.6 g/dL      RDW 14.9 %      RDW-SD 50.5 fl      MPV 11.4 fL      Platelets 230 10*3/mm3      Neutrophil % 77.3 %      Lymphocyte % 12.9 %      Monocyte % 8.4 %      Eosinophil % 0.4 %      Basophil % 0.5 %      Immature Grans % 0.5 %      Neutrophils, Absolute 11.75 10*3/mm3      Lymphocytes, Absolute 1.96 10*3/mm3      Monocytes, Absolute 1.28 10*3/mm3      Eosinophils, Absolute 0.06 10*3/mm3      Basophils, Absolute 0.08 10*3/mm3      Immature Grans, Absolute 0.08 10*3/mm3      nRBC 0.0 /100 WBC     Urinalysis With Microscopic If Indicated (No Culture) - Straight Cath [[id number]]  (Abnormal) Collected: 01/18/25 0301    Specimen: Urine from Straight Cath Updated: 01/18/25 0313     Color, UA Dark Yellow     Appearance, UA Cloudy     pH, UA 6.0     Specific Gravity, UA 1.017     Glucose, UA Negative     Ketones, UA Negative     Bilirubin, UA Negative     Blood, UA Moderate (2+)     Protein, UA 30 mg/dL (1+)     Leuk Esterase, UA Moderate (2+)     Nitrite, UA Positive     Urobilinogen, UA 1.0 E.U./dL    Urinalysis, Microscopic Only - Straight Cath [[id number]]  (Abnormal) Collected: 01/18/25 0301    Specimen: Urine from Straight Cath Updated: 01/18/25 0313     RBC, UA  3-5 /HPF      WBC, UA Too Numerous to Count /HPF      Bacteria, UA 4+ /HPF      Squamous Epithelial Cells, UA 0-2 /HPF      Hyaline Casts, UA None Seen /LPF      Methodology Automated Microscopy             Imaging:    CT Abdomen Pelvis Without Contrast    Result Date: 1/18/2025  CT ABDOMEN PELVIS WO CONTRAST Date of Exam: 1/18/2025 3:35 AM EST Indication: Abdominal pain and drainage from prior G-tube site. Comparison: 9/10/2024 Technique: Axial CT images were obtained of the abdomen and pelvis without the administration of IV contrast. Or oral contrast was administered. Reconstructed coronal and sagittal images were also obtained. Automated exposure control and iterative construction methods were used. Findings: There is a persistent small right pleural effusion that is improved with some adjacent atelectasis in the right lung base. Gallbladder is surgically absent. No definite biliary obstruction. There is some atrophy of the pancreas. There are numerous small bilateral nonobstructing kidney stones. There is a left renal cortical cyst. No ureteral stones or hydronephrosis on either side. The unenhanced solid abdominal organs are otherwise grossly normal. Urinary bladder and solid pelvic organs are grossly normal. There is a small amount of free fluid, particularly adjacent to the liver. Extensive postoperative changes are noted in the ventral abdominal wall. There appear to be embolization coils in the right upper quadrant that are unchanged. A large amount of stool and gas noted throughout the colon, most severe involving the rectum. Findings are compatible with constipation. No definite evidence of acute colitis. Postoperative changes are noted involving the bowel. Correlate with medical history. Oral contrast has reached the right colon. No small bowel obstruction is seen. No definite acute findings involving the stomach. 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 Bubbles of gas  are noted within the ventral abdominal wall at this level. There is also probably a minimal amount of some oral contrast noted just superficial to the abdominal wall fascia. This is also present to some degree previously. There also appears to be a fistula with bowel in the right mid abdomen, not significantly changed. No abscess is identified.     1.Extensive postoperative changes involving the bowel and ventral abdominal wall. Correlate with medical history. 2.Large amount of stool and gas throughout the colon, most severe involving the rectum. Findings are compatible with constipation. No definite evidence of acute colitis. 3.Small amount of free fluid adjacent to the liver. 4.Small right pleural effusion, improved. 5.Multiple small bilateral nonobstructing kidney stones. No ureteral stones or hydronephrosis. 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 There also appears to be a fistula with bowel in the right mid abdomen, not significantly changed. No abscess identified. 7.Additional findings as given above. Electronically Signed: Skip Alfaro MD  1/18/2025 3:58 AM EST  Workstation ID: [id number]       Differential Diagnosis and Discussion:    Abdominal Pain: Based on the patient's signs and symptoms, I considered abdominal aortic aneurysm, small bowel obstruction, pancreatitis, acute cholecystitis, acute appendecitis, peptic ulcer disease, gastritis, colitis, endocrine disorders, irritable bowel syndrome and other differential diagnosis an etiology of the patient's abdominal pain.    PROCEDURES:    Labs were collected in the emergency department and all labs were reviewed and interpreted by me.  X-ray were performed in the emergency department and all X-ray impressions were independently interpreted by me.    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       Amount and/or Complexity of Data Reviewed  Clinical lab tests: reviewed  Tests in the radiology section of CPT®: reviewed                      Patient Care Considerations:    SEPSIS was considered but is NOT present in the emergency department as SIRS criteria is not present.      Consultants/Shared Management Plan:    None    Social Determinants of Health:    Patient is independent, reliable, and has access to care.       Disposition and Care Coordination:    Discharged: I considered escalation of care by admitting this patient to the hospital, however there is no sign of emergent condition requiring hospitalization.    I have explained discharge medications and the need for follow up with the patient/caretakers. This was also printed in the discharge instructions. Patient was discharged with the following medications and follow up:      Medication List        New Prescriptions      cephalexin 500 MG capsule  Commonly known as: KEFLEX  Take 1 capsule by mouth 4 (Four) Times a Day.               Where to Get Your Medications        These medications were sent to [facility] #[id number] - [place], [place] - [address] - [phone] [facility] [phone] FX  [address] [place] [address]      Hours: 24-hours Phone: [phone]   cephalexin 500 MG capsule      Your primary care provider             Final diagnoses:   Enterocutaneous fistula   Acute UTI        ED Disposition       ED Disposition   Discharge    Condition   Stable    Comment   --               This medical record created using voice recognition software.             Fer Salas,   01/18/25 0409       Fer Salas,   01/18/25 0401

## 2025-01-18 NOTE — DISCHARGE INSTRUCTIONS
Continue previous wound care.  Drink plenty of fluids.  Take antibiotic as directed.  Return for worsening symptoms.  Follow-up your doctor next week

## 2025-01-18 NOTE — ED TRIAGE NOTES
Pt presents to ED via medic from [facility] c/o generalized pain and staff concern for elevated WBC. Per pt, pt had abdominal surgery last year and is having left upper quad abd pain. Drainage noted on site. Pt reports open wound from site is open. Pt endorses chronic diarrhea. Denies cough, fever, chills, n/v, CP and SOB. Respirs even and unlabored. Skin warm and dry. A&Ox4. Pt is on phone while this RN is triaging pt.

## 2025-02-01 ENCOUNTER — APPOINTMENT (OUTPATIENT)
Dept: CT IMAGING | Facility: HOSPITAL | Age: 45
End: 2025-02-01
Payer: MEDICARE

## 2025-02-01 ENCOUNTER — HOSPITAL ENCOUNTER (EMERGENCY)
Facility: HOSPITAL | Age: 45
Discharge: OTHER INSTITUTION | End: 2025-02-02
Attending: EMERGENCY MEDICINE
Payer: MEDICARE

## 2025-02-01 DIAGNOSIS — A41.9 SEPSIS, DUE TO UNSPECIFIED ORGANISM, UNSPECIFIED WHETHER ACUTE ORGAN DYSFUNCTION PRESENT: Primary | ICD-10-CM

## 2025-02-01 DIAGNOSIS — L03.316 CELLULITIS OF UMBILICUS: ICD-10-CM

## 2025-02-01 LAB
ALBUMIN SERPL-MCNC: 2 G/DL (ref 3.5–5.2)
ALBUMIN/GLOB SERPL: 0.4 G/DL
ALP SERPL-CCNC: 222 U/L (ref 39–117)
ALT SERPL W P-5'-P-CCNC: 6 U/L (ref 1–33)
ANION GAP SERPL CALCULATED.3IONS-SCNC: 11 MMOL/L (ref 5–15)
AST SERPL-CCNC: 15 U/L (ref 1–32)
BASOPHILS # BLD AUTO: 0.08 10*3/MM3 (ref 0–0.2)
BASOPHILS NFR BLD AUTO: 0.5 % (ref 0–1.5)
BILIRUB SERPL-MCNC: 0.4 MG/DL (ref 0–1.2)
BUN SERPL-MCNC: 16 MG/DL (ref 6–20)
BUN/CREAT SERPL: 15.7 (ref 7–25)
CALCIUM SPEC-SCNC: 9.3 MG/DL (ref 8.6–10.5)
CHLORIDE SERPL-SCNC: 104 MMOL/L (ref 98–107)
CO2 SERPL-SCNC: 19 MMOL/L (ref 22–29)
CREAT SERPL-MCNC: 1.02 MG/DL (ref 0.57–1)
D-LACTATE SERPL-SCNC: 1.7 MMOL/L (ref 0.5–2)
D-LACTATE SERPL-SCNC: 2.3 MMOL/L (ref 0.5–2)
DEPRECATED RDW RBC AUTO: 48.3 FL (ref 37–54)
EGFRCR SERPLBLD CKD-EPI 2021: 68.9 ML/MIN/1.73
EOSINOPHIL # BLD AUTO: 0.03 10*3/MM3 (ref 0–0.4)
EOSINOPHIL NFR BLD AUTO: 0.2 % (ref 0.3–6.2)
ERYTHROCYTE [DISTWIDTH] IN BLOOD BY AUTOMATED COUNT: 14.5 % (ref 12.3–15.4)
GLOBULIN UR ELPH-MCNC: 4.5 GM/DL
GLUCOSE SERPL-MCNC: 101 MG/DL (ref 65–99)
HCT VFR BLD AUTO: 33.9 % (ref 34–46.6)
HGB BLD-MCNC: 10.5 G/DL (ref 12–15.9)
HOLD SPECIMEN: NORMAL
IMM GRANULOCYTES # BLD AUTO: 0.06 10*3/MM3 (ref 0–0.05)
IMM GRANULOCYTES NFR BLD AUTO: 0.3 % (ref 0–0.5)
LIPASE SERPL-CCNC: 8 U/L (ref 13–60)
LYMPHOCYTES # BLD AUTO: 1.28 10*3/MM3 (ref 0.7–3.1)
LYMPHOCYTES NFR BLD AUTO: 7.4 % (ref 19.6–45.3)
MCH RBC QN AUTO: 28.2 PG (ref 26.6–33)
MCHC RBC AUTO-ENTMCNC: 31 G/DL (ref 31.5–35.7)
MCV RBC AUTO: 90.9 FL (ref 79–97)
MONOCYTES # BLD AUTO: 1.42 10*3/MM3 (ref 0.1–0.9)
MONOCYTES NFR BLD AUTO: 8.2 % (ref 5–12)
NEUTROPHILS NFR BLD AUTO: 14.5 10*3/MM3 (ref 1.7–7)
NEUTROPHILS NFR BLD AUTO: 83.4 % (ref 42.7–76)
NRBC BLD AUTO-RTO: 0 /100 WBC (ref 0–0.2)
PLATELET # BLD AUTO: 265 10*3/MM3 (ref 140–450)
PMV BLD AUTO: 11.3 FL (ref 6–12)
POTASSIUM SERPL-SCNC: 3.9 MMOL/L (ref 3.5–5.2)
PROT SERPL-MCNC: 6.5 G/DL (ref 6–8.5)
RBC # BLD AUTO: 3.73 10*6/MM3 (ref 3.77–5.28)
SODIUM SERPL-SCNC: 134 MMOL/L (ref 136–145)
WBC NRBC COR # BLD AUTO: 17.37 10*3/MM3 (ref 3.4–10.8)
WHOLE BLOOD HOLD COAG: NORMAL
WHOLE BLOOD HOLD SPECIMEN: NORMAL

## 2025-02-01 PROCEDURE — 25510000001 IOPAMIDOL PER 1 ML: Performed by: EMERGENCY MEDICINE

## 2025-02-01 PROCEDURE — 25010000002 VANCOMYCIN 1 G RECONSTITUTED SOLUTION 1 EACH VIAL

## 2025-02-01 PROCEDURE — 25810000003 SODIUM CHLORIDE 0.9 % SOLUTION 250 ML FLEX CONT

## 2025-02-01 PROCEDURE — 83690 ASSAY OF LIPASE: CPT

## 2025-02-01 PROCEDURE — 87040 BLOOD CULTURE FOR BACTERIA: CPT

## 2025-02-01 PROCEDURE — 99291 CRITICAL CARE FIRST HOUR: CPT

## 2025-02-01 PROCEDURE — 96375 TX/PRO/DX INJ NEW DRUG ADDON: CPT

## 2025-02-01 PROCEDURE — 83605 ASSAY OF LACTIC ACID: CPT

## 2025-02-01 PROCEDURE — 74177 CT ABD & PELVIS W/CONTRAST: CPT

## 2025-02-01 PROCEDURE — 25810000003 SODIUM CHLORIDE 0.9 % SOLUTION

## 2025-02-01 PROCEDURE — 96365 THER/PROPH/DIAG IV INF INIT: CPT

## 2025-02-01 PROCEDURE — 25010000002 DIPHENHYDRAMINE PER 50 MG

## 2025-02-01 PROCEDURE — 96361 HYDRATE IV INFUSION ADD-ON: CPT

## 2025-02-01 PROCEDURE — 85025 COMPLETE CBC W/AUTO DIFF WBC: CPT

## 2025-02-01 PROCEDURE — 36415 COLL VENOUS BLD VENIPUNCTURE: CPT

## 2025-02-01 PROCEDURE — 80053 COMPREHEN METABOLIC PANEL: CPT

## 2025-02-01 PROCEDURE — 96367 TX/PROPH/DG ADDL SEQ IV INF: CPT

## 2025-02-01 PROCEDURE — 25010000002 CEFEPIME PER 500 MG

## 2025-02-01 RX ORDER — SODIUM CHLORIDE 0.9 % (FLUSH) 0.9 %
10 SYRINGE (ML) INJECTION AS NEEDED
Status: DISCONTINUED | OUTPATIENT
Start: 2025-02-01 | End: 2025-02-02 | Stop reason: HOSPADM

## 2025-02-01 RX ORDER — METRONIDAZOLE 500 MG/1
500 TABLET ORAL ONCE
Status: COMPLETED | OUTPATIENT
Start: 2025-02-01 | End: 2025-02-01

## 2025-02-01 RX ORDER — IOPAMIDOL 755 MG/ML
100 INJECTION, SOLUTION INTRAVASCULAR
Status: COMPLETED | OUTPATIENT
Start: 2025-02-01 | End: 2025-02-01

## 2025-02-01 RX ORDER — DIPHENHYDRAMINE HYDROCHLORIDE 50 MG/ML
50 INJECTION INTRAMUSCULAR; INTRAVENOUS ONCE
Status: COMPLETED | OUTPATIENT
Start: 2025-02-01 | End: 2025-02-01

## 2025-02-01 RX ADMIN — DIPHENHYDRAMINE HYDROCHLORIDE 50 MG: 50 INJECTION, SOLUTION INTRAMUSCULAR; INTRAVENOUS at 21:04

## 2025-02-01 RX ADMIN — METRONIDAZOLE 500 MG: 500 TABLET ORAL at 22:18

## 2025-02-01 RX ADMIN — CEFEPIME 2000 MG: 2 INJECTION, POWDER, FOR SOLUTION INTRAVENOUS at 22:15

## 2025-02-01 RX ADMIN — VANCOMYCIN HYDROCHLORIDE 1000 MG: 1 INJECTION, POWDER, LYOPHILIZED, FOR SOLUTION INTRAVENOUS at 23:20

## 2025-02-01 RX ADMIN — IOPAMIDOL 100 ML: 755 INJECTION, SOLUTION INTRAVENOUS at 22:03

## 2025-02-01 RX ADMIN — SODIUM CHLORIDE 1602 ML: 9 INJECTION, SOLUTION INTRAVENOUS at 21:04

## 2025-02-02 VITALS
WEIGHT: 117.73 LBS | TEMPERATURE: 99.7 F | HEIGHT: 61 IN | DIASTOLIC BLOOD PRESSURE: 46 MMHG | HEART RATE: 86 BPM | BODY MASS INDEX: 22.23 KG/M2 | OXYGEN SATURATION: 99 % | SYSTOLIC BLOOD PRESSURE: 78 MMHG | RESPIRATION RATE: 17 BRPM

## 2025-02-02 NOTE — ED TRIAGE NOTES
BIBA from [facility] by [facility] for abdominal wounds that has opened and draining green drainage and smells. Pain 5/10.

## 2025-02-02 NOTE — ED PROVIDER NOTES
"Patient is 44 y.o. year old female that presents to the ED for evaluation of wound infection.     Physical Exam    ED Course:    BP (!) 75/50   Pulse 88   Temp 99.7 °F (37.6 °C) (Oral)   Resp 17   Ht 154.9 cm (61\")   Wt 53.4 kg (117 lb 11.6 oz)   SpO2 100%   BMI 22.24 kg/m²   Results for orders placed or performed during the hospital encounter of 02/01/25   Comprehensive Metabolic Panel    Collection Time: 02/01/25  8:16 PM    Specimen: Blood   Result Value Ref Range    Glucose 101 (H) 65 - 99 mg/dL    BUN 16 6 - 20 mg/dL    Creatinine 1.02 (H) 0.57 - 1.00 mg/dL    Sodium 134 (L) 136 - 145 mmol/L    Potassium 3.9 3.5 - 5.2 mmol/L    Chloride 104 98 - 107 mmol/L    CO2 19.0 (L) 22.0 - 29.0 mmol/L    Calcium 9.3 8.6 - 10.5 mg/dL    Total Protein 6.5 6.0 - 8.5 g/dL    Albumin 2.0 (L) 3.5 - 5.2 g/dL    ALT (SGPT) 6 1 - 33 U/L    AST (SGOT) 15 1 - 32 U/L    Alkaline Phosphatase 222 (H) 39 - 117 U/L    Total Bilirubin 0.4 0.0 - 1.2 mg/dL    Globulin 4.5 gm/dL    A/G Ratio 0.4 g/dL    BUN/Creatinine Ratio 15.7 7.0 - 25.0    Anion Gap 11.0 5.0 - 15.0 mmol/L    eGFR 68.9 >60.0 mL/min/1.73   Lipase    Collection Time: 02/01/25  8:16 PM    Specimen: Blood   Result Value Ref Range    Lipase 8 (L) 13 - 60 U/L   Lactic Acid, Plasma    Collection Time: 02/01/25  8:16 PM    Specimen: Blood   Result Value Ref Range    Lactate 2.3 (C) 0.5 - 2.0 mmol/L   CBC Auto Differential    Collection Time: 02/01/25  8:16 PM    Specimen: Blood   Result Value Ref Range    WBC 17.37 (H) 3.40 - 10.80 10*3/mm3    RBC 3.73 (L) 3.77 - 5.28 10*6/mm3    Hemoglobin 10.5 (L) 12.0 - 15.9 g/dL    Hematocrit 33.9 (L) 34.0 - 46.6 %    MCV 90.9 79.0 - 97.0 fL    MCH 28.2 26.6 - 33.0 pg    MCHC 31.0 (L) 31.5 - 35.7 g/dL    RDW 14.5 12.3 - 15.4 %    RDW-SD 48.3 37.0 - 54.0 fl    MPV 11.3 6.0 - 12.0 fL    Platelets 265 140 - 450 10*3/mm3    Neutrophil % 83.4 (H) 42.7 - 76.0 %    Lymphocyte % 7.4 (L) 19.6 - 45.3 %    Monocyte % 8.2 5.0 - 12.0 %    Eosinophil " % 0.2 (L) 0.3 - 6.2 %    Basophil % 0.5 0.0 - 1.5 %    Immature Grans % 0.3 0.0 - 0.5 %    Neutrophils, Absolute 14.50 (H) 1.70 - 7.00 10*3/mm3    Lymphocytes, Absolute 1.28 0.70 - 3.10 10*3/mm3    Monocytes, Absolute 1.42 (H) 0.10 - 0.90 10*3/mm3    Eosinophils, Absolute 0.03 0.00 - 0.40 10*3/mm3    Basophils, Absolute 0.08 0.00 - 0.20 10*3/mm3    Immature Grans, Absolute 0.06 (H) 0.00 - 0.05 10*3/mm3    nRBC 0.0 0.0 - 0.2 /100 WBC   Green Top (Gel)    Collection Time: 02/01/25  8:16 PM   Result Value Ref Range    Extra Tube Hold for add-ons.    Lavender Top    Collection Time: 02/01/25  8:16 PM   Result Value Ref Range    Extra Tube hold for add-on    Gold Top - SST    Collection Time: 02/01/25  8:16 PM   Result Value Ref Range    Extra Tube Hold for add-ons.    Light Blue Top    Collection Time: 02/01/25  8:17 PM   Result Value Ref Range    Extra Tube Hold for add-ons.    Green Top (Gel)    Collection Time: 02/01/25  8:17 PM   Result Value Ref Range    Extra Tube Hold for add-ons.      Medications   sodium chloride 0.9 % flush 10 mL (has no administration in time range)   sodium chloride 0.9 % flush 10 mL (has no administration in time range)   vancomycin (VANCOCIN) 1,000 mg in sodium chloride 0.9 % 250 mL IVPB-VTB (has no administration in time range)   cefepime 2000 mg IVPB in 100 mL NS (VTB) (2,000 mg Intravenous New Bag 2/1/25 2215)   sodium chloride 0.9 % bolus 1,602 mL (1,602 mL Intravenous New Bag 2/1/25 2104)   diphenhydrAMINE (BENADRYL) injection 50 mg (50 mg Intravenous Given 2/1/25 2104)   metroNIDAZOLE (FLAGYL) tablet 500 mg (500 mg Oral Given 2/1/25 2218)   iopamidol (ISOVUE-370) 76 % injection 100 mL (100 mL Intravenous Given 2/1/25 9778)     CT Abdomen Pelvis Without Contrast    Result Date: 1/18/2025  Narrative: CT ABDOMEN PELVIS WO CONTRAST Date of Exam: 1/18/2025 3:35 AM EST Indication: Abdominal pain and drainage from prior G-tube site. Comparison: 9/10/2024 Technique: Axial CT images were  obtained of the abdomen and pelvis without the administration of IV contrast. Or oral contrast was administered. Reconstructed coronal and sagittal images were also obtained. Automated exposure control and iterative construction methods were used. Findings: There is a persistent small right pleural effusion that is improved with some adjacent atelectasis in the right lung base. Gallbladder is surgically absent. No definite biliary obstruction. There is some atrophy of the pancreas. There are numerous small bilateral nonobstructing kidney stones. There is a left renal cortical cyst. No ureteral stones or hydronephrosis on either side. The unenhanced solid abdominal organs are otherwise grossly normal. Urinary bladder and solid pelvic organs are grossly normal. There is a small amount of free fluid, particularly adjacent to the liver. Extensive postoperative changes are noted in the ventral abdominal wall. There appear to be embolization coils in the right upper quadrant that are unchanged. A large amount of stool and gas noted throughout the colon, most severe involving the rectum. Findings are compatible with constipation. No definite evidence of acute colitis. Postoperative changes are noted involving the bowel. Correlate with medical history. Oral contrast has reached the right colon. No small bowel obstruction is seen. No definite acute findings involving the stomach. 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 Bubbles of gas are noted within the ventral abdominal wall at this level. There is also probably a minimal amount of some oral contrast noted just superficial to the abdominal wall fascia. This is also present to some degree previously. There also appears to be a fistula with bowel in the right mid abdomen, not significantly changed. No abscess is identified.     Impression: 1.Extensive postoperative changes involving the bowel and ventral abdominal wall. Correlate with  medical history. 2.Large amount of stool and gas throughout the colon, most severe involving the rectum. Findings are compatible with constipation. No definite evidence of acute colitis. 3.Small amount of free fluid adjacent to the liver. 4.Small right pleural effusion, improved. 5.Multiple small bilateral nonobstructing kidney stones. No ureteral stones or hydronephrosis. 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 There also appears to be a fistula with bowel in the right mid abdomen, not significantly changed. No abscess identified. 7.Additional findings as given above. Electronically Signed: Skip Alfaro MD  1/18/2025 3:58 AM EST  Workstation ID: [id number]     MDM:      I have seen and evaluated this patient and agree with the nurse practitioner or physician assistant´s documentation and assessment. All charts, labs, and imaging studies were reviewed. Documentation of one or more elements of my assessment included in the medical record. I agree with findings, exam, and plan.    Disposition:   ED Disposition       None              Clincal Impression: Sepsis, abdominal wall cellulitis    Rufus Dunn MD  22:28 EST  02/01/25         Rufus Dunn MD  02/02/25 0710

## 2025-02-02 NOTE — ED NOTES
Spoke with Wilma Giron RN about patient needing IV U/S placement. She referred me to Pardeep GAMBINO RN for attempts first. Cornel call to Pardeep GAMBINO RN and he will assist as soon as he is available.

## 2025-02-02 NOTE — ED PROVIDER NOTES
Time: 10:36 PM EST  Date of encounter:  2/1/2025  Independent Historian/Clinical History and Information was obtained by:   Patient    History is limited by: N/A    Chief Complaint: Wound evaluation      History of Present Illness:  Patient is a 44 y.o. year old female who presents to the emergency department via EMS from [facility] for evaluation of abdominal wound. She has a hx of duodenal ulcer perforation last year and was transferred to . She was at  for several weeks, was transferred to [facility] for several weeks ago and was transferred to a SNF. Since then, she has been in and out of the hospital 2-3 other times for complications from surgery. She was seen in the ED on 1/18/25 and was discharged home with keflex. During her wound care today, they noticed foul smelling, greenish drainage coming out of her central wound that is open. She denies any fever. Denies any nausea or vomiting. Hx of Crohn's disease, hypotension.     Patient Care Team  Primary Care Provider: Provider, No Known    Past Medical History:     Allergies   Allergen Reactions    Iodinated Contrast Media Anaphylaxis    Penicillins Anaphylaxis     1. When was your reaction? > 10 years ago  2. Did your reaction happen after the first dose or after several doses? Do not know  3. Did your reaction require ED or hospital care to manage your reaction? Do not know  4. Did your reaction require treatment with epinephrine? Do not know  5. Have you taken amoxicillin (Amoxil) or amoxicillin-clavulanate (Augmentin) without issue since? Do not know  6. Have you taken cephalexin (Keflex) without issue since? Do not know     Contrast Dye (Echo Or Unknown Ct/Mr) Itching and Rash     Past Medical History:   Diagnosis Date    Anemia     Anxiety and depression     Crohn's disease      Past Surgical History:   Procedure Laterality Date    COLONOSCOPY  2017, 2020    COLONOSCOPY N/A 9/5/2022    Procedure: COLONOSCOPY WITH BIOPSIES;  Surgeon: Ruth Ann Pascal  MD Selina;  Location: [facility] ENDOSCOPY;  Service: Gastroenterology;  Laterality: N/A;  COLITIS  ILEITIS    COLONOSCOPY N/A 10/18/2023    Procedure: COLONOSCOPY WITH BIOPSIES;  Surgeon: Ruth Ann Pascal MD;  Location: [facility] ENDOSCOPY;  Service: Gastroenterology;  Laterality: N/A;  ILEITIS, RECTAL LESION    ENDOSCOPY N/A 9/5/2022    Procedure: ESOPHAGOGASTRODUODENOSCOPY WITH BIOPSIES;  Surgeon: Ruth Ann Pascal MD;  Location: [facility] ENDOSCOPY;  Service: Gastroenterology;  Laterality: N/A;  DUODENAL ULCER  HIATAL HERNIA  ESOPHAGITIS    EXPLORATORY LAPAROTOMY N/A 2/29/2024    Procedure: LAPAROTOMY EXPLORATORY W/ CONTROLLED DRAINAGE OF PERFORATED ULCER;  Surgeon: Chace Taylor MD;  Location: [facility] MAIN OR;  Service: General;  Laterality: N/A;    FECAL IMPACTION REMOVAL      Small    INCISIONAL HERNIA REPAIR      With mesh     Family History   Problem Relation Age of Onset    Esophageal cancer Father        Home Medications:  Prior to Admission medications    Medication Sig Start Date End Date Taking? Authorizing Provider   calcium carbonate (Antacid Calcium) 500 MG chewable tablet Chew 1 tablet Daily.    Provider, MD Juan   cephalexin (KEFLEX) 500 MG capsule Take 1 capsule by mouth 4 (Four) Times a Day. 1/18/25   Fer Salas DO   lactated ringers infusion Infuse 100 mL/hr into a venous catheter Continuous. 2/29/24   Roderick Marley MD   naloxone (NARCAN) 0.4 MG/ML injection Infuse 1 mL into a venous catheter Every 5 (Five) Minutes As Needed for Respiratory Depression. 2/29/24   Roderick Marley MD   naloxone (NARCAN) 0.4 MG/ML injection Infuse 1 mL into a venous catheter Every 5 (Five) Minutes As Needed for Respiratory Depression. 2/29/24   Roderick Marley MD   ondansetron (ZOFRAN) 2 mg/mL injection Infuse 2 mL into a venous catheter Every 4 (Four) Hours As Needed for Nausea or Vomiting. 2/29/24   Roderick Marley MD   pantoprazole (PROTONIX) 40 MG injection Infuse 10 mL into a venous catheter 2 (Two)  "Times a Day. Indications: Gastroesophageal Reflux Disease 2/29/24   Roderick Marley MD   phenylephrine (MITCHELL-SYNEPHRINE) 200 MCG/ML solution Infuse 27..5 mcg/min into a venous catheter Dose Adjusted By Provider As Needed. 2/29/24   Roderick Marley MD   propofol (DIPRIVAN) 10 mg/mL emulsion infusion Infuse 272.5-2,725 mcg/min into a venous catheter Dose Adjusted By Provider As Needed. 2/29/24   Roderick Marley MD        Social History:   Social History     Tobacco Use    Smoking status: Never    Smokeless tobacco: Never   Vaping Use    Vaping status: Never Used   Substance Use Topics    Alcohol use: Never    Drug use: Never         Review of Systems:  Review of Systems   Constitutional:  Negative for chills and fever.   HENT:  Negative for ear pain.    Eyes:  Negative for pain.   Respiratory:  Negative for cough and shortness of breath.    Cardiovascular:  Negative for chest pain.   Gastrointestinal:  Positive for abdominal pain. Negative for diarrhea, nausea and vomiting.   Genitourinary:  Negative for dysuria.   Musculoskeletal:  Negative for arthralgias.   Skin:  Positive for wound. Negative for rash.   Neurological:  Negative for headaches.        Physical Exam:  BP (!) 75/50   Pulse 88   Temp 99.7 °F (37.6 °C) (Oral)   Resp 17   Ht 154.9 cm (61\")   Wt 53.4 kg (117 lb 11.6 oz)   SpO2 100%   BMI 22.24 kg/m²     Physical Exam  Vitals and nursing note reviewed.   Constitutional:       Appearance: Normal appearance.   HENT:      Head: Normocephalic and atraumatic.      Nose: Nose normal.   Eyes:      Extraocular Movements: Extraocular movements intact.      Conjunctiva/sclera: Conjunctivae normal.      Pupils: Pupils are equal, round, and reactive to light.   Cardiovascular:      Rate and Rhythm: Normal rate and regular rhythm.      Pulses: Normal pulses.      Heart sounds: Normal heart sounds.   Pulmonary:      Effort: Pulmonary effort is normal.      Breath sounds: Normal breath sounds.   Abdominal:      " General: Abdomen is flat.      Palpations: Abdomen is soft.      Tenderness: There is abdominal tenderness.       Musculoskeletal:         General: Normal range of motion.      Cervical back: Normal range of motion and neck supple.   Skin:     General: Skin is warm and dry.   Neurological:      General: No focal deficit present.      Mental Status: She is alert and oriented to person, place, and time.   Psychiatric:         Mood and Affect: Mood normal.         Behavior: Behavior normal.                    Medical Decision Making:      Comorbidities that affect care:    Crohn's disease    External Notes reviewed:    Previous ED Note: Reviewed ED note on 1/18/25  Reviewed discharged summary on 9/14/24    The following orders were placed and all results were independently analyzed by me:  Orders Placed This Encounter   Procedures    Blood Culture - Blood,    Blood Culture - Blood,    CT Abdomen Pelvis With Contrast    [place] Draw    Comprehensive Metabolic Panel    Lipase    Urinalysis With Microscopic If Indicated (No Culture) - Urine, Clean Catch    Lactic Acid, Plasma    CBC Auto Differential    STAT Lactic Acid, Reflex    Urinalysis With Culture If Indicated -    Undress & Gown    Continuous Pulse Oximetry    Vital Signs    Straight cath    Inpatient General Surgery Consult    Oxygen Therapy- Nasal Cannula; Titrate 1-6 LPM Per SpO2; 90 - 95%    Insert peripheral IV    Insert Peripheral IV    Insert Peripheral IV    CBC & Differential    Green Top (Gel)    Lavender Top    Gold Top - SST    Light Blue Top    Extra Tubes    Green Top (Gel)       Medications Given in the Emergency Department:  Medications   sodium chloride 0.9 % flush 10 mL (has no administration in time range)   sodium chloride 0.9 % flush 10 mL (has no administration in time range)   vancomycin (VANCOCIN) 1,000 mg in sodium chloride 0.9 % 250 mL IVPB-VTB (1,000 mg Intravenous New Bag 2/1/25 2070)   sodium chloride 0.9 % bolus 1,602 mL (0 mL  Intravenous Stopped 2/1/25 2318)   diphenhydrAMINE (BENADRYL) injection 50 mg (50 mg Intravenous Given 2/1/25 2104)   cefepime 2000 mg IVPB in 100 mL NS (VTB) (0 mg Intravenous Stopped 2/1/25 2318)   metroNIDAZOLE (FLAGYL) tablet 500 mg (500 mg Oral Given 2/1/25 2218)   iopamidol (ISOVUE-370) 76 % injection 100 mL (100 mL Intravenous Given 2/1/25 2203)        ED Course:    ED Course as of 02/01/25 2322   Sat Feb 01, 2025 2037 States that she tolerated IV contrast in the past as long as she gets benadryl before. [MV]      ED Course User Index  [MV] Ryan Hamm PA       Labs:    Lab Results (last 24 hours)       Procedure Component Value Units Date/Time    CBC & Differential [[id number]]  (Abnormal) Collected: 02/01/25 2016    Specimen: Blood Updated: 02/01/25 2025    Narrative:      The following orders were created for panel order CBC & Differential.  Procedure                               Abnormality         Status                     ---------                               -----------         ------                     CBC Auto Differential[[id number]]        Abnormal            Final result                 Please view results for these tests on the individual orders.    Comprehensive Metabolic Panel [[id number]]  (Abnormal) Collected: 02/01/25 2016    Specimen: Blood Updated: 02/01/25 2044     Glucose 101 mg/dL      BUN 16 mg/dL      Creatinine 1.02 mg/dL      Sodium 134 mmol/L      Potassium 3.9 mmol/L      Comment: Slight hemolysis detected by analyzer. Result may be falsely elevated.        Chloride 104 mmol/L      CO2 19.0 mmol/L      Calcium 9.3 mg/dL      Total Protein 6.5 g/dL      Albumin 2.0 g/dL      ALT (SGPT) 6 U/L      AST (SGOT) 15 U/L      Comment: Slight hemolysis detected by analyzer. Result may be falsely elevated.        Alkaline Phosphatase 222 U/L      Total Bilirubin 0.4 mg/dL      Globulin 4.5 gm/dL      A/G Ratio 0.4 g/dL      BUN/Creatinine Ratio 15.7     Anion Gap 11.0 mmol/L      eGFR  68.9 mL/min/1.73     Narrative:      GFR Categories in Chronic Kidney Disease (CKD)      GFR Category          GFR (mL/min/1.73)    Interpretation  G1                     90 or greater         Normal or high (1)  G2                      60-89                Mild decrease (1)  G3a                   45-59                Mild to moderate decrease  G3b                   30-44                Moderate to severe decrease  G4                    15-29                Severe decrease  G5                    14 or less           Kidney failure          (1)In the absence of evidence of kidney disease, neither GFR category G1 or G2 fulfill the criteria for CKD.    eGFR calculation 2021 CKD-EPI creatinine equation, which does not include race as a factor    Lipase [[id number]]  (Abnormal) Collected: 02/01/25 2016    Specimen: Blood Updated: 02/01/25 2042     Lipase 8 U/L     Lactic Acid, Plasma [[id number]]  (Abnormal) Collected: 02/01/25 2016    Specimen: Blood Updated: 02/01/25 2057     Lactate 2.3 mmol/L     CBC Auto Differential [[id number]]  (Abnormal) Collected: 02/01/25 2016    Specimen: Blood Updated: 02/01/25 2025     WBC 17.37 10*3/mm3      RBC 3.73 10*6/mm3      Hemoglobin 10.5 g/dL      Hematocrit 33.9 %      MCV 90.9 fL      MCH 28.2 pg      MCHC 31.0 g/dL      RDW 14.5 %      RDW-SD 48.3 fl      MPV 11.3 fL      Platelets 265 10*3/mm3      Neutrophil % 83.4 %      Lymphocyte % 7.4 %      Monocyte % 8.2 %      Eosinophil % 0.2 %      Basophil % 0.5 %      Immature Grans % 0.3 %      Neutrophils, Absolute 14.50 10*3/mm3      Lymphocytes, Absolute 1.28 10*3/mm3      Monocytes, Absolute 1.42 10*3/mm3      Eosinophils, Absolute 0.03 10*3/mm3      Basophils, Absolute 0.08 10*3/mm3      Immature Grans, Absolute 0.06 10*3/mm3      nRBC 0.0 /100 WBC     Blood Culture - Blood, Arm, Right [[id number]] Collected: 02/01/25 2042    Specimen: Blood from Arm, Right Updated: 02/01/25 2046    Blood Culture - Blood, Arm, Left [[id number]]  Collected: 02/01/25 2042    Specimen: Blood from Arm, Left Updated: 02/01/25 2046             Imaging:    CT Abdomen Pelvis With Contrast    Result Date: 2/1/2025  CT ABDOMEN PELVIS W CONTRAST Date of Exam: 2/1/2025 10:02 PM EST Indication: abd pain, drainage. Comparison: 1/18/2025 Technique: Axial CT images were obtained of the abdomen and pelvis after the uneventful intravenous administration of iodinated contrast. Reconstructed coronal and sagittal images were also obtained. Automated exposure control and iterative construction methods were used. Findings: There is some scarring/atelectasis in the lung bases. There is a small right pleural effusion that is not significantly changed. Abdominal aorta is normal in caliber. Gallbladder is surgically absent. No biliary obstruction. Again seen are bilateral nonobstructing kidney stones. There are bilateral renal cysts, largest on the left. No follow-up is indicated. No ureteral stones or hydronephrosis. There is some subtle scalloping of the hepatic contour that could indicate cirrhosis. No liver mass. There is some atrophy of the pancreas. Solid abdominal organs are otherwise normal. Embolization coils noted near the pancreatic head. There is a small amount of free fluid in the abdomen and pelvis, particularly around the liver. Urinary bladder and solid pelvic organs are grossly normal. Stomach appears grossly normal. No small bowel obstruction is seen. There is some formed stool in the descending colon and rectosigmoid colon. The colon is diffusely distended with gas. No evidence of acute colitis. Postoperative changes are noted involving the GI tract. 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 There is a new pocket of air in the midline ventral abdominal wall measuring approximately 1.8 x 2.5 x 6.2 cm in size with the inferior margin at about the level of the umbilicus. This may be the result of recent  instrumentation. No definite fistula with the bowel is seen at this level. Correlate with recent history. There may be a fistula in the right mid abdominal wall as well, also seen previously. No drainable abscess.     1.There is a new pocket of air in the midline ventral abdominal wall measuring approximately 1.8 x 2.5 x 6.2 cm in size with the inferior margin at about the level of the umbilicus. This may be the result of recent instrumentation. No definite fistula with the bowel is seen at this level. Correlate with recent history. There may be a fistula in the right mid abdominal wall as well, also seen previously. No drainable abscess. 2.Stable left mid abdomen enterocutaneous fistula. 3.Stable small right pleural effusion. 4.Small amount of free fluid in the abdomen and pelvis, particularly around the liver. 5.Bilateral nonobstructing kidney stones. 6.Additional nonacute findings as above. Electronically Signed: Skip Alfaro MD  2/1/2025 10:27 PM EST  Workstation ID: [id number]       Differential Diagnosis and Discussion:    Abdominal Pain: Based on the patient's signs and symptoms, I considered abdominal aortic aneurysm, small bowel obstruction, pancreatitis, acute cholecystitis, acute appendecitis, peptic ulcer disease, gastritis, colitis, endocrine disorders, irritable bowel syndrome and other differential diagnosis an etiology of the patient's abdominal pain.  Wound Evaluation: Differential diagnosis includes but is not limited to laceration, abrasion, puncture, burn, ulcer, cellulitis, abscess, vasculitis, malignancy, and rash.    PROCEDURES:    Labs were collected in the emergency department and all labs were reviewed and interpreted by me.  CT scan was performed in the emergency department and the CT scan radiology impression was interpreted by me.    No orders to display       Procedures    MDM     Amount and/or Complexity of Data Reviewed  Clinical lab tests: reviewed  Tests in the radiology section of  CPT®: reviewed  Decide to obtain previous medical records or to obtain history from someone other than the patient: yes    Risk of Complications, Morbidity, and/or Mortality  Presenting problems: moderate  Diagnostic procedures: moderate  Management options: moderate    Patient Progress  Patient progress: stable    Patient presents to the emergency department via EMS from [facility] for evaluation of abdominal wound. She has a hx of duodenal ulcer perforation last year and was transferred to . She was at  for several weeks, was transferred to [facility] for several weeks ago and was transferred to a SNF. Since then, she has been in and out of the hospital 2-3 other times for complications from surgery. She was seen in the ED on 1/10/25 and was discharged home with keflex. During her wound care today, they noticed foul smelling, greenish drainage coming out of her central wound that is open. She denies any fever. Denies any nausea or vomiting. Hx of Crohn's disease, hypotension.      On exam  Fistula, +cellulitis, greenish drainage with foul odor    CBC shows an elevated WBC of 17.37. Of note, there is no anemia requiring a blood transfusion and the platelet count is acceptable.     The patient´s CMP that was reviewed and interpreted by me shows no abnormalities of critical concern. Of note, the patient´s sodium and potassium are acceptable. The patient´s liver enzymes are unremarkable. The patient´s renal function (creatinine) is preserved. The patient has a normal anion gap.     Lipase 8.              Sepsis criteria was met in the emergency department and the Sepsis protocol (including antibiotic administration) was initiated.      SIRS criteria considered:   1.  Temperature > 100.4 or <96.8    2.  Heart Rate > 90    3.  Respiratory Rate > 22    4.  WBC > 12K or <4K.             Severe Sepsis:     Respiratory: Mechanical Ventilation or Bipap  Hypotension: SBP > 90 or MAP < 65  Renal: Creatinine >  2  Metabolic: Lactic Acid > 2  Hematologic: Platelets < 100K or INR > 1.5  Hepatic: BILI  >  2  CNS: Sudden AMS     Septic Shock:     Severe Sepsis + Persistent hypotension or Lactic Acid > 4     Normal saline bolus, Antibiotics, and final disposition was based on these definitions.        Sepsis was recognized at 2016    Antibiotics were ordered.     30 mL/kg bolus was indicated.       The patient presents with 2 out of the 4 SIRS criteria and a suspected source for sepsis.  Patient was evaluated and placed on a cardiac monitor for fear of worsening tachycardia and life-threatening hypotension.  Patient was monitored for shock and signs of end-organ damage.  Mental status was repeatedly checked throughout the ED stay.  Medications were ordered by me which includes Vanc, Cefepime, Flagyl.  The case was discussed at length with admitting physician.    Total Critical Care time of 31 minutes. Total critical care time documented does not include time spent on separately billed procedures for services of nurses or physician assistants. I personally saw and examined the patient. I have reviewed all diagnostic interpretations and treatment plans as written. I was present for the key portions of any procedures performed and the inclusive time noted in any critical care statement. Critical care time includes patient management by me, time spent at the patients bedside,  time to review lab and imaging results, discussing patient care, documentation in the medical record, and time spent with family or caregiver.    Patient Care Considerations:    CONSULT: I considered consulting [facility], however patient has been seen at  multiple times. Will consult  instead.      Consultants/Shared Management Plan:    SHARED VISIT: I have discussed the case with my supervising physician, Dr. Dunn who statesto start sepsis protocol and consult  for transfer. The substantive portion of the medical decision was made  by the attesting physician who made or approve the management plan and will take responsibility for the patient.  Clinical findings were discussed and ultimate disposition was made in consult with supervising physician.  Transfer Provider: I have discussed the case with Dr. Maya at [facility] who agrees to accept the patient as a transfer.    Social Determinants of Health:    Patient is independent, reliable, and has access to care.       Disposition and Care Coordination:    Transferred: Through independent evaluation of the patient's history, physical, and imperical data, the patient meets criteria to be transferred to another hospital for evaluation/admission.        Final diagnoses:   Sepsis, due to unspecified organism, unspecified whether acute organ dysfunction present   Cellulitis of umbilicus        ED Disposition       ED Disposition   Transfer to Another Facility     Condition   --    Comment   --               This medical record created using voice recognition software.             Ryan Hamm PA  02/01/25 1002       Ryan Hamm PA  02/02/25 [id number]

## 2025-02-02 NOTE — ED NOTES
Multiple attempts to call report to  ED at [phone]. Line rings and no one answers. I have contacted ED [facility] re: concern and need for another contact number

## 2025-02-06 LAB
BACTERIA SPEC AEROBE CULT: NORMAL
BACTERIA SPEC AEROBE CULT: NORMAL

## 2025-02-21 ENCOUNTER — APPOINTMENT (OUTPATIENT)
Dept: GENERAL RADIOLOGY | Facility: HOSPITAL | Age: 45
DRG: 853 | End: 2025-02-21
Payer: MEDICARE

## 2025-02-21 ENCOUNTER — HOSPITAL ENCOUNTER (INPATIENT)
Facility: HOSPITAL | Age: 45
LOS: 12 days | Discharge: NURSING, REHAB OR LONG-TERM CARE | DRG: 853 | End: 2025-03-06
Attending: EMERGENCY MEDICINE | Admitting: INTERNAL MEDICINE
Payer: MEDICARE

## 2025-02-21 DIAGNOSIS — N20.0 KIDNEY STONE: ICD-10-CM

## 2025-02-21 DIAGNOSIS — N17.9 ACUTE RENAL FAILURE, UNSPECIFIED ACUTE RENAL FAILURE TYPE: Primary | ICD-10-CM

## 2025-02-21 DIAGNOSIS — R26.2 DIFFICULTY WALKING: ICD-10-CM

## 2025-02-21 DIAGNOSIS — Z78.9 DECREASED ACTIVITIES OF DAILY LIVING (ADL): ICD-10-CM

## 2025-02-21 DIAGNOSIS — E86.1 HYPOTENSION DUE TO HYPOVOLEMIA: ICD-10-CM

## 2025-02-21 LAB
ALBUMIN SERPL-MCNC: 1.6 G/DL (ref 3.5–5.2)
ALBUMIN SERPL-MCNC: 1.6 G/DL (ref 3.5–5.2)
ALBUMIN/GLOB SERPL: 0.4 G/DL
ALBUMIN/GLOB SERPL: 0.4 G/DL
ALP SERPL-CCNC: 81 U/L (ref 39–117)
ALP SERPL-CCNC: 88 U/L (ref 39–117)
ALT SERPL W P-5'-P-CCNC: 5 U/L (ref 1–33)
ALT SERPL W P-5'-P-CCNC: <5 U/L (ref 1–33)
ANION GAP SERPL CALCULATED.3IONS-SCNC: 13.5 MMOL/L (ref 5–15)
ANION GAP SERPL CALCULATED.3IONS-SCNC: 15.7 MMOL/L (ref 5–15)
AST SERPL-CCNC: 13 U/L (ref 1–32)
AST SERPL-CCNC: 9 U/L (ref 1–32)
B-HCG UR QL: NEGATIVE
BACTERIA UR QL AUTO: ABNORMAL /HPF
BASOPHILS # BLD AUTO: 0.04 10*3/MM3 (ref 0–0.2)
BASOPHILS # BLD AUTO: 0.07 10*3/MM3 (ref 0–0.2)
BASOPHILS NFR BLD AUTO: 0.4 % (ref 0–1.5)
BASOPHILS NFR BLD AUTO: 0.6 % (ref 0–1.5)
BILIRUB SERPL-MCNC: 0.8 MG/DL (ref 0–1.2)
BILIRUB SERPL-MCNC: 0.9 MG/DL (ref 0–1.2)
BILIRUB UR QL STRIP: ABNORMAL
BUN SERPL-MCNC: 53 MG/DL (ref 6–20)
BUN SERPL-MCNC: 56 MG/DL (ref 6–20)
BUN/CREAT SERPL: 9.6 (ref 7–25)
BUN/CREAT SERPL: 9.9 (ref 7–25)
CALCIUM SPEC-SCNC: 7.9 MG/DL (ref 8.6–10.5)
CALCIUM SPEC-SCNC: 8.2 MG/DL (ref 8.6–10.5)
CHLORIDE SERPL-SCNC: 106 MMOL/L (ref 98–107)
CHLORIDE SERPL-SCNC: 110 MMOL/L (ref 98–107)
CLARITY UR: ABNORMAL
CO2 SERPL-SCNC: 10.3 MMOL/L (ref 22–29)
CO2 SERPL-SCNC: 11.5 MMOL/L (ref 22–29)
COLOR UR: ABNORMAL
CREAT SERPL-MCNC: 5.5 MG/DL (ref 0.57–1)
CREAT SERPL-MCNC: 5.67 MG/DL (ref 0.57–1)
DEPRECATED RDW RBC AUTO: 56.3 FL (ref 37–54)
DEPRECATED RDW RBC AUTO: 56.3 FL (ref 37–54)
EGFRCR SERPLBLD CKD-EPI 2021: 8.8 ML/MIN/1.73
EGFRCR SERPLBLD CKD-EPI 2021: 9.1 ML/MIN/1.73
EOSINOPHIL # BLD AUTO: 0.06 10*3/MM3 (ref 0–0.4)
EOSINOPHIL # BLD AUTO: 0.08 10*3/MM3 (ref 0–0.4)
EOSINOPHIL NFR BLD AUTO: 0.6 % (ref 0.3–6.2)
EOSINOPHIL NFR BLD AUTO: 0.7 % (ref 0.3–6.2)
ERYTHROCYTE [DISTWIDTH] IN BLOOD BY AUTOMATED COUNT: 16.7 % (ref 12.3–15.4)
ERYTHROCYTE [DISTWIDTH] IN BLOOD BY AUTOMATED COUNT: 16.8 % (ref 12.3–15.4)
FLUAV SUBTYP SPEC NAA+PROBE: NOT DETECTED
FLUBV RNA ISLT QL NAA+PROBE: NOT DETECTED
GLOBULIN UR ELPH-MCNC: 3.8 GM/DL
GLOBULIN UR ELPH-MCNC: 4.5 GM/DL
GLUCOSE SERPL-MCNC: 79 MG/DL (ref 65–99)
GLUCOSE SERPL-MCNC: 88 MG/DL (ref 65–99)
GLUCOSE UR STRIP-MCNC: NEGATIVE MG/DL
HCG INTACT+B SERPL-ACNC: 2.44 MIU/ML
HCT VFR BLD AUTO: 26.8 % (ref 34–46.6)
HCT VFR BLD AUTO: 29.9 % (ref 34–46.6)
HGB BLD-MCNC: 8.2 G/DL (ref 12–15.9)
HGB BLD-MCNC: 9 G/DL (ref 12–15.9)
HGB UR QL STRIP.AUTO: ABNORMAL
HOLD SPECIMEN: NORMAL
HOLD SPECIMEN: NORMAL
HYALINE CASTS UR QL AUTO: ABNORMAL /LPF
IMM GRANULOCYTES # BLD AUTO: 0.06 10*3/MM3 (ref 0–0.05)
IMM GRANULOCYTES # BLD AUTO: 0.08 10*3/MM3 (ref 0–0.05)
IMM GRANULOCYTES NFR BLD AUTO: 0.5 % (ref 0–0.5)
IMM GRANULOCYTES NFR BLD AUTO: 0.9 % (ref 0–0.5)
IRON 24H UR-MRATE: 25 MCG/DL (ref 37–145)
IRON SATN MFR SERPL: 32 % (ref 20–50)
KETONES UR QL STRIP: NEGATIVE
LEUKOCYTE ESTERASE UR QL STRIP.AUTO: ABNORMAL
LIPASE SERPL-CCNC: 10 U/L (ref 13–60)
LYMPHOCYTES # BLD AUTO: 0.36 10*3/MM3 (ref 0.7–3.1)
LYMPHOCYTES # BLD AUTO: 0.43 10*3/MM3 (ref 0.7–3.1)
LYMPHOCYTES NFR BLD AUTO: 3.6 % (ref 19.6–45.3)
LYMPHOCYTES NFR BLD AUTO: 3.9 % (ref 19.6–45.3)
MAGNESIUM SERPL-MCNC: 1.2 MG/DL (ref 1.6–2.6)
MCH RBC QN AUTO: 28 PG (ref 26.6–33)
MCH RBC QN AUTO: 28.5 PG (ref 26.6–33)
MCHC RBC AUTO-ENTMCNC: 30.1 G/DL (ref 31.5–35.7)
MCHC RBC AUTO-ENTMCNC: 30.6 G/DL (ref 31.5–35.7)
MCV RBC AUTO: 92.9 FL (ref 79–97)
MCV RBC AUTO: 93.1 FL (ref 79–97)
MONOCYTES # BLD AUTO: 0.42 10*3/MM3 (ref 0.1–0.9)
MONOCYTES # BLD AUTO: 0.49 10*3/MM3 (ref 0.1–0.9)
MONOCYTES NFR BLD AUTO: 4.1 % (ref 5–12)
MONOCYTES NFR BLD AUTO: 4.5 % (ref 5–12)
NEUTROPHILS NFR BLD AUTO: 10.85 10*3/MM3 (ref 1.7–7)
NEUTROPHILS NFR BLD AUTO: 8.31 10*3/MM3 (ref 1.7–7)
NEUTROPHILS NFR BLD AUTO: 89.7 % (ref 42.7–76)
NEUTROPHILS NFR BLD AUTO: 90.5 % (ref 42.7–76)
NITRITE UR QL STRIP: NEGATIVE
NRBC BLD AUTO-RTO: 0 /100 WBC (ref 0–0.2)
NRBC BLD AUTO-RTO: 0 /100 WBC (ref 0–0.2)
NT-PROBNP SERPL-MCNC: ABNORMAL PG/ML (ref 0–450)
PH UR STRIP.AUTO: 5.5 [PH] (ref 5–8)
PLATELET # BLD AUTO: 71 10*3/MM3 (ref 140–450)
PLATELET # BLD AUTO: 86 10*3/MM3 (ref 140–450)
PMV BLD AUTO: 12.9 FL (ref 6–12)
PMV BLD AUTO: 13 FL (ref 6–12)
POTASSIUM SERPL-SCNC: 3 MMOL/L (ref 3.5–5.2)
POTASSIUM SERPL-SCNC: 3.3 MMOL/L (ref 3.5–5.2)
PROT SERPL-MCNC: 5.4 G/DL (ref 6–8.5)
PROT SERPL-MCNC: 6.1 G/DL (ref 6–8.5)
PROT UR QL STRIP: ABNORMAL
RBC # BLD AUTO: 2.88 10*6/MM3 (ref 3.77–5.28)
RBC # BLD AUTO: 3.22 10*6/MM3 (ref 3.77–5.28)
RBC # UR STRIP: ABNORMAL /HPF
RBC MORPH BLD: NORMAL
RBC MORPH BLD: NORMAL
REF LAB TEST METHOD: ABNORMAL
RETICS # AUTO: 0.05 10*6/MM3 (ref 0.02–0.13)
RETICS/RBC NFR AUTO: 1.63 % (ref 0.7–1.9)
RSV RNA NPH QL NAA+NON-PROBE: NOT DETECTED
SARS-COV-2 RNA RESP QL NAA+PROBE: NOT DETECTED
SMALL PLATELETS BLD QL SMEAR: NORMAL
SMALL PLATELETS BLD QL SMEAR: NORMAL
SODIUM SERPL-SCNC: 132 MMOL/L (ref 136–145)
SODIUM SERPL-SCNC: 135 MMOL/L (ref 136–145)
SP GR UR STRIP: 1.02 (ref 1–1.03)
SQUAMOUS #/AREA URNS HPF: ABNORMAL /HPF
TIBC SERPL-MCNC: 79 MCG/DL (ref 298–536)
TRANSFERRIN SERPL-MCNC: 53 MG/DL (ref 200–360)
UROBILINOGEN UR QL STRIP: ABNORMAL
WBC # UR STRIP: ABNORMAL /HPF
WBC MORPH BLD: NORMAL
WBC MORPH BLD: NORMAL
WBC NRBC COR # BLD AUTO: 11.98 10*3/MM3 (ref 3.4–10.8)
WBC NRBC COR # BLD AUTO: 9.27 10*3/MM3 (ref 3.4–10.8)
WHOLE BLOOD HOLD COAG: NORMAL
WHOLE BLOOD HOLD SPECIMEN: NORMAL

## 2025-02-21 PROCEDURE — 51798 US URINE CAPACITY MEASURE: CPT

## 2025-02-21 PROCEDURE — 99291 CRITICAL CARE FIRST HOUR: CPT

## 2025-02-21 PROCEDURE — 84466 ASSAY OF TRANSFERRIN: CPT | Performed by: FAMILY MEDICINE

## 2025-02-21 PROCEDURE — 25010000002 PROCHLORPERAZINE 10 MG/2ML SOLUTION

## 2025-02-21 PROCEDURE — 36415 COLL VENOUS BLD VENIPUNCTURE: CPT

## 2025-02-21 PROCEDURE — 84156 ASSAY OF PROTEIN URINE: CPT

## 2025-02-21 PROCEDURE — 85007 BL SMEAR W/DIFF WBC COUNT: CPT | Performed by: EMERGENCY MEDICINE

## 2025-02-21 PROCEDURE — 82570 ASSAY OF URINE CREATININE: CPT

## 2025-02-21 PROCEDURE — 81025 URINE PREGNANCY TEST: CPT

## 2025-02-21 PROCEDURE — 25810000003 SODIUM CHLORIDE 0.9 % SOLUTION: Performed by: EMERGENCY MEDICINE

## 2025-02-21 PROCEDURE — 99223 1ST HOSP IP/OBS HIGH 75: CPT | Performed by: FAMILY MEDICINE

## 2025-02-21 PROCEDURE — 93010 ELECTROCARDIOGRAM REPORT: CPT | Performed by: INTERNAL MEDICINE

## 2025-02-21 PROCEDURE — 83735 ASSAY OF MAGNESIUM: CPT

## 2025-02-21 PROCEDURE — 85045 AUTOMATED RETICULOCYTE COUNT: CPT | Performed by: FAMILY MEDICINE

## 2025-02-21 PROCEDURE — 25810000003 SODIUM CHLORIDE 0.9 % SOLUTION

## 2025-02-21 PROCEDURE — 81001 URINALYSIS AUTO W/SCOPE: CPT | Performed by: EMERGENCY MEDICINE

## 2025-02-21 PROCEDURE — 84702 CHORIONIC GONADOTROPIN TEST: CPT | Performed by: EMERGENCY MEDICINE

## 2025-02-21 PROCEDURE — 80053 COMPREHEN METABOLIC PANEL: CPT | Performed by: EMERGENCY MEDICINE

## 2025-02-21 PROCEDURE — 83880 ASSAY OF NATRIURETIC PEPTIDE: CPT | Performed by: EMERGENCY MEDICINE

## 2025-02-21 PROCEDURE — 82607 VITAMIN B-12: CPT | Performed by: FAMILY MEDICINE

## 2025-02-21 PROCEDURE — 82746 ASSAY OF FOLIC ACID SERUM: CPT | Performed by: FAMILY MEDICINE

## 2025-02-21 PROCEDURE — 85025 COMPLETE CBC W/AUTO DIFF WBC: CPT | Performed by: EMERGENCY MEDICINE

## 2025-02-21 PROCEDURE — 87637 SARSCOV2&INF A&B&RSV AMP PRB: CPT

## 2025-02-21 PROCEDURE — 93005 ELECTROCARDIOGRAM TRACING: CPT | Performed by: EMERGENCY MEDICINE

## 2025-02-21 PROCEDURE — 71045 X-RAY EXAM CHEST 1 VIEW: CPT

## 2025-02-21 PROCEDURE — 83690 ASSAY OF LIPASE: CPT | Performed by: EMERGENCY MEDICINE

## 2025-02-21 PROCEDURE — 87899 AGENT NOS ASSAY W/OPTIC: CPT | Performed by: PHYSICIAN ASSISTANT

## 2025-02-21 PROCEDURE — 83540 ASSAY OF IRON: CPT | Performed by: FAMILY MEDICINE

## 2025-02-21 PROCEDURE — 87449 NOS EACH ORGANISM AG IA: CPT | Performed by: PHYSICIAN ASSISTANT

## 2025-02-21 PROCEDURE — P9612 CATHETERIZE FOR URINE SPEC: HCPCS

## 2025-02-21 PROCEDURE — 84133 ASSAY OF URINE POTASSIUM: CPT

## 2025-02-21 PROCEDURE — 82436 ASSAY OF URINE CHLORIDE: CPT

## 2025-02-21 RX ORDER — MULTIPLE VITAMINS W/ MINERALS TAB 9MG-400MCG
1 TAB ORAL DAILY
COMMUNITY

## 2025-02-21 RX ORDER — FERROUS SULFATE 325(65) MG
325 TABLET ORAL
COMMUNITY

## 2025-02-21 RX ORDER — PANTOPRAZOLE SODIUM 40 MG/1
40 TABLET, DELAYED RELEASE ORAL DAILY
COMMUNITY

## 2025-02-21 RX ORDER — SACCHAROMYCES BOULARDII 250 MG
2 CAPSULE ORAL DAILY
COMMUNITY

## 2025-02-21 RX ORDER — SODIUM CHLORIDE AND POTASSIUM CHLORIDE 150; 900 MG/100ML; MG/100ML
125 INJECTION, SOLUTION INTRAVENOUS CONTINUOUS
Status: DISCONTINUED | OUTPATIENT
Start: 2025-02-21 | End: 2025-02-21

## 2025-02-21 RX ORDER — MIDODRINE HYDROCHLORIDE 10 MG/1
10 TABLET ORAL ONCE
Status: COMPLETED | OUTPATIENT
Start: 2025-02-21 | End: 2025-02-21

## 2025-02-21 RX ORDER — SENNOSIDES A AND B 8.6 MG/1
2 TABLET, FILM COATED ORAL DAILY PRN
COMMUNITY

## 2025-02-21 RX ORDER — MIDODRINE HYDROCHLORIDE 10 MG/1
10 TABLET ORAL
COMMUNITY

## 2025-02-21 RX ORDER — SODIUM CHLORIDE, SODIUM LACTATE, POTASSIUM CHLORIDE, CALCIUM CHLORIDE 600; 310; 30; 20 MG/100ML; MG/100ML; MG/100ML; MG/100ML
125 INJECTION, SOLUTION INTRAVENOUS CONTINUOUS
Status: DISCONTINUED | OUTPATIENT
Start: 2025-02-21 | End: 2025-02-22

## 2025-02-21 RX ORDER — SODIUM CHLORIDE 0.9 % (FLUSH) 0.9 %
10 SYRINGE (ML) INJECTION AS NEEDED
Status: DISCONTINUED | OUTPATIENT
Start: 2025-02-21 | End: 2025-03-07 | Stop reason: HOSPADM

## 2025-02-21 RX ORDER — ONDANSETRON 4 MG/1
4 TABLET, FILM COATED ORAL EVERY 8 HOURS PRN
COMMUNITY

## 2025-02-21 RX ORDER — PROCHLORPERAZINE EDISYLATE 5 MG/ML
5 INJECTION INTRAMUSCULAR; INTRAVENOUS ONCE
Status: COMPLETED | OUTPATIENT
Start: 2025-02-21 | End: 2025-02-21

## 2025-02-21 RX ORDER — ACETAMINOPHEN 500 MG
1000 TABLET ORAL EVERY 6 HOURS PRN
COMMUNITY

## 2025-02-21 RX ORDER — CEPHALEXIN 500 MG/1
500 CAPSULE ORAL 4 TIMES DAILY
COMMUNITY
Start: 2025-02-19 | End: 2025-02-26

## 2025-02-21 RX ORDER — METRONIDAZOLE 500 MG/1
500 TABLET ORAL 3 TIMES DAILY
COMMUNITY
Start: 2025-02-19 | End: 2025-02-26

## 2025-02-21 RX ADMIN — SODIUM CHLORIDE 1000 ML: 9 INJECTION, SOLUTION INTRAVENOUS at 17:41

## 2025-02-21 RX ADMIN — MIDODRINE HYDROCHLORIDE 10 MG: 10 TABLET ORAL at 22:01

## 2025-02-21 RX ADMIN — SODIUM CHLORIDE 2000 ML: 9 INJECTION, SOLUTION INTRAVENOUS at 19:22

## 2025-02-21 RX ADMIN — PROCHLORPERAZINE EDISYLATE 5 MG: 5 INJECTION INTRAMUSCULAR; INTRAVENOUS at 17:42

## 2025-02-21 NOTE — ED NOTES
Patient is extremely hypotensive, 60/42 manual but is awake alert and oriented responding appropriately at this time. Provider is aware

## 2025-02-21 NOTE — ED PROVIDER NOTES
"SHARED VISIT NOTE:    Patient is 44 y.o. year old female that presents to the ED for evaluation of abnormal labs, nausea and vomiting    Physical Exam    ED Course:    BP (!) 82/52   Pulse 70   Temp 98.1 °F (36.7 °C) (Oral)   Resp 16   Ht 157.5 cm (62\")   Wt 52 kg (114 lb 10.2 oz)   LMP  (LMP Unknown)   SpO2 99%   BMI 20.97 kg/m²   Results for orders placed or performed during the hospital encounter of 02/21/25   Comprehensive Metabolic Panel    Collection Time: 02/21/25  5:08 PM    Specimen: Blood   Result Value Ref Range    Glucose 88 65 - 99 mg/dL    BUN 56 (H) 6 - 20 mg/dL    Creatinine 5.67 (H) 0.57 - 1.00 mg/dL    Sodium 132 (L) 136 - 145 mmol/L    Potassium 3.3 (L) 3.5 - 5.2 mmol/L    Chloride 106 98 - 107 mmol/L    CO2 10.3 (L) 22.0 - 29.0 mmol/L    Calcium 8.2 (L) 8.6 - 10.5 mg/dL    Total Protein 6.1 6.0 - 8.5 g/dL    Albumin 1.6 (L) 3.5 - 5.2 g/dL    ALT (SGPT) <5 1 - 33 U/L    AST (SGOT) 13 1 - 32 U/L    Alkaline Phosphatase 88 39 - 117 U/L    Total Bilirubin 0.9 0.0 - 1.2 mg/dL    Globulin 4.5 gm/dL    A/G Ratio 0.4 g/dL    BUN/Creatinine Ratio 9.9 7.0 - 25.0    Anion Gap 15.7 (H) 5.0 - 15.0 mmol/L    eGFR 8.8 (L) >60.0 mL/min/1.73   Lipase    Collection Time: 02/21/25  5:08 PM    Specimen: Blood   Result Value Ref Range    Lipase 10 (L) 13 - 60 U/L   hCG, Quantitative, Pregnancy    Collection Time: 02/21/25  5:08 PM    Specimen: Blood   Result Value Ref Range    HCG Quantitative 2.44 mIU/mL   CBC Auto Differential    Collection Time: 02/21/25  5:08 PM    Specimen: Blood   Result Value Ref Range    WBC 11.98 (H) 3.40 - 10.80 10*3/mm3    RBC 3.22 (L) 3.77 - 5.28 10*6/mm3    Hemoglobin 9.0 (L) 12.0 - 15.9 g/dL    Hematocrit 29.9 (L) 34.0 - 46.6 %    MCV 92.9 79.0 - 97.0 fL    MCH 28.0 26.6 - 33.0 pg    MCHC 30.1 (L) 31.5 - 35.7 g/dL    RDW 16.7 (H) 12.3 - 15.4 %    RDW-SD 56.3 (H) 37.0 - 54.0 fl    MPV 13.0 (H) 6.0 - 12.0 fL    Platelets 86 (L) 140 - 450 10*3/mm3    Neutrophil % 90.5 (H) 42.7 - " 76.0 %    Lymphocyte % 3.6 (L) 19.6 - 45.3 %    Monocyte % 4.1 (L) 5.0 - 12.0 %    Eosinophil % 0.7 0.3 - 6.2 %    Basophil % 0.6 0.0 - 1.5 %    Immature Grans % 0.5 0.0 - 0.5 %    Neutrophils, Absolute 10.85 (H) 1.70 - 7.00 10*3/mm3    Lymphocytes, Absolute 0.43 (L) 0.70 - 3.10 10*3/mm3    Monocytes, Absolute 0.49 0.10 - 0.90 10*3/mm3    Eosinophils, Absolute 0.08 0.00 - 0.40 10*3/mm3    Basophils, Absolute 0.07 0.00 - 0.20 10*3/mm3    Immature Grans, Absolute 0.06 (H) 0.00 - 0.05 10*3/mm3    nRBC 0.0 0.0 - 0.2 /100 WBC   Scan Slide    Collection Time: 02/21/25  5:08 PM    Specimen: Blood   Result Value Ref Range    RBC Morphology Normal Normal    WBC Morphology Normal Normal    Platelet Estimate Decreased Normal   Green Top (Gel)    Collection Time: 02/21/25  5:08 PM   Result Value Ref Range    Extra Tube Hold for add-ons.    Lavender Top    Collection Time: 02/21/25  5:08 PM   Result Value Ref Range    Extra Tube hold for add-on    Gold Top - SST    Collection Time: 02/21/25  5:08 PM   Result Value Ref Range    Extra Tube Hold for add-ons.    Light Blue Top    Collection Time: 02/21/25  5:08 PM   Result Value Ref Range    Extra Tube Hold for add-ons.    COVID-19, FLU A/B, RSV PCR 1 HR TAT - Swab, Nasopharynx    Collection Time: 02/21/25  5:45 PM    Specimen: Nasopharynx; Swab   Result Value Ref Range    COVID19 Not Detected Not Detected - Ref. Range    Influenza A PCR Not Detected Not Detected    Influenza B PCR Not Detected Not Detected    RSV, PCR Not Detected Not Detected   CBC Auto Differential    Collection Time: 02/21/25  6:21 PM    Specimen: Blood   Result Value Ref Range    WBC 9.27 3.40 - 10.80 10*3/mm3    RBC 2.88 (L) 3.77 - 5.28 10*6/mm3    Hemoglobin 8.2 (L) 12.0 - 15.9 g/dL    Hematocrit 26.8 (L) 34.0 - 46.6 %    MCV 93.1 79.0 - 97.0 fL    MCH 28.5 26.6 - 33.0 pg    MCHC 30.6 (L) 31.5 - 35.7 g/dL    RDW 16.8 (H) 12.3 - 15.4 %    RDW-SD 56.3 (H) 37.0 - 54.0 fl    MPV 12.9 (H) 6.0 - 12.0 fL     Platelets 71 (L) 140 - 450 10*3/mm3    Neutrophil % 89.7 (H) 42.7 - 76.0 %    Lymphocyte % 3.9 (L) 19.6 - 45.3 %    Monocyte % 4.5 (L) 5.0 - 12.0 %    Eosinophil % 0.6 0.3 - 6.2 %    Basophil % 0.4 0.0 - 1.5 %    Immature Grans % 0.9 (H) 0.0 - 0.5 %    Neutrophils, Absolute 8.31 (H) 1.70 - 7.00 10*3/mm3    Lymphocytes, Absolute 0.36 (L) 0.70 - 3.10 10*3/mm3    Monocytes, Absolute 0.42 0.10 - 0.90 10*3/mm3    Eosinophils, Absolute 0.06 0.00 - 0.40 10*3/mm3    Basophils, Absolute 0.04 0.00 - 0.20 10*3/mm3    Immature Grans, Absolute 0.08 (H) 0.00 - 0.05 10*3/mm3    nRBC 0.0 0.0 - 0.2 /100 WBC   Comprehensive Metabolic Panel    Collection Time: 02/21/25  6:21 PM    Specimen: Blood   Result Value Ref Range    Glucose 79 65 - 99 mg/dL    BUN 53 (H) 6 - 20 mg/dL    Creatinine 5.50 (H) 0.57 - 1.00 mg/dL    Sodium 135 (L) 136 - 145 mmol/L    Potassium 3.0 (L) 3.5 - 5.2 mmol/L    Chloride 110 (H) 98 - 107 mmol/L    CO2 11.5 (L) 22.0 - 29.0 mmol/L    Calcium 7.9 (L) 8.6 - 10.5 mg/dL    Total Protein 5.4 (L) 6.0 - 8.5 g/dL    Albumin 1.6 (L) 3.5 - 5.2 g/dL    ALT (SGPT) 5 1 - 33 U/L    AST (SGOT) 9 1 - 32 U/L    Alkaline Phosphatase 81 39 - 117 U/L    Total Bilirubin 0.8 0.0 - 1.2 mg/dL    Globulin 3.8 gm/dL    A/G Ratio 0.4 g/dL    BUN/Creatinine Ratio 9.6 7.0 - 25.0    Anion Gap 13.5 5.0 - 15.0 mmol/L    eGFR 9.1 (L) >60.0 mL/min/1.73   Scan Slide    Collection Time: 02/21/25  6:21 PM    Specimen: Blood   Result Value Ref Range    RBC Morphology Normal Normal    WBC Morphology Normal Normal    Platelet Estimate Decreased Normal   BNP    Collection Time: 02/21/25  6:21 PM    Specimen: Blood   Result Value Ref Range    proBNP 15,412.0 (H) 0.0 - 450.0 pg/mL   Magnesium    Collection Time: 02/21/25  6:21 PM    Specimen: Blood   Result Value Ref Range    Magnesium 1.2 (L) 1.6 - 2.6 mg/dL   Iron Profile    Collection Time: 02/21/25  6:21 PM    Specimen: Blood   Result Value Ref Range    Iron 25 (L) 37 - 145 mcg/dL    Iron  Saturation (TSAT) 32 20 - 50 %    Transferrin 53 (L) 200 - 360 mg/dL    TIBC 79 (L) 298 - 536 mcg/dL   Reticulocytes    Collection Time: 02/21/25  6:21 PM    Specimen: Blood   Result Value Ref Range    Reticulocyte % 1.63 0.70 - 1.90 %    Reticulocyte Absolute 0.0479 0.0200 - 0.1300 10*6/mm3   ECG 12 Lead Rhythm Change    Collection Time: 02/21/25  6:53 PM   Result Value Ref Range    QT Interval 415 ms    QTC Interval 462 ms   Urinalysis With Microscopic If Indicated (No Culture) - Straight Cath    Collection Time: 02/21/25 10:02 PM    Specimen: Straight Cath; Urine   Result Value Ref Range    Color, UA Dark Yellow (A) Yellow, Straw    Appearance, UA Cloudy (A) Clear    pH, UA 5.5 5.0 - 8.0    Specific Gravity, UA 1.016 1.005 - 1.030    Glucose, UA Negative Negative    Ketones, UA Negative Negative    Bilirubin, UA Small (1+) (A) Negative    Blood, UA Large (3+) (A) Negative    Protein, UA >=300 mg/dL (3+) (A) Negative    Leuk Esterase, UA Moderate (2+) (A) Negative    Nitrite, UA Negative Negative    Urobilinogen, UA 0.2 E.U./dL 0.2 - 1.0 E.U./dL   Pregnancy, Urine - Straight Cath    Collection Time: 02/21/25 10:02 PM    Specimen: Straight Cath; Urine   Result Value Ref Range    HCG, Urine QL Negative Negative   Urinalysis, Microscopic Only - Straight Cath    Collection Time: 02/21/25 10:02 PM    Specimen: Straight Cath; Urine   Result Value Ref Range    RBC, UA Too Numerous to Count (A) None Seen, 0-2 /HPF    WBC, UA 11-20 (A) None Seen, 0-2 /HPF    Bacteria, UA None Seen None Seen /HPF    Squamous Epithelial Cells, UA 0-2 None Seen, 0-2 /HPF    Hyaline Casts, UA 0-2 None Seen /LPF    Methodology Manual Light Microscopy      Medications   sodium chloride 0.9 % flush 10 mL (has no administration in time range)   lactated ringers infusion (has no administration in time range)   sodium chloride 0.9 % bolus 1,000 mL (0 mL Intravenous Stopped 2/21/25 1901)   prochlorperazine (COMPAZINE) injection 5 mg (5 mg Intravenous  Given 2/21/25 1742)   sodium chloride 0.9 % bolus 2,000 mL (2,000 mL Intravenous New Bag 2/21/25 1922)   midodrine (PROAMATINE) tablet 10 mg (10 mg Oral Given 2/21/25 2201)     XR Chest 1 View    Result Date: 2/21/2025  Narrative: XR CHEST 1 VW Date of Exam: 2/21/2025 7:17 PM EST Indication: Weakness and shortness of breath Comparison: 8/1/2024. 8/5/2023. FINDINGS: The lungs are well-expanded. The heart and pulmonary vasculature are within normal limits. No pleural effusions are identified. There are no active appearing infiltrates. Stable elevation of the right hemidiaphragm.     Impression: No active disease. Electronically Signed: Vitaly Maya MD  2/21/2025 7:38 PM EST  Workstation ID: [id number]     MDM:    Procedures              SHARED VISIT ATTESTATION:    This visit was performed by both myself and an APC.  I performed the substantive portion of the medical decision making. The management plan was made or approved by me, and I take responsibility for patient management.           Wilfrido Kumar,   00:10 EST  02/22/25         Wilfrido Kumar,   02/22/25 0010

## 2025-02-21 NOTE — ED PROVIDER NOTES
"Time: 5:34 PM EST  Date of encounter:  2/21/2025  Independent Historian/Clinical History and Information was obtained by:   Patient    History is limited by: N/A    Chief Complaint   Patient presents with    Nausea    Hypotension         History of Present Illness:  Patient is a 44 y.o. year old female who presents to the emergency department for evaluation of nausea and vomiting since Tuesday.  Patient states her last episode of vomiting was yesterday but she still nauseous.  They were giving her Zofran at [facility] where patient resides.  Patient states she constantly has diarrhea due to history of Crohn's.  She has 3 wounds on her abdomen that she sees wound care for every Tuesday and gets dressing changes twice a day.  He denies suspicious food intake.  Denies fever, chills and cough.    Patient Care Team  Primary Care Provider: Provider, No Known    Past Medical History:     Allergies   Allergen Reactions    Iodine Unknown - High Severity    Penicillins Unknown - High Severity     Past Medical History:   Diagnosis Date    Abscess of peritoneum     Anemia     Anxiety     Breast cancer     Crohn's disease     GERD (gastroesophageal reflux disease)     HTN (hypertension)     Hyperlipidemia     Intestine disorder     SVT (supraventricular tachycardia)      Past Surgical History:   Procedure Laterality Date    ABDOMINAL SURGERY       History reviewed. No pertinent family history.    Home Medications:  Prior to Admission medications    Not on File        Social History:          Review of Systems:  Review of Systems   Constitutional:  Negative for chills and fever.   Respiratory:  Negative for cough.    Gastrointestinal:  Positive for diarrhea (constant) and nausea. Negative for vomiting.        Physical Exam:  BP (!) 82/52   Pulse 70   Temp 98.1 °F (36.7 °C) (Oral)   Resp 16   Ht 157.5 cm (62\")   Wt 52 kg (114 lb 10.2 oz)   LMP  (LMP Unknown)   SpO2 99%   BMI 20.97 kg/m²         Physical Exam  Vitals " and nursing note reviewed.   Constitutional:       Appearance: Normal appearance.   HENT:      Head: Normocephalic and atraumatic.      Nose: Nose normal.      Mouth/Throat:      Mouth: Mucous membranes are moist.   Eyes:      Extraocular Movements: Extraocular movements intact.      Conjunctiva/sclera: Conjunctivae normal.      Pupils: Pupils are equal, round, and reactive to light.   Cardiovascular:      Rate and Rhythm: Normal rate and regular rhythm.      Heart sounds: Normal heart sounds.   Pulmonary:      Effort: Pulmonary effort is normal.      Breath sounds: Normal breath sounds.   Abdominal:      General: Abdomen is flat.      Palpations: Abdomen is soft.      Tenderness: There is no abdominal tenderness. There is no guarding or rebound.      Comments: Patient has 3 covered wounds on her abdomen.   Musculoskeletal:         General: Normal range of motion.      Cervical back: Normal range of motion and neck supple.   Skin:     General: Skin is warm and dry.   Neurological:      General: No focal deficit present.      Mental Status: She is alert and oriented to person, place, and time.   Psychiatric:         Mood and Affect: Mood normal.         Behavior: Behavior normal.                Medical Decision Making:      Comorbidities that affect care:    Crohn's, chronic hypertension, Chronic Kidney Disease    External Notes reviewed:    Previous Clinic Note: Office visit with nephrology 8/31/2023, Previous ED Note: Patient's last ED visit with 2/1/2025 for abdominal pain she has a history of duodenal ulcer perforation from a year ago., Previous Radiological Studies: CT scan from 2/2/2025 showing postsurgical changes laparotomy anterior abdominal wall.  Right upper quadrant fluid and gas containing collection, and Previous Labs: CMP from 2/5/2025 showing GFR 53, creatinine 1.25, sodium 147 potassium 3.3      The following orders were placed and all results were independently analyzed by me:  Orders Placed This  Encounter   Procedures    COVID-19, FLU A/B, RSV PCR 1 HR TAT - Swab, Nasopharynx    XR Chest 1 View    [place] Draw    Comprehensive Metabolic Panel    Lipase    Urinalysis With Microscopic If Indicated (No Culture) - Urine, Clean Catch    hCG, Quantitative, Pregnancy    CBC Auto Differential    Scan Slide    CBC Auto Differential    Comprehensive Metabolic Panel    Scan Slide    BNP    Magnesium    Potassium, Urine, Random - Urine, Clean Catch    Chloride, Urine, Random - Urine, Clean Catch    Creatinine Urine Random (kidney function) GFR component - Urine, Clean Catch    Pregnancy, Urine - Urine, Clean Catch    Iron Profile    Vitamin B12    Folate    Reticulocytes    Diet: Cardiac; Healthy Heart (2-3 Na+); Fluid Consistency: Thin (IDDSI 0)    Undress & Gown    Bladder scan    Straight cath    Code Status and Medical Interventions: CPR (Attempt to Resuscitate); Full Support    Inpatient Nephrology Consult    Inpatient Hospitalist Consult    ECG 12 Lead Rhythm Change    Insert Peripheral IV    Inpatient Admission    CBC & Differential    Green Top (Gel)    Lavender Top    Gold Top - SST    Light Blue Top    CBC & Differential       Medications Given in the Emergency Department:  Medications   sodium chloride 0.9 % flush 10 mL (has no administration in time range)   lactated ringers infusion (has no administration in time range)   sodium chloride 0.9 % bolus 1,000 mL (0 mL Intravenous Stopped 2/21/25 1901)   prochlorperazine (COMPAZINE) injection 5 mg (5 mg Intravenous Given 2/21/25 1742)   sodium chloride 0.9 % bolus 2,000 mL (2,000 mL Intravenous New Bag 2/21/25 1922)   midodrine (PROAMATINE) tablet 10 mg (10 mg Oral Given 2/21/25 2201)        ED Course:    The patient was initially evaluated in the triage area where orders were placed. The patient was later dispositioned by Pushpa Ramon PA-C.      The patient was advised to stay for completion of workup which includes but is not limited to communication  of labs and radiological results, reassessment and plan. The patient was advised that leaving prior to disposition by a provider could result in critical findings that are not communicated to the patient.     ED Course as of 02/21/25 2222 Fri Feb 21, 2025 1844 RN called and states patient's manual blood pressure is 60/42.  She is alert and oriented and in no acute distress.  Pressure fluids running. [AJ]   1852 Discussed patient's case with Dr. Kumar who advises 2 more liters of fluids and consult with nephrology for admission. [AJ]   1923 She is a poor historian.  She is not sure what medication she takes for hypertension but says she takes 3 times a day [AJ]   1928 CMP from 2/5/2025 showing GFR 53 and a creatinine of 1.25 sodium 147 potassium 3.3 [AJ]   1931 Consulted with Dr. Chavez, nephrologist, he states patient is JOHANA is most likely caused from vomiting.  He would like UA, UA creatinine, potassium and chloride ordered.  Admit to hospital if they have any questions they can call them directly. [AJ]   1944 Patient states she has not had a menstrual cycle since she was 31 yo  [AJ]   2120 Bladder scan showed 0 ml per RN   [AJ]      ED Course User Index  [AJ] Pushpa Ramon, PAMYAH       Labs:    Lab Results (last 24 hours)       Procedure Component Value Units Date/Time    CBC & Differential [[id number]]  (Abnormal) Collected: 02/21/25 1708    Specimen: Blood Updated: 02/21/25 1746    Narrative:      The following orders were created for panel order CBC & Differential.  Procedure                               Abnormality         Status                     ---------                               -----------         ------                     CBC Auto Differential[[id number]]        Abnormal            Final result               Scan Slide[[id number]]                                       Final result                 Please view results for these tests on the individual orders.    Comprehensive Metabolic  Panel [[id number]]  (Abnormal) Collected: 02/21/25 1708    Specimen: Blood Updated: 02/21/25 1750     Glucose 88 mg/dL      BUN 56 mg/dL      Creatinine 5.67 mg/dL      Sodium 132 mmol/L      Potassium 3.3 mmol/L      Comment: Slight hemolysis detected by analyzer. Result may be falsely elevated.        Chloride 106 mmol/L      CO2 10.3 mmol/L      Calcium 8.2 mg/dL      Total Protein 6.1 g/dL      Albumin 1.6 g/dL      ALT (SGPT) <5 U/L      AST (SGOT) 13 U/L      Alkaline Phosphatase 88 U/L      Total Bilirubin 0.9 mg/dL      Globulin 4.5 gm/dL      A/G Ratio 0.4 g/dL      BUN/Creatinine Ratio 9.9     Anion Gap 15.7 mmol/L      eGFR 8.8 mL/min/1.73     Narrative:      GFR Categories in Chronic Kidney Disease (CKD)      GFR Category          GFR (mL/min/1.73)    Interpretation  G1                     90 or greater         Normal or high (1)  G2                      60-89                Mild decrease (1)  G3a                   45-59                Mild to moderate decrease  G3b                   30-44                Moderate to severe decrease  G4                    15-29                Severe decrease  G5                    14 or less           Kidney failure          (1)In the absence of evidence of kidney disease, neither GFR category G1 or G2 fulfill the criteria for CKD.    eGFR calculation 2021 CKD-EPI creatinine equation, which does not include race as a factor    Lipase [[id number]]  (Abnormal) Collected: 02/21/25 1708    Specimen: Blood Updated: 02/21/25 1739     Lipase 10 U/L     hCG, Quantitative, Pregnancy [[id number]] Collected: 02/21/25 1708    Specimen: Blood Updated: 02/21/25 1737     HCG Quantitative 2.44 mIU/mL     Narrative:      HCG Ranges by Gestational Age    Females - non-pregnant premenopausal   </= 1mIU/mL HCG  Females - postmenopausal               </= 7mIU/mL HCG    3 Weeks       5.4   -      72 mIU/mL  4 Weeks      10.2   -     708 mIU/mL  5 Weeks       217   -   8,245 mIU/mL  6 Weeks        152   -  32,177 mIU/mL  7 Weeks     4,059   - 153,767 mIU/mL  8 Weeks    31,366   - 149,094 mIU/mL  9 Weeks    59,109   - 135,901 mIU/mL  10 Weeks   44,186   - 170,409 mIU/mL  12 Weeks   27,107   - 201,615 mIU/mL  14 Weeks   24,302   -  93,646 mIU/mL  15 Weeks   12,540   -  69,747 mIU/mL  16 Weeks    8,904   -  55,332 mIU/mL  17 Weeks    8,240   -  51,793 mIU/mL  18 Weeks    9,649   -  55,271 mIU/mL      CBC Auto Differential [[id number]]  (Abnormal) Collected: 02/21/25 1708    Specimen: Blood Updated: 02/21/25 1746     WBC 11.98 10*3/mm3      RBC 3.22 10*6/mm3      Hemoglobin 9.0 g/dL      Hematocrit 29.9 %      MCV 92.9 fL      MCH 28.0 pg      MCHC 30.1 g/dL      RDW 16.7 %      RDW-SD 56.3 fl      MPV 13.0 fL      Platelets 86 10*3/mm3      Neutrophil % 90.5 %      Lymphocyte % 3.6 %      Monocyte % 4.1 %      Eosinophil % 0.7 %      Basophil % 0.6 %      Immature Grans % 0.5 %      Neutrophils, Absolute 10.85 10*3/mm3      Lymphocytes, Absolute 0.43 10*3/mm3      Monocytes, Absolute 0.49 10*3/mm3      Eosinophils, Absolute 0.08 10*3/mm3      Basophils, Absolute 0.07 10*3/mm3      Immature Grans, Absolute 0.06 10*3/mm3      nRBC 0.0 /100 WBC     Narrative:      Appended report. These results have been appended to a previously verified report.    Scan Slide [[id number]] Collected: 02/21/25 1708    Specimen: Blood Updated: 02/21/25 1746     RBC Morphology Normal     WBC Morphology Normal     Platelet Estimate Decreased    COVID-19, FLU A/B, RSV PCR 1 HR TAT - Swab, Nasopharynx [[id number]]  (Normal) Collected: 02/21/25 1745    Specimen: Swab from Nasopharynx Updated: 02/21/25 1832     COVID19 Not Detected     Influenza A PCR Not Detected     Influenza B PCR Not Detected     RSV, PCR Not Detected    Narrative:      Fact sheet for providers: https://www.fda.gov/media/324664/download    Fact sheet for patients: https://www.fda.gov/media/366655/download    Test performed by PCR.    CBC & Differential [[id number]]   (Abnormal) Collected: 02/21/25 1821    Specimen: Blood Updated: 02/21/25 1846    Narrative:      The following orders were created for panel order CBC & Differential.  Procedure                               Abnormality         Status                     ---------                               -----------         ------                     CBC Auto Differential[[id number]]        Abnormal            Final result               Scan Slide[[id number]]                                       Final result                 Please view results for these tests on the individual orders.    CBC Auto Differential [[id number]]  (Abnormal) Collected: 02/21/25 1821    Specimen: Blood Updated: 02/21/25 1834     WBC 9.27 10*3/mm3      RBC 2.88 10*6/mm3      Hemoglobin 8.2 g/dL      Hematocrit 26.8 %      MCV 93.1 fL      MCH 28.5 pg      MCHC 30.6 g/dL      RDW 16.8 %      RDW-SD 56.3 fl      MPV 12.9 fL      Platelets 71 10*3/mm3      Neutrophil % 89.7 %      Lymphocyte % 3.9 %      Monocyte % 4.5 %      Eosinophil % 0.6 %      Basophil % 0.4 %      Immature Grans % 0.9 %      Neutrophils, Absolute 8.31 10*3/mm3      Lymphocytes, Absolute 0.36 10*3/mm3      Monocytes, Absolute 0.42 10*3/mm3      Eosinophils, Absolute 0.06 10*3/mm3      Basophils, Absolute 0.04 10*3/mm3      Immature Grans, Absolute 0.08 10*3/mm3      nRBC 0.0 /100 WBC     Comprehensive Metabolic Panel [[id number]]  (Abnormal) Collected: 02/21/25 1821    Specimen: Blood Updated: 02/21/25 1910     Glucose 79 mg/dL      BUN 53 mg/dL      Creatinine 5.50 mg/dL      Sodium 135 mmol/L      Potassium 3.0 mmol/L      Chloride 110 mmol/L      CO2 11.5 mmol/L      Calcium 7.9 mg/dL      Total Protein 5.4 g/dL      Albumin 1.6 g/dL      ALT (SGPT) 5 U/L      AST (SGOT) 9 U/L      Alkaline Phosphatase 81 U/L      Total Bilirubin 0.8 mg/dL      Globulin 3.8 gm/dL      A/G Ratio 0.4 g/dL      BUN/Creatinine Ratio 9.6     Anion Gap 13.5 mmol/L      eGFR 9.1 mL/min/1.73     Narrative:       GFR Categories in Chronic Kidney Disease (CKD)      GFR Category          GFR (mL/min/1.73)    Interpretation  G1                     90 or greater         Normal or high (1)  G2                      60-89                Mild decrease (1)  G3a                   45-59                Mild to moderate decrease  G3b                   30-44                Moderate to severe decrease  G4                    15-29                Severe decrease  G5                    14 or less           Kidney failure          (1)In the absence of evidence of kidney disease, neither GFR category G1 or G2 fulfill the criteria for CKD.    eGFR calculation 2021 CKD-EPI creatinine equation, which does not include race as a factor    Scan Slide [[id number]] Collected: 02/21/25 1821    Specimen: Blood Updated: 02/21/25 1846     RBC Morphology Normal     WBC Morphology Normal     Platelet Estimate Decreased    BNP [[id number]]  (Abnormal) Collected: 02/21/25 1821    Specimen: Blood Updated: 02/21/25 1906     proBNP 15,412.0 pg/mL     Narrative:      This assay is used as an aid in the diagnosis of individuals suspected of having heart failure. It can be used as an aid in the diagnosis of acute decompensated heart failure (ADHF) in patients presenting with signs and symptoms of ADHF to the emergency department (ED). In addition, NT-proBNP of <300 pg/mL indicates ADHF is not likely.    Age Range Result Interpretation  NT-proBNP Concentration (pg/mL:      <50             Positive            >450                   Gray                 300-450                    Negative             <300    50-75           Positive            >900                  Gray                300-900                  Negative            <300      >75             Positive            >1800                  Gray                300-1800                  Negative            <300    Magnesium [[id number]]  (Abnormal) Collected: 02/21/25 1821    Specimen: Blood Updated: 02/21/25 1910      Magnesium 1.2 mg/dL     Urinalysis With Microscopic If Indicated (No Culture) - Straight Cath [[id number]] Collected: 02/21/25 2202    Specimen: Urine from Straight Cath Updated: 02/21/25 2208    Potassium, Urine, Random - Straight Cath [[id number]] Collected: 02/21/25 2202    Specimen: Urine from Straight Cath Updated: 02/21/25 2208    Chloride, Urine, Random - Straight Cath [[id number]] Collected: 02/21/25 2202    Specimen: Urine from Straight Cath Updated: 02/21/25 2208    Creatinine Urine Random (kidney function) GFR component - Straight Cath [[id number]] Collected: 02/21/25 2202    Specimen: Urine from Straight Cath Updated: 02/21/25 2208    Pregnancy, Urine - Straight Cath [[id number]] Collected: 02/21/25 2202    Specimen: Urine from Straight Cath Updated: 02/21/25 2214             Imaging:    XR Chest 1 View    Result Date: 2/21/2025  XR CHEST 1 VW Date of Exam: 2/21/2025 7:17 PM EST Indication: Weakness and shortness of breath Comparison: 8/1/2024. 8/5/2023. FINDINGS: The lungs are well-expanded. The heart and pulmonary vasculature are within normal limits. No pleural effusions are identified. There are no active appearing infiltrates. Stable elevation of the right hemidiaphragm.     No active disease. Electronically Signed: Vitaly Maya MD  2/21/2025 7:38 PM EST  Workstation ID: [id number]       Differential Diagnosis and Discussion:      Vomiting: Differential diagnosis includes but is not limited to migraine, labyrinthine disorders, psychogenic, metabolic and endocrine causes, peptic ulcer, gastric outlet obstruction, gastritis, gastroenteritis, appendicitis, intestinal obstruction, paralytic ileus, food poisoning, cholecystitis, acute hepatitis, acute pancreatitis, acute febrile illness, and myocardial infarction.    PROCEDURES:    Labs were collected in the emergency department and all labs were reviewed and interpreted by me.  X-ray were performed in the emergency department and all X-ray impressions  were independently interpreted by me.  An EKG was performed and the EKG was interpreted by me.  An EKG was performed and the EKG was interpreted by supervising attending.    ECG 12 Lead Rhythm Change   Preliminary Result   HEART RATE=74  bpm   RR Gnvapgdo=080  ms   MT Jbirdzfl=556  ms   P Horizontal Axis=-38  deg   P Front Axis=10  deg   QRSD Interval=92  ms   QT Sdxtqgyp=064  ms   NBwA=862  ms   QRS Axis=5  deg   T Wave Axis=37  deg   - BORDERLINE ECG -   Sinus rhythm   Low voltage, extremity and precordial leads   Date and Time of Study:2025-02-21 18:53:30           Procedures    MDM     Amount and/or Complexity of Data Reviewed  Clinical lab tests: reviewed  Tests in the medicine section of CPT®: reviewed  Decide to obtain previous medical records or to obtain history from someone other than the patient: yes           Total Critical Care time of 35 minutes. Total critical care time documented does not include time spent on separately billed procedures for services of nurses or physician assistants. I personally saw and examined the patient. I have reviewed all diagnostic interpretations and treatment plans as written. I was present for the key portions of any procedures performed and the inclusive time noted in any critical care statement. Critical care time includes patient management by me, time spent at the patients bedside,  time to review lab and imaging results, discussing patient care, documentation in the medical record, and time spent with family or caregiver.          Patient Care Considerations:    SEPSIS was considered but is NOT present in the emergency department as SIRS criteria is not present. CT ABDOMEN AND PELVIS: I considered ordering a CT scan of the abdomen and pelvis however abdomen soft and nontender       Consultants/Shared Management Plan:    Hospitalist: I have discussed the case with Dr. Palacios who agrees to accept the patient for admission.  Consultant: I have discussed the case with  Dr. Chavez, nephrologist who states JOHANA most likely due to vomiting.  He would like UA, UA creatinine, potassium chloride ordered and he will follow-up with patient tomorrow.  States if hospitalist has any questions call him directly.    Social Determinants of Health:    Patient is a nursing home/assisted living resident and has reliable access to care.      Disposition and Care Coordination:    Admit:   Through independent evaluation of the patient's history, physical, and imperical data, the patient meets criteria for inpatient admission to the hospital.        Final diagnoses:   Acute renal failure, unspecified acute renal failure type   Hypotension due to hypovolemia        ED Disposition       ED Disposition   Decision to Admit    Condition   --    Comment   Level of Care: Telemetry [5]   Diagnosis: Acute renal failure [868202]   Admitting Physician: SALBADOR HARDY [[id number]]   Certification: I Certify That Inpatient Hospital Services Are Medically Necessary For Greater Than 2 Midnights                 This medical record created using voice recognition software.             Pushpa Ramon PA-C  02/21/25 0642

## 2025-02-22 ENCOUNTER — ANESTHESIA EVENT CONVERTED (OUTPATIENT)
Dept: ANESTHESIOLOGY | Facility: HOSPITAL | Age: 45
DRG: 853 | End: 2025-02-22
Payer: MEDICARE

## 2025-02-22 ENCOUNTER — APPOINTMENT (OUTPATIENT)
Dept: GENERAL RADIOLOGY | Facility: HOSPITAL | Age: 45
DRG: 853 | End: 2025-02-22
Payer: MEDICARE

## 2025-02-22 ENCOUNTER — ANESTHESIA (OUTPATIENT)
Dept: PERIOP | Facility: HOSPITAL | Age: 45
End: 2025-02-22
Payer: MEDICARE

## 2025-02-22 ENCOUNTER — APPOINTMENT (OUTPATIENT)
Dept: CT IMAGING | Facility: HOSPITAL | Age: 45
DRG: 853 | End: 2025-02-22
Payer: MEDICARE

## 2025-02-22 ENCOUNTER — ANESTHESIA EVENT (OUTPATIENT)
Dept: PERIOP | Facility: HOSPITAL | Age: 45
End: 2025-02-22
Payer: MEDICARE

## 2025-02-22 ENCOUNTER — APPOINTMENT (OUTPATIENT)
Dept: ULTRASOUND IMAGING | Facility: HOSPITAL | Age: 45
DRG: 853 | End: 2025-02-22
Payer: MEDICARE

## 2025-02-22 PROBLEM — N20.0 KIDNEY STONE: Status: ACTIVE | Noted: 2025-02-21

## 2025-02-22 PROBLEM — K52.9 CHRONIC DIARRHEA: Status: ACTIVE | Noted: 2025-02-22

## 2025-02-22 PROBLEM — E86.1 HYPOTENSION DUE TO HYPOVOLEMIA: Status: ACTIVE | Noted: 2025-02-22

## 2025-02-22 PROBLEM — K50.90 INFLAMMATORY BOWEL DISEASE (CROHN'S DISEASE): Status: ACTIVE | Noted: 2025-02-22

## 2025-02-22 PROBLEM — R11.2 NAUSEA & VOMITING: Status: ACTIVE | Noted: 2025-02-22

## 2025-02-22 LAB
ABO GROUP BLD: NORMAL
ABO GROUP BLD: NORMAL
ALBUMIN SERPL-MCNC: 1.5 G/DL (ref 3.5–5.2)
ALBUMIN SERPL-MCNC: 1.6 G/DL (ref 3.5–5.2)
ALBUMIN/GLOB SERPL: 0.4 G/DL
ALBUMIN/GLOB SERPL: 0.4 G/DL
ALP SERPL-CCNC: 100 U/L (ref 39–117)
ALP SERPL-CCNC: 95 U/L (ref 39–117)
ALT SERPL W P-5'-P-CCNC: 5 U/L (ref 1–33)
ALT SERPL W P-5'-P-CCNC: <5 U/L (ref 1–33)
ANION GAP SERPL CALCULATED.3IONS-SCNC: 11.7 MMOL/L (ref 5–15)
ANION GAP SERPL CALCULATED.3IONS-SCNC: 13.7 MMOL/L (ref 5–15)
ANION GAP SERPL CALCULATED.3IONS-SCNC: 17.1 MMOL/L (ref 5–15)
APTT PPP: 47.6 SECONDS (ref 24.2–34.2)
ARTERIAL PATENCY WRIST A: ABNORMAL
AST SERPL-CCNC: 7 U/L (ref 1–32)
AST SERPL-CCNC: 8 U/L (ref 1–32)
ATMOSPHERIC PRESS: 749.3 MMHG
BACTERIA UR QL AUTO: ABNORMAL /HPF
BASE EXCESS BLDA CALC-SCNC: -13.6 MMOL/L (ref -2–2)
BASOPHILS # BLD AUTO: 0.05 10*3/MM3 (ref 0–0.2)
BASOPHILS NFR BLD AUTO: 0.5 % (ref 0–1.5)
BDY SITE: ABNORMAL
BILIRUB SERPL-MCNC: 0.8 MG/DL (ref 0–1.2)
BILIRUB SERPL-MCNC: 0.9 MG/DL (ref 0–1.2)
BILIRUB UR QL STRIP: NEGATIVE
BLD GP AB SCN SERPL QL: NEGATIVE
BUN BLDA-MCNC: 38 MG/DL (ref 8–23)
BUN SERPL-MCNC: 39 MG/DL (ref 6–20)
BUN SERPL-MCNC: 45 MG/DL (ref 6–20)
BUN SERPL-MCNC: 48 MG/DL (ref 6–20)
BUN/CREAT SERPL: 10 (ref 7–25)
BUN/CREAT SERPL: 10.1 (ref 7–25)
BUN/CREAT SERPL: 9.5 (ref 7–25)
BURR CELLS BLD QL SMEAR: ABNORMAL
CA-I BLDA-SCNC: 1.35 MMOL/L (ref 1.13–1.32)
CALCIUM SPEC-SCNC: 8.2 MG/DL (ref 8.6–10.5)
CALCIUM SPEC-SCNC: 8.4 MG/DL (ref 8.6–10.5)
CALCIUM SPEC-SCNC: 8.4 MG/DL (ref 8.6–10.5)
CHLORIDE BLDA-SCNC: 120 MMOL/L (ref 98–107)
CHLORIDE SERPL-SCNC: 113 MMOL/L (ref 98–107)
CHLORIDE SERPL-SCNC: 113 MMOL/L (ref 98–107)
CHLORIDE SERPL-SCNC: 114 MMOL/L (ref 98–107)
CHLORIDE UR-SCNC: 32 MMOL/L
CK SERPL-CCNC: 13 U/L (ref 20–180)
CLARITY UR: ABNORMAL
CO2 BLDA-SCNC: 10.1 MMOL/L (ref 23–27)
CO2 SERPL-SCNC: 11.3 MMOL/L (ref 22–29)
CO2 SERPL-SCNC: 12.3 MMOL/L (ref 22–29)
CO2 SERPL-SCNC: 4.9 MMOL/L (ref 22–29)
COLOR UR: ABNORMAL
CREAT BLDA-MCNC: 4.38 MG/DL (ref 0.6–1.3)
CREAT SERPL-MCNC: 3.91 MG/DL (ref 0.57–1)
CREAT SERPL-MCNC: 4.46 MG/DL (ref 0.57–1)
CREAT SERPL-MCNC: 5.03 MG/DL (ref 0.57–1)
CREAT UR-MCNC: 79.5 MG/DL
CREAT UR-MCNC: 81.9 MG/DL
D DIMER PPP FEU-MCNC: 1.76 MCGFEU/ML (ref 0–0.5)
D-LACTATE SERPL-SCNC: 1.2 MMOL/L (ref 0.5–2)
D-LACTATE SERPL-SCNC: 1.7 MMOL/L
DEPRECATED RDW RBC AUTO: 52 FL (ref 37–54)
DEPRECATED RDW RBC AUTO: 56.2 FL (ref 37–54)
DEPRECATED RDW RBC AUTO: 60.5 FL (ref 37–54)
EGFRCR SERPLBLD CKD-EPI 2021: 10.1 ML/MIN/1.73
EGFRCR SERPLBLD CKD-EPI 2021: 11.7 ML/MIN/1.73
EGFRCR SERPLBLD CKD-EPI 2021: 13.7 ML/MIN/1.73
EOSINOPHIL # BLD AUTO: 0.07 10*3/MM3 (ref 0–0.4)
EOSINOPHIL # BLD MANUAL: 0.33 10*3/MM3 (ref 0–0.4)
EOSINOPHIL NFR BLD AUTO: 0.7 % (ref 0.3–6.2)
EOSINOPHIL NFR BLD MANUAL: 4 % (ref 0.3–6.2)
ERYTHROCYTE [DISTWIDTH] IN BLOOD BY AUTOMATED COUNT: 16.6 % (ref 12.3–15.4)
ERYTHROCYTE [DISTWIDTH] IN BLOOD BY AUTOMATED COUNT: 17 % (ref 12.3–15.4)
ERYTHROCYTE [DISTWIDTH] IN BLOOD BY AUTOMATED COUNT: 17 % (ref 12.3–15.4)
FIBRINOGEN PPP-MCNC: 481 MG/DL (ref 215–521)
FOLATE SERPL-MCNC: 7.99 NG/ML (ref 4.78–24.2)
GLOBULIN UR ELPH-MCNC: 3.6 GM/DL
GLOBULIN UR ELPH-MCNC: 4.2 GM/DL
GLUCOSE BLDC GLUCOMTR-MCNC: 121 MG/DL (ref 70–99)
GLUCOSE BLDC GLUCOMTR-MCNC: 59 MG/DL (ref 70–99)
GLUCOSE BLDC GLUCOMTR-MCNC: 68 MG/DL (ref 70–99)
GLUCOSE BLDC GLUCOMTR-MCNC: 99 MG/DL (ref 65–99)
GLUCOSE SERPL-MCNC: 153 MG/DL (ref 65–99)
GLUCOSE SERPL-MCNC: 53 MG/DL (ref 65–99)
GLUCOSE SERPL-MCNC: 72 MG/DL (ref 65–99)
GLUCOSE UR STRIP-MCNC: NEGATIVE MG/DL
HCO3 BLDA-SCNC: 10.7 MMOL/L (ref 22–26)
HCT VFR BLD AUTO: 25.2 % (ref 34–46.6)
HCT VFR BLD AUTO: 27.9 % (ref 34–46.6)
HCT VFR BLD AUTO: 28.1 % (ref 34–46.6)
HCT VFR BLD CALC: 26 % (ref 38–51)
HEMODILUTION: NO
HGB BLD-MCNC: 8 G/DL (ref 12–15.9)
HGB BLD-MCNC: 8.4 G/DL (ref 12–15.9)
HGB BLD-MCNC: 8.5 G/DL (ref 12–15.9)
HGB BLDA-MCNC: 8.7 G/DL (ref 12–18)
HGB UR QL STRIP.AUTO: ABNORMAL
HYALINE CASTS UR QL AUTO: ABNORMAL /LPF
IMM GRANULOCYTES # BLD AUTO: 0.09 10*3/MM3 (ref 0–0.05)
IMM GRANULOCYTES NFR BLD AUTO: 0.9 % (ref 0–0.5)
INR PPP: 3.17 (ref 0.86–1.15)
KETONES UR QL STRIP: NEGATIVE
L PNEUMO1 AG UR QL IA: NEGATIVE
LEUKOCYTE ESTERASE UR QL STRIP.AUTO: ABNORMAL
LYMPHOCYTES # BLD AUTO: 0.45 10*3/MM3 (ref 0.7–3.1)
LYMPHOCYTES # BLD MANUAL: 0.17 10*3/MM3 (ref 0.7–3.1)
LYMPHOCYTES NFR BLD AUTO: 4.6 % (ref 19.6–45.3)
LYMPHOCYTES NFR BLD MANUAL: 4 % (ref 5–12)
Lab: ABNORMAL
MAGNESIUM SERPL-MCNC: 0.9 MG/DL (ref 1.6–2.6)
MAGNESIUM SERPL-MCNC: 1 MG/DL (ref 1.6–2.6)
MCH RBC QN AUTO: 27.6 PG (ref 26.6–33)
MCH RBC QN AUTO: 27.6 PG (ref 26.6–33)
MCH RBC QN AUTO: 29.1 PG (ref 26.6–33)
MCHC RBC AUTO-ENTMCNC: 29.9 G/DL (ref 31.5–35.7)
MCHC RBC AUTO-ENTMCNC: 30.5 G/DL (ref 31.5–35.7)
MCHC RBC AUTO-ENTMCNC: 31.7 G/DL (ref 31.5–35.7)
MCV RBC AUTO: 86.9 FL (ref 79–97)
MCV RBC AUTO: 90.6 FL (ref 79–97)
MCV RBC AUTO: 97.2 FL (ref 79–97)
MODALITY: ABNORMAL
MONOCYTES # BLD AUTO: 0.52 10*3/MM3 (ref 0.1–0.9)
MONOCYTES # BLD: 0.33 10*3/MM3 (ref 0.1–0.9)
MONOCYTES NFR BLD AUTO: 5.3 % (ref 5–12)
MYELOCYTES NFR BLD MANUAL: 1 % (ref 0–0)
NEUTROPHILS # BLD AUTO: 7.41 10*3/MM3 (ref 1.7–7)
NEUTROPHILS NFR BLD AUTO: 8.59 10*3/MM3 (ref 1.7–7)
NEUTROPHILS NFR BLD AUTO: 88 % (ref 42.7–76)
NEUTROPHILS NFR BLD MANUAL: 86 % (ref 42.7–76)
NEUTS BAND NFR BLD MANUAL: 3 % (ref 0–5)
NITRITE UR QL STRIP: NEGATIVE
NOTIFIED WHO: ABNORMAL
NOTIFIED WHO: ABNORMAL
NRBC BLD AUTO-RTO: 0 /100 WBC (ref 0–0.2)
PATHOLOGY REVIEW: YES
PCO2 BLDA: 20.5 MM HG (ref 35–45)
PH BLDA: 7.33 PH UNITS (ref 7.35–7.45)
PH UR STRIP.AUTO: 6 [PH] (ref 5–8)
PHOSPHATE SERPL-MCNC: 2.2 MG/DL (ref 2.5–4.5)
PHOSPHATE SERPL-MCNC: 2.6 MG/DL (ref 2.5–4.5)
PLATELET # BLD AUTO: 100 10*3/MM3 (ref 140–450)
PLATELET # BLD AUTO: 87 10*3/MM3 (ref 140–450)
PLATELET # BLD AUTO: 94 10*3/MM3 (ref 140–450)
PMV BLD AUTO: 12.1 FL (ref 6–12)
PMV BLD AUTO: 12.4 FL (ref 6–12)
PMV BLD AUTO: 12.5 FL (ref 6–12)
PO2 BLDA: 102.9 MM HG (ref 80–100)
POTASSIUM BLDA-SCNC: 3.1 MMOL/L (ref 3.5–5)
POTASSIUM SERPL-SCNC: 2.9 MMOL/L (ref 3.5–5.2)
POTASSIUM SERPL-SCNC: 3.4 MMOL/L (ref 3.5–5.2)
POTASSIUM SERPL-SCNC: 3.9 MMOL/L (ref 3.5–5.2)
POTASSIUM UR-SCNC: 23.2 MMOL/L
PROCALCITONIN SERPL-MCNC: 26.11 NG/ML (ref 0–0.25)
PROT ?TM UR-MCNC: 306.9 MG/DL
PROT SERPL-MCNC: 5.2 G/DL (ref 6–8.5)
PROT SERPL-MCNC: 5.7 G/DL (ref 6–8.5)
PROT UR QL STRIP: ABNORMAL
PROT/CREAT UR: 3.75 MG/G{CREAT}
PROTHROMBIN TIME: 34.3 SECONDS (ref 11.8–14.9)
QT INTERVAL: 415 MS
QTC INTERVAL: 462 MS
RBC # BLD AUTO: 2.89 10*6/MM3 (ref 3.77–5.28)
RBC # BLD AUTO: 2.9 10*6/MM3 (ref 3.77–5.28)
RBC # BLD AUTO: 3.08 10*6/MM3 (ref 3.77–5.28)
RBC # UR STRIP: ABNORMAL /HPF
READ BACK: YES
READ BACK: YES
REF LAB TEST METHOD: ABNORMAL
RH BLD: POSITIVE
RH BLD: POSITIVE
S PNEUM AG SPEC QL LA: NEGATIVE
SAO2 % BLDCOA: 97.7 % (ref 95–99)
SMALL PLATELETS BLD QL SMEAR: ABNORMAL
SODIUM BLD-SCNC: 144 MMOL/L (ref 131–143)
SODIUM SERPL-SCNC: 135 MMOL/L (ref 136–145)
SODIUM SERPL-SCNC: 138 MMOL/L (ref 136–145)
SODIUM SERPL-SCNC: 138 MMOL/L (ref 136–145)
SP GR UR STRIP: 1.01 (ref 1–1.03)
SQUAMOUS #/AREA URNS HPF: ABNORMAL /HPF
T&S EXPIRATION DATE: NORMAL
UROBILINOGEN UR QL STRIP: ABNORMAL
VARIANT LYMPHS NFR BLD MANUAL: 2 % (ref 19.6–45.3)
VIT B12 BLD-MCNC: 545 PG/ML (ref 211–946)
WBC # UR STRIP: ABNORMAL /HPF
WBC MORPH BLD: NORMAL
WBC NRBC COR # BLD AUTO: 12.57 10*3/MM3 (ref 3.4–10.8)
WBC NRBC COR # BLD AUTO: 8.33 10*3/MM3 (ref 3.4–10.8)
WBC NRBC COR # BLD AUTO: 9.77 10*3/MM3 (ref 3.4–10.8)

## 2025-02-22 PROCEDURE — 0T778DZ DILATION OF LEFT URETER WITH INTRALUMINAL DEVICE, VIA NATURAL OR ARTIFICIAL OPENING ENDOSCOPIC: ICD-10-PCS | Performed by: UROLOGY

## 2025-02-22 PROCEDURE — 87086 URINE CULTURE/COLONY COUNT: CPT | Performed by: PHYSICIAN ASSISTANT

## 2025-02-22 PROCEDURE — 52332 CYSTOSCOPY AND TREATMENT: CPT | Performed by: UROLOGY

## 2025-02-22 PROCEDURE — 83605 ASSAY OF LACTIC ACID: CPT | Performed by: INTERNAL MEDICINE

## 2025-02-22 PROCEDURE — 36620 INSERTION CATHETER ARTERY: CPT | Performed by: PHYSICIAN ASSISTANT

## 2025-02-22 PROCEDURE — 25810000003 LACTATED RINGERS SOLUTION: Performed by: PHYSICIAN ASSISTANT

## 2025-02-22 PROCEDURE — 85730 THROMBOPLASTIN TIME PARTIAL: CPT | Performed by: PHYSICIAN ASSISTANT

## 2025-02-22 PROCEDURE — 87186 SC STD MICRODIL/AGAR DIL: CPT | Performed by: PHYSICIAN ASSISTANT

## 2025-02-22 PROCEDURE — 83735 ASSAY OF MAGNESIUM: CPT | Performed by: PHYSICIAN ASSISTANT

## 2025-02-22 PROCEDURE — 85379 FIBRIN DEGRADATION QUANT: CPT | Performed by: PHYSICIAN ASSISTANT

## 2025-02-22 PROCEDURE — 85025 COMPLETE CBC W/AUTO DIFF WBC: CPT | Performed by: PHYSICIAN ASSISTANT

## 2025-02-22 PROCEDURE — 71045 X-RAY EXAM CHEST 1 VIEW: CPT

## 2025-02-22 PROCEDURE — C2617 STENT, NON-COR, TEM W/O DEL: HCPCS | Performed by: UROLOGY

## 2025-02-22 PROCEDURE — 86927 PLASMA FRESH FROZEN: CPT

## 2025-02-22 PROCEDURE — 0T9B80Z DRAINAGE OF BLADDER WITH DRAINAGE DEVICE, VIA NATURAL OR ARTIFICIAL OPENING ENDOSCOPIC: ICD-10-PCS | Performed by: UROLOGY

## 2025-02-22 PROCEDURE — C1751 CATH, INF, PER/CENT/MIDLINE: HCPCS | Performed by: ANESTHESIOLOGY

## 2025-02-22 PROCEDURE — 25810000003 SODIUM CHLORIDE 0.9 % SOLUTION: Performed by: FAMILY MEDICINE

## 2025-02-22 PROCEDURE — 80053 COMPREHEN METABOLIC PANEL: CPT | Performed by: FAMILY MEDICINE

## 2025-02-22 PROCEDURE — 80047 BASIC METABLC PNL IONIZED CA: CPT

## 2025-02-22 PROCEDURE — 86901 BLOOD TYPING SEROLOGIC RH(D): CPT

## 2025-02-22 PROCEDURE — 99291 CRITICAL CARE FIRST HOUR: CPT | Performed by: INTERNAL MEDICINE

## 2025-02-22 PROCEDURE — 86900 BLOOD TYPING SEROLOGIC ABO: CPT | Performed by: INTERNAL MEDICINE

## 2025-02-22 PROCEDURE — 99233 SBSQ HOSP IP/OBS HIGH 50: CPT | Performed by: INTERNAL MEDICINE

## 2025-02-22 PROCEDURE — 86038 ANTINUCLEAR ANTIBODIES: CPT | Performed by: STUDENT IN AN ORGANIZED HEALTH CARE EDUCATION/TRAINING PROGRAM

## 2025-02-22 PROCEDURE — 74176 CT ABD & PELVIS W/O CONTRAST: CPT

## 2025-02-22 PROCEDURE — 81001 URINALYSIS AUTO W/SCOPE: CPT | Performed by: PHYSICIAN ASSISTANT

## 2025-02-22 PROCEDURE — 86850 RBC ANTIBODY SCREEN: CPT | Performed by: INTERNAL MEDICINE

## 2025-02-22 PROCEDURE — 76775 US EXAM ABDO BACK WALL LIM: CPT

## 2025-02-22 PROCEDURE — 86900 BLOOD TYPING SEROLOGIC ABO: CPT

## 2025-02-22 PROCEDURE — 85384 FIBRINOGEN ACTIVITY: CPT | Performed by: PHYSICIAN ASSISTANT

## 2025-02-22 PROCEDURE — 86901 BLOOD TYPING SEROLOGIC RH(D): CPT | Performed by: INTERNAL MEDICINE

## 2025-02-22 PROCEDURE — 87070 CULTURE OTHR SPECIMN AEROBIC: CPT | Performed by: INTERNAL MEDICINE

## 2025-02-22 PROCEDURE — 85610 PROTHROMBIN TIME: CPT | Performed by: PHYSICIAN ASSISTANT

## 2025-02-22 PROCEDURE — 25010000002 PROPOFOL 10 MG/ML EMULSION: Performed by: ANESTHESIOLOGY

## 2025-02-22 PROCEDURE — 25510000001 IOPAMIDOL PER 1 ML: Performed by: UROLOGY

## 2025-02-22 PROCEDURE — 87077 CULTURE AEROBIC IDENTIFY: CPT | Performed by: INTERNAL MEDICINE

## 2025-02-22 PROCEDURE — 85027 COMPLETE CBC AUTOMATED: CPT | Performed by: FAMILY MEDICINE

## 2025-02-22 PROCEDURE — 04HL33Z INSERTION OF INFUSION DEVICE INTO LEFT FEMORAL ARTERY, PERCUTANEOUS APPROACH: ICD-10-PCS | Performed by: PHYSICIAN ASSISTANT

## 2025-02-22 PROCEDURE — 25010000002 FENTANYL CITRATE (PF) 50 MCG/ML SOLUTION: Performed by: ANESTHESIOLOGY

## 2025-02-22 PROCEDURE — 87205 SMEAR GRAM STAIN: CPT | Performed by: INTERNAL MEDICINE

## 2025-02-22 PROCEDURE — 51702 INSERT TEMP BLADDER CATH: CPT

## 2025-02-22 PROCEDURE — 25810000003 LACTATED RINGERS PER 1000 ML: Performed by: ANESTHESIOLOGY

## 2025-02-22 PROCEDURE — 83516 IMMUNOASSAY NONANTIBODY: CPT | Performed by: STUDENT IN AN ORGANIZED HEALTH CARE EDUCATION/TRAINING PROGRAM

## 2025-02-22 PROCEDURE — 84100 ASSAY OF PHOSPHORUS: CPT | Performed by: PHYSICIAN ASSISTANT

## 2025-02-22 PROCEDURE — C1758 CATHETER, URETERAL: HCPCS | Performed by: UROLOGY

## 2025-02-22 PROCEDURE — 84145 PROCALCITONIN (PCT): CPT | Performed by: INTERNAL MEDICINE

## 2025-02-22 PROCEDURE — 25010000002 PHYTONADIONE 10 MG/ML SOLUTION 1 ML AMPULE: Performed by: PHYSICIAN ASSISTANT

## 2025-02-22 PROCEDURE — 82948 REAGENT STRIP/BLOOD GLUCOSE: CPT

## 2025-02-22 PROCEDURE — 0T9B70Z DRAINAGE OF BLADDER WITH DRAINAGE DEVICE, VIA NATURAL OR ARTIFICIAL OPENING: ICD-10-PCS | Performed by: PHYSICIAN ASSISTANT

## 2025-02-22 PROCEDURE — 83605 ASSAY OF LACTIC ACID: CPT

## 2025-02-22 PROCEDURE — 51702 INSERT TEMP BLADDER CATH: CPT | Performed by: PHYSICIAN ASSISTANT

## 2025-02-22 PROCEDURE — 86037 ANCA TITER EACH ANTIBODY: CPT | Performed by: STUDENT IN AN ORGANIZED HEALTH CARE EDUCATION/TRAINING PROGRAM

## 2025-02-22 PROCEDURE — 85007 BL SMEAR W/DIFF WBC COUNT: CPT | Performed by: PHYSICIAN ASSISTANT

## 2025-02-22 PROCEDURE — 87154 CUL TYP ID BLD PTHGN 6+ TRGT: CPT | Performed by: PHYSICIAN ASSISTANT

## 2025-02-22 PROCEDURE — 25010000002 MAGNESIUM SULFATE 4 GM/100ML SOLUTION: Performed by: PHYSICIAN ASSISTANT

## 2025-02-22 PROCEDURE — 87077 CULTURE AEROBIC IDENTIFY: CPT | Performed by: PHYSICIAN ASSISTANT

## 2025-02-22 PROCEDURE — 25810000003 LACTATED RINGERS PER 1000 ML: Performed by: FAMILY MEDICINE

## 2025-02-22 PROCEDURE — 87186 SC STD MICRODIL/AGAR DIL: CPT | Performed by: INTERNAL MEDICINE

## 2025-02-22 PROCEDURE — P9059 PLASMA, FRZ BETWEEN 8-24HOUR: HCPCS

## 2025-02-22 PROCEDURE — C1769 GUIDE WIRE: HCPCS | Performed by: UROLOGY

## 2025-02-22 PROCEDURE — 87040 BLOOD CULTURE FOR BACTERIA: CPT | Performed by: PHYSICIAN ASSISTANT

## 2025-02-22 PROCEDURE — 25010000002 CEFTRIAXONE PER 250 MG: Performed by: INTERNAL MEDICINE

## 2025-02-22 PROCEDURE — 36430 TRANSFUSION BLD/BLD COMPNT: CPT

## 2025-02-22 PROCEDURE — 25010000002 HYDROCORTISONE SOD SUC (PF) 100 MG RECONSTITUTED SOLUTION: Performed by: PHYSICIAN ASSISTANT

## 2025-02-22 PROCEDURE — 25010000002 POTASSIUM CHLORIDE 10 MEQ/100ML SOLUTION: Performed by: FAMILY MEDICINE

## 2025-02-22 PROCEDURE — 25010000003 DEXTROSE 5 % SOLUTION: Performed by: STUDENT IN AN ORGANIZED HEALTH CARE EDUCATION/TRAINING PROGRAM

## 2025-02-22 PROCEDURE — 82550 ASSAY OF CK (CPK): CPT | Performed by: PHYSICIAN ASSISTANT

## 2025-02-22 PROCEDURE — 99222 1ST HOSP IP/OBS MODERATE 55: CPT | Performed by: UROLOGY

## 2025-02-22 PROCEDURE — 82803 BLOOD GASES ANY COMBINATION: CPT

## 2025-02-22 PROCEDURE — 76000 FLUOROSCOPY <1 HR PHYS/QHP: CPT

## 2025-02-22 PROCEDURE — 84145 PROCALCITONIN (PCT): CPT | Performed by: PHYSICIAN ASSISTANT

## 2025-02-22 PROCEDURE — 85025 COMPLETE CBC W/AUTO DIFF WBC: CPT | Performed by: STUDENT IN AN ORGANIZED HEALTH CARE EDUCATION/TRAINING PROGRAM

## 2025-02-22 DEVICE — URETERAL STENT
Type: IMPLANTABLE DEVICE | Site: URETER | Status: FUNCTIONAL
Brand: ASCERTA™

## 2025-02-22 RX ORDER — CALCIUM CHLORIDE, MAGNESIUM CHLORIDE, SODIUM CHLORIDE, SODIUM BICARBONATE, POTASSIUM CHLORIDE AND SODIUM PHOSPHATE DIBASIC DIHYDRATE 3.68; 3.05; 6.34; 3.09; .314; .187 G/L; G/L; G/L; G/L; G/L; G/L
1000 INJECTION INTRAVENOUS CONTINUOUS
Status: DISCONTINUED | OUTPATIENT
Start: 2025-02-22 | End: 2025-02-22

## 2025-02-22 RX ORDER — BISACODYL 10 MG
10 SUPPOSITORY, RECTAL RECTAL DAILY PRN
Status: DISCONTINUED | OUTPATIENT
Start: 2025-02-22 | End: 2025-03-07 | Stop reason: HOSPADM

## 2025-02-22 RX ORDER — SODIUM CHLORIDE 0.9 % (FLUSH) 0.9 %
10 SYRINGE (ML) INJECTION AS NEEDED
Status: DISCONTINUED | OUTPATIENT
Start: 2025-02-22 | End: 2025-03-07 | Stop reason: HOSPADM

## 2025-02-22 RX ORDER — IOPAMIDOL 510 MG/ML
INJECTION, SOLUTION INTRAVASCULAR AS NEEDED
Status: DISCONTINUED | OUTPATIENT
Start: 2025-02-22 | End: 2025-02-22 | Stop reason: HOSPADM

## 2025-02-22 RX ORDER — METRONIDAZOLE 500 MG/1
500 TABLET ORAL 3 TIMES DAILY
Status: DISCONTINUED | OUTPATIENT
Start: 2025-02-22 | End: 2025-02-26

## 2025-02-22 RX ORDER — PANTOPRAZOLE SODIUM 40 MG/1
40 TABLET, DELAYED RELEASE ORAL DAILY
Status: DISCONTINUED | OUTPATIENT
Start: 2025-02-22 | End: 2025-03-07 | Stop reason: HOSPADM

## 2025-02-22 RX ORDER — AMOXICILLIN 250 MG
2 CAPSULE ORAL 2 TIMES DAILY PRN
Status: DISCONTINUED | OUTPATIENT
Start: 2025-02-22 | End: 2025-03-07 | Stop reason: HOSPADM

## 2025-02-22 RX ORDER — FENTANYL CITRATE 50 UG/ML
INJECTION, SOLUTION INTRAMUSCULAR; INTRAVENOUS AS NEEDED
Status: DISCONTINUED | OUTPATIENT
Start: 2025-02-22 | End: 2025-02-22 | Stop reason: SURG

## 2025-02-22 RX ORDER — POLYETHYLENE GLYCOL 3350 17 G/17G
17 POWDER, FOR SOLUTION ORAL DAILY PRN
Status: DISCONTINUED | OUTPATIENT
Start: 2025-02-22 | End: 2025-03-07 | Stop reason: HOSPADM

## 2025-02-22 RX ORDER — LEVOFLOXACIN 750 MG/1
750 TABLET, FILM COATED ORAL ONCE
Status: COMPLETED | OUTPATIENT
Start: 2025-02-22 | End: 2025-02-22

## 2025-02-22 RX ORDER — MAGNESIUM SULFATE HEPTAHYDRATE 40 MG/ML
4 INJECTION, SOLUTION INTRAVENOUS ONCE
Status: COMPLETED | OUTPATIENT
Start: 2025-02-22 | End: 2025-02-22

## 2025-02-22 RX ORDER — HYDROCORTISONE SODIUM SUCCINATE 100 MG/2ML
100 INJECTION INTRAMUSCULAR; INTRAVENOUS ONCE
Status: COMPLETED | OUTPATIENT
Start: 2025-02-22 | End: 2025-02-22

## 2025-02-22 RX ORDER — SODIUM CHLORIDE 9 MG/ML
40 INJECTION, SOLUTION INTRAVENOUS AS NEEDED
Status: DISCONTINUED | OUTPATIENT
Start: 2025-02-22 | End: 2025-03-07 | Stop reason: HOSPADM

## 2025-02-22 RX ORDER — MIDODRINE HYDROCHLORIDE 10 MG/1
10 TABLET ORAL
Status: DISCONTINUED | OUTPATIENT
Start: 2025-02-22 | End: 2025-03-01

## 2025-02-22 RX ORDER — SACCHAROMYCES BOULARDII 250 MG
250 CAPSULE ORAL 2 TIMES DAILY
Status: DISCONTINUED | OUTPATIENT
Start: 2025-02-22 | End: 2025-02-25

## 2025-02-22 RX ORDER — HYDROCORTISONE SODIUM SUCCINATE 100 MG/2ML
50 INJECTION INTRAMUSCULAR; INTRAVENOUS EVERY 8 HOURS
Status: DISCONTINUED | OUTPATIENT
Start: 2025-02-23 | End: 2025-02-24

## 2025-02-22 RX ORDER — SODIUM CHLORIDE, SODIUM LACTATE, POTASSIUM CHLORIDE, CALCIUM CHLORIDE 600; 310; 30; 20 MG/100ML; MG/100ML; MG/100ML; MG/100ML
9 INJECTION, SOLUTION INTRAVENOUS CONTINUOUS PRN
Status: CANCELLED | OUTPATIENT
Start: 2025-02-22 | End: 2025-02-23

## 2025-02-22 RX ORDER — DEXTROSE MONOHYDRATE 25 G/50ML
50 INJECTION, SOLUTION INTRAVENOUS
Status: DISCONTINUED | OUTPATIENT
Start: 2025-02-22 | End: 2025-03-07 | Stop reason: HOSPADM

## 2025-02-22 RX ORDER — POTASSIUM CHLORIDE 750 MG/1
20 CAPSULE, EXTENDED RELEASE ORAL ONCE
Status: COMPLETED | OUTPATIENT
Start: 2025-02-22 | End: 2025-02-22

## 2025-02-22 RX ORDER — ONDANSETRON 2 MG/ML
4 INJECTION INTRAMUSCULAR; INTRAVENOUS EVERY 6 HOURS PRN
Status: DISCONTINUED | OUTPATIENT
Start: 2025-02-22 | End: 2025-03-07 | Stop reason: HOSPADM

## 2025-02-22 RX ORDER — POTASSIUM CHLORIDE 7.45 MG/ML
10 INJECTION INTRAVENOUS
Status: COMPLETED | OUTPATIENT
Start: 2025-02-22 | End: 2025-02-22

## 2025-02-22 RX ORDER — BISACODYL 5 MG/1
5 TABLET, DELAYED RELEASE ORAL DAILY PRN
Status: DISCONTINUED | OUTPATIENT
Start: 2025-02-22 | End: 2025-03-07 | Stop reason: HOSPADM

## 2025-02-22 RX ORDER — SODIUM CHLORIDE, SODIUM LACTATE, POTASSIUM CHLORIDE, CALCIUM CHLORIDE 600; 310; 30; 20 MG/100ML; MG/100ML; MG/100ML; MG/100ML
INJECTION, SOLUTION INTRAVENOUS CONTINUOUS PRN
Status: DISCONTINUED | OUTPATIENT
Start: 2025-02-22 | End: 2025-02-22 | Stop reason: SURG

## 2025-02-22 RX ORDER — NOREPINEPHRINE BITARTRATE 0.03 MG/ML
.02-.3 INJECTION, SOLUTION INTRAVENOUS
Status: DISCONTINUED | OUTPATIENT
Start: 2025-02-22 | End: 2025-02-27

## 2025-02-22 RX ORDER — PROPOFOL 10 MG/ML
VIAL (ML) INTRAVENOUS AS NEEDED
Status: DISCONTINUED | OUTPATIENT
Start: 2025-02-22 | End: 2025-02-22 | Stop reason: SURG

## 2025-02-22 RX ORDER — HEPARIN SODIUM 5000 [USP'U]/ML
5000 INJECTION, SOLUTION INTRAVENOUS; SUBCUTANEOUS EVERY 12 HOURS SCHEDULED
Status: DISCONTINUED | OUTPATIENT
Start: 2025-02-22 | End: 2025-02-22

## 2025-02-22 RX ORDER — NOREPINEPHRINE BITARTRATE 0.03 MG/ML
INJECTION, SOLUTION INTRAVENOUS
Status: COMPLETED
Start: 2025-02-22 | End: 2025-02-22

## 2025-02-22 RX ORDER — SODIUM CHLORIDE 0.9 % (FLUSH) 0.9 %
10 SYRINGE (ML) INJECTION EVERY 12 HOURS SCHEDULED
Status: DISCONTINUED | OUTPATIENT
Start: 2025-02-22 | End: 2025-03-07 | Stop reason: HOSPADM

## 2025-02-22 RX ORDER — HEPARIN SODIUM 1000 [USP'U]/ML
INJECTION, SOLUTION INTRAVENOUS; SUBCUTANEOUS AS NEEDED
Status: DISCONTINUED | OUTPATIENT
Start: 2025-02-22 | End: 2025-02-22

## 2025-02-22 RX ADMIN — PROPOFOL 50 MG: 10 INJECTION, EMULSION INTRAVENOUS at 22:31

## 2025-02-22 RX ADMIN — POTASSIUM CHLORIDE 10 MEQ: 7.46 INJECTION, SOLUTION INTRAVENOUS at 03:18

## 2025-02-22 RX ADMIN — NOREPINEPHRINE BITARTRATE 0.02 MCG/KG/MIN: 0.03 INJECTION, SOLUTION INTRAVENOUS at 17:20

## 2025-02-22 RX ADMIN — MIDODRINE HYDROCHLORIDE 10 MG: 10 TABLET ORAL at 16:38

## 2025-02-22 RX ADMIN — SODIUM CHLORIDE 1000 MG: 9 INJECTION INTRAMUSCULAR; INTRAVENOUS; SUBCUTANEOUS at 16:01

## 2025-02-22 RX ADMIN — MAGNESIUM SULFATE IN WATER 4 G: 40 INJECTION, SOLUTION INTRAVENOUS at 20:42

## 2025-02-22 RX ADMIN — MIDODRINE HYDROCHLORIDE 10 MG: 10 TABLET ORAL at 11:39

## 2025-02-22 RX ADMIN — MIDODRINE HYDROCHLORIDE 10 MG: 10 TABLET ORAL at 07:45

## 2025-02-22 RX ADMIN — POTASSIUM CHLORIDE 20 MEQ: 750 CAPSULE, EXTENDED RELEASE ORAL at 02:37

## 2025-02-22 RX ADMIN — Medication 10 ML: at 02:37

## 2025-02-22 RX ADMIN — Medication 10 ML: at 09:19

## 2025-02-22 RX ADMIN — PANTOPRAZOLE SODIUM 40 MG: 40 TABLET, DELAYED RELEASE ORAL at 09:19

## 2025-02-22 RX ADMIN — SODIUM CHLORIDE 250 ML: 9 INJECTION, SOLUTION INTRAVENOUS at 02:37

## 2025-02-22 RX ADMIN — PROPOFOL 175 MCG/KG/MIN: 10 INJECTION, EMULSION INTRAVENOUS at 22:31

## 2025-02-22 RX ADMIN — Medication 250 MG: at 09:19

## 2025-02-22 RX ADMIN — SODIUM CHLORIDE, SODIUM LACTATE, POTASSIUM CHLORIDE, CALCIUM CHLORIDE 125 ML/HR: 20; 30; 600; 310 INJECTION, SOLUTION INTRAVENOUS at 03:17

## 2025-02-22 RX ADMIN — HYDROCORTISONE SODIUM SUCCINATE 100 MG: 100 INJECTION, POWDER, FOR SOLUTION INTRAMUSCULAR; INTRAVENOUS at 20:41

## 2025-02-22 RX ADMIN — FENTANYL CITRATE 50 MCG: 50 INJECTION, SOLUTION INTRAMUSCULAR; INTRAVENOUS at 22:46

## 2025-02-22 RX ADMIN — SODIUM CHLORIDE, SODIUM LACTATE, POTASSIUM CHLORIDE, CALCIUM CHLORIDE 1569 ML: 20; 30; 600; 310 INJECTION, SOLUTION INTRAVENOUS at 19:06

## 2025-02-22 RX ADMIN — PROPOFOL 50 MG: 10 INJECTION, EMULSION INTRAVENOUS at 22:40

## 2025-02-22 RX ADMIN — SODIUM BICARBONATE 150 MEQ: 84 INJECTION INTRAVENOUS at 16:37

## 2025-02-22 RX ADMIN — DEXTROSE MONOHYDRATE 50 ML: 25 INJECTION, SOLUTION INTRAVENOUS at 17:31

## 2025-02-22 RX ADMIN — PHYTONADIONE 10 MG: 10 INJECTION, EMULSION INTRAMUSCULAR; INTRAVENOUS; SUBCUTANEOUS at 20:47

## 2025-02-22 RX ADMIN — LEVOFLOXACIN 750 MG: 750 TABLET, FILM COATED ORAL at 03:19

## 2025-02-22 RX ADMIN — DEXTROSE MONOHYDRATE 50 ML: 25 INJECTION, SOLUTION INTRAVENOUS at 21:30

## 2025-02-22 RX ADMIN — SODIUM CHLORIDE, POTASSIUM CHLORIDE, SODIUM LACTATE AND CALCIUM CHLORIDE: 600; 310; 30; 20 INJECTION, SOLUTION INTRAVENOUS at 22:08

## 2025-02-22 RX ADMIN — SERTRALINE HYDROCHLORIDE 75 MG: 50 TABLET ORAL at 09:19

## 2025-02-22 RX ADMIN — POTASSIUM CHLORIDE 10 MEQ: 7.46 INJECTION, SOLUTION INTRAVENOUS at 04:22

## 2025-02-22 RX ADMIN — METRONIDAZOLE 500 MG: 500 TABLET ORAL at 16:01

## 2025-02-22 NOTE — PROGRESS NOTES
[facility]   Hospitalist Progress Note  Date: 2025  Patient Name: Mag Padilla  : 1980  MRN: [id number]  Date of admission: 2025  Room/Bed: [facility]      Subjective   Subjective     Chief Complaint: nausea, vomiting weakness     Summary:Mag Padilla is a 44 y.o. female who is a nursing home resident  with past medical history of Crohn disease status post resection,with complex abdominal surgical history.  Patient had a history of a duodenal hole and underwent extensive surgery that required a G-tube, J-tube and multiple drain placements this was all done at [facility].  Patient since that time has had multiple issues with abdominal wounds and abscesses.  Patient also has history of hypotension on midodrine, anxiety, chronic diarrhea, and GERD presenting to the ED for evaluation of nausea vomiting diarrhea with generalized weakness.  Patient states that for the last 3 days she has not been feeling well with persistent nausea and vomiting for the last 2 days and worsening of her chronic diarrhea.  Due to the symptoms patient is weakness and lethargy had also worsened to where she is mostly bedbound with excessive sleepiness.  Patient is seen by wound care for abdominal wounds after an ex lap with colon resection.  Due to worsening condition patient was eventually brought to the ED for further evaluation.  In the ED patient was significantly hypotensive on arrival with remaining vitals being within normal limits.  UA was positive for UTI with signs of dehydration, labs showed severely reduced renal function with hypokalemia, anemia, thrombocytopenia, and severe hypoalbuminemia.  Chest x-ray was negative for any acute findings.  When seen patient was resting comfortably and still having episode of diarrhea since being here although her nausea is much improved.  She did deny any recent fevers, chills, headaches, focal weakness, chest pain, palpitation, shortness of breath, cough,  abdominal pain, constipation, dysuria, hematuria, hematochezia, melena, or anxiety.  Patient admitted for further evaluation and treatment.       Interval Followup:   Patient's creatinine is elevated at 5.  Patient's potassium within normal limits.  Patient continues to have draining abdominal wounds which she states she always has some degree of drainage  Patient states she just feels weak and tired  White blood cell count elevated at 12.5  Patient's iron studies consistent with iron deficiency anemia    Review of Systems    All systems reviewed and negative except for what is outlined above.      Objective   Objective     Vitals:   Temp:  [97.5 °F (36.4 °C)-98.1 °F (36.7 °C)] 97.8 °F (36.6 °C)  Heart Rate:  [55-76] 67  Resp:  [16] 16  BP: (56-82)/(37-63) 77/63    Physical Exam   General: Awake, alert, NAD resting in bed  HENT: NCAT, MMM  Eyes: pupils equal, no scleral icterus  Cardiovascular: RRR, no murmurs   Pulmonary: CTA bilaterally; no wheezes; no conversational dyspnea  Gastrointestinal: Soft patient has multiple abdominal wounds patient has a wound on her left lower side that is draining a significant amount of greenish drainage.  Will culture it.  Patient denies any significant pain  Musculoskeletal: No gross deformities  Skin: No jaundice, no rash on exposed skin appreciated  Neuro: CN II through XII grossly intact; speech clear; no tremor  Psych: Mood and affect appropriate  : No Renee catheter; no suprapubic tenderness    Result Review    Result Review:  I have personally reviewed these results:  [x]  Laboratory      Lab 02/22/25  0502 02/21/25 1821 02/21/25  1708   WBC 12.57* 9.27 11.98*   HEMOGLOBIN 8.4* 8.2* 9.0*   HEMATOCRIT 28.1* 26.8* 29.9*   PLATELETS 87* 71* 86*   NEUTROS ABS  --  8.31* 10.85*   IMMATURE GRANS (ABS)  --  0.08* 0.06*   LYMPHS ABS  --  0.36* 0.43*   MONOS ABS  --  0.42 0.49   EOS ABS  --  0.06 0.08   MCV 97.2* 93.1 92.9         Lab 02/22/25  0502 02/21/25  1821 02/21/25  1708    SODIUM 135* 135* 132*   POTASSIUM 3.9 3.0* 3.3*   CHLORIDE 113* 110* 106   CO2 4.9* 11.5* 10.3*   ANION GAP 17.1* 13.5 15.7*   BUN 48* 53* 56*   CREATININE 5.03* 5.50* 5.67*   EGFR 10.1* 9.1* 8.8*   GLUCOSE 53* 79 88   CALCIUM 8.2* 7.9* 8.2*   MAGNESIUM  --  1.2*  --          Lab 02/21/25  1821 02/21/25  1708   TOTAL PROTEIN 5.4* 6.1   ALBUMIN 1.6* 1.6*   GLOBULIN 3.8 4.5   ALT (SGPT) 5 <5   AST (SGOT) 9 13   BILIRUBIN 0.8 0.9   ALK PHOS 81 88   LIPASE  --  10*         Lab 02/21/25 1821   PROBNP 15,412.0*             Lab 02/21/25 1821   IRON 25*   IRON SATURATION (TSAT) 32   TIBC 79*   TRANSFERRIN 53*         Brief Urine Lab Results  (Last result in the past 365 days)        Color   Clarity   Blood   Leuk Est   Nitrite   Protein   CREAT   Urine HCG        02/21/25 2202 Dark Yellow   Cloudy   Large (3+)   Moderate (2+)   Negative   >=300 mg/dL (3+)           02/21/25 2202               Negative             [x]  Microbiology   Microbiology Results (last 10 days)       Procedure Component Value - Date/Time    COVID-19, FLU A/B, RSV PCR 1 HR TAT - Swab, Nasopharynx [[id number]]  (Normal) Collected: 02/21/25 1745    Lab Status: Final result Specimen: Swab from Nasopharynx Updated: 02/21/25 1832     COVID19 Not Detected     Influenza A PCR Not Detected     Influenza B PCR Not Detected     RSV, PCR Not Detected    Narrative:      Fact sheet for providers: https://www.fda.gov/media/181140/download    Fact sheet for patients: https://www.fda.gov/media/817958/download    Test performed by PCR.          [x]  Radiology  XR Chest 1 View    Result Date: 2/21/2025  No active disease. Electronically Signed: Vitaly Maya MD  2/21/2025 7:38 PM EST  Workstation ID: [id number]   []  EKG/Telemetry   []  Cardiology/Vascular   []  Pathology  []  Old records  []  Other:    Assessment & Plan   Assessment / Plan     Assessment:  Acute renal failure  Chronic hypotension on midodrine  History of Crohn's disease with complex surgery  Chronic  nonhealing abdominal wounds and abscess that time secondary to significant abdominal surgery in the past for duodenal ulcer rupture follows with       Plan:  Patient remains admitted to the medicine service  Patient appears to be on Flagyl and Keflex from the nursing home for her chronic abdominal wounds.  Will continue her on Flagyl and Rocephin  Nephrology has been consulted for help with acute kidney injury  Obtain renal ultrasound  Insert Renee for accurate In/out   Obtain abdominal wound culture  Obtain CT of abdomen   Resumed home medication   Discussed plan with nurse and with patient      Discussed with RN.    VTE Prophylaxis:  Mechanical VTE prophylaxis orders are present.        CODE STATUS:   Level Of Support Discussed With: Patient  Code Status (Patient has no pulse and is not breathing): CPR (Attempt to Resuscitate)  Medical Interventions (Patient has pulse or is breathing): Full Support      Electronically signed by Brandon Estevez DO, 2/22/2025, 11:59 EST.

## 2025-02-22 NOTE — CONSULTS
[facility]   Consult Note    Patient Name: Mag Padilla  : 1980  MRN: [id number]  Primary Care Physician:  Provider, No Known  Referring Physician: No ref. provider found  Date of admission: 2025    Subjective   Subjective     Reason for Consult/ Chief Complaint: JOHANA    HPI:  Mag Padilla is a 44 y.o. female 44-year-old female with past medical history of Crohn's disease status post resection, chronically hypotensive on midodrine, anxiety, chronic diarrhea, GERD who who has not been feeling well and has had persistent nausea and vomiting for the last few days as well as her diarrhea.  She was also noted to be excessively sleepy.  Due to her worsening condition she was brought into the ED.    In the ER she was noted to be significantly hypotensive and was resuscitated with IV fluids however her blood pressures persistently stayed low.  Her creatinine was noted to be at 5.6.  Her urine analysis was concerning for possible UTI.  Her lactate was noted to be negative.  Her labs continue to worsen with a bicarb of 5 and decreased urine output.  Her white count noted to be elevated at 12.5.  Patient admitted for further management of her symptoms nephrology has been consulted for management of JOHANA    Review of Systems  All review of systems negative except as given below.    Personal History     Past Medical History:   Diagnosis Date    Abscess of peritoneum     Anemia     Anxiety     Breast cancer     Crohn's disease     GERD (gastroesophageal reflux disease)     HTN (hypertension)     Hyperlipidemia     Intestine disorder     SVT (supraventricular tachycardia)        Past Surgical History:   Procedure Laterality Date    ABDOMINAL SURGERY         Family History: family history is not on file. Otherwise pertinent FHx was reviewed and not pertinent to current issue.    Social History:  reports that she has never smoked. She has never used smokeless tobacco. She reports that she does not drink  alcohol and does not use drugs.    Home Medications:  acetaminophen, cephalexin, ferrous sulfate, metroNIDAZOLE, midodrine, multivitamin with minerals, ondansetron, pantoprazole, potassium phosphate (monobasic), saccharomyces boulardii, senna, and sertraline    Allergies:  Allergies   Allergen Reactions    Iodine Unknown - High Severity    Penicillins Unknown - High Severity       Objective    Objective     Vitals:   Temp:  [97.5 °F (36.4 °C)-98.6 °F (37 °C)] 98.6 °F (37 °C)  Heart Rate:  [] 145  Resp:  [16] 16  BP: (56-82)/(37-64) 78/64    Physical Exam:             Constitutional:         Awake, alert responsive, conversant, no obvious distress   Eyes:                       PERRLA, sclerae anicteric, no conjunctival injection   HEENT:                   Moist mucous membranes, no nasal or eye discharge, no throat congestion   Neck:                      Supple, no thyromegaly, no lymphadenopathy, trachea midline, no elevated JVD   Respiratory:           Clear to auscultation bilaterally, nonlabored respirations    Cardiovascular:     RRR, no murmurs, rubs, or gallops, palpable pedal pulses bilaterally, No bilateral ankle edema   Gastrointestinal:   Positive bowel sounds, soft, nontender, non-distended, no organomegaly   Musculoskeletal:  No clubbing or cyanosis to extremities, muscle wasting, joint swelling, muscle weakness   Psychiatric:              Appropriate affect, cooperative   Neurologic:            Awake alert, oriented x 3, strength symmetric in all extremities, Cranial Nerves grossly intact to confrontation, speech clear   Skin:                      No rashes, bruising, skin ulcers, petechiae or ecchymosis    Result Review    Result Review:  I have personally reviewed the results from the time of this admission to 2/22/2025 16:18 EST and agree with these findings:  []  Laboratory  []  Microbiology  []  Radiology  []  EKG/Telemetry   []  Cardiology/Vascular   []  Pathology  []  Old records  []   Other:    Results from last 7 days   Lab Units 02/22/25  0502 02/21/25  1821 02/21/25  1708   WBC 10*3/mm3 12.57* 9.27 11.98*   HEMOGLOBIN g/dL 8.4* 8.2* 9.0*   PLATELETS 10*3/mm3 87* 71* 86*     Results from last 7 days   Lab Units 02/22/25  0502 02/21/25  1821 02/21/25  1708   SODIUM mmol/L 135* 135* 132*   POTASSIUM mmol/L 3.9 3.0* 3.3*   CHLORIDE mmol/L 113* 110* 106   CO2 mmol/L 4.9* 11.5* 10.3*   ANION GAP mmol/L 17.1* 13.5 15.7*   BUN mg/dL 48* 53* 56*   CREATININE mg/dL 5.03* 5.50* 5.67*   GLUCOSE mg/dL 53* 79 88   EGFR mL/min/1.73 10.1* 9.1* 8.8*   CALCIUM mg/dL 8.2* 7.9* 8.2*   MAGNESIUM mg/dL  --  1.2*  --    BILIRUBIN mg/dL  --  0.8 0.9   ALK PHOS U/L  --  81 88   ALT (SGPT) U/L  --  5 <5   AST (SGOT) U/L  --  9 13       Assessment & Plan   Assessment / Plan     Active Hospital Problems:  Active Hospital Problems    Diagnosis     **Acute renal failure     Hypotension due to hypovolemia     Chronic diarrhea     Nausea & vomiting     Inflammatory bowel disease (Crohn's disease)      4-year-old female with past medical history of Crohn's disease status post resection, chronically hypotensive on midodrine, anxiety, chronic diarrhea, GERD who who has not been feeling well and has had persistent nausea and vomiting for the last few days as well as her diarrhea with severe JOHANA and creatinine rise from baseline normal to peak of 5.6 with decreased urine output and worsening bicarb currently at 5.  JOHANA most likely due to hypotension, diarrhea and vomiting contributing with UA showing some hyaline cast.  Urine analysis also concerning for large amount of proteinuria and RBCs    Plan:   Though patient's mentation is stable, she has severe organ dysfunction which may possibly due to hypoperfusion and will transfer to the ICU for initiation of pressors to maintain a MAP greater than 65  Patient will need an A-line for accurate blood pressure monitoring  Will start the patient on bicarb drip  Creatinine is slightly  improving though there may be a component of dilution however will give a trial with bicarb drip and continue to monitor closely  Will get repeat labs stat and reassess as patient will most likely end up needing renal replacement therapy.  I have spoken with the ICU team and will tentatively place a line in and reassess timing of initiation of dialysis  Continue with midodrine 10 mg 3 times daily  Due to her hematuria and completion of workup, will get complement levels as well as ANCA profile  Patient is on antibiotics  Patient will be getting stat CT and renal ultrasound  Discussed with floor nurse, ICU attending, as well as primary attending    35 minutes critical care taken in managing the patient    Thank you for involving me in the care of the patient.  Will continue to follow along    Electronically signed by Yaya San MD, 02/22/25, 4:18 PM EST.

## 2025-02-22 NOTE — PLAN OF CARE
Goal Outcome Evaluation:  Plan of Care Reviewed With: patient           Outcome Evaluation: Patient alert and oriented x4 on room air. Patient blood pressure has been low all night but no symptoms present. No complaints of pain noted. Potassium given per MAR. Continue care per Plan of Care.

## 2025-02-22 NOTE — PROCEDURES
Procedure Name: De La Cruz Catheter Placement    Indication:  renal failure    Procedure:  Patient was prepped and draped in normal fashion for catheter placement.      A 16 Romanian indwelling de la cruz catheter was inserted per urethra into the bladder without difficulty.  There was return of  urine.      Patient tolerated procedure well.      Complications: None    Electronically signed by DANIEL Hernandez, 02/22/25, 6:38 PM EST.

## 2025-02-22 NOTE — H&P
[facility]   HISTORY AND PHYSICAL    Patient Name: Mag Padilla  : 1980  MRN: [id number]  Primary Care Physician:  Provider, No Known  Date of admission: 2025    Subjective   Subjective     Chief Complaint: Nausea vomiting diarrhea, generalized weakness    HPI:    Mag Padilla is a 44 y.o. female with past medical history of Crohn disease status post resection, hypotension on midodrine, anxiety, chronic diarrhea, and GERD presenting to the ED for evaluation of nausea vomiting diarrhea with generalized weakness.  Patient states that for the last 3 days she has not been feeling well with persistent nausea and vomiting for the last 2 days and worsening of her chronic diarrhea.  Due to the symptoms patient is weakness and lethargy had also worsened to where she is mostly bedbound with excessive sleepiness.  Patient is seen by wound care for abdominal wounds after an ex lap with colon resection.  Due to worsening condition patient was eventually brought to the ED for further evaluation.  In the ED patient was significantly hypotensive on arrival with remaining vitals being within normal limits.  UA was positive for UTI with signs of dehydration, labs showed severely reduced renal function with hypokalemia, anemia, thrombocytopenia, and severe hypoalbuminemia.  Chest x-ray was negative for any acute findings.  When seen patient was resting comfortably and still having episode of diarrhea since being here although her nausea is much improved.  She did deny any recent fevers, chills, headaches, focal weakness, chest pain, palpitation, shortness of breath, cough, abdominal pain, constipation, dysuria, hematuria, hematochezia, melena, or anxiety.  Patient admitted for further evaluation and treatment.    Review of Systems   All systems were reviewed and negative except for: As per HPI    Personal History     Past Medical History:   Diagnosis Date    Abscess of peritoneum     Anemia     Anxiety      Breast cancer     Crohn's disease     GERD (gastroesophageal reflux disease)     HTN (hypertension)     Hyperlipidemia     Intestine disorder     SVT (supraventricular tachycardia)        Past Surgical History:   Procedure Laterality Date    ABDOMINAL SURGERY         Family History: family history is not on file. Otherwise pertinent FHx was reviewed and not pertinent to current issue.    Social History:      Home Medications:  acetaminophen, cephalexin, ferrous sulfate, metroNIDAZOLE, midodrine, multivitamin with minerals, ondansetron, pantoprazole, potassium phosphate (monobasic), saccharomyces boulardii, senna, and sertraline      Allergies:  Allergies   Allergen Reactions    Iodine Unknown - High Severity    Penicillins Unknown - High Severity       Objective   Objective     Vitals:   Temp:  [97.7 °F (36.5 °C)-98.1 °F (36.7 °C)] 98.1 °F (36.7 °C)  Heart Rate:  [66-76] 70  Resp:  [16] 16  BP: (58-82)/(38-52) 82/52  Physical Exam    Constitutional: Awake, alert, ill-appearing   Eyes: PERRLA, sclerae anicteric, no conjunctival injection   HENT: NCAT, mucous membranes dry   Neck: Supple, no thyromegaly, no lymphadenopathy, trachea midline   Respiratory: Clear to auscultation bilaterally, nonlabored respirations    Cardiovascular: RRR, no murmurs, rubs, or gallops, palpable pedal pulses bilaterally   Gastrointestinal: Positive bowel sounds, soft, nontender, nondistended   Musculoskeletal: No bilateral ankle edema, no clubbing or cyanosis to extremities   Psychiatric: Appropriate affect, cooperative   Neurologic: Oriented x 3, strength symmetric in all extremities, Cranial Nerves grossly intact to confrontation, speech clear   Skin: Packed abdominal wound from prior ex lap.                  Result Review    Result Review:  I have personally reviewed the results from the time of this admission to 2/21/2025 22:46 EST and agree with these findings:  [x]  Laboratory list / accordion  []  Microbiology  [x]  Radiology  [x]   EKG/Telemetry   []  Cardiology/Vascular   []  Pathology  []  Old records  []  Other:  Most notable findings include: UA was positive for UTI with signs of dehydration, labs showed severely reduced renal function with hypokalemia, anemia, thrombocytopenia, and severe hypoalbuminemia.  Chest x-ray was negative for any acute finding      Assessment & Plan   Assessment / Plan     Brief Patient Summary:  Mag Padilla is a 44 y.o. female with past medical history of Crohn disease status post resection, hypotension on midodrine, anxiety, chronic diarrhea, and GERD presenting to the ED for evaluation of nausea vomiting diarrhea with generalized weakness.    Active Hospital Problems:  Active Hospital Problems    Diagnosis     **Acute renal failure     Hypotension due to hypovolemia     Chronic diarrhea     Nausea & vomiting     Inflammatory bowel disease (Crohn's disease)      Plan:     Acute renal failure  -Admit to telemetry  -Secondary to GI losses/dehydration  -Patient hypotensive on arrival.  Hypoperfusion  -Status post fluid resuscitation  -Maintenance IVF  -UA reviewed.  UTI also noted  -Avoid nephrotoxic drugs  -Consult nephrology  -Follow-up labs    Hypotension  -Chronic issue  -Superimposed dehydration as above  -Midodrine  -IVF  -Transfer to ICU for pressor support if warranted    Crohn's disease  Anxiety  Chronic diarrhea    GI ppx  DVT ppx      VTE Prophylaxis:  Pharmacologic VTE prophylaxis orders are signed & held.          CODE STATUS:    Level Of Support Discussed With: Patient  Code Status (Patient has no pulse and is not breathing): CPR (Attempt to Resuscitate)  Medical Interventions (Patient has pulse or is breathing): Full Support    Admission Status:  I believe this patient meets inpatient status.      Electronically signed by Parveen Palacios MD, 02/21/25, 10:46 PM EST.

## 2025-02-22 NOTE — PROCEDURES
FEMORAL ARTERIAL LINE PLACEMENT NOTE  Indication: Need for continuous hemodynamic monitoring,  Consent: Obtained  A time-out was completed verifying correct patient, procedure, site, positioning, and special equipment if applicable prior to beginning procedure.     Procedure:  Liam´s test was not indicated. The patient´s left femoral groin was prepped and draped in sterile fashion. 1% Lidocaine was used to anesthetize the area. Ultrasound was used to assist with finding the artery which was patent, pulsatile and lying next to femoral vein. Artery was found with cannulating needle with bright red pulsatile blood return. Wire was introduced into the vessel and needle was removed. A long arterial line was introduced into the left femoral artery and then wire was removed. Line was then connected to transducer with appropriate wave form noted on monitor. The catheter was then sutured in place to the skin and a sterile dressing applied. Perfusion to the extremity distal to the point of catheter insertion was checked and found to be adequate.     Estimated Blood Loss: None  The patient tolerated the procedure well and there were no complications.    Electronically signed by DANIEL Hernandez, 02/22/25, 6:27 PM EST.

## 2025-02-22 NOTE — NURSING NOTE
Patient Mag Padilla admitted to 4MTU from the ED. Patient is alert and oriented to person, place, time, and situation. Patient oriented to unit, call light system and bed alarm use, teaching effective. Patient agreed to bed alarm use. Candida Auris screening revealed patient   4 eyes skin assessment completed with Germaine Bang RN. Per policy, the following skin interventions were put in place as a result of the skin assessment below: LDA created and pictures and measurements taken of current skin breakdown    Skin Assessments (most recent)      Flowsheet Row Most Recent Value   Sensory Perception 4-->no impairment   Moisture 2-->very moist   Activity 2-->chairfast   Mobility 2-->very limited   Nutrition 3-->adequate   Friction and Shear 2-->potential problem   Alok Score 15            Fall assessment completed. Per policy, the patient was determined be a Low fall risk due to Kilgore Jerson score 14 or less (only used within the first 24 hours of admission). Patient assessed to need the following mobility assistance: 1 Assist with the following assistive devices: Walker, and will be using a bedpan for toileting. Per policy, the following fall interventions were put in place: Standard Fall Precautions - oriented to room and call light, bed low and locked, clutter free environment, adequate lighting, directed to call for assistance, non-skid footwear, hourly rounding and personal belongings within reach., Bed Alarm, and Room close to nurse's station.     Patient's belongings that were sent with family: None  Patient's belongings that were sent to security: None  Patient's belongings locked in safe box in room: None  Patient's belongings that were sent to pharmacy: None  Patient's belongings kept at bedside per patient: Clothing

## 2025-02-23 LAB
ALBUMIN SERPL-MCNC: 1.9 G/DL (ref 3.5–5.2)
ALBUMIN SERPL-MCNC: 2 G/DL (ref 3.5–5.2)
ALBUMIN/GLOB SERPL: 0.6 G/DL
ALP SERPL-CCNC: 91 U/L (ref 39–117)
ALT SERPL W P-5'-P-CCNC: 7 U/L (ref 1–33)
ANION GAP SERPL CALCULATED.3IONS-SCNC: 12.5 MMOL/L (ref 5–15)
ANION GAP SERPL CALCULATED.3IONS-SCNC: 15.2 MMOL/L (ref 5–15)
AST SERPL-CCNC: 9 U/L (ref 1–32)
B PARAPERT DNA SPEC QL NAA+PROBE: NOT DETECTED
B PERT DNA SPEC QL NAA+PROBE: NOT DETECTED
BACTERIA BLD CULT: ABNORMAL
BACTERIA ID TEST ISLT QL CULT: ABNORMAL
BILIRUB SERPL-MCNC: 1.2 MG/DL (ref 0–1.2)
BOTTLE TYPE: ABNORMAL
BUN SERPL-MCNC: 34 MG/DL (ref 6–20)
BUN SERPL-MCNC: 38 MG/DL (ref 6–20)
BUN/CREAT SERPL: 10.4 (ref 7–25)
BUN/CREAT SERPL: 9.3 (ref 7–25)
C PNEUM DNA NPH QL NAA+NON-PROBE: NOT DETECTED
C3 SERPL-MCNC: 75 MG/DL (ref 82–167)
C3 SERPL-MCNC: 77 MG/DL (ref 82–167)
C4 SERPL-MCNC: 15 MG/DL (ref 14–44)
C4 SERPL-MCNC: 15 MG/DL (ref 14–44)
CALCIUM SPEC-SCNC: 8.1 MG/DL (ref 8.6–10.5)
CALCIUM SPEC-SCNC: 8.3 MG/DL (ref 8.6–10.5)
CHLORIDE SERPL-SCNC: 109 MMOL/L (ref 98–107)
CHLORIDE SERPL-SCNC: 112 MMOL/L (ref 98–107)
CO2 SERPL-SCNC: 15.5 MMOL/L (ref 22–29)
CO2 SERPL-SCNC: 18.8 MMOL/L (ref 22–29)
CREAT SERPL-MCNC: 3.64 MG/DL (ref 0.57–1)
CREAT SERPL-MCNC: 3.66 MG/DL (ref 0.57–1)
D-LACTATE SERPL-SCNC: 0.9 MMOL/L (ref 0.5–2)
DEPRECATED RDW RBC AUTO: 53.1 FL (ref 37–54)
EGFRCR SERPLBLD CKD-EPI 2021: 14.9 ML/MIN/1.73
EGFRCR SERPLBLD CKD-EPI 2021: 15 ML/MIN/1.73
ERYTHROCYTE [DISTWIDTH] IN BLOOD BY AUTOMATED COUNT: 16.8 % (ref 12.3–15.4)
FLUAV SUBTYP SPEC NAA+PROBE: NOT DETECTED
FLUBV RNA ISLT QL NAA+PROBE: NOT DETECTED
GLOBULIN UR ELPH-MCNC: 3.6 GM/DL
GLUCOSE BLDC GLUCOMTR-MCNC: 156 MG/DL (ref 70–99)
GLUCOSE BLDC GLUCOMTR-MCNC: 171 MG/DL (ref 70–99)
GLUCOSE BLDC GLUCOMTR-MCNC: 172 MG/DL (ref 70–99)
GLUCOSE SERPL-MCNC: 138 MG/DL (ref 65–99)
GLUCOSE SERPL-MCNC: 149 MG/DL (ref 65–99)
HADV DNA SPEC NAA+PROBE: NOT DETECTED
HCOV 229E RNA SPEC QL NAA+PROBE: NOT DETECTED
HCOV HKU1 RNA SPEC QL NAA+PROBE: NOT DETECTED
HCOV NL63 RNA SPEC QL NAA+PROBE: NOT DETECTED
HCOV OC43 RNA SPEC QL NAA+PROBE: NOT DETECTED
HCT VFR BLD AUTO: 22.4 % (ref 34–46.6)
HCT VFR BLD AUTO: 24.7 % (ref 34–46.6)
HGB BLD-MCNC: 7 G/DL (ref 12–15.9)
HGB BLD-MCNC: 8.1 G/DL (ref 12–15.9)
HMPV RNA NPH QL NAA+NON-PROBE: NOT DETECTED
HOLD SPECIMEN: NORMAL
HPIV1 RNA ISLT QL NAA+PROBE: NOT DETECTED
HPIV2 RNA SPEC QL NAA+PROBE: NOT DETECTED
HPIV3 RNA NPH QL NAA+PROBE: NOT DETECTED
HPIV4 P GENE NPH QL NAA+PROBE: NOT DETECTED
INR PPP: 1.35 (ref 0.86–1.15)
M PNEUMO IGG SER IA-ACNC: NOT DETECTED
MAGNESIUM SERPL-MCNC: 1.9 MG/DL (ref 1.6–2.6)
MCH RBC QN AUTO: 27.2 PG (ref 26.6–33)
MCHC RBC AUTO-ENTMCNC: 31.3 G/DL (ref 31.5–35.7)
MCV RBC AUTO: 87.2 FL (ref 79–97)
MRSA DNA SPEC QL NAA+PROBE: ABNORMAL
PHOSPHATE SERPL-MCNC: 2.2 MG/DL (ref 2.5–4.5)
PHOSPHATE SERPL-MCNC: 2.7 MG/DL (ref 2.5–4.5)
PLATELET # BLD AUTO: 84 10*3/MM3 (ref 140–450)
PMV BLD AUTO: 13.5 FL (ref 6–12)
POTASSIUM SERPL-SCNC: 3 MMOL/L (ref 3.5–5.2)
POTASSIUM SERPL-SCNC: 4.1 MMOL/L (ref 3.5–5.2)
PROCALCITONIN SERPL-MCNC: 20.74 NG/ML (ref 0–0.25)
PROT SERPL-MCNC: 5.6 G/DL (ref 6–8.5)
PROTHROMBIN TIME: 17.3 SECONDS (ref 11.8–14.9)
QT INTERVAL: 417 MS
QT INTERVAL: 450 MS
QTC INTERVAL: 433 MS
QTC INTERVAL: 486 MS
RBC # BLD AUTO: 2.57 10*6/MM3 (ref 3.77–5.28)
RHINOVIRUS RNA SPEC NAA+PROBE: NOT DETECTED
RSV RNA NPH QL NAA+NON-PROBE: NOT DETECTED
SARS-COV-2 RNA RESP QL NAA+PROBE: NOT DETECTED
SODIUM SERPL-SCNC: 140 MMOL/L (ref 136–145)
SODIUM SERPL-SCNC: 143 MMOL/L (ref 136–145)
VANCOMYCIN SERPL-MCNC: 23.27 MCG/ML (ref 5–40)
WBC NRBC COR # BLD AUTO: 6.5 10*3/MM3 (ref 3.4–10.8)
WHOLE BLOOD HOLD COAG: NORMAL

## 2025-02-23 PROCEDURE — 85014 HEMATOCRIT: CPT | Performed by: PHYSICIAN ASSISTANT

## 2025-02-23 PROCEDURE — 86160 COMPLEMENT ANTIGEN: CPT | Performed by: STUDENT IN AN ORGANIZED HEALTH CARE EDUCATION/TRAINING PROGRAM

## 2025-02-23 PROCEDURE — 25810000003 SODIUM CHLORIDE 0.9 % SOLUTION 250 ML FLEX CONT: Performed by: UROLOGY

## 2025-02-23 PROCEDURE — 25010000002 MEROPENEM PER 100 MG: Performed by: PHYSICIAN ASSISTANT

## 2025-02-23 PROCEDURE — 85610 PROTHROMBIN TIME: CPT | Performed by: PHYSICIAN ASSISTANT

## 2025-02-23 PROCEDURE — 80053 COMPREHEN METABOLIC PANEL: CPT | Performed by: INTERNAL MEDICINE

## 2025-02-23 PROCEDURE — 93005 ELECTROCARDIOGRAM TRACING: CPT | Performed by: INTERNAL MEDICINE

## 2025-02-23 PROCEDURE — 85018 HEMOGLOBIN: CPT | Performed by: PHYSICIAN ASSISTANT

## 2025-02-23 PROCEDURE — 83735 ASSAY OF MAGNESIUM: CPT | Performed by: INTERNAL MEDICINE

## 2025-02-23 PROCEDURE — 87641 MR-STAPH DNA AMP PROBE: CPT | Performed by: UROLOGY

## 2025-02-23 PROCEDURE — 99291 CRITICAL CARE FIRST HOUR: CPT | Performed by: INTERNAL MEDICINE

## 2025-02-23 PROCEDURE — 25010000002 POTASSIUM CHLORIDE PER 2 MEQ: Performed by: PHYSICIAN ASSISTANT

## 2025-02-23 PROCEDURE — 25010000002 HYDROCORTISONE SOD SUC (PF) 100 MG RECONSTITUTED SOLUTION: Performed by: UROLOGY

## 2025-02-23 PROCEDURE — 80202 ASSAY OF VANCOMYCIN: CPT | Performed by: UROLOGY

## 2025-02-23 PROCEDURE — 83605 ASSAY OF LACTIC ACID: CPT | Performed by: INTERNAL MEDICINE

## 2025-02-23 PROCEDURE — 82948 REAGENT STRIP/BLOOD GLUCOSE: CPT | Performed by: UROLOGY

## 2025-02-23 PROCEDURE — 25010000002 MAGNESIUM SULFATE IN D5W 1G/100ML (PREMIX) 1-5 GM/100ML-% SOLUTION: Performed by: PHYSICIAN ASSISTANT

## 2025-02-23 PROCEDURE — 25010000003 DEXTROSE 5 % SOLUTION: Performed by: UROLOGY

## 2025-02-23 PROCEDURE — 86022 PLATELET ANTIBODIES: CPT | Performed by: UROLOGY

## 2025-02-23 PROCEDURE — 25010000002 VANCOMYCIN 1 G RECONSTITUTED SOLUTION 1 EACH VIAL: Performed by: UROLOGY

## 2025-02-23 PROCEDURE — 84100 ASSAY OF PHOSPHORUS: CPT | Performed by: INTERNAL MEDICINE

## 2025-02-23 PROCEDURE — 0202U NFCT DS 22 TRGT SARS-COV-2: CPT | Performed by: UROLOGY

## 2025-02-23 PROCEDURE — 85362 FIBRIN DEGRADATION PRODUCTS: CPT | Performed by: PHYSICIAN ASSISTANT

## 2025-02-23 PROCEDURE — 25010000002 CEFEPIME PER 500 MG: Performed by: UROLOGY

## 2025-02-23 PROCEDURE — 25010000002 VANCOMYCIN PER 500 MG: Performed by: PHYSICIAN ASSISTANT

## 2025-02-23 PROCEDURE — 85027 COMPLETE CBC AUTOMATED: CPT | Performed by: INTERNAL MEDICINE

## 2025-02-23 PROCEDURE — 99233 SBSQ HOSP IP/OBS HIGH 50: CPT | Performed by: INTERNAL MEDICINE

## 2025-02-23 PROCEDURE — 99232 SBSQ HOSP IP/OBS MODERATE 35: CPT | Performed by: UROLOGY

## 2025-02-23 RX ORDER — POTASSIUM CHLORIDE 29.8 MG/ML
20 INJECTION INTRAVENOUS
Status: COMPLETED | OUTPATIENT
Start: 2025-02-23 | End: 2025-02-23

## 2025-02-23 RX ORDER — MAGNESIUM SULFATE 1 G/100ML
1 INJECTION INTRAVENOUS ONCE
Status: COMPLETED | OUTPATIENT
Start: 2025-02-23 | End: 2025-02-23

## 2025-02-23 RX ADMIN — POTASSIUM CHLORIDE 20 MEQ: 29.8 INJECTION, SOLUTION INTRAVENOUS at 05:53

## 2025-02-23 RX ADMIN — MUPIROCIN 1 APPLICATION: 20 OINTMENT TOPICAL at 20:35

## 2025-02-23 RX ADMIN — SODIUM CHLORIDE 500 MG: 9 INJECTION, SOLUTION INTRAVENOUS at 20:34

## 2025-02-23 RX ADMIN — Medication 250 MG: at 20:34

## 2025-02-23 RX ADMIN — HYDROCORTISONE SODIUM SUCCINATE 50 MG: 100 INJECTION, POWDER, FOR SOLUTION INTRAMUSCULAR; INTRAVENOUS at 04:45

## 2025-02-23 RX ADMIN — MIDODRINE HYDROCHLORIDE 10 MG: 10 TABLET ORAL at 08:10

## 2025-02-23 RX ADMIN — MIDODRINE HYDROCHLORIDE 10 MG: 10 TABLET ORAL at 10:58

## 2025-02-23 RX ADMIN — SODIUM CHLORIDE 500 MG: 9 INJECTION, SOLUTION INTRAVENOUS at 13:32

## 2025-02-23 RX ADMIN — HYDROCORTISONE SODIUM SUCCINATE 50 MG: 100 INJECTION, POWDER, FOR SOLUTION INTRAMUSCULAR; INTRAVENOUS at 20:34

## 2025-02-23 RX ADMIN — MAGNESIUM SULFATE 1 G: 1 INJECTION INTRAVENOUS at 05:53

## 2025-02-23 RX ADMIN — HYDROCORTISONE SODIUM SUCCINATE 50 MG: 100 INJECTION, POWDER, FOR SOLUTION INTRAMUSCULAR; INTRAVENOUS at 11:02

## 2025-02-23 RX ADMIN — SODIUM CHLORIDE 2000 MG: 9 INJECTION, SOLUTION INTRAVENOUS at 00:46

## 2025-02-23 RX ADMIN — POTASSIUM CHLORIDE 20 MEQ: 29.8 INJECTION, SOLUTION INTRAVENOUS at 08:11

## 2025-02-23 RX ADMIN — METRONIDAZOLE 500 MG: 500 TABLET ORAL at 20:34

## 2025-02-23 RX ADMIN — SODIUM BICARBONATE 150 MEQ: 84 INJECTION INTRAVENOUS at 04:45

## 2025-02-23 RX ADMIN — METRONIDAZOLE 500 MG: 500 TABLET ORAL at 16:35

## 2025-02-23 RX ADMIN — NOREPINEPHRINE BITARTRATE 0.14 MCG/KG/MIN: 0.03 INJECTION, SOLUTION INTRAVENOUS at 20:32

## 2025-02-23 RX ADMIN — Medication 10 ML: at 08:11

## 2025-02-23 RX ADMIN — Medication 250 MG: at 08:10

## 2025-02-23 RX ADMIN — POTASSIUM CHLORIDE 20 MEQ: 29.8 INJECTION, SOLUTION INTRAVENOUS at 08:12

## 2025-02-23 RX ADMIN — PANTOPRAZOLE SODIUM 40 MG: 40 TABLET, DELAYED RELEASE ORAL at 08:10

## 2025-02-23 RX ADMIN — VANCOMYCIN HYDROCHLORIDE 1000 MG: 1 INJECTION, POWDER, LYOPHILIZED, FOR SOLUTION INTRAVENOUS at 01:30

## 2025-02-23 RX ADMIN — MIDODRINE HYDROCHLORIDE 10 MG: 10 TABLET ORAL at 16:35

## 2025-02-23 RX ADMIN — VANCOMYCIN HYDROCHLORIDE 500 MG: 500 INJECTION, POWDER, LYOPHILIZED, FOR SOLUTION INTRAVENOUS at 16:35

## 2025-02-23 RX ADMIN — METRONIDAZOLE 500 MG: 500 TABLET ORAL at 08:10

## 2025-02-23 RX ADMIN — SERTRALINE HYDROCHLORIDE 75 MG: 50 TABLET ORAL at 08:10

## 2025-02-23 RX ADMIN — Medication 10 ML: at 20:35

## 2025-02-23 NOTE — ANESTHESIA PROCEDURE NOTES
Central Line      Patient reassessed immediately prior to procedure    Start time: 2/22/2025 10:30 PM  Stop Time:2/22/2025 10:50 PM  Indications: vascular access  Staff  Anesthesiologist: Man Pierre MD  Preanesthetic Checklist  Completed: patient identified, IV checked, site marked, risks and benefits discussed, surgical consent, monitors and equipment checked, pre-op evaluation and timeout performed  Central Line Prep  Sterile Tech:cap, gloves, mask, sterile barriers and gown  Prep: chloraprep  no medical exclusion documented for following all elements of maximal sterile barrier technique  Patient monitoring: blood pressure monitoring, continuous pulse oximetry and EKG  Central Line Procedure  Laterality:right  Location:internal jugular  Catheter Type:triple lumen  Catheter Size:7 Fr  Guidance:ultrasound guided  PROCEDURE NOTE/ULTRASOUND INTERPRETATION.  Using ultrasound guidance the potential vascular sites for insertion of the catheter were visualized to determine the patency of the vessel to be used for vascular access.  After selecting the appropriate site for insertion, the needle was visualized under ultrasound being inserted into the internal jugular vein, followed by ultrasound confirmation of wire and catheter placement. There were no abnormalities seen on ultrasound; an image was taken; and the patient tolerated the procedure with no complications. Images: still images not obtained  Assessment  Post procedure:occlusive dressing applied, line sutured and biopatch applied  Assessement:blood return through all ports, free fluid flow, chest x-ray ordered and Poncho Test  Complications:no  Patient Tolerance:patient tolerated the procedure well with no apparent complications

## 2025-02-23 NOTE — PROGRESS NOTES
Pulmonary / Critical Care Progress Note      Patient Name: Mag Padilla  : 1980  MRN: [id number]  Attending:  Brandon Estevez DO   Date of admission: 2025    Subjective   Subjective   Patient critically ill with septic shock    Over past 24 hours:  Went to OR last night with urology and had ureteral stent placed  INR reversed  Blood cultures with CTX M E. coli  Remains on pressors this morning  Renal function is improving, albeit urine output poor  Very weak, frail and bedbound  Mentation is a little bit better but lethargic  Remains on bicarb drip  No fevers or chills      Objective   Objective     Vitals:   Vital signs for last 24 hours:  Temp:  [96.3 °F (35.7 °C)-99.2 °F (37.3 °C)] 98.1 °F (36.7 °C)  Heart Rate:  [] 65  Resp:  [16-27] 19  BP: ()/(55-75) 96/72  Arterial Line BP: ()/(34-86) 97/63    Intake/Output last 3 shifts:  I/O last 3 completed shifts:  In: -   Out: 20 [Urine:20]  Intake/Output this shift:  I/O this shift:  In: 1060 [I.V.:400; Blood:660]  Out: 5 [Blood:5]    Vent settings for last 24 hours:  S RR:  [10] 10    Hemodynamic parameters for last 24 hours:       Physical Exam:  Vital Signs Reviewed   General: Pale, lethargic, critically ill female.    HEENT:  PERRL, EOMI.  OP, nares clear  Chest:  good aeration, clear to auscultation bilaterally, tympanic to percussion bilaterally, no work of breathing noted  CV: RRR, no MGR, pulses 2+, equal.  Abd:  Soft, NT, ND, + BS, no HSM, multiple abdominal incisions noted, weeping wounds noted from enterocutaneous fistulas  EXT:  no clubbing, no cyanosis, no edema  Neuro:  A&Ox3, CN grossly intact, no focal deficits.  Skin: No rashes or lesions noted    Result Review    Result Review:  I have personally reviewed the results from the time of this admission to 2025 05:21 EST and agree with these findings:  [x]  Laboratory  [x]  Microbiology  [x]  Radiology  [x]  EKG/Telemetry   [x]  Cardiology/Vascular   []  Pathology  []   Old records  []  Other:  Most notable findings include:       Lab 02/23/25  1058 02/23/25  0138 02/22/25  2201 02/22/25  2157 02/22/25  1919 02/22/25  1837 02/22/25  1648 02/22/25  0502 02/21/25  1821 02/21/25  1708   WBC  --  6.50 8.33  --   --   --  9.77 12.57* 9.27 11.98*   HEMOGLOBIN  --  7.0* 8.0*  --   --   --  8.5* 8.4* 8.2* 9.0*   HEMATOCRIT  --  22.4* 25.2*  --   --   --  27.9* 28.1* 26.8* 29.9*   PLATELETS  --  84* 94*  --   --   --  100* 87* 71* 86*   SODIUM 143 140  --  138  --   --  138 135* 135* 132*   POTASSIUM 4.1 3.0*  --  2.9*  --   --  3.4* 3.9 3.0* 3.3*   CHLORIDE 109* 112*  --  114*  --   --  113* 113* 110* 106   CO2 18.8* 15.5*  --  12.3*  --   --  11.3* 4.9* 11.5* 10.3*   BUN 34* 38*  --  39*  --   --  45* 48* 53* 56*   CREATININE 3.66* 3.64*  --  3.91*  --  4.38* 4.46* 5.03* 5.50* 5.67*   GLUCOSE 138* 149*  --  153*  --   --  72 53* 79 88   CALCIUM 8.1* 8.3*  --  8.4*  --   --  8.4* 8.2* 7.9* 8.2*   PHOSPHORUS 2.2* 2.7  --  2.2* 2.6  --   --   --   --   --    TOTAL PROTEIN  --  5.6*  --  5.2*  --   --  5.7*  --  5.4* 6.1   ALBUMIN 1.9* 2.0*  --  1.6*  --   --  1.5*  --  1.6* 1.6*   GLOBULIN  --  3.6  --  3.6  --   --  4.2  --  3.8 4.5     CT Abdomen Pelvis Without Contrast    Result Date: 2/22/2025  CT ABDOMEN PELVIS WO CONTRAST Date of Exam: 2/22/2025 4:18 PM EST Indication: chronic subcutaneous abscess of abdomen. Comparison: CT AP 2/1/2025 Technique: Axial CT images were obtained of the abdomen and pelvis without the administration of contrast. Reconstructed coronal and sagittal images were also obtained. Automated exposure control and iterative construction methods were used. Findings: Subsegmental atelectasis at the lung bases appears worsened. No consolidation or mass is evident. Small right effusion appears larger. New small left effusion is evident. The right liver lobe is relatively small, which may be a manifestation of cirrhosis. The gallbladder is absent, surgical clips are seen  in the gallbladder bed. No pancreatic mass is evident. Embolization coils are again seen adjacent to the pancreatic head. No adrenal mass is evident. The spleen is of normal size. A small amount of peritoneal fluid is again seen. Numerous tiny nonobstructing stones are seen in the renal collecting systems. Mild left hydroureteronephrosis is evident. 6 mm stone in the mid left ureter at the L5 level was seen on 2/1/2025 in the lower pole collecting system of the left kidney. This stone appears to be visible on the  image. 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 A wound is seen in the midline anterior abdominal wall, with 1.5 cm gas or air pocket seen on sagittal series 4 image 105, which appears slightly smaller in comparison to 2/1/2025. No fluid collection is evident. The uterus measures 4 cm in greatest dimension. No pelvic mass is seen.     Impression: Impression: CT scan of the abdomen and pelvis without contrast demonstrating worsening subsegmental atelectasis at the lung bases. Small right effusion appears larger. New small left effusion. Hepatic configuration concerning for cirrhosis. A small amount of peritoneal fluid is again seen. Post cholecystectomy. Multiple nonobstructing renal stones are again seen. Mild left hydroureteronephrosis is not seen on the prior study. 6 mm stone in the mid left ureter at the L5 level was seen on the prior study in the lower pole collecting system of the left kidney. 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 Wound in the anterior abdominal wall is again seen. This appears smaller in comparison to 2/1/2025. Electronically Signed: Shun Milan MD  2/22/2025 5:22 PM EST  Workstation ID: [id number]     Blood cultures positive for CTX-M E-coli      Assessment & Plan   Assessment / Plan     Active Hospital Problems:  Active Hospital Problems    Diagnosis     **Acute renal failure     Hypotension due to hypovolemia     Chronic diarrhea      Nausea & vomiting     Inflammatory bowel disease (Crohn's disease)     Kidney stone      Impression:  Septic shock, present on admission  Urinary tract infection from multidrug-resistant E. Coli  E. coli bacteremia  Obstructing nephrolithiasis status post ureteral stent placement  Dehydration  Acute kidney injury secondary to prerenal etiology  Metabolic acidosis  Supratherapeutic INR  Hypokalemia  Chronic hypotension on midodrine  History of Crohn's disease with complex abdominal surgeries  Chronic nonhealing abdominal wounds  Enterocutaneous fistulas     Plan:  Renal function slowly improving.  Hopefully will get renal recovery and urine output will   Continue pressors and shoot for higher systolic blood pressure goal to help perfuse kidneys and continue midodrine.  Systolic of 20 and mean arterial pressure greater than 65.  Stress of steroids added by urology.  Will begin weaning tomorrow.  Appreciate assistance  Appreciate ureteral stent from urology.  Renee catheter for now  Adjust antibiotics to meropenem given blood cultures with multidrug-resistant E. coli  Bicarb drip suggested by nephrology.  Appreciate assistance  Trend renal panel and electrolytes.  Replace potassium orally  INR improved  Continue wound care    VTE Prophylaxis:  Mechanical VTE prophylaxis orders are present.    CODE STATUS:   Level Of Support Discussed With: Patient  Code Status (Patient has no pulse and is not breathing): CPR (Attempt to Resuscitate)  Medical Interventions (Patient has pulse or is breathing): Full Support      The patient is critically ill in the ICU with septic shock, E. coli bacteremia, acute kidney injury, metabolic acidosis, multiple electrolyte disturbances. Multidisciplinary bedside critical care rounds were performed with nursing staff, respiratory therapy, pharmacy, nutritional services, social work. I have personally reviewed the chart, labs and any pertinent imaging available.  I have spent 36  minutes of critical care time, excluding procedures, in the care of this patient.    Electronically signed by Margarito Zurita MD, 02/23/25, 1:26 PM EST.

## 2025-02-23 NOTE — PROGRESS NOTES
[facility]     Progress Note    Patient Name: Mag Padilla  : 1980  MRN: [id number]  Primary Care Physician:  Provider, No Known  Date of admission: 2025    Subjective   Patient had CT scan and subsequently was found to have an obstructed stone  Subsequently underwent cystoscopy with removal of stone with some purulent discharge which was likely the source of her severe sepsis and septic shock  Patient's blood pressures are significantly better  Renal dysfunction is also improving  Urine output is poor but patient does not appear to be volume overloaded  Also received a unit of blood  Potassium slightly low this morning    Scheduled Meds:cefepime, 2,000 mg, Intravenous, Nightly  hydrocortisone sodium succinate, 50 mg, Intravenous, Q8H  metroNIDAZOLE, 500 mg, Oral, TID  midodrine, 10 mg, Oral, TID AC  mupirocin, 1 Application, Each Nare, BID  pantoprazole, 40 mg, Oral, Daily  potassium chloride, 20 mEq, Intravenous, Q1H  saccharomyces boulardii, 250 mg, Oral, BID  sertraline, 75 mg, Oral, Daily  sodium chloride, 10 mL, Intravenous, Q12H  vancomycin, 500 mg, Intravenous, Once  Vancomycin Pharmacy Intermittent/Pulse Dosing, , Not Applicable, Daily      Continuous Infusions:norepinephrine, 0.02-0.3 mcg/kg/min, Last Rate: Stopped (25 0828)  Pharmacy to dose vancomycin,   sodium bicarbonate, 150 mEq, Last Rate: 125 mL/hr at 25 0715      PRN Meds:.  senna-docusate sodium **AND** polyethylene glycol **AND** bisacodyl **AND** bisacodyl    dextrose    ondansetron    Pharmacy to dose vancomycin    sodium chloride    sodium chloride    sodium chloride       Review of Systems  Constitutional:        Weakness tiredness fatigue  Eyes:                       No blurry vision, eye discharge, eye irritation, eye pain  HEENT:                   No acute hair loss, earache and discharge, nasal congestion or discharge, sore throat, postnasal drip  Respiratory:           No shortness of breath coughing  sputum production wheezing hemoptysis pleuritic chest pain  Cardiovascular:     No chest pain, orthopnea, PND, dizziness, palpitation, lower extremity edema  Gastrointestinal:   No nausea vomiting diarrhea abdominal pain constipation  Genitourinary:       No urinary incontinence, hesitancy, frequency, urgency, dysuria  Hematologic:         No bruising, bleeding, pallor, lymphadenopathy  Endocrine:            No coldness, hot flashes, polyuria, abnormal hair growth  Musculoskeletal:    No body pains, aches, arthritic pains, muscle pain ,muscle wasting  Psychiatric:          No low or high mood, anxiety, hallucinations, delusions  Skin.                      No rash, ulcers, bruising, itching  Neurological:        No confusion, headache, focal weakness, numbness, dysphasia    Objective   Objective     Vitals:   Temp:  [96.3 °F (35.7 °C)-99.2 °F (37.3 °C)] 97.6 °F (36.4 °C)  Heart Rate:  [] 68  Resp:  [15-27] 15  BP: ()/(55-75) 96/72  Arterial Line BP: ()/(34-86) 97/57  Flow (L/min) (Oxygen Therapy):  [5-10] 5  Physical Exam    Constitutional: Awake, alert responsive, conversant, no obvious distress              Psychiatric:  Appropriate affect, cooperative   Neurologic:  Awake alert ,oriented x 3, strength symmetric in all extremities, Cranial Nerves grossly intact to confrontation, speech clear   Eyes:   PERRLA, sclerae anicteric, no conjunctival injection   HEENT:  Moist mucous membranes, no nasal or eye discharge, no throat congestion   Neck:   Supple, no thyromegaly, no lymphadenopathy, trachea midline, no elevated JVD   Respiratory:  Clear to auscultation bilaterally, nonlabored respirations    Cardiovascular: RRR, no murmurs, rubs, or gallops, palpable pedal pulses bilaterally, No bilateral ankle edema   Gastrointestinal: Positive bowel sounds, soft, nontender, nondistended, no organomegaly   Musculoskeletal:  No clubbing or cyanosis to extremities,muscle wasting, joint swelling, muscle  weakness             Skin:                      No rashes, bruising, skin ulcers, petechiae or ecchymosis    Result Review    Result Review:  I have personally reviewed the results from the time of this admission to 2/23/2025 09:08 EST and agree with these findings:  []  Laboratory  []  Microbiology  []  Radiology  []  EKG/Telemetry   []  Cardiology/Vascular   []  Pathology  []  Old records  []  Other:    Assessment & Plan   Assessment / Plan       Active Hospital Problems:  Active Hospital Problems    Diagnosis     **Acute renal failure     Hypotension due to hypovolemia     Chronic diarrhea     Nausea & vomiting     Inflammatory bowel disease (Crohn's disease)     Kidney stone      44-year-old female with past medical history of Crohn's disease status post resection, chronically hypotensive on midodrine, anxiety, chronic diarrhea, GERD who who has not been feeling well and has had persistent nausea and vomiting for the last few days as well as her diarrhea with severe JOHANA and creatinine rise from baseline normal to peak of 5.6 with decreased urine output and worsening bicarb currently at 5.  JOHANA most likely due to hypotension, diarrhea and vomiting contributing with UA showing some hyaline cast.  Urine analysis also concerning for large amount of proteinuria and RBCs with CT scan showing obstructive stone which is likely the source of septic shock     Plan:   Pressors to maintain MAP greater than 65  Continue with bicarb drip though will decrease rate to 75 cc/h till the order expires  Encouraging to see that creatinine is improving though urine output has not completely picked up yet and we will continue to monitor  Continue with midodrine 10 mg 3 times daily  ANCA profile and complements pending.  Though at this point suspicion of RPGN is low  Patient completing course of antibiotics    Thank you for involving me in the care of the patient.  Will continue to follow along    Electronically signed by Yaya HERNANDEZ  MD Mena, 02/23/25, 9:08 AM EST.

## 2025-02-23 NOTE — PROGRESS NOTES
[facility]     Progress Note    Patient Name: Mag Padilla  : 1980  MRN: [id number]  Primary Care Physician:  Provider, No Known  Date of admission: 2025    Subjective   Subjective     Chief Complaint: no complaints today    Nausea  Symptoms include nausea.    Hypotension  Symptoms include nausea.      Patient Reports Pt . Admitted to ICU with severe sepsis.  CT showed 6 mm stone Left mid ureter.  She was taken to OR for emergent stenting.  She is doing much better this morning.    Review of Systems   Gastrointestinal:  Positive for nausea.       Objective   Objective     Vitals:   Temp:  [96.3 °F (35.7 °C)-99.2 °F (37.3 °C)] 97.6 °F (36.4 °C)  Heart Rate:  [] 68  Resp:  [15-27] 15  BP: ()/(55-75) 96/72  Arterial Line BP: ()/(34-86) 97/57  Flow (L/min) (Oxygen Therapy):  [5-10] 5    Physical Exam   Awake alert oriented  Af vss  Abd: soft and benign.  Result Review    Result Review:  I have personally reviewed the results from the time of this admission to 2025 10:16 EST and agree with these findings:  [x]  Laboratory list / accordion  []  Microbiology  []  Radiology  []  EKG/Telemetry   []  Cardiology/Vascular   []  Pathology  []  Old records  []  Other:  Most notable findings include: cr 3.64 wbc 6.50      Assessment & Plan   Assessment / Plan     Brief Patient Summary:  Mag Padilla is a 44 y.o. female who kidney stone sepsis.    Active Hospital Problems:  Active Hospital Problems    Diagnosis     **Acute renal failure     Hypotension due to hypovolemia     Chronic diarrhea     Nausea & vomiting     Inflammatory bowel disease (Crohn's disease)     Kidney stone      Plan:   Pt. Underwent emergent stenting of left kidney due to  6 mm left mid ureteral stone.  Post stent pt. States she is feeling much better.  Cont with  current medical management.  Eventuall pt will need ureteroscopy and laser litho of the left ureteral stone.    VTE Prophylaxis:  Mechanical VTE  prophylaxis orders are present.        CODE STATUS:    Level Of Support Discussed With: Patient  Code Status (Patient has no pulse and is not breathing): CPR (Attempt to Resuscitate)  Medical Interventions (Patient has pulse or is breathing): Full Support    Disposition:  I expect patient to be discharged home.    Elliot Briceño MD

## 2025-02-23 NOTE — CONSULTS
Pulmonary / Critical Care Consult Note      Patient Name: Mag Padilla  : 1980  MRN: [id number]  Primary Care Physician:  Provider, No Known  Referring Physician: Brandon Estevez DO  Date of admission: 2025    Subjective   Subjective     Reason for Consult/ Chief Complaint:   Shock and acidosis    HPI:  Mag Padilla is a 44 y.o. female with a history of Crohn's disease status post resection, complex abdominal history with enterocutaneous fistulas, nursing home resident, chronic hypotension with baseline systolic blood pressure around 70.  Presented to the ER 2 days ago with nausea, vomiting, diarrhea and feeling poorly.  Patient's blood pressure was 60s over 40s.  Patient also had renal failure with a serum bicarb of 10 and a creatinine of 5.  Was admitted to the floor as the patient was reportedly mentating well and had normal lactic acid and this was felt to be chronic hypotension.  Remained hypotensive on the floor for 24 hours with worsening renal failure and acidosis.  Nephrology was consulted and patient was transferred to the ICU.  Patient is lethargic but arousable.  Blood pressure is in the 70s systolic.  Has worsening acidosis and renal failure.  Has been started on bicarb drip and pressors.  Did do CT of the chest/abdomen/pelvis which does show possible obstructing ureteral stone and chronic postoperative changes with no new abscess or fistula or perforation.  Urology is consulted and planning to take patient for ureteral stent.  INR is elevated as well and patient has concerning features for septic shock.      Personal History     Past Medical History:   Diagnosis Date    Abscess of peritoneum     Anemia     Anxiety     Breast cancer     Crohn's disease     GERD (gastroesophageal reflux disease)     HTN (hypertension)     Hyperlipidemia     Intestine disorder     SVT (supraventricular tachycardia)        Past Surgical History:   Procedure Laterality Date    ABDOMINAL SURGERY          Family History: family history is not on file. Otherwise pertinent FHx was reviewed and not pertinent to current issue.    Social History:  reports that she has never smoked. She has never used smokeless tobacco. She reports that she does not drink alcohol and does not use drugs.    Home Medications:  acetaminophen, cephalexin, ferrous sulfate, metroNIDAZOLE, midodrine, multivitamin with minerals, ondansetron, pantoprazole, potassium phosphate (monobasic), saccharomyces boulardii, senna, and sertraline    Allergies:  Allergies   Allergen Reactions    Iodine Unknown - High Severity    Penicillins Unknown - High Severity       Objective    Objective     Vitals:   Temp:  [96.3 °F (35.7 °C)-99.2 °F (37.3 °C)] 98.1 °F (36.7 °C)  Heart Rate:  [] 65  Resp:  [16-27] 19  BP: ()/(55-75) 96/72  Arterial Line BP: ()/(34-86) 97/63  Flow (L/min) (Oxygen Therapy):  [5-10] 5    Physical Exam:  Vital Signs Reviewed   General: Pale, lethargic, critically ill female.    HEENT:  PERRL, EOMI.  OP, nares clear  Neck:  Supple, no JVD, no thyromegaly  Chest:  good aeration, clear to auscultation bilaterally, tympanic to percussion bilaterally, no work of breathing noted  CV: RRR, no MGR, pulses 2+, equal.  Abd:  Soft, NT, ND, + BS, no HSM, multiple abdominal incisions noted, weeping wounds noted from enterocutaneous fistulas  EXT:  no clubbing, no cyanosis, no edema  Neuro:  A&Ox3, CN grossly intact, no focal deficits.  Skin: No rashes or lesions noted      Result Review    Result Review:  I have personally reviewed the results from the time of this admission to 2/23/2025 05:21 EST and agree with these findings:  [x]  Laboratory  [x]  Microbiology  [x]  Radiology  [x]  EKG/Telemetry   [x]  Cardiology/Vascular   []  Pathology  []  Old records  []  Other:  Most notable findings include:       Lab 02/23/25  1058 02/23/25  0138 02/22/25  2201 02/22/25  2157 02/22/25  1919 02/22/25  1837 02/22/25  1648 02/22/25  0502  02/21/25  1821 02/21/25  1708   WBC  --  6.50 8.33  --   --   --  9.77 12.57* 9.27 11.98*   HEMOGLOBIN  --  7.0* 8.0*  --   --   --  8.5* 8.4* 8.2* 9.0*   HEMATOCRIT  --  22.4* 25.2*  --   --   --  27.9* 28.1* 26.8* 29.9*   PLATELETS  --  84* 94*  --   --   --  100* 87* 71* 86*   SODIUM 143 140  --  138  --   --  138 135* 135* 132*   POTASSIUM 4.1 3.0*  --  2.9*  --   --  3.4* 3.9 3.0* 3.3*   CHLORIDE 109* 112*  --  114*  --   --  113* 113* 110* 106   CO2 18.8* 15.5*  --  12.3*  --   --  11.3* 4.9* 11.5* 10.3*   BUN 34* 38*  --  39*  --   --  45* 48* 53* 56*   CREATININE 3.66* 3.64*  --  3.91*  --  4.38* 4.46* 5.03* 5.50* 5.67*   GLUCOSE 138* 149*  --  153*  --   --  72 53* 79 88   CALCIUM 8.1* 8.3*  --  8.4*  --   --  8.4* 8.2* 7.9* 8.2*   PHOSPHORUS 2.2* 2.7  --  2.2* 2.6  --   --   --   --   --    TOTAL PROTEIN  --  5.6*  --  5.2*  --   --  5.7*  --  5.4* 6.1   ALBUMIN 1.9* 2.0*  --  1.6*  --   --  1.5*  --  1.6* 1.6*   GLOBULIN  --  3.6  --  3.6  --   --  4.2  --  3.8 4.5     CT Abdomen Pelvis Without Contrast    Result Date: 2/22/2025  CT ABDOMEN PELVIS WO CONTRAST Date of Exam: 2/22/2025 4:18 PM EST Indication: chronic subcutaneous abscess of abdomen. Comparison: CT AP 2/1/2025 Technique: Axial CT images were obtained of the abdomen and pelvis without the administration of contrast. Reconstructed coronal and sagittal images were also obtained. Automated exposure control and iterative construction methods were used. Findings: Subsegmental atelectasis at the lung bases appears worsened. No consolidation or mass is evident. Small right effusion appears larger. New small left effusion is evident. The right liver lobe is relatively small, which may be a manifestation of cirrhosis. The gallbladder is absent, surgical clips are seen in the gallbladder bed. No pancreatic mass is evident. Embolization coils are again seen adjacent to the pancreatic head. No adrenal mass is evident. The spleen is of normal size. A small  amount of peritoneal fluid is again seen. Numerous tiny nonobstructing stones are seen in the renal collecting systems. Mild left hydroureteronephrosis is evident. 6 mm stone in the mid left ureter at the L5 level was seen on 2/1/2025 in the lower pole collecting system of the left kidney. This stone appears to be visible on the  image. 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 A wound is seen in the midline anterior abdominal wall, with 1.5 cm gas or air pocket seen on sagittal series 4 image 105, which appears slightly smaller in comparison to 2/1/2025. No fluid collection is evident. The uterus measures 4 cm in greatest dimension. No pelvic mass is seen.     Impression: Impression: CT scan of the abdomen and pelvis without contrast demonstrating worsening subsegmental atelectasis at the lung bases. Small right effusion appears larger. New small left effusion. Hepatic configuration concerning for cirrhosis. A small amount of peritoneal fluid is again seen. Post cholecystectomy. Multiple nonobstructing renal stones are again seen. Mild left hydroureteronephrosis is not seen on the prior study. 6 mm stone in the mid left ureter at the L5 level was seen on the prior study in the lower pole collecting system of the left kidney. 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 Wound in the anterior abdominal wall is again seen. This appears smaller in comparison to 2/1/2025. Electronically Signed: Suhn Milan MD  2/22/2025 5:22 PM EST  Workstation ID: [id number]            Assessment & Plan   Assessment / Plan     Active Hospital Problems:  Active Hospital Problems    Diagnosis     **Acute renal failure     Hypotension due to hypovolemia     Chronic diarrhea     Nausea & vomiting     Inflammatory bowel disease (Crohn's disease)     Kidney stone      Impression:  Septic shock, present on admission  Urinary tract infection from unspecified organism  Obstructing  nephrolithiasis  Dehydration  Acute kidney injury secondary to prerenal etiology  Metabolic acidosis  Supratherapeutic INR  Chronic hypotension on midodrine  History of Crohn's disease with complex abdominal surgeries  Chronic nonhealing abdominal wounds  Enterocutaneous fistulas    Plan:  Discussed with nephrology.  Agree patient is in septic shock and profound renal failure.  Hopefully can avoid renal replacement therapy  Transfer to ICU  Place central line and arterial lines  Initiate norepinephrine and vasopressin drips.  Continue midodrine.  Goal mean arterial pressure greater than 65  Start bicarb drip  CT abdomen and pelvis reviewed.  Agree with consulting urology for ureteral stent placement  Empiric antibiotics of vancomycin and cefepime.  De-escalate based off cultures  Check procalcitonin  N.p.o. for now  Give IV vitamin K and FFP for supratherapeutic INR    VTE Prophylaxis:  Mechanical VTE prophylaxis orders are present.    Code Status and Medical Interventions: CPR (Attempt to Resuscitate); Full Support   Ordered at: 02/21/25 9879     Level Of Support Discussed With:    Patient     Code Status (Patient has no pulse and is not breathing):    CPR (Attempt to Resuscitate)     Medical Interventions (Patient has pulse or is breathing):    Full Support        The patient is critically ill in the ICU with septic shock, renal failure, metabolic acidosis, obstructing nephrolithiasis. Multidisciplinary bedside critical care rounds were performed with nursing staff, respiratory therapy, pharmacy, nutritional services, social work. I have personally reviewed the chart, labs and any pertinent imaging available.  I have spent 45 minutes of critical care time, excluding procedures, in the care of this patient.

## 2025-02-23 NOTE — CONSULTS
[facility]   Consult Note    Patient Name: Mag Padilla  : 1980  MRN: [id number]  Primary Care Physician:  Provider, No Known  Referring Physician: No ref. provider found  Date of admission: 2025    Consults  Subjective   Subjective     Reason for Consult/ Chief Complaint: Left ureteral stone, sepsis    Nausea  Symptoms include nausea.    Hypotension  Symptoms include nausea.      Mag Padilla is a 44 y.o. female PT. Admitted to hospital complaining of generalized weakness and abdominal and left flank pain.   Here in the hospital it was noted pt. Is septic and hypotensive and is admitted to ICU.  CT scan shows a 6 mm stone in the left mid ureter with hydronephrosis.the patient. Late last year had abdominal surgery.  She states she had a hole in the bowel.    Review of Systems   Gastrointestinal:  Positive for nausea.    Negative    Personal History     Past Medical History:   Diagnosis Date    Abscess of peritoneum     Anemia     Anxiety     Breast cancer     Crohn's disease     GERD (gastroesophageal reflux disease)     HTN (hypertension)     Hyperlipidemia     Intestine disorder     SVT (supraventricular tachycardia)        Past Surgical History:   Procedure Laterality Date    ABDOMINAL SURGERY         Family History: family history is not on file. Otherwise pertinent FHx was reviewed and not pertinent to current issue.    Social History:  reports that she has never smoked. She has never used smokeless tobacco. She reports that she does not drink alcohol and does not use drugs.    Home Medications:   acetaminophen, cephalexin, ferrous sulfate, metroNIDAZOLE, midodrine, multivitamin with minerals, ondansetron, pantoprazole, potassium phosphate (monobasic), saccharomyces boulardii, senna, and sertraline    Allergies:  Allergies   Allergen Reactions    Iodine Unknown - High Severity    Penicillins Unknown - High Severity       Objective    Objective     Vitals:  Temp:  [97.4 °F (36.3  °C)-98.6 °F (37 °C)] 97.4 °F (36.3 °C)  Heart Rate:  [] 84  Resp:  [16-26] 23  BP: ()/(37-75) 84/55  Arterial Line BP: ()/(46-68) 118/68    Physical Exam  AF hypotensive.  Awake alert oriented  Abdomen tender but no rebound guarding or rigidity noted.  She does have Left CVAT none on the  right side.  EXT:  No clubbing cyanosis or edema.  Result Review    Result Review:  I have personally reviewed the results from the time of this admission to 2/22/2025 19:24 EST and agree with these findings:  [x]  Laboratory list / accordion  []  Microbiology  [x]  Radiology  []  EKG/Telemetry   []  Cardiology/Vascular   []  Pathology  []  Old records  []  Other:  Most notable findings include: CR: 4.38 and WBC is 9.77 and CT shows a 6 mm Left ureteral stone.      Assessment & Plan   Assessment / Plan     Brief Patient Summary:  Mag Padilla is a 44 y.o. female who Left mid ureteral stone and sepsis.    Active Hospital Problems:  Active Hospital Problems    Diagnosis     **Acute renal failure     Hypotension due to hypovolemia     Chronic diarrhea     Nausea & vomiting     Inflammatory bowel disease (Crohn's disease)      Plan:   I at length discussed with pt. All the risks benefits alternatives all potential complications of watchful waiting vs. Proceeding with cystoscopy left ureteral stent placement.  PT. Understands our discussion voices her understanding of our discussion and wishes to proceed with the procedure tonight.    Elliot Briceño MD

## 2025-02-23 NOTE — PLAN OF CARE
Goal Outcome Evaluation:        Problem: Adult Inpatient Plan of Care  Goal: Plan of Care Review  Outcome: Not Progressing  Flowsheets  Taken 2/23/2025 1649 by Teena Saini, RN  Outcome Evaluation: Patient continued on levophed to maintain SBP greater than or equal to 120. Currently levo @0.14. RA. SB 48-50. A&Ox4. Wound care completed.  Taken 2/22/2025 0558 by Antony Weaver, RN  Plan of Care Reviewed With: patient

## 2025-02-23 NOTE — PROGRESS NOTES
"[facility] Clinical Pharmacy Services: Vancomycin Pharmacokinetic Initial Consult Note    Mag Padilla is a 44 y.o. female who is on day 1 of pharmacy to dose vancomycin for Bacteremia.    Consult Information  Consulting Provider: Elliot Briceño MD  Planned Duration of Therapy: 7 days (through 3/1)  Was Patient Receiving Prior to Admission/Consult?: No  Loading Dose Ordered or Given: 1000 mg on 25 at 0130  PK/PD Target: Dose by Levels (due to renal function)  Other Antimicrobials: Cefepime and Metronidazole    Imaging Reviewed?: Yes    Microbiology Data  MRSA PCR performed: No; Result: Pending  Culture/Source:    Legionella, S. Pneumo antigen, urine: negative   Wound cx, abdominal wall: (1+) gram negative bacilli   Blood cx: in process   Respiratory Panel: in process   MRSA PCR: in process    Vitals/Labs  Ht: 157.5 cm (62\"); Wt: 52.3 kg (115 lb 4.8 oz)  Temp (24hrs), Av.8 °F (36.6 °C), Min:96.3 °F (35.7 °C), Max:99.2 °F (37.3 °C)   Estimated Creatinine Clearance: 15.9 mL/min (A) (by C-G formula based on SCr of 3.64 mg/dL (H)).     Results from last 7 days   Lab Units 25  0607 25  0138 25  2201 25  2157 25  1837 25  1648   VANCOMYCIN RM mcg/mL 23.27  --   --   --   --   --    CREATININE mg/dL  --  3.64*  --  3.91* 4.38* 4.46*   WBC 10*3/mm3  --  6.50 8.33  --   --  9.77     Assessment/Plan:    Vancomycin Dose: pulse dosing with 500 mg IV once on  at 1500  Will continue to pulse dose at this time due to renal function.   Vanc Random ordered for  at AM labs  Patient has order for Complete Metabolic Panel    Pharmacy will follow patient's kidney function and will adjust doses and obtain levels as necessary. Thank you for involving pharmacy in this patient's care. Please contact pharmacy with any questions or concerns.                           Francie Payton [facility]  Clinical Pharmacist  "

## 2025-02-23 NOTE — OP NOTE
CYSTOSCOPY URETERAL CATHETER/STENT INSERTION  Procedure Report    Patient Name:  Mag Padilla  YOB: 1980    Date of Surgery:  2/22/2025     Indications: Left ureteral stone sepsis    Pre-op Diagnosis:   Kidney stone [N20.0]       Post-Op Diagnosis Codes:     * Kidney stone [N20.0]    Procedure/CPT® Codes:  AR CYSTO W/INSERT URETERAL STENT [93362] left ureteral stent placement    Procedure(s):  CYSTOSCOPY URETERAL CATHETER/STENT INSERTION left ureteral stent placement without a string        Staff:  Surgeon(s):  Elliot Briceño MD         Anesthesia: General    Estimated Blood Loss: none    Implants:    Implant Name Type Inv. Item Serial No.  Lot No. LRB No. Used Action   STNT URETRL ASCERTA 6F 24CM - [id number] Stent STNT URETRL ASCERTA 6F 24CM  [employer] [id number] Left 1 Implanted       Specimen:          None        Findings: 6 mm left mid ureteral stone    Complications: None    Description of Procedure: Indication for procedure: Patient comes to the hospital admitted to the intensive care unit with significant sepsis.  CT scan reveals 6 mm stone in the left mid ureter with hydronephrosis.  We are proceeding to put a stent in the patient.  Description of procedure: After informed consent was obtained patient was taken to the operating room.  Intravenous sedation was administered and patient was placed in a dorsolithotomy position.  Groin is prepped and draped in a sterile fashion.  Cystoscopy is performed the bladder is normal both ureteral orifice ease are normal.  A ureteral catheter was inserted in the left distal ureter contrast was injected the stone was visualized there was some hydronephrosis proximal of the stone.  Then a sensor tip guidewire was passed through the catheter into the left kidney.  The wire went up into the left kidney significant purulent material was seen draining from the left ureteral orifice.  Over the guidewire a 6 [place] by 24 cm stent  was passed into the left kidney fluoroscopy showed the proximal end to be in good position cystoscopy showed the distal end to be in good position in the bladder.  The bladder was drained the cystoscope was removed.  A Renee catheter was replaced.  Patient tolerated the procedure well there were no complications she was awake alert in stable condition 1 transported back to the intensive care unit.                  Elliot Briceño MD     Date: 2/22/2025  Time: 23:05 EST

## 2025-02-23 NOTE — ANESTHESIA PREPROCEDURE EVALUATION
Anesthesia Evaluation     Patient summary reviewed and Nursing notes reviewed   no history of anesthetic complications:   NPO Solid Status: > 8 hours  NPO Liquid Status: > 2 hours           Airway   Mallampati: II  TM distance: >3 FB  Neck ROM: full  No difficulty expected and Small opening  Dental    (+) poor dentition        Pulmonary - negative pulmonary ROS and normal exam    breath sounds clear to auscultation  Cardiovascular - normal exam  Exercise tolerance: good (4-7 METS)    Rhythm: regular  Rate: normal    (+) hypertension, hyperlipidemia      Neuro/Psych  (+) psychiatric history  GI/Hepatic/Renal/Endo    (+) GERD, renal disease-    Musculoskeletal (-) negative ROS    Abdominal    Substance History - negative use     OB/GYN negative ob/gyn ROS         Other      history of cancer    ROS/Med Hx Other: PAT Nursing Notes unavailable.     PT with obstructing stone and in urosepsis.  Critically ill with norepi infusion to maintain BP              Anesthesia Plan    ASA 4 - emergent     general and MAC     (Patient understands anesthesia not responsible for dental damage.    No IV access when entering OR (two infiltrated PIV), will placed PIV and central line)  intravenous induction     Anesthetic plan, risks, benefits, and alternatives have been provided, discussed and informed consent has been obtained with: patient.    Use of blood products discussed with patient .    Plan discussed with CRNA.    CODE STATUS:    Level Of Support Discussed With: Patient  Code Status (Patient has no pulse and is not breathing): CPR (Attempt to Resuscitate)  Medical Interventions (Patient has pulse or is breathing): Full Support

## 2025-02-23 NOTE — PLAN OF CARE
Goal Outcome Evaluation:              Outcome Evaluation: Patient transferred to ICU today for hypotension. Levophed gtt started and IV boluses initiated. Levophed currently on hold. Left femoral Arterial line inserted by DANIEL Garner. Patient AxOx4. Renee catheter inserted. VSS

## 2025-02-23 NOTE — ANESTHESIA POSTPROCEDURE EVALUATION
Patient: Mag Padilla    Procedure Summary       Date: 02/22/25 Room / Location: [facility] OR 08 / [facility] MAIN OR    Anesthesia Start: 2209 Anesthesia Stop: 2322    Procedure: CYSTOSCOPY URETERAL CATHETER/STENT INSERTION (Left: Ureter) Diagnosis:       Kidney stone      (Kidney stone [N20.0])    Surgeons: Elliot Briceño MD Provider: Man Pierre MD    Anesthesia Type: general, MAC ASA Status: 4 - Emergent            Anesthesia Type: general, MAC    Vitals  No vitals data found for the desired time range.          Post Anesthesia Care and Evaluation    Patient location during evaluation: ICU  Patient participation: complete - patient participated  Level of consciousness: awake  Pain management: adequate    Airway patency: patent  PONV Status: none  Cardiovascular status: acceptable and stable  Respiratory status: acceptable  Hydration status: acceptable    Comments: Pt transported to Icu on monitors, stable throughout, care transferred to ICU team

## 2025-02-23 NOTE — PROGRESS NOTES
[facility]   Hospitalist Progress Note  Date: 2025  Patient Name: Mag Padilla  : 1980  MRN: [id number]  Date of admission: 2025  Room/Bed: [facility]      Subjective   Subjective     Chief Complaint: nausea, vomiting weakness     Summary:Mag Padilla is a 44 y.o. female who is a nursing home resident  with past medical history of Crohn disease status post resection,with complex abdominal surgical history.  Patient had a history of a duodenal hole and underwent extensive surgery that required a G-tube, J-tube and multiple drain placements this was all done at [facility].  Patient since that time has had multiple issues with abdominal wounds and abscesses.  Patient also has history of hypotension on midodrine, anxiety, chronic diarrhea, and GERD presenting to the ED for evaluation of nausea vomiting diarrhea with generalized weakness.  Patient states that for the last 3 days she has not been feeling well with persistent nausea and vomiting for the last 2 days and worsening of her chronic diarrhea.  Due to the symptoms patient is weakness and lethargy had also worsened to where she is mostly bedbound with excessive sleepiness.  Patient is seen by wound care for abdominal wounds after an ex lap with colon resection.  Due to worsening condition patient was eventually brought to the ED for further evaluation.  In the ED patient was significantly hypotensive on arrival with remaining vitals being within normal limits.  UA was positive for UTI with signs of dehydration, labs showed severely reduced renal function with hypokalemia, anemia, thrombocytopenia, and severe hypoalbuminemia.  Chest x-ray was negative for any acute findings.  When seen patient was resting comfortably and still having episode of diarrhea since being here although her nausea is much improved.  She did deny any recent fevers, chills, headaches, focal weakness, chest pain, palpitation, shortness of breath, cough,  abdominal pain, constipation, dysuria, hematuria, hematochezia, melena, or anxiety.  Patient admitted for further evaluation and treatment.       Interval Followup:   Patient continued to decline yesterday evening and required transfer to the intensive care unit for vasopressor support  CT of the abdomen revealed left ureteral stone which required urology consult and patient was taken to the operating room last night and had a cystoscopy with stent placement    Patient's blood cultures are growing ESBL E. Coli  MRSA screen is also positive  Creatinine has improved to 3.6 today    Patient's wound culture from her abdominal wound is pending      Review of Systems    All systems reviewed and negative except for what is outlined above.      Objective   Objective     Vitals:   Temp:  [96.3 °F (35.7 °C)-99.2 °F (37.3 °C)] 97.7 °F (36.5 °C)  Heart Rate:  [] 58  Resp:  [15-27] 15  BP: ()/(55-75) 96/72  Arterial Line BP: ()/(34-86) 94/52  Flow (L/min) (Oxygen Therapy):  [5-10] 5    Physical Exam   General: Awake, alert, NAD resting in bed  HENT: NCAT, MMM  Eyes: pupils equal, no scleral icterus  Cardiovascular: RRR, no murmurs   Pulmonary: CTA bilaterally; no wheezes; no conversational dyspnea  Gastrointestinal: Soft patient has multiple abdominal wounds patient has a wound on her left lower side that is draining a significant amount of greenish drainage.  Will culture it.  Patient denies any significant pain  Musculoskeletal: No gross deformities  Skin: No jaundice, no rash on exposed skin appreciated  Neuro: CN II through XII grossly intact; speech clear; no tremor  Psych: Mood and affect appropriate   de la cruz in place     Result Review    Result Review:  I have personally reviewed these results:  [x]  Laboratory      Lab 02/23/25  0138 02/22/25  2201 02/22/25  2157 02/22/25  1919 02/22/25  1837 02/22/25  1648 02/22/25  1531 02/22/25  0502 02/21/25  1821 02/21/25  1708   WBC 6.50 8.33  --   --   --  9.77   --    < > 9.27 11.98*   HEMOGLOBIN 7.0* 8.0*  --   --   --  8.5*  --    < > 8.2* 9.0*   HEMATOCRIT 22.4* 25.2*  --   --   --  27.9*  --    < > 26.8* 29.9*   PLATELETS 84* 94*  --   --   --  100*  --    < > 71* 86*   NEUTROS ABS  --  7.41*  --   --   --  8.59*  --   --  8.31* 10.85*   IMMATURE GRANS (ABS)  --   --   --   --   --  0.09*  --   --  0.08* 0.06*   LYMPHS ABS  --   --   --   --   --  0.45*  --   --  0.36* 0.43*   MONOS ABS  --   --   --   --   --  0.52  --   --  0.42 0.49   EOS ABS  --  0.33  --   --   --  0.07  --   --  0.06 0.08   MCV 87.2 86.9  --   --   --  90.6  --    < > 93.1 92.9   PROCALCITONIN  --   --  20.74* 26.11*  --   --   --   --   --   --    LACTATE 0.9  --   --   --  1.7  --  1.2  --   --   --    PROTIME  --   --   --  34.3*  --   --   --   --   --   --    APTT  --   --   --  47.6*  --   --   --   --   --   --    D DIMER QUANT  --   --   --  1.76*  --   --   --   --   --   --     < > = values in this interval not displayed.         Lab 02/23/25  0138 02/22/25  2157 02/22/25  1919 02/22/25  1837 02/22/25  1648   SODIUM 140 138  --   --  138   POTASSIUM 3.0* 2.9*  --   --  3.4*   CHLORIDE 112* 114*  --   --  113*   CO2 15.5* 12.3*  --   --  11.3*   ANION GAP 12.5 11.7  --   --  13.7   BUN 38* 39*  --   --  45*   CREATININE 3.64* 3.91*  --  4.38* 4.46*   EGFR 15.0* 13.7*  --   --  11.7*   GLUCOSE 149* 153*  --   --  72   CALCIUM 8.3* 8.4*  --   --  8.4*   MAGNESIUM 1.9 0.9* 1.0*  --   --    PHOSPHORUS 2.7 2.2* 2.6  --   --          Lab 02/23/25  0138 02/22/25  2157 02/22/25  1648 02/21/25  1821 02/21/25  1708   TOTAL PROTEIN 5.6* 5.2* 5.7*   < > 6.1   ALBUMIN 2.0* 1.6* 1.5*   < > 1.6*   GLOBULIN 3.6 3.6 4.2   < > 4.5   ALT (SGPT) 7 <5 5   < > <5   AST (SGOT) 9 7 8   < > 13   BILIRUBIN 1.2 0.9 0.8   < > 0.9   ALK PHOS 91 95 100   < > 88   LIPASE  --   --   --   --  10*    < > = values in this interval not displayed.         Lab 02/22/25  1919 02/21/25 1821   PROBNP  --  15,412.0*   PROTIME  34.3*  --    INR 3.17*  --              Lab 02/22/25  2157 02/22/25  1648 02/21/25  1821 02/21/25  1708   IRON  --   --  25*  --    IRON SATURATION (TSAT)  --   --  32  --    TIBC  --   --  79*  --    TRANSFERRIN  --   --  53*  --    FOLATE  --   --   --  7.99   VITAMIN B 12  --   --   --  545   ABO TYPING O   < >  --   --    RH TYPING Positive   < >  --   --    ANTIBODY SCREEN Negative  --   --   --     < > = values in this interval not displayed.         Lab 02/22/25  1837   PH, ARTERIAL 7.327*   PCO2, ARTERIAL 20.5*   PO2 .9*   O2 SATURATION ART 97.7   HCO3 ART 10.7*   BASE EXCESS ART -13.6*     Brief Urine Lab Results  (Last result in the past 365 days)        Color   Clarity   Blood   Leuk Est   Nitrite   Protein   CREAT   Urine HCG        02/22/25 2208 Dark Yellow   Cloudy   Large (3+)   Moderate (2+)   Negative   100 mg/dL (2+)                 [x]  Microbiology   Microbiology Results (last 10 days)       Procedure Component Value - Date/Time    MRSA Screen, PCR (Inpatient) - Swab, Nares [[id number]]  (Abnormal) Collected: 02/23/25 0601    Lab Status: Final result Specimen: Swab from Nares Updated: 02/23/25 1001     MRSA PCR MRSA Detected    Narrative:      The negative predictive value of this diagnostic test is high and should only be used to consider de-escalating anti-MRSA therapy. A positive result may indicate colonization with MRSA and must be correlated clinically.    Respiratory Panel PCR w/COVID-19(SARS-CoV-2) BRIELLE/TOSHA/IMTIAZ/PAD/COR/NATACHA In-House, NP Swab in UTM/VTM, 2 HR TAT - Swab, Nasopharynx [[id number]]  (Normal) Collected: 02/23/25 0601    Lab Status: Final result Specimen: Swab from Nasopharynx Updated: 02/23/25 0827     ADENOVIRUS, PCR Not Detected     Coronavirus 229E Not Detected     Coronavirus HKU1 Not Detected     Coronavirus NL63 Not Detected     Coronavirus OC43 Not Detected     COVID19 Not Detected     Human Metapneumovirus Not Detected     Human Rhinovirus/Enterovirus Not Detected      Influenza A PCR Not Detected     Influenza B PCR Not Detected     Parainfluenza Virus 1 Not Detected     Parainfluenza Virus 2 Not Detected     Parainfluenza Virus 3 Not Detected     Parainfluenza Virus 4 Not Detected     RSV, PCR Not Detected     Bordetella pertussis pcr Not Detected     Bordetella parapertussis PCR Not Detected     Chlamydophila pneumoniae PCR Not Detected     Mycoplasma pneumo by PCR Not Detected    Narrative:      In the setting of a positive respiratory panel with a viral infection PLUS a negative procalcitonin without other underlying concern for bacterial infection, consider observing off antibiotics or discontinuation of antibiotics and continue supportive care. If the respiratory panel is positive for atypical bacterial infection (Bordetella pertussis, Chlamydophila pneumoniae, or Mycoplasma pneumoniae), consider antibiotic de-escalation to target atypical bacterial infection.    Wound Culture - Wound, Abdominal Wall [[id number]] Collected: 02/22/25 1605    Lab Status: Preliminary result Specimen: Wound from Abdominal Wall Updated: 02/22/25 1901     Gram Stain Rare (1+) Gram negative bacilli    Legionella Antigen, Urine - Urine, Straight Cath [[id number]]  (Normal) Collected: 02/21/25 2202    Lab Status: Final result Specimen: Urine from Straight Cath Updated: 02/22/25 1751     LEGIONELLA ANTIGEN, URINE Negative    S. Pneumo Ag Urine or CSF - Urine, Straight Cath [[id number]]  (Normal) Collected: 02/21/25 2202    Lab Status: Final result Specimen: Urine from Straight Cath Updated: 02/22/25 1752     Strep Pneumo Ag Negative    COVID-19, FLU A/B, RSV PCR 1 HR TAT - Swab, Nasopharynx [[id number]]  (Normal) Collected: 02/21/25 1745    Lab Status: Final result Specimen: Swab from Nasopharynx Updated: 02/21/25 1832     COVID19 Not Detected     Influenza A PCR Not Detected     Influenza B PCR Not Detected     RSV, PCR Not Detected    Narrative:      Fact sheet for providers:  https://www.fda.gov/media/091676/download    Fact sheet for patients: https://www.fda.gov/media/287645/download    Test performed by PCR.          [x]  Radiology  XR Chest 1 View    Result Date: 2/23/2025  The distal tip of the right IJ central venous line/port is in the expected mid superior vena cava (SVC). No pneumothorax is seen. There are bilateral infiltrates present, suggestive of pulmonary edema with vascular congestion.    Portions of this note were completed with a voice recognition program.  2/23/2025 12:16 AM by James Hull MD on Workstation: "[email or web address]"      CT Abdomen Pelvis Without Contrast    Result Date: 2/22/2025  Impression: CT scan of the abdomen and pelvis without contrast demonstrating worsening subsegmental atelectasis at the lung bases. Small right effusion appears larger. New small left effusion. Hepatic configuration concerning for cirrhosis. A small amount of peritoneal fluid is again seen. Post cholecystectomy. Multiple nonobstructing renal stones are again seen. Mild left hydroureteronephrosis is not seen on the prior study. 6 mm stone in the mid left ureter at the L5 level was seen on the prior study in the lower pole collecting system of the left kidney. 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 Wound in the anterior abdominal wall is again seen. This appears smaller in comparison to 2/1/2025. Electronically Signed: Shun Milan MD  2/22/2025 5:22 PM EST  Workstation ID: [id number]    US Renal Bilateral    Result Date: 2/22/2025  Impression: 1.Bilateral nephrolithiasis without evidence of hydronephrosis. 2.Bilateral increased cortical echogenicity consistent with chronic renal disease. 3.Asymmetrically edematous left kidney with decreased cortical medullary differentiation which is nonspecific but could be seen in the setting of pyelonephritis.. Electronically Signed: Raad Jansen MD  2/22/2025 5:10 PM EST  Workstation ID: [id number]    XR Chest 1 View    Result Date:  2/21/2025  No active disease. Electronically Signed: Vitaly Maya MD  2/21/2025 7:38 PM EST  Workstation ID: [id number]   []  EKG/Telemetry   []  Cardiology/Vascular   []  Pathology  []  Old records  []  Other:    Assessment & Plan   Assessment / Plan     Assessment:  Acute renal failure due to obstructing nephrolithiasis on the left status post stent placement  Septic shock, present on admission secondary to bacteremia  ESBL E. coli bacteremia  Chronic hypotension on midodrine  History of Crohn's disease with complex surgery  Chronic nonhealing abdominal wounds and abscess that time secondary to significant abdominal surgery in the past for duodenal ulcer rupture follows with   Chronic enterocutaneous fistulas followed by     Plan:  Patient remains admitted to the medicine service  Continue patient on antibiotics.  Patient being switched to meropenem given blood culture results  CT of the abdomen and pelvis reviewed.  Patient taken by urology to the operating room to have left sided stent placement  Nephrology following.  Renal function slowly improving  Continue patient on Levophed  Continue patient on bicarbonate drip  Wound culture from abdomen is currently pending  Continue wound care  Discussed plan with nurse and with patient      Discussed with RN.    VTE Prophylaxis:  Mechanical VTE prophylaxis orders are present.        CODE STATUS:   Level Of Support Discussed With: Patient  Code Status (Patient has no pulse and is not breathing): CPR (Attempt to Resuscitate)  Medical Interventions (Patient has pulse or is breathing): Full Support      Electronically signed by Brandon Estevez DO, 2/23/2025, 11:07 EST.

## 2025-02-24 ENCOUNTER — APPOINTMENT (OUTPATIENT)
Dept: CARDIOLOGY | Facility: HOSPITAL | Age: 45
DRG: 853 | End: 2025-02-24
Payer: MEDICARE

## 2025-02-24 LAB
027 TOXIN: NORMAL
ALBUMIN SERPL-MCNC: 2 G/DL (ref 3.5–5.2)
ALBUMIN/GLOB SERPL: 0.5 G/DL
ALP SERPL-CCNC: 93 U/L (ref 39–117)
ALT SERPL W P-5'-P-CCNC: 5 U/L (ref 1–33)
ANION GAP SERPL CALCULATED.3IONS-SCNC: 10.6 MMOL/L (ref 5–15)
AST SERPL-CCNC: 8 U/L (ref 1–32)
BACTERIA SPEC AEROBE CULT: ABNORMAL
BH BB BLOOD EXPIRATION DATE: NORMAL
BH BB BLOOD EXPIRATION DATE: NORMAL
BH BB BLOOD TYPE BARCODE: 5100
BH BB BLOOD TYPE BARCODE: 5100
BH BB DISPENSE STATUS: NORMAL
BH BB DISPENSE STATUS: NORMAL
BH BB PRODUCT CODE: NORMAL
BH BB PRODUCT CODE: NORMAL
BH BB UNIT NUMBER: NORMAL
BH BB UNIT NUMBER: NORMAL
BILIRUB SERPL-MCNC: 0.8 MG/DL (ref 0–1.2)
BUN SERPL-MCNC: 38 MG/DL (ref 6–20)
BUN/CREAT SERPL: 10.8 (ref 7–25)
C DIFF TOX GENS STL QL NAA+PROBE: NEGATIVE
CALCIUM SPEC-SCNC: 8.4 MG/DL (ref 8.6–10.5)
CHLORIDE SERPL-SCNC: 110 MMOL/L (ref 98–107)
CO2 SERPL-SCNC: 20.4 MMOL/L (ref 22–29)
CREAT SERPL-MCNC: 3.53 MG/DL (ref 0.57–1)
CYTO UR: NORMAL
DEPRECATED RDW RBC AUTO: 54.5 FL (ref 37–54)
EGFRCR SERPLBLD CKD-EPI 2021: 15.5 ML/MIN/1.73
ERYTHROCYTE [DISTWIDTH] IN BLOOD BY AUTOMATED COUNT: 17.2 % (ref 12.3–15.4)
GLOBULIN UR ELPH-MCNC: 3.7 GM/DL
GLUCOSE BLDC GLUCOMTR-MCNC: 129 MG/DL (ref 70–99)
GLUCOSE BLDC GLUCOMTR-MCNC: 133 MG/DL (ref 70–99)
GLUCOSE BLDC GLUCOMTR-MCNC: 134 MG/DL (ref 70–99)
GLUCOSE SERPL-MCNC: 133 MG/DL (ref 65–99)
HCT VFR BLD AUTO: 23.3 % (ref 34–46.6)
HGB BLD-MCNC: 7.3 G/DL (ref 12–15.9)
LAB AP CASE REPORT: NORMAL
LAB AP CLINICAL INFORMATION: NORMAL
MAGNESIUM SERPL-MCNC: 2.3 MG/DL (ref 1.6–2.6)
MCH RBC QN AUTO: 27.5 PG (ref 26.6–33)
MCHC RBC AUTO-ENTMCNC: 31.3 G/DL (ref 31.5–35.7)
MCV RBC AUTO: 87.9 FL (ref 79–97)
PATH REPORT.FINAL DX SPEC: NORMAL
PATH REPORT.GROSS SPEC: NORMAL
PHOSPHATE SERPL-MCNC: 3 MG/DL (ref 2.5–4.5)
PLATELET # BLD AUTO: 116 10*3/MM3 (ref 140–450)
PMV BLD AUTO: 13.9 FL (ref 6–12)
POTASSIUM SERPL-SCNC: 3.5 MMOL/L (ref 3.5–5.2)
PROT SERPL-MCNC: 5.7 G/DL (ref 6–8.5)
RBC # BLD AUTO: 2.65 10*6/MM3 (ref 3.77–5.28)
SODIUM SERPL-SCNC: 141 MMOL/L (ref 136–145)
UNIT  ABO: NORMAL
UNIT  ABO: NORMAL
UNIT  RH: NORMAL
UNIT  RH: NORMAL
VANCOMYCIN SERPL-MCNC: 21.68 MCG/ML (ref 5–40)
WBC NRBC COR # BLD AUTO: 6.61 10*3/MM3 (ref 3.4–10.8)

## 2025-02-24 PROCEDURE — 83735 ASSAY OF MAGNESIUM: CPT | Performed by: UROLOGY

## 2025-02-24 PROCEDURE — 82948 REAGENT STRIP/BLOOD GLUCOSE: CPT | Performed by: INTERNAL MEDICINE

## 2025-02-24 PROCEDURE — 80202 ASSAY OF VANCOMYCIN: CPT | Performed by: PHYSICIAN ASSISTANT

## 2025-02-24 PROCEDURE — 85027 COMPLETE CBC AUTOMATED: CPT | Performed by: PHYSICIAN ASSISTANT

## 2025-02-24 PROCEDURE — 99291 CRITICAL CARE FIRST HOUR: CPT | Performed by: STUDENT IN AN ORGANIZED HEALTH CARE EDUCATION/TRAINING PROGRAM

## 2025-02-24 PROCEDURE — 93306 TTE W/DOPPLER COMPLETE: CPT

## 2025-02-24 PROCEDURE — 99231 SBSQ HOSP IP/OBS SF/LOW 25: CPT | Performed by: UROLOGY

## 2025-02-24 PROCEDURE — 87493 C DIFF AMPLIFIED PROBE: CPT | Performed by: PHYSICIAN ASSISTANT

## 2025-02-24 PROCEDURE — 25010000002 FUROSEMIDE PER 20 MG: Performed by: STUDENT IN AN ORGANIZED HEALTH CARE EDUCATION/TRAINING PROGRAM

## 2025-02-24 PROCEDURE — 93306 TTE W/DOPPLER COMPLETE: CPT | Performed by: INTERNAL MEDICINE

## 2025-02-24 PROCEDURE — 80053 COMPREHEN METABOLIC PANEL: CPT | Performed by: UROLOGY

## 2025-02-24 PROCEDURE — 84100 ASSAY OF PHOSPHORUS: CPT | Performed by: UROLOGY

## 2025-02-24 PROCEDURE — 25010000002 POTASSIUM CHLORIDE PER 2 MEQ: Performed by: PHYSICIAN ASSISTANT

## 2025-02-24 PROCEDURE — 25010000002 HYDROCORTISONE SOD SUC (PF) 100 MG RECONSTITUTED SOLUTION: Performed by: UROLOGY

## 2025-02-24 PROCEDURE — 87040 BLOOD CULTURE FOR BACTERIA: CPT | Performed by: INTERNAL MEDICINE

## 2025-02-24 PROCEDURE — 93005 ELECTROCARDIOGRAM TRACING: CPT | Performed by: INTERNAL MEDICINE

## 2025-02-24 PROCEDURE — 25010000002 MEROPENEM PER 100 MG: Performed by: PHYSICIAN ASSISTANT

## 2025-02-24 PROCEDURE — 99233 SBSQ HOSP IP/OBS HIGH 50: CPT | Performed by: INTERNAL MEDICINE

## 2025-02-24 PROCEDURE — 82948 REAGENT STRIP/BLOOD GLUCOSE: CPT

## 2025-02-24 RX ORDER — FUROSEMIDE 10 MG/ML
60 INJECTION INTRAMUSCULAR; INTRAVENOUS ONCE
Status: COMPLETED | OUTPATIENT
Start: 2025-02-24 | End: 2025-02-24

## 2025-02-24 RX ORDER — POTASSIUM CHLORIDE 29.8 MG/ML
20 INJECTION INTRAVENOUS
Status: COMPLETED | OUTPATIENT
Start: 2025-02-24 | End: 2025-02-24

## 2025-02-24 RX ADMIN — MUPIROCIN 1 APPLICATION: 20 OINTMENT TOPICAL at 21:55

## 2025-02-24 RX ADMIN — HYDROCORTISONE SODIUM SUCCINATE 50 MG: 100 INJECTION, POWDER, FOR SOLUTION INTRAMUSCULAR; INTRAVENOUS at 04:39

## 2025-02-24 RX ADMIN — PANTOPRAZOLE SODIUM 40 MG: 40 TABLET, DELAYED RELEASE ORAL at 08:00

## 2025-02-24 RX ADMIN — MIDODRINE HYDROCHLORIDE 10 MG: 10 TABLET ORAL at 08:00

## 2025-02-24 RX ADMIN — Medication 250 MG: at 21:55

## 2025-02-24 RX ADMIN — METRONIDAZOLE 500 MG: 500 TABLET ORAL at 16:53

## 2025-02-24 RX ADMIN — POTASSIUM CHLORIDE 20 MEQ: 29.8 INJECTION, SOLUTION INTRAVENOUS at 11:14

## 2025-02-24 RX ADMIN — SODIUM CHLORIDE 500 MG: 9 INJECTION, SOLUTION INTRAVENOUS at 21:54

## 2025-02-24 RX ADMIN — SERTRALINE HYDROCHLORIDE 75 MG: 50 TABLET ORAL at 08:00

## 2025-02-24 RX ADMIN — MUPIROCIN 1 APPLICATION: 20 OINTMENT TOPICAL at 08:04

## 2025-02-24 RX ADMIN — Medication 10 ML: at 21:55

## 2025-02-24 RX ADMIN — FUROSEMIDE 60 MG: 10 INJECTION, SOLUTION INTRAMUSCULAR; INTRAVENOUS at 09:10

## 2025-02-24 RX ADMIN — Medication 250 MG: at 08:00

## 2025-02-24 RX ADMIN — METRONIDAZOLE 500 MG: 500 TABLET ORAL at 21:55

## 2025-02-24 RX ADMIN — METRONIDAZOLE 500 MG: 500 TABLET ORAL at 08:00

## 2025-02-24 RX ADMIN — SODIUM CHLORIDE 500 MG: 9 INJECTION, SOLUTION INTRAVENOUS at 08:01

## 2025-02-24 RX ADMIN — Medication 10 ML: at 08:03

## 2025-02-24 RX ADMIN — POTASSIUM CHLORIDE 20 MEQ: 29.8 INJECTION, SOLUTION INTRAVENOUS at 10:20

## 2025-02-24 NOTE — PROGRESS NOTES
Pulmonary / Critical Care Progress Note      Patient Name: Mag Padilla  : 1980  MRN: [id number]  Attending:  Brandon Estevez DO   Date of admission: 2025    Subjective   Subjective   Patient critically ill with septic shock    Over past 24 hours:  Remains on levo 0.03  Blood culture positive for ESBL  Still having abdominal pain but improved  On 2 L nasal cannula  Bradycardic overnight but remained asymptomatic  EKG shows sinus bradycardia    Objective   Objective     Vitals:   Vital signs for last 24 hours:  Temp:  [97.5 °F (36.4 °C)-98.9 °F (37.2 °C)] 98.2 °F (36.8 °C)  Heart Rate:  [] 41  Resp:  [4-27] 19  BP: ()/(62-73) 101/67  Arterial Line BP: ()/(51-81) 117/67    Intake/Output last 3 shifts:  I/O last 3 completed shifts:  In: 5942.5 [I.V.:5282.5; Blood:660]  Out: 605 [Urine:600; Blood:5]  Intake/Output this shift:  I/O this shift:  In: 240 [P.O.:240]  Out: -     Vent settings for last 24 hours:       Hemodynamic parameters for last 24 hours:       Physical Exam:  Vital Signs Reviewed   General: Pale, lethargic, critically ill female.    HEENT:  PERRL, EOMI.  OP, nares clear  Chest:  clear to auscultation bilaterally, tympanic to percussion bilaterally, no work of breathing noted on 2 L nasal cannula  CV: Bradycardic, no MGR, pulses 2+, equal.  Abd:  Soft, NT, ND, + BS, no HSM, multiple abdominal incisions noted, weeping wounds noted from enterocutaneous fistulas  EXT:  no clubbing, no cyanosis, no edema  Neuro:  A&Ox3, CN grossly intact, no focal deficits.  Skin: No rashes or lesions noted    Result Review    Result Review:  I have personally reviewed the results from the time of this admission to 2025 10:48 EST and agree with these findings:  [x]  Laboratory  [x]  Microbiology  [x]  Radiology  [x]  EKG/Telemetry   [x]  Cardiology/Vascular   []  Pathology  []  Old records  []  Other:  Most notable findings include:       Lab 25  0526 25  0440 25  4668  02/23/25  1058 02/23/25  0138 02/22/25  2201 02/22/25  2157 02/22/25  1919 02/22/25  1837 02/22/25  1648 02/22/25  0502 02/21/25  1821 02/21/25  1708   WBC 6.61  --   --   --  6.50 8.33  --   --   --  9.77 12.57* 9.27 11.98*   HEMOGLOBIN 7.3*  --  8.1*  --  7.0* 8.0*  --   --   --  8.5* 8.4* 8.2* 9.0*   HEMATOCRIT 23.3*  --  24.7*  --  22.4* 25.2*  --   --   --  27.9* 28.1* 26.8* 29.9*   PLATELETS 116*  --   --   --  84* 94*  --   --   --  100* 87* 71* 86*   SODIUM  --  141  --  143 140  --  138  --   --  138 135* 135* 132*   POTASSIUM  --  3.5  --  4.1 3.0*  --  2.9*  --   --  3.4* 3.9 3.0* 3.3*   CHLORIDE  --  110*  --  109* 112*  --  114*  --   --  113* 113* 110* 106   CO2  --  20.4*  --  18.8* 15.5*  --  12.3*  --   --  11.3* 4.9* 11.5* 10.3*   BUN  --  38*  --  34* 38*  --  39*  --   --  45* 48* 53* 56*   CREATININE  --  3.53*  --  3.66* 3.64*  --  3.91*  --  4.38* 4.46* 5.03* 5.50* 5.67*   GLUCOSE  --  133*  --  138* 149*  --  153*  --   --  72 53* 79 88   CALCIUM  --  8.4*  --  8.1* 8.3*  --  8.4*  --   --  8.4* 8.2* 7.9* 8.2*   PHOSPHORUS  --  3.0  --  2.2* 2.7  --  2.2* 2.6  --   --   --   --   --    TOTAL PROTEIN  --  5.7*  --   --  5.6*  --  5.2*  --   --  5.7*  --  5.4* 6.1   ALBUMIN  --  2.0*  --  1.9* 2.0*  --  1.6*  --   --  1.5*  --  1.6* 1.6*   GLOBULIN  --  3.7  --   --  3.6  --  3.6  --   --  4.2  --  3.8 4.5     CT Abdomen Pelvis Without Contrast    Result Date: 2/22/2025  CT ABDOMEN PELVIS WO CONTRAST Date of Exam: 2/22/2025 4:18 PM EST Indication: chronic subcutaneous abscess of abdomen. Comparison: CT AP 2/1/2025 Technique: Axial CT images were obtained of the abdomen and pelvis without the administration of contrast. Reconstructed coronal and sagittal images were also obtained. Automated exposure control and iterative construction methods were used. Findings: Subsegmental atelectasis at the lung bases appears worsened. No consolidation or mass is evident. Small right effusion appears larger.  New small left effusion is evident. The right liver lobe is relatively small, which may be a manifestation of cirrhosis. The gallbladder is absent, surgical clips are seen in the gallbladder bed. No pancreatic mass is evident. Embolization coils are again seen adjacent to the pancreatic head. No adrenal mass is evident. The spleen is of normal size. A small amount of peritoneal fluid is again seen. Numerous tiny nonobstructing stones are seen in the renal collecting systems. Mild left hydroureteronephrosis is evident. 6 mm stone in the mid left ureter at the L5 level was seen on 2/1/2025 in the lower pole collecting system of the left kidney. This stone appears to be visible on the  image. 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 A wound is seen in the midline anterior abdominal wall, with 1.5 cm gas or air pocket seen on sagittal series 4 image 105, which appears slightly smaller in comparison to 2/1/2025. No fluid collection is evident. The uterus measures 4 cm in greatest dimension. No pelvic mass is seen.     Impression: Impression: CT scan of the abdomen and pelvis without contrast demonstrating worsening subsegmental atelectasis at the lung bases. Small right effusion appears larger. New small left effusion. Hepatic configuration concerning for cirrhosis. A small amount of peritoneal fluid is again seen. Post cholecystectomy. Multiple nonobstructing renal stones are again seen. Mild left hydroureteronephrosis is not seen on the prior study. 6 mm stone in the mid left ureter at the L5 level was seen on the prior study in the lower pole collecting system of the left kidney. 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 Wound in the anterior abdominal wall is again seen. This appears smaller in comparison to 2/1/2025. Electronically Signed: Shun Milan MD  2/22/2025 5:22 PM EST  Workstation ID: [id number]     Blood cultures positive for CTX-M E-coli      Assessment &  Plan   Assessment / Plan     Active Hospital Problems:  Active Hospital Problems    Diagnosis     **Acute renal failure     Hypotension due to hypovolemia     Chronic diarrhea     Nausea & vomiting     Inflammatory bowel disease (Crohn's disease)     Kidney stone      Impression:  Septic shock, present on admission  Urinary tract infection from multidrug-resistant E. Coli  E. coli bacteremia  Obstructing nephrolithiasis status post ureteral stent placement  Dehydration  Acute kidney injury secondary to prerenal etiology  Metabolic acidosis  Supratherapeutic INR  Hypokalemia  Chronic hypotension on midodrine  History of Crohn's disease with complex abdominal surgeries  Chronic nonhealing abdominal wounds  Enterocutaneous fistulas     Plan:  Currently on 2 L nasal cannula  Pressors: Continue norepinephrine to keep MAP greater than 65  Continue midodrine  Continue with meropenem, Flagyl, vancomycin  Repeat culture until clearance  Renal function slowly improving.  Status post ureteral stent by urology.  Management per them.  Cont aspiration precautions. Keep HOB 30 deg.   Replace electrolytes PRN to keep K 4.0, Mag 2.0, Phos 4.0.  Keep glucose 140-180 while in ICU. Cont SSI.  Transfuse to keep Hgb >7  Continue wound care  GI prophylaxis with PPI    VTE Prophylaxis:  Mechanical VTE prophylaxis orders are present.    CODE STATUS:   Level Of Support Discussed With: Patient  Code Status (Patient has no pulse and is not breathing): CPR (Attempt to Resuscitate)  Medical Interventions (Patient has pulse or is breathing): Full Support    Patient is critically ill with septic shock, ESBL E. coli bacteremia, JOHANA, metabolic acidosis, multiple electrolyte abnormalities. I, Dr. Anne Roger, spent 33 minutes of critical care time. Total Critical Care time spent: 33 minutes. This included personally reviewing all pertinent labs, imaging, microbiology and documentation. Also discussing the case with the patient and any available  family, the admitting physician and any available ancillary staff.   Electronically signed by Anne Roger MD, 02/24/25, 1:22 PM EST.

## 2025-02-24 NOTE — PLAN OF CARE
Goal Outcome Evaluation:              Outcome Evaluation: Patient alert and oriented. Continued on levophed to maintain SBP greater than or equal to 120; currently on levophed at 0.06 mcg/kg/min. HR dropped to 35-38 consistently overnight. 2 liters NC applied for SpO2 <90% while sleeping.    [place] RN

## 2025-02-24 NOTE — PROGRESS NOTES
[facility]     Progress Note    Patient Name: Mag Padilla  : 1980  MRN: [id number]  Primary Care Physician:  Provider, No Known  Date of admission: 2025    Subjective   Subjective     Chief Complaint: Pt. Does not have any complaints.    Nausea  Symptoms include nausea.    Hypotension  Symptoms include nausea.      Patient Reports Pt. Admitted with sepsis due to a 6 mm stone left ureter.  She underwent emergent stent placement.   She is doing well and has no complaints.  De La Cruz catheter is in place working well.    Review of Systems   Gastrointestinal:  Positive for nausea.   Negative    Objective   Objective     Vitals:   Temp:  [97.5 °F (36.4 °C)-98.9 °F (37.2 °C)] 98.3 °F (36.8 °C)  Heart Rate:  [] 49  Resp:  [4-27] 20  BP: ()/(59-73) 86/59  Arterial Line BP: ()/(51-81) 114/71  Flow (L/min) (Oxygen Therapy):  [2] 2    Physical Exam   Awake Alert  NAD  AF VSS  Result Review    Result Review:  I have personally reviewed the results from the time of this admission to 2025 14:14 EST and agree with these findings:  [x]  Laboratory list / accordion  []  Microbiology  []  Radiology  []  EKG/Telemetry   []  Cardiology/Vascular   []  Pathology  []  Old records  []  Other:  Most notable findings include: Cr 3.53 down from 3.66 yesterday.  WBC 6.61      Assessment & Plan   Assessment / Plan     Brief Patient Summary:  Mag Padilla is a 44 y.o. female who sepsis.    Active Hospital Problems:  Active Hospital Problems    Diagnosis     **Acute renal failure     Hypotension due to hypovolemia     Chronic diarrhea     Nausea & vomiting     Inflammatory bowel disease (Crohn's disease)     Kidney stone      Plan:   Pt. Had developed sepsis from 6 mm Left ureteral stone.  She has ureteral stent in left side. She has de la cruz in and that can be removed anytime.  She needs after discharge follow up with Urology clinic to have left ureteral stone removed. I stressed to pt the importance  of this follow up and she understands and agrees to do that.    VTE Prophylaxis:  Mechanical VTE prophylaxis orders are present.        CODE STATUS:    Level Of Support Discussed With: Patient  Code Status (Patient has no pulse and is not breathing): CPR (Attempt to Resuscitate)  Medical Interventions (Patient has pulse or is breathing): Full Support    Disposition:  I expect patient to be discharged  home.    Elliot Briceño MD

## 2025-02-24 NOTE — PROGRESS NOTES
[facility]   Hospitalist Progress Note  Date: 2025  Patient Name: Mag Padilla  : 1980  MRN: [id number]  Date of admission: 2025  Room/Bed: \[facility]\""      Subjective   Subjective     Chief Complaint: nausea, vomiting weakness     Summary:Mag Padilla is a 44 y.o. female who is a nursing home resident  with past medical history of Crohn disease status post resection,with complex abdominal surgical history.  Patient had a history of a duodenal hole and underwent extensive surgery that required a G-tube, J-tube and multiple drain placements this was all done at [facility].  Patient since that time has had multiple issues with abdominal wounds and abscesses.  Patient also has history of hypotension on midodrine, anxiety, chronic diarrhea, and GERD presenting to the ED for evaluation of nausea vomiting diarrhea with generalized weakness.  Patient states that for the last 3 days she has not been feeling well with persistent nausea and vomiting for the last 2 days and worsening of her chronic diarrhea.  Due to the symptoms patient is weakness and lethargy had also worsened to where she is mostly bedbound with excessive sleepiness.  Patient is seen by wound care for abdominal wounds after an ex lap with colon resection.  Due to worsening condition patient was eventually brought to the ED for further evaluation.  In the ED patient was significantly hypotensive on arrival with remaining vitals being within normal limits.  UA was positive for UTI with signs of dehydration, labs showed severely reduced renal function with hypokalemia, anemia, thrombocytopenia, and severe hypoalbuminemia.  Chest x-ray was negative for any acute findings.  When seen patient was resting comfortably and still having episode of diarrhea since being here although her nausea is much improved.  She did deny any recent fevers, chills, headaches, focal weakness, chest pain, palpitation, shortness of breath, cough,  abdominal pain, constipation, dysuria, hematuria, hematochezia, melena, or anxiety.  Patient admitted for further evaluation and treatment.       Interval Followup:   Patient remains in the ICU  Remains on Levophed  CT of the abdomen revealed left ureteral stone which required urology consult and patient was taken to the operating room  and had a cystoscopy with stent placement    Patient's blood cultures are growing ESBL E. Coli  MRSA screen is also positive  Creatinine has improved to 3.5 today    Patient's wound culture from her abdominal wound is pending growing gram negative   Chronic abdominal wounds follows with [facility]       Review of Systems    All systems reviewed and negative except for what is outlined above.      Objective   Objective     Vitals:   Temp:  [97.5 °F (36.4 °C)-98.9 °F (37.2 °C)] 98.2 °F (36.8 °C)  Heart Rate:  [] 41  Resp:  [4-27] 19  BP: ()/(62-73) 101/67  Arterial Line BP: ()/(51-81) 117/67  Flow (L/min) (Oxygen Therapy):  [2] 2    Physical Exam   General: Awake, alert, NAD resting in bed  HENT: NCAT, MMM  Eyes: pupils equal, no scleral icterus  Cardiovascular: RRR, no murmurs   Pulmonary: CTA bilaterally; no wheezes; no conversational dyspnea  Gastrointestinal: Soft patient has multiple abdominal wounds patient has a wound on her left lower side that is draining a significant amount of greenish drainage.   Patient denies any significant pain  Musculoskeletal: No gross deformities  Skin: No jaundice, no rash on exposed skin appreciated  Neuro: CN II through XII grossly intact; speech clear; no tremor  Psych: Mood and affect appropriate   de la cruz in place     Result Review    Result Review:  I have personally reviewed these results:  [x]  Laboratory      Lab 02/24/25  0526 02/23/25  2105 02/23/25  1058 02/23/25  0138 02/22/25  2201 02/22/25  2157 02/22/25  1919 02/22/25  1837 02/22/25  1648 02/22/25  1531 02/22/25  0502 02/21/25  1821 02/21/25  1708   WBC 6.61  --   --  6.50  8.33  --   --   --  9.77  --    < > 9.27 11.98*   HEMOGLOBIN 7.3* 8.1*  --  7.0* 8.0*  --   --   --  8.5*  --    < > 8.2* 9.0*   HEMATOCRIT 23.3* 24.7*  --  22.4* 25.2*  --   --   --  27.9*  --    < > 26.8* 29.9*   PLATELETS 116*  --   --  84* 94*  --   --   --  100*  --    < > 71* 86*   NEUTROS ABS  --   --   --   --  7.41*  --   --   --  8.59*  --   --  8.31* 10.85*   IMMATURE GRANS (ABS)  --   --   --   --   --   --   --   --  0.09*  --   --  0.08* 0.06*   LYMPHS ABS  --   --   --   --   --   --   --   --  0.45*  --   --  0.36* 0.43*   MONOS ABS  --   --   --   --   --   --   --   --  0.52  --   --  0.42 0.49   EOS ABS  --   --   --   --  0.33  --   --   --  0.07  --   --  0.06 0.08   MCV 87.9  --   --  87.2 86.9  --   --   --  90.6  --    < > 93.1 92.9   PROCALCITONIN  --   --   --   --   --  20.74* 26.11*  --   --   --   --   --   --    LACTATE  --   --   --  0.9  --   --   --  1.7  --  1.2  --   --   --    PROTIME  --   --  17.3*  --   --   --  34.3*  --   --   --   --   --   --    APTT  --   --   --   --   --   --  47.6*  --   --   --   --   --   --    D DIMER QUANT  --   --   --   --   --   --  1.76*  --   --   --   --   --   --     < > = values in this interval not displayed.         Lab 02/24/25  0440 02/23/25  1058 02/23/25  0138 02/22/25  2157   SODIUM 141 143 140 138   POTASSIUM 3.5 4.1 3.0* 2.9*   CHLORIDE 110* 109* 112* 114*   CO2 20.4* 18.8* 15.5* 12.3*   ANION GAP 10.6 15.2* 12.5 11.7   BUN 38* 34* 38* 39*   CREATININE 3.53* 3.66* 3.64* 3.91*   EGFR 15.5* 14.9* 15.0* 13.7*   GLUCOSE 133* 138* 149* 153*   CALCIUM 8.4* 8.1* 8.3* 8.4*   MAGNESIUM 2.3  --  1.9 0.9*   PHOSPHORUS 3.0 2.2* 2.7 2.2*         Lab 02/24/25  0440 02/23/25  1058 02/23/25  0138 02/22/25  2157 02/21/25  1821 02/21/25  1708   TOTAL PROTEIN 5.7*  --  5.6* 5.2*   < > 6.1   ALBUMIN 2.0* 1.9* 2.0* 1.6*   < > 1.6*   GLOBULIN 3.7  --  3.6 3.6   < > 4.5   ALT (SGPT) 5  --  7 <5   < > <5   AST (SGOT) 8  --  9 7   < > 13   BILIRUBIN 0.8  --   1.2 0.9   < > 0.9   ALK PHOS 93  --  91 95   < > 88   LIPASE  --   --   --   --   --  10*    < > = values in this interval not displayed.         Lab 02/23/25  1058 02/22/25  1919 02/21/25  1821   PROBNP  --   --  15,412.0*   PROTIME 17.3* 34.3*  --    INR 1.35* 3.17*  --              Lab 02/22/25  2157 02/22/25  1648 02/21/25  1821 02/21/25  1708   IRON  --   --  25*  --    IRON SATURATION (TSAT)  --   --  32  --    TIBC  --   --  79*  --    TRANSFERRIN  --   --  53*  --    FOLATE  --   --   --  7.99   VITAMIN B 12  --   --   --  545   ABO TYPING O   < >  --   --    RH TYPING Positive   < >  --   --    ANTIBODY SCREEN Negative  --   --   --     < > = values in this interval not displayed.         Lab 02/22/25  1837   PH, ARTERIAL 7.327*   PCO2, ARTERIAL 20.5*   PO2 .9*   O2 SATURATION ART 97.7   HCO3 ART 10.7*   BASE EXCESS ART -13.6*     Brief Urine Lab Results  (Last result in the past 365 days)        Color   Clarity   Blood   Leuk Est   Nitrite   Protein   CREAT   Urine HCG        02/22/25 2208 Dark Yellow   Cloudy   Large (3+)   Moderate (2+)   Negative   100 mg/dL (2+)                 [x]  Microbiology   Microbiology Results (last 10 days)       Procedure Component Value - Date/Time    Clostridioides difficile Toxin - Stool, Per Rectum [[id number]] Collected: 02/24/25 0856    Lab Status: Final result Specimen: Stool from Per Rectum Updated: 02/24/25 0950    Narrative:      The following orders were created for panel order Clostridioides difficile Toxin - Stool, Per Rectum.  Procedure                               Abnormality         Status                     ---------                               -----------         ------                     Clostridioides difficile...[[id number]]                      Final result                 Please view results for these tests on the individual orders.    Clostridioides difficile Toxin, PCR - Stool, Per Rectum [[id number]] Collected: 02/24/25 0856    Lab Status:  Final result Specimen: Stool from Per Rectum Updated: 02/24/25 0950     Toxigenic C. difficile by PCR Negative     027 Toxin Presumptive Negative    Narrative:      The result indicates the absence of toxigenic C. difficile from stool specimen.     MRSA Screen, PCR (Inpatient) - Swab, Nares [[id number]]  (Abnormal) Collected: 02/23/25 0601    Lab Status: Final result Specimen: Swab from Nares Updated: 02/23/25 1001     MRSA PCR MRSA Detected    Narrative:      The negative predictive value of this diagnostic test is high and should only be used to consider de-escalating anti-MRSA therapy. A positive result may indicate colonization with MRSA and must be correlated clinically.    Respiratory Panel PCR w/COVID-19(SARS-CoV-2) BRIELLE/TOSHA/IMTIAZ/PAD/COR/NATACHA In-House, NP Swab in UTM/VTM, 2 HR TAT - Swab, Nasopharynx [[id number]]  (Normal) Collected: 02/23/25 0601    Lab Status: Final result Specimen: Swab from Nasopharynx Updated: 02/23/25 0827     ADENOVIRUS, PCR Not Detected     Coronavirus 229E Not Detected     Coronavirus HKU1 Not Detected     Coronavirus NL63 Not Detected     Coronavirus OC43 Not Detected     COVID19 Not Detected     Human Metapneumovirus Not Detected     Human Rhinovirus/Enterovirus Not Detected     Influenza A PCR Not Detected     Influenza B PCR Not Detected     Parainfluenza Virus 1 Not Detected     Parainfluenza Virus 2 Not Detected     Parainfluenza Virus 3 Not Detected     Parainfluenza Virus 4 Not Detected     RSV, PCR Not Detected     Bordetella pertussis pcr Not Detected     Bordetella parapertussis PCR Not Detected     Chlamydophila pneumoniae PCR Not Detected     Mycoplasma pneumo by PCR Not Detected    Narrative:      In the setting of a positive respiratory panel with a viral infection PLUS a negative procalcitonin without other underlying concern for bacterial infection, consider observing off antibiotics or discontinuation of antibiotics and continue supportive care. If the respiratory panel  is positive for atypical bacterial infection (Bordetella pertussis, Chlamydophila pneumoniae, or Mycoplasma pneumoniae), consider antibiotic de-escalation to target atypical bacterial infection.    Urine Culture - Urine, Urine, Clean Catch [[id number]]  (Normal) Collected: 02/22/25 2208    Lab Status: Preliminary result Specimen: Urine, Clean Catch Updated: 02/23/25 1418     Urine Culture Culture in progress    Blood Culture - Blood, Leg, Left [[id number]]  (Abnormal) Collected: 02/22/25 1922    Lab Status: Preliminary result Specimen: Blood from Leg, Left Updated: 02/24/25 0623     Blood Culture Gram Negative Bacilli     Isolated from Aerobic and Anaerobic Bottles     Gram Stain Aerobic Bottle Gram negative bacilli      Anaerobic Bottle Gram negative bacilli    Narrative:      Less than seven (7) mL's of blood was collected.  Insufficient quantity may yield false negative results.    Blood Culture ID, PCR - Blood, Leg, Left [[id number]]  (Abnormal) Collected: 02/22/25 1922    Lab Status: Final result Specimen: Blood from Leg, Left Updated: 02/23/25 1155     BCID, PCR Escherichia coli. Identification by BCID2 PCR.     BCID, PCR 2 CTX-M (ESBL) detected. Identification by BCID2 PCR     BOTTLE TYPE Anaerobic Bottle    Blood Culture - Blood, Arm, Left [[id number]]  (Normal) Collected: 02/22/25 1919    Lab Status: Preliminary result Specimen: Blood from Arm, Left Updated: 02/23/25 1931     Blood Culture No growth at 24 hours    Narrative:      Less than seven (7) mL's of blood was collected.  Insufficient quantity may yield false negative results.    Wound Culture - Wound, Abdominal Wall [[id number]]  (Abnormal) Collected: 02/22/25 1605    Lab Status: Preliminary result Specimen: Wound from Abdominal Wall Updated: 02/24/25 0858     Wound Culture Moderate growth (3+) Gram Negative Bacilli      Light growth (2+) Normal Skin Aria     Gram Stain Rare (1+) Gram negative bacilli    Legionella Antigen, Urine - Urine, Straight Cath  [[id number]]  (Normal) Collected: 02/21/25 2202    Lab Status: Final result Specimen: Urine from Straight Cath Updated: 02/22/25 1751     LEGIONELLA ANTIGEN, URINE Negative    S. Pneumo Ag Urine or CSF - Urine, Straight Cath [[id number]]  (Normal) Collected: 02/21/25 2202    Lab Status: Final result Specimen: Urine from Straight Cath Updated: 02/22/25 1752     Strep Pneumo Ag Negative    COVID-19, FLU A/B, RSV PCR 1 HR TAT - Swab, Nasopharynx [[id number]]  (Normal) Collected: 02/21/25 1745    Lab Status: Final result Specimen: Swab from Nasopharynx Updated: 02/21/25 1832     COVID19 Not Detected     Influenza A PCR Not Detected     Influenza B PCR Not Detected     RSV, PCR Not Detected    Narrative:      Fact sheet for providers: https://www.fda.gov/media/331506/download    Fact sheet for patients: https://www.fda.gov/media/744551/download    Test performed by PCR.          [x]  Radiology  XR Chest 1 View    Result Date: 2/23/2025  The distal tip of the right IJ central venous line/port is in the expected mid superior vena cava (SVC). No pneumothorax is seen. There are bilateral infiltrates present, suggestive of pulmonary edema with vascular congestion.    Portions of this note were completed with a voice recognition program.  2/23/2025 12:16 AM by James Hull MD on Workstation: [id number]      CT Abdomen Pelvis Without Contrast    Result Date: 2/22/2025  Impression: CT scan of the abdomen and pelvis without contrast demonstrating worsening subsegmental atelectasis at the lung bases. Small right effusion appears larger. New small left effusion. Hepatic configuration concerning for cirrhosis. A small amount of peritoneal fluid is again seen. Post cholecystectomy. Multiple nonobstructing renal stones are again seen. Mild left hydroureteronephrosis is not seen on the prior study. 6 mm stone in the mid left ureter at the L5 level was seen on the prior study in the lower pole collecting system of the left kidney.  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 Wound in the anterior abdominal wall is again seen. This appears smaller in comparison to 2/1/2025. Electronically Signed: Shun Milan MD  2/22/2025 5:22 PM EST  Workstation ID: [id number]    US Renal Bilateral    Result Date: 2/22/2025  Impression: 1.Bilateral nephrolithiasis without evidence of hydronephrosis. 2.Bilateral increased cortical echogenicity consistent with chronic renal disease. 3.Asymmetrically edematous left kidney with decreased cortical medullary differentiation which is nonspecific but could be seen in the setting of pyelonephritis.. Electronically Signed: Raad Jansen MD  2/22/2025 5:10 PM EST  Workstation ID: [id number]    XR Chest 1 View    Result Date: 2/21/2025  No active disease. Electronically Signed: Vitaly Maya MD  2/21/2025 7:38 PM EST  Workstation ID: [id number]   []  EKG/Telemetry   []  Cardiology/Vascular   []  Pathology  []  Old records  []  Other:    Assessment & Plan   Assessment / Plan     Assessment:  Acute renal failure due to obstructing nephrolithiasis on the left status post stent placement  Septic shock, present on admission secondary to bacteremia  ESBL E. coli bacteremia  Chronic hypotension on midodrine  History of Crohn's disease with complex surgery  Chronic nonhealing abdominal wounds and abscess that time secondary to significant abdominal surgery in the past for duodenal ulcer rupture follows with   Chronic enterocutaneous fistulas followed by     Plan:  Patient remains admitted to the medicine service  Continue patient on antibiotics.  Continue meropenem   Repeat Blood cultures   CT of the abdomen and pelvis reviewed.  Patient taken by urology to the operating room to have left sided stent placement  Nephrology following.  Renal function slowly improving  Continue patient on Levophed  Continue patient on bicarbonate drip  Wound culture from abdomen is currently pending but growing gram negative.  Patient does have known chronic abdominal wounds with fistulous communication.  Patient follows with  for management of these wounds patient was just discharged this month from  for these wounds.  May need to be transferred back to  once acute issues resolve  Continue wound care   Discussed plan with nurse and with patient      Discussed with RN.    VTE Prophylaxis:  Mechanical VTE prophylaxis orders are present.        CODE STATUS:   Level Of Support Discussed With: Patient  Code Status (Patient has no pulse and is not breathing): CPR (Attempt to Resuscitate)  Medical Interventions (Patient has pulse or is breathing): Full Support      Electronically signed by Brandon Estevez DO, 2/24/2025, 10:41 EST.

## 2025-02-24 NOTE — SIGNIFICANT NOTE
02/24/25 1400   Physical Therapy Time and Intention   Session Not Performed patient unavailable for evaluation  (echo)

## 2025-02-24 NOTE — SIGNIFICANT NOTE
02/24/25 1645   Coping/Psychosocial   Observed Emotional State calm;cooperative   Verbalized Emotional State hopefulness   Trust Relationship/Rapport empathic listening provided   Involvement in Care interacting with patient   Additional Documentation Spiritual Care (Group)   Spiritual Care   Use of Spiritual Resources spirituality for coping, indicated strong use of   Spiritual Care Source  initiative   Spiritual Care Follow-Up follow-up, none required as presently assessed   Response to Spiritual Care receptive of support;engaged in conversation;thanks expressed   Spiritual Care Interventions supportive conversation provided   Spiritual Care Visit Type initial   Receptivity to Spiritual Care visit welcomed     Duration of visit: 12 min

## 2025-02-24 NOTE — PROGRESS NOTES
[facility]: Vancomycin Monitoring Note    Mag Padilla is a 44 y.o. female who is on day 2/7 of pharmacy to dose vancomycin for Bacteremia.    Previous Vancomycin Dose:   500 mg IV x1 2/23 @ 1635  Imaging Reviewed?: Yes  Updated Cultures and Sensitivities:   Microbiology Results (last 10 days)       Procedure Component Value - Date/Time    Clostridioides difficile Toxin - Stool, Per Rectum [[id number]] Collected: 02/24/25 0856    Lab Status: Final result Specimen: Stool from Per Rectum Updated: 02/24/25 0950    Narrative:      The following orders were created for panel order Clostridioides difficile Toxin - Stool, Per Rectum.  Procedure                               Abnormality         Status                     ---------                               -----------         ------                     Clostridioides difficile...[[id number]]                      Final result                 Please view results for these tests on the individual orders.    Clostridioides difficile Toxin, PCR - Stool, Per Rectum [[id number]] Collected: 02/24/25 0856    Lab Status: Final result Specimen: Stool from Per Rectum Updated: 02/24/25 0950     Toxigenic C. difficile by PCR Negative     027 Toxin Presumptive Negative    Narrative:      The result indicates the absence of toxigenic C. difficile from stool specimen.     MRSA Screen, PCR (Inpatient) - Swab, Nares [[id number]]  (Abnormal) Collected: 02/23/25 0601    Lab Status: Final result Specimen: Swab from Nares Updated: 02/23/25 1001     MRSA PCR MRSA Detected    Narrative:      The negative predictive value of this diagnostic test is high and should only be used to consider de-escalating anti-MRSA therapy. A positive result may indicate colonization with MRSA and must be correlated clinically.    Respiratory Panel PCR w/COVID-19(SARS-CoV-2) BRIELLE/TOSHA/IMTIAZ/PAD/COR/NATACHA In-House, NP Swab in UTM/VTM, 2 HR TAT - Swab, Nasopharynx [[id number]]  (Normal)  Collected: 02/23/25 0601    Lab Status: Final result Specimen: Swab from Nasopharynx Updated: 02/23/25 0827     ADENOVIRUS, PCR Not Detected     Coronavirus 229E Not Detected     Coronavirus HKU1 Not Detected     Coronavirus NL63 Not Detected     Coronavirus OC43 Not Detected     COVID19 Not Detected     Human Metapneumovirus Not Detected     Human Rhinovirus/Enterovirus Not Detected     Influenza A PCR Not Detected     Influenza B PCR Not Detected     Parainfluenza Virus 1 Not Detected     Parainfluenza Virus 2 Not Detected     Parainfluenza Virus 3 Not Detected     Parainfluenza Virus 4 Not Detected     RSV, PCR Not Detected     Bordetella pertussis pcr Not Detected     Bordetella parapertussis PCR Not Detected     Chlamydophila pneumoniae PCR Not Detected     Mycoplasma pneumo by PCR Not Detected    Narrative:      In the setting of a positive respiratory panel with a viral infection PLUS a negative procalcitonin without other underlying concern for bacterial infection, consider observing off antibiotics or discontinuation of antibiotics and continue supportive care. If the respiratory panel is positive for atypical bacterial infection (Bordetella pertussis, Chlamydophila pneumoniae, or Mycoplasma pneumoniae), consider antibiotic de-escalation to target atypical bacterial infection.    Urine Culture - Urine, Urine, Clean Catch [[id number]]  (Abnormal) Collected: 02/22/25 2208    Lab Status: Final result Specimen: Urine, Clean Catch Updated: 02/24/25 1058     Urine Culture <10,000 CFU/mL Gram Negative Bacilli    Narrative:      Call if further workup needed.   Colonization of the urinary tract without infection is common. Treatment is discouraged unless the patient is symptomatic, pregnant, or undergoing an invasive urologic procedure.    Blood Culture - Blood, Leg, Left [[id number]]  (Abnormal) Collected: 02/22/25 1922    Lab Status: Preliminary result Specimen: Blood from Leg, Left Updated: 02/24/25 0623     Blood  Culture Gram Negative Bacilli     Isolated from Aerobic and Anaerobic Bottles     Gram Stain Aerobic Bottle Gram negative bacilli      Anaerobic Bottle Gram negative bacilli    Narrative:      Less than seven (7) mL's of blood was collected.  Insufficient quantity may yield false negative results.    Blood Culture ID, PCR - Blood, Leg, Left [[id number]]  (Abnormal) Collected: 02/22/25 1922    Lab Status: Final result Specimen: Blood from Leg, Left Updated: 02/23/25 1155     BCID, PCR Escherichia coli. Identification by BCID2 PCR.     BCID, PCR 2 CTX-M (ESBL) detected. Identification by BCID2 PCR     BOTTLE TYPE Anaerobic Bottle    Blood Culture - Blood, Arm, Left [[id number]]  (Normal) Collected: 02/22/25 1919    Lab Status: Preliminary result Specimen: Blood from Arm, Left Updated: 02/23/25 1931     Blood Culture No growth at 24 hours    Narrative:      Less than seven (7) mL's of blood was collected.  Insufficient quantity may yield false negative results.    Wound Culture - Wound, Abdominal Wall [[id number]]  (Abnormal) Collected: 02/22/25 1605    Lab Status: Preliminary result Specimen: Wound from Abdominal Wall Updated: 02/24/25 0858     Wound Culture Moderate growth (3+) Gram Negative Bacilli      Light growth (2+) Normal Skin Aria     Gram Stain Rare (1+) Gram negative bacilli    Legionella Antigen, Urine - Urine, Straight Cath [[id number]]  (Normal) Collected: 02/21/25 2202    Lab Status: Final result Specimen: Urine from Straight Cath Updated: 02/22/25 1751     LEGIONELLA ANTIGEN, URINE Negative    S. Pneumo Ag Urine or CSF - Urine, Straight Cath [[id number]]  (Normal) Collected: 02/21/25 2202    Lab Status: Final result Specimen: Urine from Straight Cath Updated: 02/22/25 1752     Strep Pneumo Ag Negative    COVID-19, FLU A/B, RSV PCR 1 HR TAT - Swab, Nasopharynx [[id number]]  (Normal) Collected: 02/21/25 1745    Lab Status: Final result Specimen: Swab from Nasopharynx Updated: 02/21/25 1832     COVID19 Not  "Detected     Influenza A PCR Not Detected     Influenza B PCR Not Detected     RSV, PCR Not Detected    Narrative:      Fact sheet for providers: https://www.fda.gov/media/509021/download    Fact sheet for patients: https://www.fda.gov/media/000471/download    Test performed by PCR.              Vitals/Labs  Ht: 157.5 cm (62\"); Wt: 52.3 kg (115 lb 4.8 oz)   Temp (24hrs), Av.2 °F (36.8 °C), Min:97.5 °F (36.4 °C), Max:98.9 °F (37.2 °C)   Estimated Creatinine Clearance: 16.4 mL/min (A) (by C-G formula based on SCr of 3.53 mg/dL (H)).     Results from last 7 days   Lab Units 25  0526 25  0440 25  1058 25  0607 25  0138 25  2201   VANCOMYCIN RM mcg/mL  --  21.68  --  23.27  --   --    CREATININE mg/dL  --  3.53* 3.66*  --  3.64*  --    WBC 10*3/mm3 6.61  --   --   --  6.50 8.33     Assessment/Plan    Current Vancomycin Dose: Pulse dosing s/t renal function  Random level >20mcg/mL   Will hold additional doses today. Plan to dose with 500mg IV  @ 0800  Next Vanc Random planned for  at 0600  We will continue to monitor patient changes and renal function     Thank you for involving pharmacy in this patient's care. Please contact pharmacy with any questions or concerns.    Ivy Mayer  Clinical Pharmacist    "

## 2025-02-24 NOTE — PROGRESS NOTES
[facility]     Progress Note    Patient Name: Mag Padilla  : 1980  MRN: [id number]  Primary Care Physician:  Provider, No Known  Date of admission: 2025    Subjective   Patient's urine output is poor  Continues to be on low-dose norepinephrine  Creatinine appears to have plateaued  Hemoglobin is stable around 7  Blood cultures with ESBL and patient appropriately on meropenem      Scheduled Meds:hydrocortisone sodium succinate, 50 mg, Intravenous, Q8H  meropenem, 500 mg, Intravenous, Q12H  metroNIDAZOLE, 500 mg, Oral, TID  midodrine, 10 mg, Oral, TID AC  mupirocin, 1 Application, Each Nare, BID  pantoprazole, 40 mg, Oral, Daily  saccharomyces boulardii, 250 mg, Oral, BID  sertraline, 75 mg, Oral, Daily  sodium chloride, 10 mL, Intravenous, Q12H  Vancomycin Pharmacy Intermittent/Pulse Dosing, , Not Applicable, Daily      Continuous Infusions:norepinephrine, 0.02-0.3 mcg/kg/min, Last Rate: 0.02 mcg/kg/min (25 0805)  Pharmacy to dose vancomycin,       PRN Meds:.  senna-docusate sodium **AND** polyethylene glycol **AND** bisacodyl **AND** bisacodyl    dextrose    ondansetron    Pharmacy to dose vancomycin    sodium chloride    sodium chloride    sodium chloride       Review of Systems  Constitutional:        Weakness tiredness fatigue  Eyes:                       No blurry vision, eye discharge, eye irritation, eye pain  HEENT:                   No acute hair loss, earache and discharge, nasal congestion or discharge, sore throat, postnasal drip  Respiratory:           No shortness of breath coughing sputum production wheezing hemoptysis pleuritic chest pain  Cardiovascular:     No chest pain, orthopnea, PND, dizziness, palpitation, lower extremity edema  Gastrointestinal:   No nausea vomiting diarrhea abdominal pain constipation  Genitourinary:       No urinary incontinence, hesitancy, frequency, urgency, dysuria  Hematologic:         No bruising, bleeding, pallor,  lymphadenopathy  Endocrine:            No coldness, hot flashes, polyuria, abnormal hair growth  Musculoskeletal:    No body pains, aches, arthritic pains, muscle pain ,muscle wasting  Psychiatric:          No low or high mood, anxiety, hallucinations, delusions  Skin.                      No rash, ulcers, bruising, itching  Neurological:        No confusion, headache, focal weakness, numbness, dysphasia    Objective   Objective     Vitals:   Temp:  [97.5 °F (36.4 °C)-98.9 °F (37.2 °C)] 98 °F (36.7 °C)  Heart Rate:  [36-71] 58  Resp:  [4-27] 17  BP: ()/(62-73) 101/67  Arterial Line BP: ()/(51-81) 129/79  Flow (L/min) (Oxygen Therapy):  [2] 2  Physical Exam    Constitutional: Awake, alert responsive, conversant, no obvious distress              Psychiatric:  Appropriate affect, cooperative   Neurologic:  Awake alert ,oriented x 3, strength symmetric in all extremities, Cranial Nerves grossly intact to confrontation, speech clear   Eyes:   PERRLA, sclerae anicteric, no conjunctival injection   HEENT:  Moist mucous membranes, no nasal or eye discharge, no throat congestion   Neck:   Supple, no thyromegaly, no lymphadenopathy, trachea midline, no elevated JVD   Respiratory:  Clear to auscultation bilaterally, nonlabored respirations    Cardiovascular: RRR, no murmurs, rubs, or gallops, palpable pedal pulses bilaterally, No bilateral ankle edema   Gastrointestinal: Positive bowel sounds, soft, nontender, nondistended, no organomegaly   Musculoskeletal:  No clubbing or cyanosis to extremities,muscle wasting, joint swelling, muscle weakness             Skin:                      No rashes, bruising, skin ulcers, petechiae or ecchymosis    Result Review    Result Review:  I have personally reviewed the results from the time of this admission to 2/24/2025 08:52 EST and agree with these findings:  []  Laboratory  []  Microbiology  []  Radiology  []  EKG/Telemetry   []  Cardiology/Vascular   []  Pathology  []  Old  records  []  Other:    Assessment & Plan   Assessment / Plan       Active Hospital Problems:  Active Hospital Problems    Diagnosis     **Acute renal failure     Hypotension due to hypovolemia     Chronic diarrhea     Nausea & vomiting     Inflammatory bowel disease (Crohn's disease)     Kidney stone      44-year-old female with past medical history of Crohn's disease status post resection, chronically hypotensive on midodrine, anxiety, chronic diarrhea, GERD who who has not been feeling well and has had persistent nausea and vomiting for the last few days as well as her diarrhea with severe JOHANA and creatinine rise from baseline normal to peak of 5.6 with decreased urine output and worsening bicarb currently at 5.  JOHANA most likely due to hypotension, diarrhea and vomiting contributing with UA showing some hyaline cast.  Urine analysis also concerning for large amount of proteinuria and RBCs with CT scan showing obstructive stone which is likely the source of septic shock.  Creatinine has plateaued around 3.5 with poor urine output and concerning patient may have developed ATN.  Cultures with ESBL Klebsiella     Plan:   Patient's creatinine has plateaued.  No overt signs of volume overload.  Will give Lasix 60 mg x 1 and assess any response and urine output as patient is oliguric with less than half cc per mL/kg per hour  Patient also has very low muscle mass as she is mostly bedbound and GFR is likely lower than what is being denoted.  No uremic signs or symptoms and will continue to monitor  If no improvement over the last 24 hours, will get 24-hour urine collection for creatinine clearance  Continue with midodrine 10 mg 3 times daily  C3 is slightly low most likely infection related and low suspicion for any GN  Patient completing course of antibiotics    Thank you for involving me in the care of the patient.  Will continue to follow along

## 2025-02-25 LAB
ALBUMIN SERPL-MCNC: 2.3 G/DL (ref 3.5–5.2)
ALBUMIN/GLOB SERPL: 0.7 G/DL
ALP SERPL-CCNC: 96 U/L (ref 39–117)
ALT SERPL W P-5'-P-CCNC: <5 U/L (ref 1–33)
ANA SER QL: NEGATIVE
ANION GAP SERPL CALCULATED.3IONS-SCNC: 11.6 MMOL/L (ref 5–15)
ASCENDING AORTA: 3.2 CM
AST SERPL-CCNC: 9 U/L (ref 1–32)
AV MEAN PRESS GRAD SYS DOP V1V2: 2.4 MMHG
AV VMAX SYS DOP: 107.2 CM/SEC
BACTERIA SPEC AEROBE CULT: ABNORMAL
BH CV ECHO MEAS - AO MAX PG: 4.6 MMHG
BH CV ECHO MEAS - AO ROOT DIAM: 3 CM
BH CV ECHO MEAS - AO V2 VTI: 26.3 CM
BH CV ECHO MEAS - AVA(I,D): 2.5 CM2
BH CV ECHO MEAS - EDV(MOD-SP2): 119.4 ML
BH CV ECHO MEAS - EDV(MOD-SP4): 64.4 ML
BH CV ECHO MEAS - EF(MOD-SP2): 58.5 %
BH CV ECHO MEAS - EF(MOD-SP4): 35.9 %
BH CV ECHO MEAS - ESV(MOD-SP2): 49.6 ML
BH CV ECHO MEAS - ESV(MOD-SP4): 41.3 ML
BH CV ECHO MEAS - IVS/LVPW: 1 CM
BH CV ECHO MEAS - IVSD: 0.8 CM
BH CV ECHO MEAS - LA DIMENSION: 3.6 CM
BH CV ECHO MEAS - LAT PEAK E' VEL: 15.1 CM/SEC
BH CV ECHO MEAS - LV MAX PG: 3.2 MMHG
BH CV ECHO MEAS - LV MEAN PG: 1.3 MMHG
BH CV ECHO MEAS - LV V1 MAX: 90 CM/SEC
BH CV ECHO MEAS - LV V1 VTI: 20.6 CM
BH CV ECHO MEAS - LVIDD: 4.3 CM
BH CV ECHO MEAS - LVIDS: 3.4 CM
BH CV ECHO MEAS - LVOT DIAM: 2 CM
BH CV ECHO MEAS - LVPWD: 0.8 CM
BH CV ECHO MEAS - MED PEAK E' VEL: 13 CM/SEC
BH CV ECHO MEAS - MR MAX PG: 74.1 MMHG
BH CV ECHO MEAS - MR MAX VEL: 430.5 CM/SEC
BH CV ECHO MEAS - MV A MAX VEL: 60 CM/SEC
BH CV ECHO MEAS - MV DEC SLOPE: 521 CM/SEC2
BH CV ECHO MEAS - MV E MAX VEL: 108 CM/SEC
BH CV ECHO MEAS - MV E/A: 1.8
BH CV ECHO MEAS - MV MAX PG: 6.6 MMHG
BH CV ECHO MEAS - MV MEAN PG: 1.5 MMHG
BH CV ECHO MEAS - MV P1/2T: 72 MSEC
BH CV ECHO MEAS - MV V2 VTI: 45.7 CM
BH CV ECHO MEAS - MVA(P1/2T): 3 CM2
BH CV ECHO MEAS - RAP SYSTOLE: 3 MMHG
BH CV ECHO MEAS - RVDD: 2.8 CM
BH CV ECHO MEAS - RVSP: 23 MMHG
BH CV ECHO MEAS - SV(MOD-SP2): 69.8 ML
BH CV ECHO MEAS - SV(MOD-SP4): 23.1 ML
BH CV ECHO MEAS - TR MAX PG: 20 MMHG
BH CV ECHO MEAS - TR MAX VEL: 224 CM/SEC
BH CV ECHO MEASUREMENTS AVERAGE E/E' RATIO: 7.69
BILIRUB SERPL-MCNC: 0.6 MG/DL (ref 0–1.2)
BUN SERPL-MCNC: 41 MG/DL (ref 6–20)
BUN/CREAT SERPL: 12.7 (ref 7–25)
C-ANCA TITR SER IF: ABNORMAL TITER
CALCIUM SPEC-SCNC: 8.7 MG/DL (ref 8.6–10.5)
CHLORIDE SERPL-SCNC: 110 MMOL/L (ref 98–107)
CO2 SERPL-SCNC: 20.4 MMOL/L (ref 22–29)
CREAT SERPL-MCNC: 3.23 MG/DL (ref 0.57–1)
D-LACTATE SERPL-SCNC: 1.6 MMOL/L (ref 0.5–2)
D-LACTATE SERPL-SCNC: 2.2 MMOL/L (ref 0.5–2)
DEPRECATED RDW RBC AUTO: 55.3 FL (ref 37–54)
EGFRCR SERPLBLD CKD-EPI 2021: 17.3 ML/MIN/1.73
ERYTHROCYTE [DISTWIDTH] IN BLOOD BY AUTOMATED COUNT: 17.3 % (ref 12.3–15.4)
FSP PPP-MCNC: 5 UG/ML
GLOBULIN UR ELPH-MCNC: 3.4 GM/DL
GLUCOSE BLDC GLUCOMTR-MCNC: 102 MG/DL (ref 70–99)
GLUCOSE BLDC GLUCOMTR-MCNC: 64 MG/DL (ref 70–99)
GLUCOSE BLDC GLUCOMTR-MCNC: 69 MG/DL (ref 70–99)
GLUCOSE BLDC GLUCOMTR-MCNC: 81 MG/DL (ref 70–99)
GLUCOSE SERPL-MCNC: 89 MG/DL (ref 65–99)
GRAM STN SPEC: ABNORMAL
GRAM STN SPEC: ABNORMAL
HCT VFR BLD AUTO: 23.5 % (ref 34–46.6)
HGB BLD-MCNC: 7.5 G/DL (ref 12–15.9)
ISOLATED FROM: ABNORMAL
LEFT ATRIUM VOLUME INDEX: 41.1 ML/M2
LV EF BIPLANE MOD: 48.5 %
MAGNESIUM SERPL-MCNC: 2 MG/DL (ref 1.6–2.6)
MCH RBC QN AUTO: 28.4 PG (ref 26.6–33)
MCHC RBC AUTO-ENTMCNC: 31.9 G/DL (ref 31.5–35.7)
MCV RBC AUTO: 89 FL (ref 79–97)
MYELOPEROXIDASE AB SER IA-ACNC: <0.2 UNITS (ref 0–0.9)
P-ANCA ATYPICAL TITR SER IF: ABNORMAL TITER
P-ANCA TITR SER IF: ABNORMAL TITER
PHOSPHATE SERPL-MCNC: 2.4 MG/DL (ref 2.5–4.5)
PLATELET # BLD AUTO: 162 10*3/MM3 (ref 140–450)
PMV BLD AUTO: 11.7 FL (ref 6–12)
POTASSIUM SERPL-SCNC: 3.2 MMOL/L (ref 3.5–5.2)
PROT SERPL-MCNC: 5.7 G/DL (ref 6–8.5)
PROTEINASE3 AB SER IA-ACNC: <0.2 UNITS (ref 0–0.9)
RBC # BLD AUTO: 2.64 10*6/MM3 (ref 3.77–5.28)
SODIUM SERPL-SCNC: 142 MMOL/L (ref 136–145)
WBC NRBC COR # BLD AUTO: 9.26 10*3/MM3 (ref 3.4–10.8)

## 2025-02-25 PROCEDURE — 82948 REAGENT STRIP/BLOOD GLUCOSE: CPT

## 2025-02-25 PROCEDURE — 97165 OT EVAL LOW COMPLEX 30 MIN: CPT

## 2025-02-25 PROCEDURE — 99232 SBSQ HOSP IP/OBS MODERATE 35: CPT | Performed by: INTERNAL MEDICINE

## 2025-02-25 PROCEDURE — 25010000002 HEPARIN (PORCINE) PER 1000 UNITS: Performed by: STUDENT IN AN ORGANIZED HEALTH CARE EDUCATION/TRAINING PROGRAM

## 2025-02-25 PROCEDURE — 25010000002 ONDANSETRON PER 1 MG: Performed by: UROLOGY

## 2025-02-25 PROCEDURE — 85027 COMPLETE CBC AUTOMATED: CPT | Performed by: PHYSICIAN ASSISTANT

## 2025-02-25 PROCEDURE — 84100 ASSAY OF PHOSPHORUS: CPT | Performed by: UROLOGY

## 2025-02-25 PROCEDURE — 25010000002 MEROPENEM PER 100 MG: Performed by: PHYSICIAN ASSISTANT

## 2025-02-25 PROCEDURE — 83735 ASSAY OF MAGNESIUM: CPT | Performed by: UROLOGY

## 2025-02-25 PROCEDURE — 83605 ASSAY OF LACTIC ACID: CPT

## 2025-02-25 PROCEDURE — 99291 CRITICAL CARE FIRST HOUR: CPT | Performed by: STUDENT IN AN ORGANIZED HEALTH CARE EDUCATION/TRAINING PROGRAM

## 2025-02-25 PROCEDURE — 80053 COMPREHEN METABOLIC PANEL: CPT | Performed by: UROLOGY

## 2025-02-25 PROCEDURE — 25010000002 POTASSIUM CHLORIDE PER 2 MEQ

## 2025-02-25 RX ORDER — ACETAMINOPHEN 500 MG
1000 TABLET ORAL EVERY 6 HOURS PRN
Status: DISCONTINUED | OUTPATIENT
Start: 2025-02-25 | End: 2025-03-07 | Stop reason: HOSPADM

## 2025-02-25 RX ORDER — FERROUS SULFATE 325(65) MG
325 TABLET ORAL
Status: DISCONTINUED | OUTPATIENT
Start: 2025-02-25 | End: 2025-03-05

## 2025-02-25 RX ORDER — POTASSIUM CHLORIDE 29.8 MG/ML
20 INJECTION INTRAVENOUS
Status: COMPLETED | OUTPATIENT
Start: 2025-02-25 | End: 2025-02-25

## 2025-02-25 RX ORDER — ALUMINA, MAGNESIA, AND SIMETHICONE 2400; 2400; 240 MG/30ML; MG/30ML; MG/30ML
15 SUSPENSION ORAL EVERY 6 HOURS PRN
Status: DISCONTINUED | OUTPATIENT
Start: 2025-02-25 | End: 2025-03-07 | Stop reason: HOSPADM

## 2025-02-25 RX ORDER — MULTIPLE VITAMINS W/ MINERALS TAB 9MG-400MCG
1 TAB ORAL DAILY
Status: DISCONTINUED | OUTPATIENT
Start: 2025-02-25 | End: 2025-03-07 | Stop reason: HOSPADM

## 2025-02-25 RX ORDER — HEPARIN SODIUM 5000 [USP'U]/ML
5000 INJECTION, SOLUTION INTRAVENOUS; SUBCUTANEOUS EVERY 8 HOURS SCHEDULED
Status: DISCONTINUED | OUTPATIENT
Start: 2025-02-25 | End: 2025-03-06

## 2025-02-25 RX ORDER — SACCHAROMYCES BOULARDII 250 MG
500 CAPSULE ORAL DAILY
Status: DISCONTINUED | OUTPATIENT
Start: 2025-02-26 | End: 2025-03-07 | Stop reason: HOSPADM

## 2025-02-25 RX ORDER — LOPERAMIDE HYDROCHLORIDE 2 MG/1
2 CAPSULE ORAL 4 TIMES DAILY PRN
Status: DISCONTINUED | OUTPATIENT
Start: 2025-02-25 | End: 2025-03-07 | Stop reason: HOSPADM

## 2025-02-25 RX ADMIN — METRONIDAZOLE 500 MG: 500 TABLET ORAL at 15:28

## 2025-02-25 RX ADMIN — MUPIROCIN 1 APPLICATION: 20 OINTMENT TOPICAL at 21:26

## 2025-02-25 RX ADMIN — SODIUM CHLORIDE 500 MG: 9 INJECTION, SOLUTION INTRAVENOUS at 21:25

## 2025-02-25 RX ADMIN — SERTRALINE HYDROCHLORIDE 75 MG: 50 TABLET ORAL at 09:50

## 2025-02-25 RX ADMIN — Medication 250 MG: at 09:50

## 2025-02-25 RX ADMIN — NOREPINEPHRINE BITARTRATE 0.1 MCG/KG/MIN: 0.03 INJECTION, SOLUTION INTRAVENOUS at 17:05

## 2025-02-25 RX ADMIN — HEPARIN SODIUM 5000 UNITS: 5000 INJECTION INTRAVENOUS; SUBCUTANEOUS at 14:05

## 2025-02-25 RX ADMIN — METRONIDAZOLE 500 MG: 500 TABLET ORAL at 21:26

## 2025-02-25 RX ADMIN — POTASSIUM CHLORIDE 20 MEQ: 29.8 INJECTION, SOLUTION INTRAVENOUS at 09:52

## 2025-02-25 RX ADMIN — MUPIROCIN 1 APPLICATION: 20 OINTMENT TOPICAL at 10:58

## 2025-02-25 RX ADMIN — SODIUM CHLORIDE 500 MG: 9 INJECTION, SOLUTION INTRAVENOUS at 09:48

## 2025-02-25 RX ADMIN — POTASSIUM CHLORIDE 20 MEQ: 29.8 INJECTION, SOLUTION INTRAVENOUS at 10:58

## 2025-02-25 RX ADMIN — POTASSIUM CHLORIDE 20 MEQ: 29.8 INJECTION, SOLUTION INTRAVENOUS at 11:39

## 2025-02-25 RX ADMIN — ALUMINUM HYDROXIDE, MAGNESIUM HYDROXIDE, AND DIMETHICONE 15 ML: 400; 400; 40 SUSPENSION ORAL at 11:39

## 2025-02-25 RX ADMIN — Medication 10 ML: at 09:52

## 2025-02-25 RX ADMIN — FERROUS SULFATE TAB 325 MG (65 MG ELEMENTAL FE) 325 MG: 325 (65 FE) TAB at 14:05

## 2025-02-25 RX ADMIN — METRONIDAZOLE 500 MG: 500 TABLET ORAL at 09:50

## 2025-02-25 RX ADMIN — POTASSIUM PHOSPHATE, MONOBASIC 500 MG: 500 TABLET, SOLUBLE ORAL at 22:47

## 2025-02-25 RX ADMIN — ONDANSETRON HYDROCHLORIDE 4 MG: 2 SOLUTION INTRAMUSCULAR; INTRAVENOUS at 11:01

## 2025-02-25 RX ADMIN — Medication 1 TABLET: at 14:05

## 2025-02-25 RX ADMIN — Medication 10 ML: at 22:05

## 2025-02-25 RX ADMIN — PANTOPRAZOLE SODIUM 40 MG: 40 TABLET, DELAYED RELEASE ORAL at 09:50

## 2025-02-25 RX ADMIN — HEPARIN SODIUM 5000 UNITS: 5000 INJECTION INTRAVENOUS; SUBCUTANEOUS at 21:25

## 2025-02-25 RX ADMIN — LOPERAMIDE HYDROCHLORIDE 2 MG: 2 CAPSULE ORAL at 19:21

## 2025-02-25 NOTE — PROGRESS NOTES
[facility]     Progress Note    Patient Name: Mag Padilla  : 1980  MRN: [id number]  Primary Care Physician:  Provider, No Known  Date of admission: 2025    Subjective   Patient's creatinine is slowly improving  Urine output is picking up  Responded to Lasix  Potassium slightly low this morning  Blood pressures also noted to be soft  Patient still requiring pressors    Scheduled Meds:meropenem, 500 mg, Intravenous, Q12H  metroNIDAZOLE, 500 mg, Oral, TID  [Held by provider] midodrine, 10 mg, Oral, TID AC  mupirocin, 1 Application, Each Nare, BID  pantoprazole, 40 mg, Oral, Daily  potassium chloride, 20 mEq, Intravenous, Q1H  saccharomyces boulardii, 250 mg, Oral, BID  sertraline, 75 mg, Oral, Daily  sodium chloride, 10 mL, Intravenous, Q12H  Vancomycin Pharmacy Intermittent/Pulse Dosing, , Not Applicable, Daily      Continuous Infusions:norepinephrine, 0.02-0.3 mcg/kg/min, Last Rate: 0.04 mcg/kg/min (25 0430)  Pharmacy to dose vancomycin,       PRN Meds:.  senna-docusate sodium **AND** polyethylene glycol **AND** bisacodyl **AND** bisacodyl    dextrose    ondansetron    Pharmacy to dose vancomycin    sodium chloride    sodium chloride    sodium chloride       Review of Systems  Constitutional:        Weakness tiredness fatigue  Eyes:                       No blurry vision, eye discharge, eye irritation, eye pain  HEENT:                   No acute hair loss, earache and discharge, nasal congestion or discharge, sore throat, postnasal drip  Respiratory:           No shortness of breath coughing sputum production wheezing hemoptysis pleuritic chest pain  Cardiovascular:     No chest pain, orthopnea, PND, dizziness, palpitation, lower extremity edema  Gastrointestinal:   No nausea vomiting diarrhea abdominal pain constipation  Genitourinary:       No urinary incontinence, hesitancy, frequency, urgency, dysuria  Hematologic:         No bruising, bleeding, pallor, lymphadenopathy  Endocrine:             No coldness, hot flashes, polyuria, abnormal hair growth  Musculoskeletal:    No body pains, aches, arthritic pains, muscle pain ,muscle wasting  Psychiatric:          No low or high mood, anxiety, hallucinations, delusions  Skin.                      No rash, ulcers, bruising, itching  Neurological:        No confusion, headache, focal weakness, numbness, dysphasia    Objective   Objective     Vitals:   Temp:  [97 °F (36.1 °C)-98.8 °F (37.1 °C)] 98.1 °F (36.7 °C)  Heart Rate:  [37-87] 48  Resp:  [14-25] 19  BP: ()/(54-78) 84/55  Arterial Line BP: (105-125)/(60-73) 114/70  Flow (L/min) (Oxygen Therapy):  [2] 2  Physical Exam    Constitutional: Awake, alert responsive, conversant, no obvious distress              Psychiatric:  Appropriate affect, cooperative   Neurologic:  Awake alert ,oriented x 3, strength symmetric in all extremities, Cranial Nerves grossly intact to confrontation, speech clear   Eyes:   PERRLA, sclerae anicteric, no conjunctival injection   HEENT:  Moist mucous membranes, no nasal or eye discharge, no throat congestion   Neck:   Supple, no thyromegaly, no lymphadenopathy, trachea midline, no elevated JVD   Respiratory:  Clear to auscultation bilaterally, nonlabored respirations    Cardiovascular: RRR, no murmurs, rubs, or gallops, palpable pedal pulses bilaterally, No bilateral ankle edema   Gastrointestinal: Positive bowel sounds, soft, nontender, nondistended, no organomegaly   Musculoskeletal:  No clubbing or cyanosis to extremities,muscle wasting, joint swelling, muscle weakness             Skin:                      No rashes, bruising, skin ulcers, petechiae or ecchymosis    Result Review    Result Review:  I have personally reviewed the results from the time of this admission to 2/25/2025 09:13 EST and agree with these findings:  []  Laboratory  []  Microbiology  []  Radiology  []  EKG/Telemetry   []  Cardiology/Vascular   []  Pathology  []  Old records  []   Other:    Assessment & Plan   Assessment / Plan       Active Hospital Problems:  Active Hospital Problems    Diagnosis     **Acute renal failure     Hypotension due to hypovolemia     Chronic diarrhea     Nausea & vomiting     Inflammatory bowel disease (Crohn's disease)     Kidney stone      44-year-old female with past medical history of Crohn's disease status post resection, chronically hypotensive on midodrine, anxiety, chronic diarrhea, GERD who who has not been feeling well and has had persistent nausea and vomiting for the last few days as well as her diarrhea with severe JOHANA and creatinine rise from baseline normal to peak of 5.6 with decreased urine output and worsening bicarb currently at 5.  JOHANA most likely due to hypotension, diarrhea and vomiting contributing with UA showing some hyaline cast.  Urine analysis also concerning for large amount of proteinuria and RBCs with CT scan showing obstructive stone which is likely the source of septic shock.  Creatinine has plateaued around 3.5 with poor urine output and concerning patient may have developed ATN.  Cultures with ESBL Klebsiella.  Responded to Lasix with brisk urine output     Plan:   Encouraging to see that patient responded well to Lasix.  Her blood pressures are still low and will be continued on pressors to maintain MAP and help renal perfusion and improvement in renal dysfunction  With brisk urine output and improving creatinine, will hold off on 24-hour creatinine clearance  Continue with midodrine 10 mg 3 times daily  C3 is slightly low most likely infection related and low suspicion for any GN  Patient completing course of antibiotics    Thank you for involving me in the care of the patient.  Will continue to follow along

## 2025-02-25 NOTE — SIGNIFICANT NOTE
Wound Eval / Progress Noted    [facility]     [facility] Name: Mag Padilla  : 1980  MRN: [id number]  Today's Date: 2025                 Admit Date: 2025    Visit Dx:    ICD-10-CM ICD-9-CM   1. Acute renal failure, unspecified acute renal failure type  N17.9 584.9   2. Hypotension due to hypovolemia  E86.1 458.8     276.52   3. Kidney stone  N20.0 592.0         Acute renal failure    Hypotension due to hypovolemia    Chronic diarrhea    Nausea & vomiting    Inflammatory bowel disease (Crohn's disease)    Kidney stone        Past Medical History:   Diagnosis Date    Abscess of peritoneum     Anemia     Anxiety     Breast cancer     Crohn's disease     GERD (gastroesophageal reflux disease)     HTN (hypertension)     Hyperlipidemia     Intestine disorder     SVT (supraventricular tachycardia)         Past Surgical History:   Procedure Laterality Date    ABDOMINAL SURGERY      CYSTOSCOPY W/ URETERAL STENT PLACEMENT Left 2025    Procedure: CYSTOSCOPY URETERAL CATHETER/STENT INSERTION;  Surgeon: Elliot Briceño MD;  Location: [facility];  Service: Urology;  Laterality: Left;             Wound Check / Follow-up:  wound nurse consult for abdominal wounds. Patient alert and resting in bed on ICU.     Per chart review left upper quadrant wound confirmed fistula : green drainage noted, perifistular breakdown noted - cleansed with Ns moist gauze, blotted dry, skin prep to perifistular skin, hydrocolloid applied around opening to protect skin.   Dry gauze applied over opening to collect drainage, will discuss with providers if want to pouch for skin protection and effluent monitoring.   0.5cmLx0.6cmWxnot measured due to confirmed fistula per chart.     Lower mid abdominal wound with thick chunky green/yellow drainage similar in color to left upper quadrant drainage. Suspected fistula, will discuss with MD dressing vs pouching system. Ns moist gauze gently placed into area as has been packed for  quite some time per chart   review. Wound bed pale. Periwound red moist open areas noted. Skin prep applied and then dry dressing placed/ABD .   3.1cmLx4.2cmWxnot measured due to suspected fistula.     Right upper quadrant with healing area present, no drainage noted, cleansed and dry dressing applied. 0.4cnLx0.5cmWx0.3cmD    No other skin issues per staff report.     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     Short term goals:  ongoing care to maintain fistulas and protect skin.     Mackenzie Ferris RN    2/25/2025    00:06 EST

## 2025-02-25 NOTE — THERAPY EVALUATION
Patient Name: Mag Padilla  : 1980    MRN: [id number]                              Today's Date: 2025       Admit Date: 2025    Visit Dx:     ICD-10-CM ICD-9-CM   1. Acute renal failure, unspecified acute renal failure type  N17.9 584.9   2. Hypotension due to hypovolemia  E86.1 458.8     276.52   3. Kidney stone  N20.0 592.0     Patient Active Problem List   Diagnosis    Acute renal failure    Hypotension due to hypovolemia    Chronic diarrhea    Nausea & vomiting    Inflammatory bowel disease (Crohn's disease)    Kidney stone     Past Medical History:   Diagnosis Date    Abscess of peritoneum     Anemia     Anxiety     Breast cancer     Crohn's disease     GERD (gastroesophageal reflux disease)     HTN (hypertension)     Hyperlipidemia     Intestine disorder     SVT (supraventricular tachycardia)      Past Surgical History:   Procedure Laterality Date    ABDOMINAL SURGERY      CYSTOSCOPY W/ URETERAL STENT PLACEMENT Left 2025    Procedure: CYSTOSCOPY URETERAL CATHETER/STENT INSERTION;  Surgeon: Elliot Briceño MD;  Location: [facility];  Service: Urology;  Laterality: Left;      General Information       Row Name 25 1022          OT Time and Intention    Document Type evaluation  -PG     Mode of Treatment occupational therapy  -PG       Row Name 25 1022          General Information    Patient Profile Reviewed yes  Resides in NH with mother (share room).  Reports indep. with tfs and self-care, rwx in hallways  -PG     Prior Level of Function independent:;transfer;ADL's  -PG     Existing Precautions/Restrictions fall  -PG     Barriers to Rehab none identified  -PG       Row Name 25 1022          Occupational Profile    Reason for Services/Referral (Occupational Profile) Pt is a 43 yo female admitted for acute renal failure,hypotension, GW.  Pt is being evaluated by occupational therapy services due to recent decline in ADL function.  No previous OT services  identified.  -PG       Row Name 02/25/25 1022          Living Environment    People in Home facility resident  -PG       Row Name 02/25/25 1022          Cognition    Orientation Status (Cognition) oriented to;person;place  -PG       Row Name 02/25/25 1022          Safety Issues/Impairments Affecting Functional Mobility    Impairments Affecting Function (Mobility) balance;cognition;endurance/activity tolerance;strength  -PG     Cognitive Impairments, Mobility Safety/Performance problem-solving/reasoning;judgment  -PG               User Key  (r) = Recorded By, (t) = Taken By, (c) = Cosigned By      Initials Name Provider Type    PG Elvis Paul, NILE Occupational Therapist                     Mobility/ADL's       Row Name 02/25/25 1027          Transfers    Transfers bed-chair transfer  -PG       Row Name 02/25/25 1027          Bed-Chair Transfer    Bed-Chair [place] (Transfers) contact guard;verbal cues  -     Assistive Device (Bed-Chair Transfers) walker, front-wheeled  -PG       Row Name 02/25/25 1027          Activities of Daily Living    BADL Assessment/Intervention bathing;upper body dressing;lower body dressing;grooming;toileting  -PG       Row Name 02/25/25 1027          Bathing Assessment/Intervention    [place] Level (Bathing) bathing skills;moderate assist (50% patient effort)  -PG       Row Name 02/25/25 1027          Upper Body Dressing Assessment/Training    [place] Level (Upper Body Dressing) upper body dressing skills;set up  -PG       Row Name 02/25/25 1027          Lower Body Dressing Assessment/Training    [place] Level (Lower Body Dressing) lower body dressing skills;maximum assist (25% patient effort)  -PG       Row Name 02/25/25 1027          Grooming Assessment/Training    [place] Level (Grooming) grooming skills;set up  -PG       Row Name 02/25/25 1027          Toileting Assessment/Training    [place] Level (Toileting) toileting skills;dependent (less than 25%  patient effort)  -PG               User Key  (r) = Recorded By, (t) = Taken By, (c) = Cosigned By      Initials Name Provider Type    PG Elvis Paul OT Occupational Therapist                   Obj/Interventions       Row Name 02/25/25 1028          Sensory Assessment (Somatosensory)    Sensory Assessment (Somatosensory) sensation intact  -PG       Row Name 02/25/25 1028          Vision Assessment/Intervention    Visual Impairment/Limitations WFL  -PG       Row Name 02/25/25 1028          Range of Motion Comprehensive    General Range of Motion no range of motion deficits identified  -PG       Row Name 02/25/25 1028          Strength Comprehensive (MMT)    Comment, General Manual Muscle Testing (MMT) Assessment 4- to 4/5 BUE  -PG       Row Name 02/25/25 1028          Motor Skills    Motor Skills coordination;functional endurance  -PG     Coordination WFL  -PG     Functional Endurance fair minus  -PG               User Key  (r) = Recorded By, (t) = Taken By, (c) = Cosigned By      Initials Name Provider Type    PG Elvis Paul OT Occupational Therapist                   Goals/Plan       Row Name 02/25/25 1032          Transfer Goal 1 (OT)    Activity/Assistive Device (Transfer Goal 1, OT) transfers, all  -PG     [place] Level/Cues Needed (Transfer Goal 1, OT) modified independence  -PG     Time Frame (Transfer Goal 1, OT) long term goal (LTG);10 days  -PG       [facility] Name 02/25/25 1032          Bathing Goal 1 (OT)    Activity/Device (Bathing Goal 1, OT) bathing skills, all  -PG     [place] Level/Cues Needed (Bathing Goal 1, OT) modified independence  -PG     Time Frame (Bathing Goal 1, OT) long term goal (LTG);10 days  -PG       [facility] Name 02/25/25 1032          Dressing Goal 1 (OT)    Activity/Device (Dressing Goal 1, OT) dressing skills, all  -PG     [place]/Cues Needed (Dressing Goal 1, OT) modified independence  -PG     Time Frame (Dressing Goal 1, OT) long term goal (LTG);10 days  -PG       Row  Name 02/25/25 1032          Toileting Goal 1 (OT)    Activity/Device (Toileting Goal 1, OT) toileting skills, all  -PG     [place] Level/Cues Needed (Toileting Goal 1, OT) modified independence  -PG     Time Frame (Toileting Goal 1, OT) long term goal (LTG);10 days  -PG       Row Name 02/25/25 1032          Grooming Goal 1 (OT)    Activity/Device (Grooming Goal 1, OT) grooming skills, all  -PG     [place] (Grooming Goal 1, OT) modified independence  -PG     Time Frame (Grooming Goal 1, OT) long term goal (LTG);10 days  -PG       Row Name 02/25/25 1032          Problem Specific Goal 1 (OT)    Problem Specific Goal 1 (OT) Pt will improve activity tolerance to good minus to support ADL independence  -PG     Time Frame (Problem Specific Goal 1, OT) long term goal (LTG);10 days  -PG       Row Name 02/25/25 1032          Therapy Assessment/Plan (OT)    Planned Therapy Interventions (OT) activity tolerance training;BADL retraining;strengthening exercise;transfer/mobility retraining;patient/caregiver education/training;occupation/activity based interventions  -PG               User Key  (r) = Recorded By, (t) = Taken By, (c) = Cosigned By      Initials Name Provider Type    PG Elvis Paul, OT Occupational Therapist                   Clinical Impression       Row Name 02/25/25 1029          Pain Assessment    Pretreatment Pain Rating 0/10 - no pain  -PG     Posttreatment Pain Rating 0/10 - no pain  -PG       Row Name 02/25/25 1029          Plan of Care Review    Plan of Care Reviewed With patient  -PG     Progress no change  -PG     Outcome Evaluation Patient presents with deficits affecting her strength, activity tolerance, and balance impacting her ADL performance.  Pt will benefit from continued skilled OT services to maximize ADL performance and return to her prior functional level.  -PG       Row Name 02/25/25 1029          Therapy Assessment/Plan (OT)    Patient/Family Therapy Goal Statement (OT) Get  stronger, feel better  -PG     Rehab Potential (OT) good  -PG     Criteria for Skilled Therapeutic Interventions Met (OT) meets criteria;yes;skilled treatment is necessary  -PG     Therapy Frequency (OT) 5 times/wk  -PG       Row Name 02/25/25 1029          Therapy Plan Review/Discharge Plan (OT)    Anticipated Discharge Disposition (OT) sub acute care setting  -PG               User Key  (r) = Recorded By, (t) = Taken By, (c) = Cosigned By      Initials Name Provider Type    PG Elvis Paul, OT Occupational Therapist                   Outcome Measures       Row Name 02/25/25 1033          How much help from another is currently needed...    Putting on and taking off regular lower body clothing? 1  -PG     Bathing (including washing, rinsing, and drying) 3  -PG     Toileting (which includes using toilet bed pan or urinal) 1  -PG     Putting on and taking off regular upper body clothing 3  -PG     Taking care of personal grooming (such as brushing teeth) 4  -PG     Eating meals 4  -PG     AM-PAC 6 Clicks Score (OT) 16  -PG       Row Name 02/25/25 0744          How much help from another person do you currently need...    Turning from your back to your side while in flat bed without using bedrails? 3  -AH     Moving from lying on back to sitting on the side of a flat bed without bedrails? 3  -AH     Moving to and from a bed to a chair (including a wheelchair)? 2  -AH     Standing up from a chair using your arms (e.g., wheelchair, bedside chair)? 1  -AH     Climbing 3-5 steps with a railing? 1  -AH     To walk in hospital room? 1  -AH     AM-PAC 6 Clicks Score (PT) 11  -AH       Row Name 02/25/25 1033          Functional Assessment    Outcome Measure Options AM-PAC 6 Clicks Daily Activity (OT);Optimal Instrument  -PG       Row Name 02/25/25 1033          Optimal Instrument    Optimal Instrument Optimal - 3  -PG     Bending/Stooping 3  -PG     Standing 2  -PG     Reaching 1  -PG     From the list, choose the 3  activities you would most like to be able to do without any difficulty Bending/stooping;Standing;Reaching  -PG     Total Score Optimal - 3 6  -PG               User Key  (r) = Recorded By, (t) = Taken By, (c) = Cosigned By      Initials Name Provider Type    PG Elvis Paul OT Occupational Therapist    Rimma Knowles RN Registered Nurse                    Occupational Therapy Education       Title: PT OT SLP Therapies (Done)       Topic: Occupational Therapy (Done)       Point: ADL training (Done)       Description:   Instruct learner(s) on proper safety adaptation and remediation techniques during self care or transfers.   Instruct in proper use of assistive devices.                  Learning Progress Summary            Patient Acceptance, E,D, DU by PG at 2/25/2025 1034                      Point: Home exercise program (Done)       Description:   Instruct learner(s) on appropriate technique for monitoring, assisting and/or progressing therapeutic exercises/activities.                  Learning Progress Summary            Patient Acceptance, E,D, DU by PG at 2/25/2025 1034                      Point: Precautions (Done)       Description:   Instruct learner(s) on prescribed precautions during self-care and functional transfers.                  Learning Progress Summary            Patient Acceptance, E,D, DU by PG at 2/25/2025 1034                      Point: Body mechanics (Done)       Description:   Instruct learner(s) on proper positioning and spine alignment during self-care, functional mobility activities and/or exercises.                  Learning Progress Summary            Patient Acceptance, E,D, DU by PG at 2/25/2025 1034                                      User Key       Initials Effective Dates Name Provider Type Discipline    PG 06/16/21 -  Elvis Paul OT Occupational Therapist OT                  OT Recommendation and Plan  Planned Therapy Interventions (OT): activity tolerance training,  BADL retraining, strengthening exercise, transfer/mobility retraining, patient/caregiver education/training, occupation/activity based interventions  Therapy Frequency (OT): 5 times/wk  Plan of Care Review  Plan of Care Reviewed With: patient  Progress: no change  Outcome Evaluation: Patient presents with deficits affecting her strength, activity tolerance, and balance impacting her ADL performance.  Pt will benefit from continued skilled OT services to maximize ADL performance and return to her prior functional level.     Time Calculation:   Evaluation Complexity (OT)  Review Occupational Profile/Medical/Therapy History Complexity: brief/low complexity  Assessment, Occupational Performance/Identification of Deficit Complexity: 3-5 performance deficits  Clinical Decision Making Complexity (OT): problem focused assessment/low complexity  Overall Complexity of Evaluation (OT): low complexity     Time Calculation- OT       Row Name 02/25/25 1034             Time Calculation- OT    OT Received On 02/25/25  -PG      OT Goal Re-Cert Due Date 03/06/25  -PG         Untimed Charges    OT Eval/Re-eval Minutes 30  -PG         Total Minutes    Untimed Charges Total Minutes 30  -PG       Total Minutes 30  -PG                User Key  (r) = Recorded By, (t) = Taken By, (c) = Cosigned By      Initials Name Provider Type    PG Elvis Paul OT Occupational Therapist                  Therapy Charges for Today       Code Description Service Date Service Provider Modifiers Qty    [id number] HC OT EVAL LOW COMPLEXITY 2 2/25/2025 Elvis Paul OT GO 1                 Elvis Paul OT  2/25/2025

## 2025-02-25 NOTE — SIGNIFICANT NOTE
Wound Eval / Progress Noted    [facility]     Patient Name: Mag Padilla  : 1980  MRN: [id number]  Today's Date: 2025                 Admit Date: 2025    Visit Dx:    ICD-10-CM ICD-9-CM   1. Acute renal failure, unspecified acute renal failure type  N17.9 584.9   2. Hypotension due to hypovolemia  E86.1 458.8     276.52   3. Kidney stone  N20.0 592.0         Acute renal failure    Hypotension due to hypovolemia    Chronic diarrhea    Nausea & vomiting    Inflammatory bowel disease (Crohn's disease)    Kidney stone        Past Medical History:   Diagnosis Date    Abscess of peritoneum     Anemia     Anxiety     Breast cancer     Crohn's disease     GERD (gastroesophageal reflux disease)     HTN (hypertension)     Hyperlipidemia     Intestine disorder     SVT (supraventricular tachycardia)         Past Surgical History:   Procedure Laterality Date    ABDOMINAL SURGERY      CYSTOSCOPY W/ URETERAL STENT PLACEMENT Left 2025    Procedure: CYSTOSCOPY URETERAL CATHETER/STENT INSERTION;  Surgeon: Elliot Briceño MD;  Location: [facility];  Service: Urology;  Laterality: Left;         Physical Assessment:  Wound 25 0230 Right abdomen surgical (Active)   Wound Image   25 1349   Dressing Appearance intact 25 1349   Base red;pink 25 1349   Periwound dry;pink 25 1349   Periwound Temperature warm 25 1349   Periwound Skin Turgor soft 25 1349   Edges open 25 1349   Drainage Amount none 25 1349   Care, Wound cleansed with;sterile normal saline 25 1349   Dressing Care hydrofiber;silver impregnated;silicone border foam 25 1349       Wound 25 1518 medial abdomen fistula (Active)   Wound Image   25    Dressing Appearance moist drainage 25    Base pink;moist 25    Periwound redness;pink 25    Periwound Temperature warm 25    Periwound Skin Turgor soft 25    Edges open 25    Drainage  Characteristics/Odor green;yellow;other (see comments) 02/25/25    Care, Wound cleansed with;sterile normal saline 02/25/25    Dressing Care wound pouch applied 02/25/25        Wound Left lower abdomen fistula (Active)   Wound Image   02/25/25 1349   Dressing Appearance intact 02/25/25 1349   Base moist;red 02/25/25 1349   Periwound redness 02/25/25 1349   Periwound Temperature warm 02/25/25 1349   Periwound Skin Turgor soft 02/25/25 1349   Edges open 02/25/25 1349   Drainage Characteristics/Odor green;yellow 02/25/25 1349   Drainage Amount small 02/25/25 1349   Care, Wound cleansed with;sterile normal saline 02/25/25 1349   Dressing Care wound pouch applied 02/25/25 1349        Wound Check / Follow-up:  wound nurse follow up, MD on unit at rounds, removed dressing for MD to evaluate due to suspected fistula to mid lower abdominal wound.   Will start pouching as per orders.   Right upper abdominal wound dressing applied   Left abdomen and mid lower abdominal wounds were pouched within separate pouches.   Left abdominal perifistular skin is red but much improved from prior eval done yesterday, cleansed with NS, blotted dry, applied marathon skin prep, duoderm and then flat 1 piece coloplast pouching system to monitor effluent.   Mid abdominal wound with thick yellow green drainage perifistular skin much improved but remains red. cleansed with NS, blotted dry, applied marathon skin prep, duoderm and then flat 1 piece coloplast pouching system to monitor effluent.    (See above)     RN in room and denies further skin issues does stated buttocks slightly red but blanchable barrier cream in use along with repositioning.       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 Right upper abdominal wound, minimal drainage, dressing applied.     Short term goals:  fistula pouching to protect skin and monitor drainage, repositioning to protect buttocks.      Mackenzie Ferris RN    2/25/2025    16:12 EST

## 2025-02-25 NOTE — PLAN OF CARE
Goal Outcome Evaluation:  Plan of Care Reviewed With: patient        Progress: improving  Outcome Evaluation: Patient alert and oriented remains on levophed. Up in chair today. Renee removed. Frequent bowel movements.

## 2025-02-25 NOTE — PROGRESS NOTES
Pulmonary / Critical Care Progress Note      Patient Name: Mag Padilla  : 1980  MRN: [id number]  Attending:  Liam Israel MD   Date of admission: 2025    Subjective   Subjective   Patient critically ill with septic shock    Over past 24 hours:  Remains on levo at 0.04  Electrolytes being replaced  Renee likely to be removed today  Sitting up in the chair on room air    ROS  Constitutional symptoms:  Denied complaints   Ear, nose, throat: Denied complaints  Cardiovascular:  Denied complaints  Respiratory: Denied complaints  Gastrointestinal: Denied complaints  Musculoskeletal: Denied complaints  Neurologic: Denied complaints  Skin: Denied complaints    Objective   Objective     Vitals:   Vital signs for last 24 hours:  Temp:  [97 °F (36.1 °C)-98.8 °F (37.1 °C)] 98.1 °F (36.7 °C)  Heart Rate:  [37-87] 48  Resp:  [14-25] 19  BP: ()/(54-78) 84/55  Arterial Line BP: (105-125)/(60-73) 114/70    Intake/Output last 3 shifts:  I/O last 3 completed shifts:  In: 990.9 [P.O.:240; I.V.:750.9]  Out: 1225 [Urine:1225]  Intake/Output this shift:  No intake/output data recorded.    Vent settings for last 24 hours:       Hemodynamic parameters for last 24 hours:       Physical Exam:  Vital Signs Reviewed   General: Pale, lethargic, critically ill female, on chair  HEENT:  PERRL, EOMI.  OP, nares clear  Chest:  clear to auscultation bilaterally, tympanic to percussion bilaterally, no work of breathing noted on room air  CV: Bradycardic, no MGR, pulses 2+, equal.  Abd:  Soft, NT, ND, + BS, no HSM, multiple abdominal incisions noted, weeping wounds noted from enterocutaneous fistulas  EXT:  no clubbing, no cyanosis, no edema  Neuro:  A&Ox3, CN grossly intact, no focal deficits.  Skin: No rashes or lesions noted    Result Review    Result Review:  I have personally reviewed the results from the time of this admission to 2025 09:13 EST and agree with these findings:  [x]  Laboratory  [x]   Microbiology  [x]  Radiology  [x]  EKG/Telemetry   [x]  Cardiology/Vascular   []  Pathology  []  Old records  []  Other:  Most notable findings include:       Lab 02/25/25  0458 02/24/25  0526 02/24/25  0440 02/23/25  2105 02/23/25  1058 02/23/25  0138 02/22/25  2201 02/22/25  2157 02/22/25  1919 02/22/25  1837 02/22/25  1648 02/22/25  0502 02/21/25  1821 02/21/25  1708   WBC 9.26 6.61  --   --   --  6.50 8.33  --   --   --  9.77 12.57* 9.27 11.98*   HEMOGLOBIN 7.5* 7.3*  --  8.1*  --  7.0* 8.0*  --   --   --  8.5* 8.4* 8.2* 9.0*   HEMATOCRIT 23.5* 23.3*  --  24.7*  --  22.4* 25.2*  --   --   --  27.9* 28.1* 26.8* 29.9*   PLATELETS 162 116*  --   --   --  84* 94*  --   --   --  100* 87* 71* 86*   SODIUM 142  --  141  --  143 140  --  138  --   --  138 135* 135* 132*   POTASSIUM 3.2*  --  3.5  --  4.1 3.0*  --  2.9*  --   --  3.4* 3.9 3.0* 3.3*   CHLORIDE 110*  --  110*  --  109* 112*  --  114*  --   --  113* 113* 110* 106   CO2 20.4*  --  20.4*  --  18.8* 15.5*  --  12.3*  --   --  11.3* 4.9* 11.5* 10.3*   BUN 41*  --  38*  --  34* 38*  --  39*  --   --  45* 48* 53* 56*   CREATININE 3.23*  --  3.53*  --  3.66* 3.64*  --  3.91*  --  4.38* 4.46* 5.03* 5.50* 5.67*   GLUCOSE 89  --  133*  --  138* 149*  --  153*  --   --  72 53* 79 88   CALCIUM 8.7  --  8.4*  --  8.1* 8.3*  --  8.4*  --   --  8.4* 8.2* 7.9* 8.2*   PHOSPHORUS 2.4*  --  3.0  --  2.2* 2.7  --  2.2* 2.6  --   --   --   --   --    TOTAL PROTEIN 5.7*  --  5.7*  --   --  5.6*  --  5.2*  --   --  5.7*  --  5.4* 6.1   ALBUMIN 2.3*  --  2.0*  --  1.9* 2.0*  --  1.6*  --   --  1.5*  --  1.6* 1.6*   GLOBULIN 3.4  --  3.7  --   --  3.6  --  3.6  --   --  4.2  --  3.8 4.5     CT Abdomen Pelvis Without Contrast    Result Date: 2/22/2025  CT ABDOMEN PELVIS WO CONTRAST Date of Exam: 2/22/2025 4:18 PM EST Indication: chronic subcutaneous abscess of abdomen. Comparison: CT AP 2/1/2025 Technique: Axial CT images were obtained of the abdomen and pelvis without the  administration of contrast. Reconstructed coronal and sagittal images were also obtained. Automated exposure control and iterative construction methods were used. Findings: Subsegmental atelectasis at the lung bases appears worsened. No consolidation or mass is evident. Small right effusion appears larger. New small left effusion is evident. The right liver lobe is relatively small, which may be a manifestation of cirrhosis. The gallbladder is absent, surgical clips are seen in the gallbladder bed. No pancreatic mass is evident. Embolization coils are again seen adjacent to the pancreatic head. No adrenal mass is evident. The spleen is of normal size. A small amount of peritoneal fluid is again seen. Numerous tiny nonobstructing stones are seen in the renal collecting systems. Mild left hydroureteronephrosis is evident. 6 mm stone in the mid left ureter at the L5 level was seen on 2/1/2025 in the lower pole collecting system of the left kidney. This stone appears to be visible on the  image. 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 A wound is seen in the midline anterior abdominal wall, with 1.5 cm gas or air pocket seen on sagittal series 4 image 105, which appears slightly smaller in comparison to 2/1/2025. No fluid collection is evident. The uterus measures 4 cm in greatest dimension. No pelvic mass is seen.     Impression: Impression: CT scan of the abdomen and pelvis without contrast demonstrating worsening subsegmental atelectasis at the lung bases. Small right effusion appears larger. New small left effusion. Hepatic configuration concerning for cirrhosis. A small amount of peritoneal fluid is again seen. Post cholecystectomy. Multiple nonobstructing renal stones are again seen. Mild left hydroureteronephrosis is not seen on the prior study. 6 mm stone in the mid left ureter at the L5 level was seen on the prior study in the lower pole collecting system of the left kidney.  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 Wound in the anterior abdominal wall is again seen. This appears smaller in comparison to 2/1/2025. Electronically Signed: Shun Milan MD  2/22/2025 5:22 PM EST  Workstation ID: [id number]     Blood cultures positive for CTX-M E-coli      Assessment & Plan   Assessment / Plan     Active Hospital Problems:  Active Hospital Problems    Diagnosis     **Acute renal failure     Hypotension due to hypovolemia     Chronic diarrhea     Nausea & vomiting     Inflammatory bowel disease (Crohn's disease)     Kidney stone      Impression:  Septic shock, present on admission  Urinary tract infection from multidrug-resistant E. Coli  E. coli bacteremia  Obstructing nephrolithiasis status post ureteral stent placement  Dehydration  Acute kidney injury secondary to prerenal etiology  Metabolic acidosis  Supratherapeutic INR  Hypokalemia  Chronic hypotension on midodrine  History of Crohn's disease with complex abdominal surgeries  Chronic nonhealing abdominal wounds  Enterocutaneous fistulas  Bradycardia     Plan:  Pressors: Continue norepinephrine to keep MAP greater than 65  Check lactic acid  Holding midodrine due to bradycardia  Continue with meropenem, Flagyl; will stop vancomycin today  Repeat culture until clearance  Renal function slowly improving.  Status post ureteral stent by urology.  Management per them.  Renee to be removed today by them.  Cont aspiration precautions. Keep HOB 30 deg.   Replace electrolytes PRN to keep K 4.0, Mag 2.0, Phos 4.0.  Keep glucose 140-180 while in ICU. Cont SSI.  Transfuse to keep Hgb >7  Continue wound care  GI prophylaxis with PPI  Will add heparin subcutaneous for DVT prophylaxis    VTE Prophylaxis:  Mechanical VTE prophylaxis orders are present.    CODE STATUS:   Level Of Support Discussed With: Patient  Code Status (Patient has no pulse and is not breathing): CPR (Attempt to Resuscitate)  Medical Interventions (Patient has  pulse or is breathing): Full Support    Patient is critically ill with ESBL E. coli bacteremia and septic shock, metabolic acidosis with multiple electrolyte derangements. I, Dr. Anne Roger, spent 32 minutes of critical care time. Total Critical Care time spent: 32 minutes. This included personally reviewing all pertinent labs, imaging, microbiology and documentation. Also discussing the case with the patient and any available family, the admitting physician and any available ancillary staff.   Electronically signed by Anne Roger MD, 02/28/25, 3:35 PM EST.

## 2025-02-25 NOTE — PLAN OF CARE
Goal Outcome Evaluation:  Plan of Care Reviewed With: patient           Outcome Evaluation: Patient alert and oriented. Continued on levophed to maintain MAP greater than or equal to 65; currently on levophed at 0.04 mcg/kg/min. HR consistently sinus fitz ranging from 38 to 65 overnight.          [place] RN

## 2025-02-25 NOTE — PLAN OF CARE
Goal Outcome Evaluation:  Plan of Care Reviewed With: patient        Progress: no change  Outcome Evaluation: Patient presents with deficits affecting her strength, activity tolerance, and balance impacting her ADL performance.  Pt will benefit from continued skilled OT services to maximize ADL performance and return to her prior functional level.    Anticipated Discharge Disposition (OT): sub acute care setting

## 2025-02-25 NOTE — PROGRESS NOTES
[facility]     Progress Note    Patient Name: Mag Padilla  : 1980  MRN: [id number]  Primary Care Physician:  Provider, No Known  Date of admission: 2025    Subjective   Subjective     Chief Complaint: no compailnts    Nausea  Symptoms include nausea.    Hypotension  Symptoms include nausea.      Patient Reports She was admitted with sepsis due to a 6 mm stone left ureter.  She had stent placed. She is doing well.  Renee is out.    Review of Systems   Gastrointestinal:  Positive for nausea.   neg    Objective   Objective     Vitals:   Temp:  [97 °F (36.1 °C)-98.8 °F (37.1 °C)] 97.7 °F (36.5 °C)  Heart Rate:  [37-87] 57  Resp:  [14-25] 14  BP: ()/(54-78) 95/77  Arterial Line BP: (105-125)/(60-73) 114/70  Flow (L/min) (Oxygen Therapy):  [2] 2    Physical Exam   Awake alert NAD  AF VSS  ABD Soft and benign.  Result Review    Result Review:  I have personally reviewed the results from the time of this admission to 2025 10:46 EST and agree with these findings:  [x]  Laboratory list / accordion  []  Microbiology  []  Radiology  []  EKG/Telemetry   []  Cardiology/Vascular   []  Pathology  []  Old records  []  Other:  Most notable findings include: Cr: 3.24      Assessment & Plan   Assessment / Plan     Brief Patient Summary:  Mag Padilla is a 44 y.o. female who PT. Had sepsis and has stent in.    Active Hospital Problems:  Active Hospital Problems    Diagnosis     **Acute renal failure     Hypotension due to hypovolemia     Chronic diarrhea     Nausea & vomiting     Inflammatory bowel disease (Crohn's disease)     Kidney stone      Plan:   PT. Is doing well post stent placement. Creatinine is still elevated but wbc is down to normal. Cont with abx therapy. After d/c she needs to f/u with the Urology clinic to have treatment of the stone.    VTE Prophylaxis:  Pharmacologic & mechanical VTE prophylaxis orders are present.        CODE STATUS:    Level Of Support Discussed With:  Patient  Code Status (Patient has no pulse and is not breathing): CPR (Attempt to Resuscitate)  Medical Interventions (Patient has pulse or is breathing): Full Support    Disposition:  I expect patient to be discharged home.    Elliot Briceño MD

## 2025-02-26 LAB
ABO GROUP BLD: NORMAL
ANION GAP SERPL CALCULATED.3IONS-SCNC: 9.1 MMOL/L (ref 5–15)
ANION GAP SERPL CALCULATED.3IONS-SCNC: 9.3 MMOL/L (ref 5–15)
BACTERIA SPEC AEROBE CULT: ABNORMAL
BASOPHILS # BLD AUTO: 0.01 10*3/MM3 (ref 0–0.2)
BASOPHILS NFR BLD AUTO: 0.2 % (ref 0–1.5)
BLD GP AB SCN SERPL QL: NEGATIVE
BUN SERPL-MCNC: 28 MG/DL (ref 6–20)
BUN SERPL-MCNC: 32 MG/DL (ref 6–20)
BUN/CREAT SERPL: 10.6 (ref 7–25)
BUN/CREAT SERPL: 11.1 (ref 7–25)
CALCIUM SPEC-SCNC: 8 MG/DL (ref 8.6–10.5)
CALCIUM SPEC-SCNC: 8.4 MG/DL (ref 8.6–10.5)
CHLORIDE SERPL-SCNC: 118 MMOL/L (ref 98–107)
CHLORIDE SERPL-SCNC: 119 MMOL/L (ref 98–107)
CO2 SERPL-SCNC: 15.7 MMOL/L (ref 22–29)
CO2 SERPL-SCNC: 18.9 MMOL/L (ref 22–29)
CREAT SERPL-MCNC: 2.64 MG/DL (ref 0.57–1)
CREAT SERPL-MCNC: 2.88 MG/DL (ref 0.57–1)
DEPRECATED RDW RBC AUTO: 52.9 FL (ref 37–54)
DEPRECATED RDW RBC AUTO: 55.5 FL (ref 37–54)
EGFRCR SERPLBLD CKD-EPI 2021: 19.8 ML/MIN/1.73
EGFRCR SERPLBLD CKD-EPI 2021: 22 ML/MIN/1.73
EOSINOPHIL # BLD AUTO: 0.09 10*3/MM3 (ref 0–0.4)
EOSINOPHIL NFR BLD AUTO: 1.8 % (ref 0.3–6.2)
ERYTHROCYTE [DISTWIDTH] IN BLOOD BY AUTOMATED COUNT: 16.5 % (ref 12.3–15.4)
ERYTHROCYTE [DISTWIDTH] IN BLOOD BY AUTOMATED COUNT: 17.3 % (ref 12.3–15.4)
GLUCOSE BLDC GLUCOMTR-MCNC: 100 MG/DL (ref 70–99)
GLUCOSE BLDC GLUCOMTR-MCNC: 105 MG/DL (ref 70–99)
GLUCOSE BLDC GLUCOMTR-MCNC: 58 MG/DL (ref 70–99)
GLUCOSE BLDC GLUCOMTR-MCNC: 69 MG/DL (ref 70–99)
GLUCOSE BLDC GLUCOMTR-MCNC: 69 MG/DL (ref 70–99)
GLUCOSE BLDC GLUCOMTR-MCNC: 72 MG/DL (ref 70–99)
GLUCOSE BLDC GLUCOMTR-MCNC: 79 MG/DL (ref 70–99)
GLUCOSE BLDC GLUCOMTR-MCNC: 85 MG/DL (ref 70–99)
GLUCOSE SERPL-MCNC: 114 MG/DL (ref 65–99)
GLUCOSE SERPL-MCNC: 71 MG/DL (ref 65–99)
GRAM STN SPEC: ABNORMAL
HCT VFR BLD AUTO: 22 % (ref 34–46.6)
HCT VFR BLD AUTO: 28.3 % (ref 34–46.6)
HGB BLD-MCNC: 6.8 G/DL (ref 12–15.9)
HGB BLD-MCNC: 8.8 G/DL (ref 12–15.9)
IMM GRANULOCYTES # BLD AUTO: 0.17 10*3/MM3 (ref 0–0.05)
IMM GRANULOCYTES NFR BLD AUTO: 3.4 % (ref 0–0.5)
LYMPHOCYTES # BLD AUTO: 0.82 10*3/MM3 (ref 0.7–3.1)
LYMPHOCYTES NFR BLD AUTO: 16.4 % (ref 19.6–45.3)
MAGNESIUM SERPL-MCNC: 1.4 MG/DL (ref 1.6–2.6)
MAGNESIUM SERPL-MCNC: 1.7 MG/DL (ref 1.6–2.6)
MCH RBC QN AUTO: 27.9 PG (ref 26.6–33)
MCH RBC QN AUTO: 28.5 PG (ref 26.6–33)
MCHC RBC AUTO-ENTMCNC: 30.9 G/DL (ref 31.5–35.7)
MCHC RBC AUTO-ENTMCNC: 31.1 G/DL (ref 31.5–35.7)
MCV RBC AUTO: 90.2 FL (ref 79–97)
MCV RBC AUTO: 91.6 FL (ref 79–97)
MONOCYTES # BLD AUTO: 0.28 10*3/MM3 (ref 0.1–0.9)
MONOCYTES NFR BLD AUTO: 5.6 % (ref 5–12)
NEUTROPHILS NFR BLD AUTO: 3.62 10*3/MM3 (ref 1.7–7)
NEUTROPHILS NFR BLD AUTO: 72.6 % (ref 42.7–76)
NRBC BLD AUTO-RTO: 0.6 /100 WBC (ref 0–0.2)
PF4 HEPARIN CMPLX IGG SERPL IA: 0.06 OD (ref 0–0.4)
PHOSPHATE SERPL-MCNC: 2 MG/DL (ref 2.5–4.5)
PLATELET # BLD AUTO: 115 10*3/MM3 (ref 140–450)
PLATELET # BLD AUTO: 98 10*3/MM3 (ref 140–450)
PMV BLD AUTO: 11.5 FL (ref 6–12)
PMV BLD AUTO: 12.5 FL (ref 6–12)
POTASSIUM SERPL-SCNC: 3.5 MMOL/L (ref 3.5–5.2)
POTASSIUM SERPL-SCNC: 3.8 MMOL/L (ref 3.5–5.2)
RBC # BLD AUTO: 2.44 10*6/MM3 (ref 3.77–5.28)
RBC # BLD AUTO: 3.09 10*6/MM3 (ref 3.77–5.28)
RH BLD: POSITIVE
SODIUM SERPL-SCNC: 144 MMOL/L (ref 136–145)
SODIUM SERPL-SCNC: 146 MMOL/L (ref 136–145)
T&S EXPIRATION DATE: NORMAL
WBC NRBC COR # BLD AUTO: 3.62 10*3/MM3 (ref 3.4–10.8)
WBC NRBC COR # BLD AUTO: 4.99 10*3/MM3 (ref 3.4–10.8)

## 2025-02-26 PROCEDURE — 83735 ASSAY OF MAGNESIUM: CPT

## 2025-02-26 PROCEDURE — 25010000002 HEPARIN (PORCINE) PER 1000 UNITS: Performed by: STUDENT IN AN ORGANIZED HEALTH CARE EDUCATION/TRAINING PROGRAM

## 2025-02-26 PROCEDURE — 82948 REAGENT STRIP/BLOOD GLUCOSE: CPT | Performed by: INTERNAL MEDICINE

## 2025-02-26 PROCEDURE — 86900 BLOOD TYPING SEROLOGIC ABO: CPT | Performed by: STUDENT IN AN ORGANIZED HEALTH CARE EDUCATION/TRAINING PROGRAM

## 2025-02-26 PROCEDURE — 93005 ELECTROCARDIOGRAM TRACING: CPT | Performed by: INTERNAL MEDICINE

## 2025-02-26 PROCEDURE — 80048 BASIC METABOLIC PNL TOTAL CA: CPT | Performed by: UROLOGY

## 2025-02-26 PROCEDURE — 86850 RBC ANTIBODY SCREEN: CPT | Performed by: STUDENT IN AN ORGANIZED HEALTH CARE EDUCATION/TRAINING PROGRAM

## 2025-02-26 PROCEDURE — 84100 ASSAY OF PHOSPHORUS: CPT

## 2025-02-26 PROCEDURE — 99221 1ST HOSP IP/OBS SF/LOW 40: CPT | Performed by: INTERNAL MEDICINE

## 2025-02-26 PROCEDURE — 25010000002 VANCOMYCIN PER 500 MG: Performed by: STUDENT IN AN ORGANIZED HEALTH CARE EDUCATION/TRAINING PROGRAM

## 2025-02-26 PROCEDURE — 36430 TRANSFUSION BLD/BLD COMPNT: CPT

## 2025-02-26 PROCEDURE — 25010000002 MEROPENEM PER 100 MG: Performed by: PHYSICIAN ASSISTANT

## 2025-02-26 PROCEDURE — 85025 COMPLETE CBC W/AUTO DIFF WBC: CPT | Performed by: INTERNAL MEDICINE

## 2025-02-26 PROCEDURE — 25010000002 MEROPENEM PER 100 MG: Performed by: STUDENT IN AN ORGANIZED HEALTH CARE EDUCATION/TRAINING PROGRAM

## 2025-02-26 PROCEDURE — 85027 COMPLETE CBC AUTOMATED: CPT | Performed by: INTERNAL MEDICINE

## 2025-02-26 PROCEDURE — P9016 RBC LEUKOCYTES REDUCED: HCPCS

## 2025-02-26 PROCEDURE — 99233 SBSQ HOSP IP/OBS HIGH 50: CPT | Performed by: INTERNAL MEDICINE

## 2025-02-26 PROCEDURE — 86923 COMPATIBILITY TEST ELECTRIC: CPT

## 2025-02-26 PROCEDURE — 99291 CRITICAL CARE FIRST HOUR: CPT | Performed by: STUDENT IN AN ORGANIZED HEALTH CARE EDUCATION/TRAINING PROGRAM

## 2025-02-26 PROCEDURE — 82948 REAGENT STRIP/BLOOD GLUCOSE: CPT

## 2025-02-26 PROCEDURE — 86901 BLOOD TYPING SEROLOGIC RH(D): CPT | Performed by: STUDENT IN AN ORGANIZED HEALTH CARE EDUCATION/TRAINING PROGRAM

## 2025-02-26 PROCEDURE — 83735 ASSAY OF MAGNESIUM: CPT | Performed by: STUDENT IN AN ORGANIZED HEALTH CARE EDUCATION/TRAINING PROGRAM

## 2025-02-26 PROCEDURE — 25810000003 DEXTROSE 5 % WITH KCL 20 MEQ 20 MEQ/L SOLUTION: Performed by: STUDENT IN AN ORGANIZED HEALTH CARE EDUCATION/TRAINING PROGRAM

## 2025-02-26 PROCEDURE — 86900 BLOOD TYPING SEROLOGIC ABO: CPT

## 2025-02-26 RX ORDER — POTASSIUM CHLORIDE, DEXTROSE MONOHYDRATE 150; 5 MG/100ML; G/100ML
100 INJECTION, SOLUTION INTRAVENOUS CONTINUOUS
Status: DISCONTINUED | OUTPATIENT
Start: 2025-02-26 | End: 2025-02-27

## 2025-02-26 RX ORDER — CHOLESTYRAMINE 4 G/9G
1 POWDER, FOR SUSPENSION ORAL DAILY
Status: DISCONTINUED | OUTPATIENT
Start: 2025-02-26 | End: 2025-03-01

## 2025-02-26 RX ADMIN — HEPARIN SODIUM 5000 UNITS: 5000 INJECTION INTRAVENOUS; SUBCUTANEOUS at 05:43

## 2025-02-26 RX ADMIN — POTASSIUM CHLORIDE AND DEXTROSE MONOHYDRATE 100 ML/HR: 150; 5 INJECTION, SOLUTION INTRAVENOUS at 11:43

## 2025-02-26 RX ADMIN — NOREPINEPHRINE BITARTRATE 0.02 MCG/KG/MIN: 0.03 INJECTION, SOLUTION INTRAVENOUS at 11:08

## 2025-02-26 RX ADMIN — CHOLESTYRAMINE 1 PACKET: 4 POWDER, FOR SUSPENSION ORAL at 15:55

## 2025-02-26 RX ADMIN — SERTRALINE HYDROCHLORIDE 75 MG: 50 TABLET ORAL at 09:00

## 2025-02-26 RX ADMIN — SODIUM CHLORIDE 500 MG: 9 INJECTION, SOLUTION INTRAVENOUS at 09:01

## 2025-02-26 RX ADMIN — Medication 500 MG: at 09:00

## 2025-02-26 RX ADMIN — HEPARIN SODIUM 5000 UNITS: 5000 INJECTION INTRAVENOUS; SUBCUTANEOUS at 15:55

## 2025-02-26 RX ADMIN — VANCOMYCIN HYDROCHLORIDE 500 MG: 500 INJECTION, POWDER, LYOPHILIZED, FOR SOLUTION INTRAVENOUS at 11:08

## 2025-02-26 RX ADMIN — PANTOPRAZOLE SODIUM 40 MG: 40 TABLET, DELAYED RELEASE ORAL at 09:00

## 2025-02-26 RX ADMIN — Medication 1 TABLET: at 09:00

## 2025-02-26 RX ADMIN — MUPIROCIN 1 APPLICATION: 20 OINTMENT TOPICAL at 09:00

## 2025-02-26 RX ADMIN — HEPARIN SODIUM 5000 UNITS: 5000 INJECTION INTRAVENOUS; SUBCUTANEOUS at 21:28

## 2025-02-26 RX ADMIN — POTASSIUM PHOSPHATE, MONOBASIC 500 MG: 500 TABLET, SOLUBLE ORAL at 21:47

## 2025-02-26 RX ADMIN — SODIUM CHLORIDE 500 MG: 9 INJECTION, SOLUTION INTRAVENOUS at 21:23

## 2025-02-26 RX ADMIN — LOPERAMIDE HYDROCHLORIDE 2 MG: 2 CAPSULE ORAL at 03:58

## 2025-02-26 RX ADMIN — POTASSIUM PHOSPHATE, MONOBASIC 500 MG: 500 TABLET, SOLUBLE ORAL at 09:00

## 2025-02-26 RX ADMIN — POTASSIUM CHLORIDE AND DEXTROSE MONOHYDRATE 100 ML/HR: 150; 5 INJECTION, SOLUTION INTRAVENOUS at 21:25

## 2025-02-26 RX ADMIN — Medication 10 ML: at 09:01

## 2025-02-26 RX ADMIN — MUPIROCIN 1 APPLICATION: 20 OINTMENT TOPICAL at 21:29

## 2025-02-26 RX ADMIN — METRONIDAZOLE 500 MG: 500 TABLET ORAL at 09:00

## 2025-02-26 RX ADMIN — Medication 10 ML: at 21:31

## 2025-02-26 RX ADMIN — FERROUS SULFATE TAB 325 MG (65 MG ELEMENTAL FE) 325 MG: 325 (65 FE) TAB at 09:01

## 2025-02-26 NOTE — PROGRESS NOTES
[facility]   Hospitalist Progress Note  Date: 2025  Patient Name: Mag Padilla  : 1980  MRN: [id number]  Date of admission: 2025  Room/Bed: [facility]      Subjective   Subjective     Chief Complaint: nausea, vomiting weakness     Summary:Mag Padilla is a 44 y.o. female who is a nursing home resident  with past medical history of Crohn disease status post resection,with complex abdominal surgical history.  Patient had a history of a duodenal hole and underwent extensive surgery that required a G-tube, J-tube and multiple drain placements this was all done at [facility].  Patient since that time has had multiple issues with abdominal wounds and abscesses.  Patient also has history of hypotension on midodrine, anxiety, chronic diarrhea, and GERD presenting to the ED for evaluation of nausea vomiting diarrhea with generalized weakness.  Patient states that for the last 3 days she has not been feeling well with persistent nausea and vomiting for the last 2 days and worsening of her chronic diarrhea.  Due to the symptoms patient is weakness and lethargy had also worsened to where she is mostly bedbound with excessive sleepiness.  Patient is seen by wound care for abdominal wounds after an ex lap with colon resection.  Due to worsening condition patient was eventually brought to the ED for further evaluation.  In the ED patient was significantly hypotensive on arrival with remaining vitals being within normal limits.  UA was positive for UTI with signs of dehydration, labs showed severely reduced renal function with hypokalemia, anemia, thrombocytopenia, and severe hypoalbuminemia.  Chest x-ray was negative for any acute findings.  When seen patient was resting comfortably and still having episode of diarrhea since being here although her nausea is much improved.  She did deny any recent fevers, chills, headaches, focal weakness, chest pain, palpitation, shortness of breath, cough,  abdominal pain, constipation, dysuria, hematuria, hematochezia, melena, or anxiety.  Patient admitted for further evaluation and treatment.  Patient seen by intensivist, urology and nephrology.  GI consulted for diarrhea.  Previously patient has seen Dr. Pompa.      Interval Followup:   Remains on room air  Received a unit of blood for low hemoglobin.  No evidence of active bleeding  Blood sugars running low along with elevated sodium level  Remains off Levophed since midnight.  Blood pressure soft  Episodes of bradycardia improved since  Midodrine put on hold  CT of the abdomen revealed 6 mm left ureteral stone with mild hydro utero nephrosis which required urology consult and patient was taken to the operating room  and had a cystoscopy with stent placement  Patient's blood cultures are growing ESBL E. Coli.  Urine with less than 10,000 gram-negative rods.  MRSA screen is also positive  Creatinine slowly improving  Patient ambulates with the help of walker nursing home  Chronic abdominal wounds follows with    Urinating well without Renee catheter  Diarrhea improving had 2 watery stools since morning    Review of Systems    All systems reviewed and negative except for what is outlined above.      Objective   Objective     Vitals:   Temp:  [97 °F (36.1 °C)-97.8 °F (36.6 °C)] 97.3 °F (36.3 °C)  Heart Rate:  [44-83] 74  Resp:  [15-28] 22  BP: ()/(50-75) 91/66    Physical Exam   General: Awake, alert, NAD resting in bed  HENT: NCAT, MMM  Eyes: pupils equal, no scleral icterus  Cardiovascular: RRR, no murmurs   Pulmonary: CTA bilaterally; no wheezes; no conversational dyspnea  Gastrointestinal: Soft patient has multiple abdominal wounds patient has a wound on her left lower side that is draining a significant amount of greenish drainage.  Patient has enterocutaneous fistula left upper quadrant.  Upper midline abdomen wound is also fistula as it is draining fluid .  Patient denies any significant  pain  Musculoskeletal: No gross deformities  Skin: No jaundice, no rash on exposed skin appreciated  Neuro: CN II through XII grossly intact; speech clear; no tremor  Psych: Mood and affect appropriate  .  Off Thelma    Result Review    Result Review:  I have personally reviewed these results:  [x]  Laboratory      Lab 02/26/25  1106 02/26/25  0248 02/25/25  1226 02/25/25  0953 02/25/25  0458 02/23/25  2105 02/23/25  1058 02/23/25  0138 02/22/25  2201 02/22/25  2157 02/22/25  1919 02/22/25  1837 02/22/25  1648 02/22/25  0502 02/21/25  1821   WBC 4.99 3.62  --   --  9.26   < >  --  6.50 8.33  --   --   --  9.77   < > 9.27   HEMOGLOBIN 8.8* 6.8*  --   --  7.5*   < >  --  7.0* 8.0*  --   --   --  8.5*   < > 8.2*   HEMATOCRIT 28.3* 22.0*  --   --  23.5*   < >  --  22.4* 25.2*  --   --   --  27.9*   < > 26.8*   PLATELETS 115* 98*  --   --  162   < >  --  84* 94*  --   --   --  100*   < > 71*   NEUTROS ABS 3.62  --   --   --   --   --   --   --  7.41*  --   --   --  8.59*  --  8.31*   IMMATURE GRANS (ABS) 0.17*  --   --   --   --   --   --   --   --   --   --   --  0.09*  --  0.08*   LYMPHS ABS 0.82  --   --   --   --   --   --   --   --   --   --   --  0.45*  --  0.36*   MONOS ABS 0.28  --   --   --   --   --   --   --   --   --   --   --  0.52  --  0.42   EOS ABS 0.09  --   --   --   --   --   --   --  0.33  --   --   --  0.07  --  0.06   MCV 91.6 90.2  --   --  89.0   < >  --  87.2 86.9  --   --   --  90.6   < > 93.1   PROCALCITONIN  --   --   --   --   --   --   --   --   --  20.74* 26.11*  --   --   --   --    LACTATE  --   --  1.6 2.2*  --   --   --  0.9  --   --   --    < >  --    < >  --    PROTIME  --   --   --   --   --   --  17.3*  --   --   --  34.3*  --   --   --   --    APTT  --   --   --   --   --   --   --   --   --   --  47.6*  --   --   --   --    D DIMER QUANT  --   --   --   --   --   --   --   --   --   --  1.76*  --   --   --   --     < > = values in this interval not displayed.         Lab  02/26/25  0248 02/25/25  0458 02/24/25  0440   SODIUM 146* 142 141   POTASSIUM 3.5 3.2* 3.5   CHLORIDE 118* 110* 110*   CO2 18.9* 20.4* 20.4*   ANION GAP 9.1 11.6 10.6   BUN 32* 41* 38*   CREATININE 2.88* 3.23* 3.53*   EGFR 19.8* 17.3* 15.5*   GLUCOSE 71 89 133*   CALCIUM 8.4* 8.7 8.4*   MAGNESIUM 1.7 2.0 2.3   PHOSPHORUS 2.0* 2.4* 3.0         Lab 02/25/25  0458 02/24/25  0440 02/23/25  1058 02/23/25  0138 02/21/25  1821 02/21/25  1708   TOTAL PROTEIN 5.7* 5.7*  --  5.6*   < > 6.1   ALBUMIN 2.3* 2.0* 1.9* 2.0*   < > 1.6*   GLOBULIN 3.4 3.7  --  3.6   < > 4.5   ALT (SGPT) <5 5  --  7   < > <5   AST (SGOT) 9 8  --  9   < > 13   BILIRUBIN 0.6 0.8  --  1.2   < > 0.9   ALK PHOS 96 93  --  91   < > 88   LIPASE  --   --   --   --   --  10*    < > = values in this interval not displayed.         Lab 02/23/25  1058 02/22/25  1919 02/21/25  1821   PROBNP  --   --  15,412.0*   PROTIME 17.3* 34.3*  --    INR 1.35* 3.17*  --              Lab 02/26/25  0349 02/22/25  2157 02/22/25  1648 02/21/25  1821 02/21/25  1708   IRON  --   --   --  25*  --    IRON SATURATION (TSAT)  --   --   --  32  --    TIBC  --   --   --  79*  --    TRANSFERRIN  --   --   --  53*  --    FOLATE  --   --   --   --  7.99   VITAMIN B 12  --   --   --   --  545   ABO TYPING O O   < >  --   --    RH TYPING Positive Positive   < >  --   --    ANTIBODY SCREEN Negative Negative  --   --   --     < > = values in this interval not displayed.         Lab 02/22/25  1837   PH, ARTERIAL 7.327*   PCO2, ARTERIAL 20.5*   PO2 .9*   O2 SATURATION ART 97.7   HCO3 ART 10.7*   BASE EXCESS ART -13.6*     Brief Urine Lab Results  (Last result in the past 365 days)        Color   Clarity   Blood   Leuk Est   Nitrite   Protein   CREAT   Urine HCG        02/22/25 2208 Dark Yellow   Cloudy   Large (3+)   Moderate (2+)   Negative   100 mg/dL (2+)                 [x]  Microbiology   Microbiology Results (last 10 days)       Procedure Component Value - Date/Time    Blood  Culture - Blood, Hand, Right [[id number]]  (Normal) Collected: 02/24/25 2016    Lab Status: Preliminary result Specimen: Blood from Hand, Right Updated: 02/25/25 2030     Blood Culture No growth at 24 hours    Narrative:      Less than seven (7) mL's of blood was collected.  Insufficient quantity may yield false negative results.    Blood Culture - Blood, Arm, Left [[id number]]  (Normal) Collected: 02/24/25 1803    Lab Status: Preliminary result Specimen: Blood from Arm, Left Updated: 02/25/25 1831     Blood Culture No growth at 24 hours    Clostridioides difficile Toxin - Stool, Per Rectum [[id number]] Collected: 02/24/25 0856    Lab Status: Final result Specimen: Stool from Per Rectum Updated: 02/24/25 0950    Narrative:      The following orders were created for panel order Clostridioides difficile Toxin - Stool, Per Rectum.  Procedure                               Abnormality         Status                     ---------                               -----------         ------                     Clostridioides difficile...[[id number]]                      Final result                 Please view results for these tests on the individual orders.    Clostridioides difficile Toxin, PCR - Stool, Per Rectum [[id number]] Collected: 02/24/25 0856    Lab Status: Final result Specimen: Stool from Per Rectum Updated: 02/24/25 0950     Toxigenic C. difficile by PCR Negative     027 Toxin Presumptive Negative    Narrative:      The result indicates the absence of toxigenic C. difficile from stool specimen.     MRSA Screen, PCR (Inpatient) - Swab, Nares [[id number]]  (Abnormal) Collected: 02/23/25 0601    Lab Status: Final result Specimen: Swab from Nares Updated: 02/23/25 1001     MRSA PCR MRSA Detected    Narrative:      The negative predictive value of this diagnostic test is high and should only be used to consider de-escalating anti-MRSA therapy. A positive result may indicate colonization with MRSA and must be correlated  clinically.    Respiratory Panel PCR w/COVID-19(SARS-CoV-2) BRIELLE/TOSHA/IMTIAZ/PAD/COR/NATACHA In-House, NP Swab in UTM/VTM, 2 HR TAT - Swab, Nasopharynx [[id number]]  (Normal) Collected: 02/23/25 0601    Lab Status: Final result Specimen: Swab from Nasopharynx Updated: 02/23/25 0827     ADENOVIRUS, PCR Not Detected     Coronavirus 229E Not Detected     Coronavirus HKU1 Not Detected     Coronavirus NL63 Not Detected     Coronavirus OC43 Not Detected     COVID19 Not Detected     Human Metapneumovirus Not Detected     Human Rhinovirus/Enterovirus Not Detected     Influenza A PCR Not Detected     Influenza B PCR Not Detected     Parainfluenza Virus 1 Not Detected     Parainfluenza Virus 2 Not Detected     Parainfluenza Virus 3 Not Detected     Parainfluenza Virus 4 Not Detected     RSV, PCR Not Detected     Bordetella pertussis pcr Not Detected     Bordetella parapertussis PCR Not Detected     Chlamydophila pneumoniae PCR Not Detected     Mycoplasma pneumo by PCR Not Detected    Narrative:      In the setting of a positive respiratory panel with a viral infection PLUS a negative procalcitonin without other underlying concern for bacterial infection, consider observing off antibiotics or discontinuation of antibiotics and continue supportive care. If the respiratory panel is positive for atypical bacterial infection (Bordetella pertussis, Chlamydophila pneumoniae, or Mycoplasma pneumoniae), consider antibiotic de-escalation to target atypical bacterial infection.    Urine Culture - Urine, Urine, Clean Catch [[id number]]  (Abnormal) Collected: 02/22/25 2208    Lab Status: Final result Specimen: Urine, Clean Catch Updated: 02/24/25 1058     Urine Culture <10,000 CFU/mL Gram Negative Bacilli    Narrative:      Call if further workup needed.   Colonization of the urinary tract without infection is common. Treatment is discouraged unless the patient is symptomatic, pregnant, or undergoing an invasive urologic procedure.    Blood Culture  - Blood, Leg, Left [[id number]]  (Abnormal)  (Susceptibility) Collected: 02/22/25 1922    Lab Status: Final result Specimen: Blood from Leg, Left Updated: 02/25/25 0620     Blood Culture Escherichia coli ESBL     Comment:   Consider infectious disease consult.  Susceptibility results may not correlate to clinical outcomes.  For ESBL-producing infections in the blood, a carbapenem is recommended as first-line therapy for optimal clinical outcomes.        Isolated from Aerobic and Anaerobic Bottles     Gram Stain Aerobic Bottle Gram negative bacilli      Anaerobic Bottle Gram negative bacilli    Narrative:      Less than seven (7) mL's of blood was collected.  Insufficient quantity may yield false negative results.    Susceptibility        Escherichia coli ESBL      VANDANA      Ciprofloxacin Resistant      Ertapenem Susceptible      Levofloxacin Resistant      Meropenem Susceptible      Trimethoprim + Sulfamethoxazole Resistant                       Susceptibility Comments       Escherichia coli ESBL    With the exception of urinary-sourced infections, aminoglycosides should not be used as monotherapy.               Blood Culture ID, PCR - Blood, Leg, Left [[id number]]  (Abnormal) Collected: 02/22/25 1922    Lab Status: Final result Specimen: Blood from Leg, Left Updated: 02/23/25 1155     BCID, PCR Escherichia coli. Identification by BCID2 PCR.     BCID, PCR 2 CTX-M (ESBL) detected. Identification by BCID2 PCR     BOTTLE TYPE Anaerobic Bottle    Blood Culture - Blood, Arm, Left [[id number]]  (Normal) Collected: 02/22/25 1919    Lab Status: Preliminary result Specimen: Blood from Arm, Left Updated: 02/25/25 1931     Blood Culture No growth at 3 days    Narrative:      Less than seven (7) mL's of blood was collected.  Insufficient quantity may yield false negative results.    Wound Culture - Wound, Abdominal Wall [[id number]]  (Abnormal)  (Susceptibility) Collected: 02/22/25 1605    Lab Status: Final result Specimen: Wound  from Abdominal Wall Updated: 02/26/25 0748     Wound Culture Moderate growth (3+) Escherichia coli ESBL     Comment:   Consider infectious disease consult.  Susceptibility results may not correlate to clinical outcomes.         Light growth (2+) Enterococcus faecium      Light growth (2+) Yeast, Not Candida albicans     Gram Stain Rare (1+) Gram negative bacilli    Susceptibility        Escherichia coli ESBL      VANDANA      Ertapenem Susceptible      Meropenem Susceptible                       Susceptibility        Enterococcus faecium      VANDANA      Ampicillin Resistant      Vancomycin Susceptible                       Susceptibility Comments       Escherichia coli ESBL    With the exception of urinary-sourced infections, aminoglycosides should not be used as monotherapy.               Legionella Antigen, Urine - Urine, Straight Cath [[id number]]  (Normal) Collected: 02/21/25 2202    Lab Status: Final result Specimen: Urine from Straight Cath Updated: 02/22/25 1751     LEGIONELLA ANTIGEN, URINE Negative    S. Pneumo Ag Urine or CSF - Urine, Straight Cath [[id number]]  (Normal) Collected: 02/21/25 2202    Lab Status: Final result Specimen: Urine from Straight Cath Updated: 02/22/25 1752     Strep Pneumo Ag Negative    COVID-19, FLU A/B, RSV PCR 1 HR TAT - Swab, Nasopharynx [[id number]]  (Normal) Collected: 02/21/25 1745    Lab Status: Final result Specimen: Swab from Nasopharynx Updated: 02/21/25 1832     COVID19 Not Detected     Influenza A PCR Not Detected     Influenza B PCR Not Detected     RSV, PCR Not Detected    Narrative:      Fact sheet for providers: https://www.fda.gov/media/362865/download    Fact sheet for patients: https://www.fda.gov/media/498088/download    Test performed by PCR.          [x]  Radiology  XR Chest 1 View    Result Date: 2/23/2025  The distal tip of the right IJ central venous line/port is in the expected mid superior vena cava (SVC). No pneumothorax is seen. There are bilateral infiltrates  present, suggestive of pulmonary edema with vascular congestion.    Portions of this note were completed with a voice recognition program.  2/23/2025 12:16 AM by James Hull MD on Workstation: [email or web address]      CT Abdomen Pelvis Without Contrast    Result Date: 2/22/2025  Impression: CT scan of the abdomen and pelvis without contrast demonstrating worsening subsegmental atelectasis at the lung bases. Small right effusion appears larger. New small left effusion. Hepatic configuration concerning for cirrhosis. A small amount of peritoneal fluid is again seen. Post cholecystectomy. Multiple nonobstructing renal stones are again seen. Mild left hydroureteronephrosis is not seen on the prior study. 6 mm stone in the mid left ureter at the L5 level was seen on the prior study in the lower pole collecting system of the left kidney. 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 Wound in the anterior abdominal wall is again seen. This appears smaller in comparison to 2/1/2025. Electronically Signed: Shun Milan MD  2/22/2025 5:22 PM EST  Workstation ID: [id number]    US Renal Bilateral    Result Date: 2/22/2025  Impression: 1.Bilateral nephrolithiasis without evidence of hydronephrosis. 2.Bilateral increased cortical echogenicity consistent with chronic renal disease. 3.Asymmetrically edematous left kidney with decreased cortical medullary differentiation which is nonspecific but could be seen in the setting of pyelonephritis.. Electronically Signed: Raad Jansen MD  2/22/2025 5:10 PM EST  Workstation ID: [id number]    XR Chest 1 View    Result Date: 2/21/2025  No active disease. Electronically Signed: Vitaly Maya MD  2/21/2025 7:38 PM EST  Workstation ID: [id number]   []  EKG/Telemetry   []  Cardiology/Vascular   []  Pathology  []  Old records  []  Other:    Assessment & Plan   Assessment / Plan     Assessment:  Acute kidney injury due to obstructing nephrolithiasis on the left status post stent placement.   Improving  Septic shock, present on admission secondary to bacteremia.  Improving  ESBL E. coli bacteremia on 5 x 20-second blood cultures  Chronic hypotension on midodrine  Episodes of bradycardia due to midodrine.  Improving  History of Crohn's disease with complex surgery  Chronic nonhealing abdominal wounds and abscess  secondary to significant abdominal surgery in the past for duodenal ulcer rupture follows with .  Due to ESBL E. coli Enterococcus faecium.  Chronic enterocutaneous fistulas followed by   Anxiety disorder  Positive MRSA PCR  Acute on chronic anemia.  Status post 1 unit packed RBC transfusion  Antibiotic associated diarrhea.  C. difficile negative.  Hypernatremia.  Free water deficit.  Thrombocytopenia.  Improving    Plan:   Levophed on standby, keep MAP more than 65.  Midodrine on hold due to bradycardia  Continue meropenem, finished Flagyl  Repeat Blood cultures negative to date from February 24  Of vancomycin  CT of the abdomen and pelvis reviewed.  Only 1 enterocutaneous fistula located in the left upper quadrant reported.    Appreciate urology input.  Status post left ureteric stent.  Needs definitive stone treatment when stable  Nephrology following.  Renal function slowly improving.  Started on D5 water with potassium.  Discussed with GI about diarrhea.  Appreciate input  As needed Imodium  wound culture from abdomen noted with ESBL E. coli and Enterococcus faecium. Patient does have known chronic abdominal wounds with fistulous communication.  Patient follows with  for management of these wounds patient was just discharged this month from  for these wounds.  May need to be transferred back to  once acute issues resolve  Continue wound care.  Discussed with wound care.  Appreciate input.  Electrolyte replacement protocol  PT OT  Continue ICU care overnight       Discussed with RN,  and patient.  Return to [facility] when stable.    VTE  Prophylaxis:  Pharmacologic & mechanical VTE prophylaxis orders are present.        CODE STATUS:   Level Of Support Discussed With: Patient  Code Status (Patient has no pulse and is not breathing): CPR (Attempt to Resuscitate)  Medical Interventions (Patient has pulse or is breathing): Full Support      Electronically signed by Liam Israel MD, 2/26/2025, 18:01 EST.

## 2025-02-26 NOTE — PROGRESS NOTES
Pulmonary / Critical Care Progress Note      Patient Name: Mag Padilla  : 1980  MRN: [id number]  Attending:  Liam Israel MD   Date of admission: 2025    Subjective   Subjective   Patient critically ill with septic shock    Over past 24 hours:  Doing fair this morning  Norepinephrine turned off over night  Hemoglobin 6.8 requiring 1 unit of blood  Lactic acid improving renal function improving    ROS  Constitutional symptoms:  Denied complaints   Ear, nose, throat: Denied complaints  Cardiovascular:  Denied complaints  Respiratory: Denied complaints  Gastrointestinal: Denied complaints  Musculoskeletal: Denied complaints  Neurologic: Denied complaints  Skin: Denied complaints    Objective   Objective     Vitals:   Vital signs for last 24 hours:  Temp:  [97 °F (36.1 °C)-97.9 °F (36.6 °C)] 97 °F (36.1 °C)  Heart Rate:  [44-78] 77  Resp:  [15-24] 23  BP: ()/(50-79) 95/71    Intake/Output last 3 shifts:  I/O last 3 completed shifts:  In: 3078.8 [P.O.:1050; I.V.:1528.8; IV Piggyback:500]  Out: 1041 [Urine:1025; Stool:1]  Intake/Output this shift:  I/O this shift:  In: 370 [Blood:370]  Out: -     Vent settings for last 24 hours:       Hemodynamic parameters for last 24 hours:       Physical Exam:  Vital Signs Reviewed   General: Pale, lethargic, critically ill female, on chair  HEENT:  PERRL, EOMI.  OP, nares clear  Chest:  clear to auscultation bilaterally, tympanic to percussion bilaterally, no work of breathing noted on room air  CV: Bradycardic, no MGR, pulses 2+, equal.  Abd:  Soft, NT, ND, + BS, no HSM, multiple abdominal incisions noted, weeping wounds noted from enterocutaneous fistulas  EXT:  no clubbing, no cyanosis, no edema  Neuro:  A&Ox3, CN grossly intact, no focal deficits.  Skin: No rashes or lesions noted    Result Review    Result Review:  I have personally reviewed the results from the time of this admission to 2025 11:05 EST and agree with these findings:  [x]   Laboratory  [x]  Microbiology  [x]  Radiology  [x]  EKG/Telemetry   [x]  Cardiology/Vascular   []  Pathology  []  Old records  []  Other:  Most notable findings include:       Lab 02/26/25  0248 02/25/25  0458 02/24/25  0526 02/24/25  0440 02/23/25  2105 02/23/25  1058 02/23/25  0138 02/22/25  2201 02/22/25  2157 02/22/25  1919 02/22/25  1837 02/22/25  1648 02/22/25  0502 02/21/25  1821 02/21/25  1708   WBC 3.62 9.26 6.61  --   --   --  6.50 8.33  --   --   --  9.77 12.57* 9.27 11.98*   HEMOGLOBIN 6.8* 7.5* 7.3*  --  8.1*  --  7.0* 8.0*  --   --   --  8.5* 8.4* 8.2* 9.0*   HEMATOCRIT 22.0* 23.5* 23.3*  --  24.7*  --  22.4* 25.2*  --   --   --  27.9* 28.1* 26.8* 29.9*   PLATELETS 98* 162 116*  --   --   --  84* 94*  --   --   --  100* 87* 71* 86*   SODIUM 146* 142  --  141  --  143 140  --  138  --   --  138 135* 135* 132*   POTASSIUM 3.5 3.2*  --  3.5  --  4.1 3.0*  --  2.9*  --   --  3.4* 3.9 3.0* 3.3*   CHLORIDE 118* 110*  --  110*  --  109* 112*  --  114*  --   --  113* 113* 110* 106   CO2 18.9* 20.4*  --  20.4*  --  18.8* 15.5*  --  12.3*  --   --  11.3* 4.9* 11.5* 10.3*   BUN 32* 41*  --  38*  --  34* 38*  --  39*  --   --  45* 48* 53* 56*   CREATININE 2.88* 3.23*  --  3.53*  --  3.66* 3.64*  --  3.91*  --  4.38* 4.46* 5.03* 5.50* 5.67*   GLUCOSE 71 89  --  133*  --  138* 149*  --  153*  --   --  72 53* 79 88   CALCIUM 8.4* 8.7  --  8.4*  --  8.1* 8.3*  --  8.4*  --   --  8.4* 8.2* 7.9* 8.2*   PHOSPHORUS 2.0* 2.4*  --  3.0  --  2.2* 2.7  --  2.2* 2.6  --   --   --   --   --    TOTAL PROTEIN  --  5.7*  --  5.7*  --   --  5.6*  --  5.2*  --   --  5.7*  --  5.4* 6.1   ALBUMIN  --  2.3*  --  2.0*  --  1.9* 2.0*  --  1.6*  --   --  1.5*  --  1.6* 1.6*   GLOBULIN  --  3.4  --  3.7  --   --  3.6  --  3.6  --   --  4.2  --  3.8 4.5     Results for orders placed during the hospital encounter of 02/21/25    Adult Transthoracic Echo Complete W/ Cont if Necessary Per Protocol    Interpretation Summary    Left ventricular  ejection fraction appears to be 46 - 50%. Mild global hypokinesis    Left ventricular diastolic function is consistent with (grade I) impaired relaxation.    Estimated right ventricular systolic pressure from tricuspid regurgitation is normal (<35 mmHg).  CT Abdomen Pelvis Without Contrast    Result Date: 2/22/2025  CT ABDOMEN PELVIS WO CONTRAST Date of Exam: 2/22/2025 4:18 PM EST Indication: chronic subcutaneous abscess of abdomen. Comparison: CT AP 2/1/2025 Technique: Axial CT images were obtained of the abdomen and pelvis without the administration of contrast. Reconstructed coronal and sagittal images were also obtained. Automated exposure control and iterative construction methods were used. Findings: Subsegmental atelectasis at the lung bases appears worsened. No consolidation or mass is evident. Small right effusion appears larger. New small left effusion is evident. The right liver lobe is relatively small, which may be a manifestation of cirrhosis. The gallbladder is absent, surgical clips are seen in the gallbladder bed. No pancreatic mass is evident. Embolization coils are again seen adjacent to the pancreatic head. No adrenal mass is evident. The spleen is of normal size. A small amount of peritoneal fluid is again seen. Numerous tiny nonobstructing stones are seen in the renal collecting systems. Mild left hydroureteronephrosis is evident. 6 mm stone in the mid left ureter at the L5 level was seen on 2/1/2025 in the lower pole collecting system of the left kidney. This stone appears to be visible on the  image. 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 A wound is seen in the midline anterior abdominal wall, with 1.5 cm gas or air pocket seen on sagittal series 4 image 105, which appears slightly smaller in comparison to 2/1/2025. No fluid collection is evident. The uterus measures 4 cm in greatest dimension. No pelvic mass is seen.     Impression: Impression: CT scan of the  abdomen and pelvis without contrast demonstrating worsening subsegmental atelectasis at the lung bases. Small right effusion appears larger. New small left effusion. Hepatic configuration concerning for cirrhosis. A small amount of peritoneal fluid is again seen. Post cholecystectomy. Multiple nonobstructing renal stones are again seen. Mild left hydroureteronephrosis is not seen on the prior study. 6 mm stone in the mid left ureter at the L5 level was seen on the prior study in the lower pole collecting system of the left kidney. 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 Wound in the anterior abdominal wall is again seen. This appears smaller in comparison to 2/1/2025. Electronically Signed: Shun Milan MD  2/22/2025 5:22 PM EST  Workstation ID: [id number]     Blood cultures positive for CTX-M E-coli      Assessment & Plan   Assessment / Plan     Active Hospital Problems:  Active Hospital Problems    Diagnosis     **Acute renal failure     Hypotension due to hypovolemia     Chronic diarrhea     Nausea & vomiting     Inflammatory bowel disease (Crohn's disease)     Kidney stone      Impression:  Septic shock, present on admission  Urinary tract infection from multidrug-resistant E. Coli  E. coli bacteremia  Obstructing nephrolithiasis status post ureteral stent placement  Dehydration  Acute kidney injury secondary to prerenal etiology  Metabolic acidosis  Supratherapeutic INR  Hypokalemia  Chronic hypotension on midodrine  History of Crohn's disease with complex abdominal surgeries  Chronic nonhealing abdominal wounds  Enterocutaneous fistulas  Bradycardia     Plan:  Norepinephrine off overnight  Continue to monitor and keep MAP greater than 65  Holding midodrine due to bradycardia  Continue with meropenem, Flagyl; will stop vancomycin today  Repeat culture until clearance  Renal function slowly improving  Lactic acid improving continue to trend until clearance  Status post ureteral stent by  urology.  Management per them.  Renee to be removed today by them.  Cont aspiration precautions. Keep HOB 30 deg.   Replace electrolytes PRN to keep K 4.0, Mag 2.0, Phos 4.0.  Keep glucose 140-180 while in ICU. Cont SSI.  Hemoglobin 6.8 this morning.  1 unit PRBC ordered.  Check H&H after transfusion  Transfuse to keep Hgb >7  Continue wound care  GI prophylaxis with PPI  DVT prophylaxis with Lovenox    VTE Prophylaxis:  Pharmacologic & mechanical VTE prophylaxis orders are present.    CODE STATUS:   Level Of Support Discussed With: Patient  Code Status (Patient has no pulse and is not breathing): CPR (Attempt to Resuscitate)  Medical Interventions (Patient has pulse or is breathing): Full Support    Patient is critically ill with septic shock, ESBL E. coli bacteremia, JOHANA, metabolic acidosis, multiple electrolyte abnormalities. I, Dr. Anne Roger, spent 31 minutes of critical care time. Total Critical Care time spent: 31 minutes. This included personally reviewing all pertinent labs, imaging, microbiology and documentation. Also discussing the case with the patient and any available family, the admitting physician and any available ancillary staff. Electronically signed by Anne Roger MD, 02/26/25, 11:13 AM EST.

## 2025-02-26 NOTE — CONSULTS
Chief Complaint  Nausea and Hypotension    History of Present Illness  Mag Padilla is a 44 y.o. female with history of small bowel Crohn's, multiple abdominal surgeries, enterocutaneous fistula for over a year, anxiety, anemia, breast cancer, GERD, hypertension, SVT, hypertension who presents to [facility] on referral from Liam Israel MD for a gastroenterology evaluation of persistent diarrhea.  Patient denies any abdominal pain.  She denies any nausea vomiting, heartburn, dysphagia.  Patient had excessive diarrhea yesterday but since this morning she only had 2 stools which are small amount of loose.  She was given Imodium earlier.  She is responding well.  No blood in the stool.  Stool C. difficile is negative.      Labs Result Review Imaging    Past Medical History:   Diagnosis Date    Abscess of peritoneum     Anemia     Anxiety     Breast cancer     Crohn's disease     GERD (gastroesophageal reflux disease)     HTN (hypertension)     Hyperlipidemia     Intestine disorder     SVT (supraventricular tachycardia)        Past Surgical History:   Procedure Laterality Date    ABDOMINAL SURGERY      CYSTOSCOPY W/ URETERAL STENT PLACEMENT Left 2/22/2025    Procedure: CYSTOSCOPY URETERAL CATHETER/STENT INSERTION;  Surgeon: Elliot Briceño MD;  Location: [facility];  Service: Urology;  Laterality: Left;         Current Facility-Administered Medications:     acetaminophen (TYLENOL) tablet 1,000 mg, 1,000 mg, Oral, Q6H PRN, Liam Israel MD    aluminum-magnesium hydroxide-simethicone (MAALOX MAX) 400-400-40 MG/5ML suspension 15 mL, 15 mL, Oral, Q6H PRN, Patsy Lawrence PA, 15 mL at 02/25/25 1139    sennosides-docusate (PERICOLACE) 8.6-50 MG per tablet 2 tablet, 2 tablet, Oral, BID PRN **AND** polyethylene glycol (MIRALAX) packet 17 g, 17 g, Oral, Daily PRN **AND** bisacodyl (DULCOLAX) EC tablet 5 mg, 5 mg, Oral, Daily PRN **AND** bisacodyl (DULCOLAX) suppository 10 mg, 10  mg, Rectal, Daily PRN, Elliot Briceño MD    dextrose (D50W) (25 g/50 mL) IV injection 50 mL, 50 mL, Intravenous, Q1H PRN, Elliot Briceño MD, 50 mL at 02/22/25 2130    dextrose 5 % with KCl 20 mEq/L infusion, 100 mL/hr, Intravenous, Continuous, Yaya San MD, Last Rate: 100 mL/hr at 02/26/25 1143, 100 mL/hr at 02/26/25 1143    ferrous sulfate tablet 325 mg, 325 mg, Oral, Daily With Breakfast, Liam Israel MD, 325 mg at 02/26/25 0901    heparin (porcine) 5000 UNIT/ML injection 5,000 Units, 5,000 Units, Subcutaneous, Q8H, Anne Roger MD, 5,000 Units at 02/26/25 0543    loperamide (IMODIUM) capsule 2 mg, 2 mg, Oral, 4x Daily PRN, Liam Israel MD, 2 mg at 02/26/25 0358    meropenem (MERREM) 500 mg in sodium chloride 0.9 % 100 mL IVPB-VTB, 500 mg, Intravenous, Q12H, Anne Roger MD, 500 mg at 02/26/25 0901    [Held by provider] midodrine (PROAMATINE) tablet 10 mg, 10 mg, Oral, TID AC, Elliot Briceño MD, 10 mg at 02/24/25 0800    multivitamin with minerals 1 tablet, 1 tablet, Oral, Daily, Liam Israel MD, 1 tablet at 02/26/25 0900    mupirocin (BACTROBAN) 2 % nasal ointment 1 Application, 1 Application, Each Nare, BID, Brandon Estevez DO, 1 Application at 02/26/25 0900    norepinephrine (LEVOPHED) 8 mg in 250 mL NS infusion (premix), 0.02-0.3 mcg/kg/min, Intravenous, Titrated, Margarito Zurita MD, Held at 02/26/25 1200    ondansetron (ZOFRAN) injection 4 mg, 4 mg, Intravenous, Q6H PRN, Elliot Briceño MD, 4 mg at 02/25/25 1101    pantoprazole (PROTONIX) EC tablet 40 mg, 40 mg, Oral, Daily, Elliot Briceño MD, 40 mg at 02/26/25 0900    Pharmacy to dose vancomycin, , Not Applicable, Continuous PRN, Anne Roger MD    potassium phosphate (monobasic) (K-PHOS) tablet 500 mg, 500 mg, Oral, BID, Liam Israel MD, 500 mg at 02/26/25 0900    saccharomyces boulardii (FLORASTOR) capsule 500 mg, 500 mg, Oral, Daily, Liam Israel MD, 500 mg at 02/26/25 0900    sertraline (ZOLOFT)  "tablet 75 mg, 75 mg, Oral, Daily, Elliot Briceño MD, 75 mg at 02/26/25 0900    sodium chloride 0.9 % flush 10 mL, 10 mL, Intravenous, PRN, Elliot Briceño MD    sodium chloride 0.9 % flush 10 mL, 10 mL, Intravenous, Q12H, Elliot Briceño MD, 10 mL at 02/26/25 0901    sodium chloride 0.9 % flush 10 mL, 10 mL, Intravenous, PRN, Elliot Briceño MD    sodium chloride 0.9 % infusion 40 mL, 40 mL, Intravenous, PRN, Elliot Briceño MD     Allergies   Allergen Reactions    Iodine Unknown - High Severity    Penicillins Unknown - High Severity       History reviewed. No pertinent family history.     Social History     Social History Narrative    Not on file   Social is negative for smoking, alcohol use, drug    Immunization:    There is no immunization history on file for this patient.     Objective     Review of Systems   10 system review is negative except as mentioned HPI  Vital Signs:   BP 92/68 (BP Location: Left arm, Patient Position: Lying)   Pulse 83   Temp 97.5 °F (36.4 °C) (Oral)   Resp 20   Ht 157.5 cm (62\")   Wt 52.3 kg (115 lb 4.8 oz)   SpO2 97%   BMI 21.09 kg/m²       Physical Exam  Constitutional:       General: She is awake. She is not in acute distress.     Appearance: Normal appearance. She is well-developed and well-groomed.   HENT:      Head: Normocephalic and atraumatic.      Mouth/Throat:      Mouth: Mucous membranes are moist.      Comments: Ralph dental hygiene is good  Eyes:      General: Lids are normal.      Conjunctiva/sclera: Conjunctivae normal.      Pupils: Pupils are equal, round, and reactive to light.   Neck:      Thyroid: No thyroid mass.      Trachea: Trachea normal.   Cardiovascular:      Rate and Rhythm: Normal rate and regular rhythm.      Heart sounds: Normal heart sounds.   Pulmonary:      Effort: Pulmonary effort is normal.      Breath sounds: Normal breath sounds and air entry.   Abdominal:      General: Abdomen is flat. Bowel sounds are normal. There is no " distension.      Palpations: Abdomen is soft. There is no mass.      Tenderness: There is no abdominal tenderness. There is no guarding.      Comments: Patient has colostomy bags onto the enterocutaneous fistula on her abdominal wall.   Musculoskeletal:      Cervical back: Neck supple.      Right lower leg: No edema.      Left lower leg: No edema.   Skin:     General: Skin is warm and moist.      Coloration: Skin is not cyanotic, jaundiced or pale.      Findings: No rash.      Nails: There is no clubbing.   Neurological:      Mental Status: She is alert and oriented to person, place, and time.   Psychiatric:         Attention and Perception: Attention normal.         Mood and Affect: Mood and affect normal.         Speech: Speech normal.         Labs:  Results from last 7 days   Lab Units 02/26/25  1106 02/26/25  0248 02/25/25  0458   WBC 10*3/mm3 4.99 3.62 9.26   HEMOGLOBIN g/dL 8.8* 6.8* 7.5*   HEMATOCRIT % 28.3* 22.0* 23.5*   PLATELETS 10*3/mm3 115* 98* 162      Results from last 7 days   Lab Units 02/26/25  0248 02/25/25  0458 02/24/25  0440 02/23/25  1058 02/23/25  0138   SODIUM mmol/L 146* 142 141   < > 140   POTASSIUM mmol/L 3.5 3.2* 3.5   < > 3.0*   CHLORIDE mmol/L 118* 110* 110*   < > 112*   CO2 mmol/L 18.9* 20.4* 20.4*   < > 15.5*   BUN mg/dL 32* 41* 38*   < > 38*   CREATININE mg/dL 2.88* 3.23* 3.53*   < > 3.64*   CALCIUM mg/dL 8.4* 8.7 8.4*   < > 8.3*   BILIRUBIN mg/dL  --  0.6 0.8  --  1.2   ALK PHOS U/L  --  96 93  --  91   ALT (SGPT) U/L  --  <5 5  --  7   AST (SGOT) U/L  --  9 8  --  9   GLUCOSE mg/dL 71 89 133*   < > 149*    < > = values in this interval not displayed.      Results from last 7 days   Lab Units 02/23/25  1058 02/22/25  1919   INR  1.35* 3.17*        Assessment & Plan:  Principal Problem:    Acute renal failure  Active Problems:    Hypotension due to hypovolemia    Chronic diarrhea    Nausea & vomiting    Inflammatory bowel disease (Crohn's disease)    Kidney stone  Enterocutaneous  fistula, patient has had multiple abdominal surgeries she was at [facility] a year ago and according to the patient the surgery team decided not to operate any more.  I recommend using Imodium as needed will also start patient on bile salt sequestrant agents to control the diarrhea.  I hesitate to use in immunosuppressive agents with patient recovering from septic shock and gram-negative sepsis.  Patient was given instructions and counseling regarding her condition or for health maintenance advice. Please see specific information pulled into the AVS if appropriate.        Signed:  Anam Mccrary MD  02/26/25  14:34 EST

## 2025-02-26 NOTE — CONSULTS
"Nutrition Services    Patient Name: Mag Padilla  YOB: 1980  MRN: [id number]  Admission date: 2/21/2025      CLINICAL NUTRITION ASSESSMENT      Reason for Assessment  LOS - 4 day ICU LOS     H&P:  Past Medical History:   Diagnosis Date    Abscess of peritoneum     Anemia     Anxiety     Breast cancer     Crohn's disease     GERD (gastroesophageal reflux disease)     HTN (hypertension)     Hyperlipidemia     Intestine disorder     SVT (supraventricular tachycardia)         Current Problems:   Active Hospital Problems    Diagnosis     **Acute renal failure     Hypotension due to hypovolemia     Chronic diarrhea     Nausea & vomiting     Inflammatory bowel disease (Crohn's disease)     Kidney stone         Nutrition/Diet History         Narrative   Pt seen for 4-day ICU LOS. Discussed during morning rounds. Reports n/v ongoing for 2 days prior to admit. History of Crohn's disease with chronic diarrhea noted as well. Still on levo this morning, now held. Eating breakfast at time of visit, >75% consumed and still working on it. Phos low, received k-phos. Kidney function continues to improve. Has chronic wounds from previous G and J-tubes as well as drains that have no healed.      Anthropometrics        Current Height, Weight Height: 157.5 cm (62\")  Weight: 52.3 kg (115 lb 4.8 oz)   Current BMI Body mass index is 21.09 kg/m².   BMI Classification Normal range   % %   Adjusted Body Weight (ABW)    Weight Hx  Wt Readings from Last 30 Encounters:   02/22/25 0132 52.3 kg (115 lb 4.8 oz)   02/21/25 1655 52 kg (114 lb 10.2 oz)          Wt Change Observation No history available to trend     Estimated/Assessed Needs  Estimated Needs based on: Current Body Weight       Energy Requirements 25-30 kcal/kg   EST Needs (kcal/day) 0233-3490       Protein Requirements 1.0-1.2 g/kg   EST Daily Needs (g/day) 52-62       Fluid Requirements 30 ml/kg    Estimated Needs (mL/day) 1569     Labs/Medications         " "Pertinent Labs Reviewed.   Results from last 7 days   Lab Units 02/26/25  0248 02/25/25  0458 02/24/25  0440 02/23/25  1058 02/23/25  0138   SODIUM mmol/L 146* 142 141   < > 140   POTASSIUM mmol/L 3.5 3.2* 3.5   < > 3.0*   CHLORIDE mmol/L 118* 110* 110*   < > 112*   CO2 mmol/L 18.9* 20.4* 20.4*   < > 15.5*   BUN mg/dL 32* 41* 38*   < > 38*   CREATININE mg/dL 2.88* 3.23* 3.53*   < > 3.64*   CALCIUM mg/dL 8.4* 8.7 8.4*   < > 8.3*   BILIRUBIN mg/dL  --  0.6 0.8  --  1.2   ALK PHOS U/L  --  96 93  --  91   ALT (SGPT) U/L  --  <5 5  --  7   AST (SGOT) U/L  --  9 8  --  9   GLUCOSE mg/dL 71 89 133*   < > 149*    < > = values in this interval not displayed.     Results from last 7 days   Lab Units 02/26/25  1106 02/26/25  0248 02/25/25  0458 02/24/25  0526 02/24/25  0440   MAGNESIUM mg/dL  --  1.7 2.0  --  2.3   PHOSPHORUS mg/dL  --  2.0* 2.4*  --  3.0   HEMOGLOBIN g/dL 8.8* 6.8* 7.5*   < >  --    HEMATOCRIT % 28.3* 22.0* 23.5*   < >  --     < > = values in this interval not displayed.     COVID19   Date Value Ref Range Status   02/23/2025 Not Detected Not Detected - Ref. Range Final     No results found for: \"HGBA1C\"      Pertinent Medications Reviewed.     Malnutrition Severity Assessment              Nutrition Diagnosis         Nutrition Dx Problem 1 Inadequate oral Intake related to GI dysfunction as evidenced by  Pt report of n/v prior to admit, chronic diarrhea, and wounds increasing needs     Nutrition Intervention           Current Nutrition Orders & Evaluation of Intake       Current PO Diet Diet: Cardiac; Healthy Heart (2-3 Na+); Texture: Regular (IDDSI 7); Fluid Consistency: Thin (IDDSI 0)   Supplement Orders Placed This Encounter      Dietary Nutrition Supplements Boost Plus (Ensure Plus)           Nutrition Intervention/Prescription        Continue Heart Healthy diet as ordered  Continue Boost BID (240kcal, 10g pro each)  Add Banatrol TID  Add Colby BID        Medical Nutrition Therapy/Nutrition Education    "       Learner     Readiness Patient  Acceptance     Method     Response Explanation  Verbalizes understanding     Monitor/Evaluation        Monitor PO intake, Supplement intake, Weight, GI status     Nutrition Discharge Plan         To be determined     Electronically signed by:  Rimma Huang RD  02/26/25 14:17 EST

## 2025-02-26 NOTE — PLAN OF CARE
Goal Outcome Evaluation:  VSS. Aflutter/bradycardia/SR. Pt noted for multiple stools throughout the night. Immodium given x 2. Hemoglobin 6.8. T/S done. Waiting to for blood bank to call back when PRBC is ready.

## 2025-02-26 NOTE — PLAN OF CARE
Goal Outcome Evaluation:           Progress: improving  Outcome Evaluation: Patient a/ox4. Levo currently on hold. Frequent BMs. D5 w/ 20 k infusing to manage BS.

## 2025-02-26 NOTE — SIGNIFICANT NOTE
02/26/25 1145   Physical Therapy Time and Intention   Session Not Performed patient unavailable for evaluation

## 2025-02-26 NOTE — PROGRESS NOTES
[facility]     Progress Note    Patient Name: Mag Padilla  : 1980  MRN: [id number]  Primary Care Physician:  Provider, No Known  Date of admission: 2025    Subjective   Patient's renal dysfunction is improving  Hemoglobin is low  Phosphorus is also low  Patient off pressors since midnight    Scheduled Meds:ferrous sulfate, 325 mg, Oral, Daily With Breakfast  heparin (porcine), 5,000 Units, Subcutaneous, Q8H  meropenem, 500 mg, Intravenous, Q12H  metroNIDAZOLE, 500 mg, Oral, TID  [Held by provider] midodrine, 10 mg, Oral, TID AC  multivitamin with minerals, 1 tablet, Oral, Daily  mupirocin, 1 Application, Each Nare, BID  pantoprazole, 40 mg, Oral, Daily  potassium phosphate (monobasic), 500 mg, Oral, BID  saccharomyces boulardii, 500 mg, Oral, Daily  sertraline, 75 mg, Oral, Daily  sodium chloride, 10 mL, Intravenous, Q12H      Continuous Infusions:norepinephrine, 0.02-0.3 mcg/kg/min, Last Rate: Stopped (25 0000)  Pharmacy to dose vancomycin,       PRN Meds:.  acetaminophen    aluminum-magnesium hydroxide-simethicone    senna-docusate sodium **AND** polyethylene glycol **AND** bisacodyl **AND** bisacodyl    dextrose    loperamide    ondansetron    Pharmacy to dose vancomycin    sodium chloride    sodium chloride    sodium chloride       Review of Systems  Constitutional:        Weakness tiredness fatigue  Eyes:                       No blurry vision, eye discharge, eye irritation, eye pain  HEENT:                   No acute hair loss, earache and discharge, nasal congestion or discharge, sore throat, postnasal drip  Respiratory:           No shortness of breath coughing sputum production wheezing hemoptysis pleuritic chest pain  Cardiovascular:     No chest pain, orthopnea, PND, dizziness, palpitation, lower extremity edema  Gastrointestinal:   No nausea vomiting diarrhea abdominal pain constipation  Genitourinary:       No urinary incontinence, hesitancy, frequency, urgency,  dysuria  Hematologic:         No bruising, bleeding, pallor, lymphadenopathy  Endocrine:            No coldness, hot flashes, polyuria, abnormal hair growth  Musculoskeletal:    No body pains, aches, arthritic pains, muscle pain ,muscle wasting  Psychiatric:          No low or high mood, anxiety, hallucinations, delusions  Skin.                      No rash, ulcers, bruising, itching  Neurological:        No confusion, headache, focal weakness, numbness, dysphasia    Objective   Objective     Vitals:   Temp:  [97.5 °F (36.4 °C)-97.9 °F (36.6 °C)] 97.5 °F (36.4 °C)  Heart Rate:  [44-78] 77  Resp:  [14-24] 23  BP: ()/(50-79) 95/71  Physical Exam    Constitutional: Awake, alert responsive, conversant, no obvious distress              Psychiatric:  Appropriate affect, cooperative   Neurologic:  Awake alert ,oriented x 3, strength symmetric in all extremities, Cranial Nerves grossly intact to confrontation, speech clear   Eyes:   PERRLA, sclerae anicteric, no conjunctival injection   HEENT:  Moist mucous membranes, no nasal or eye discharge, no throat congestion   Neck:   Supple, no thyromegaly, no lymphadenopathy, trachea midline, no elevated JVD   Respiratory:  Clear to auscultation bilaterally, nonlabored respirations    Cardiovascular: RRR, no murmurs, rubs, or gallops, palpable pedal pulses bilaterally, No bilateral ankle edema   Gastrointestinal: Positive bowel sounds, soft, nontender, nondistended, no organomegaly   Musculoskeletal:  No clubbing or cyanosis to extremities,muscle wasting, joint swelling, muscle weakness             Skin:                      No rashes, bruising, skin ulcers, petechiae or ecchymosis    Result Review    Result Review:  I have personally reviewed the results from the time of this admission to 2/26/2025 09:04 EST and agree with these findings:  []  Laboratory  []  Microbiology  []  Radiology  []  EKG/Telemetry   []  Cardiology/Vascular   []  Pathology  []  Old records  []   Other:    Assessment & Plan   Assessment / Plan       Active Hospital Problems:  Active Hospital Problems    Diagnosis     **Acute renal failure     Hypotension due to hypovolemia     Chronic diarrhea     Nausea & vomiting     Inflammatory bowel disease (Crohn's disease)     Kidney stone      44-year-old female with past medical history of Crohn's disease status post resection, chronically hypotensive on midodrine, anxiety, chronic diarrhea, GERD who who has not been feeling well and has had persistent nausea and vomiting for the last few days as well as her diarrhea with severe JOHANA and creatinine rise from baseline normal to peak of 5.6 with decreased urine output and worsening bicarb currently at 5.  JOHANA most likely due to hypotension, diarrhea and vomiting contributing with UA showing some hyaline cast.  Urine analysis also concerning for large amount of proteinuria and RBCs with CT scan showing obstructive stone which is likely the source of septic shock.  Creatinine has plateaued around 3.5 with poor urine output and concerning patient may have developed ATN.  Cultures with ESBL Klebsiella.  Responded to Lasix with brisk urine output and improving renal dysfunction     Plan:   Continue with supportive care  Will start D5 water with 20 mEq of potassium for 1 day at 100 cc/h  Potassium phosphate for replacement  Agree with holding midodrine  Patient off pressors since midnight and will continue to monitor.  Patient chronically has low blood pressures  C3 is slightly low most likely infection related and low suspicion for any GN  Patient completing course of antibiotics    Thank you for involving me in the care of the patient.  Will continue to follow along

## 2025-02-26 NOTE — PROGRESS NOTES
[facility] Clinical Pharmacy Services: Vancomycin Monitoring Note    Mag Padilla is a 44 y.o. female who is on day 3/7 of pharmacy to dose vancomycin for SSTI    Previous Vancomycin Dose:   500 mg IV x1 2/23 @ 1635  Imaging Reviewed?: Yes  Updated Cultures and Sensitivities:   Microbiology Results (last 10 days)       Procedure Component Value - Date/Time    Blood Culture - Blood, Hand, Right [[id number]]  (Normal) Collected: 02/24/25 2016    Lab Status: Preliminary result Specimen: Blood from Hand, Right Updated: 02/25/25 2030     Blood Culture No growth at 24 hours    Narrative:      Less than seven (7) mL's of blood was collected.  Insufficient quantity may yield false negative results.    Blood Culture - Blood, Arm, Left [[id number]]  (Normal) Collected: 02/24/25 1803    Lab Status: Preliminary result Specimen: Blood from Arm, Left Updated: 02/25/25 1831     Blood Culture No growth at 24 hours    Clostridioides difficile Toxin - Stool, Per Rectum [[id number]] Collected: 02/24/25 0856    Lab Status: Final result Specimen: Stool from Per Rectum Updated: 02/24/25 0950    Narrative:      The following orders were created for panel order Clostridioides difficile Toxin - Stool, Per Rectum.  Procedure                               Abnormality         Status                     ---------                               -----------         ------                     Clostridioides difficile...[[id number]]                      Final result                 Please view results for these tests on the individual orders.    Clostridioides difficile Toxin, PCR - Stool, Per Rectum [[id number]] Collected: 02/24/25 0856    Lab Status: Final result Specimen: Stool from Per Rectum Updated: 02/24/25 0950     Toxigenic C. difficile by PCR Negative     027 Toxin Presumptive Negative    Narrative:      The result indicates the absence of toxigenic C. difficile from stool specimen.     MRSA Screen, PCR (Inpatient) - Swab, Nares  [[id number]]  (Abnormal) Collected: 02/23/25 0601    Lab Status: Final result Specimen: Swab from Nares Updated: 02/23/25 1001     MRSA PCR MRSA Detected    Narrative:      The negative predictive value of this diagnostic test is high and should only be used to consider de-escalating anti-MRSA therapy. A positive result may indicate colonization with MRSA and must be correlated clinically.    Respiratory Panel PCR w/COVID-19(SARS-CoV-2) BRIELLE/TOSHA/IMTIAZ/PAD/COR/NATACHA In-House, NP Swab in UTM/VTM, 2 HR TAT - Swab, Nasopharynx [[id number]]  (Normal) Collected: 02/23/25 0601    Lab Status: Final result Specimen: Swab from Nasopharynx Updated: 02/23/25 0827     ADENOVIRUS, PCR Not Detected     Coronavirus 229E Not Detected     Coronavirus HKU1 Not Detected     Coronavirus NL63 Not Detected     Coronavirus OC43 Not Detected     COVID19 Not Detected     Human Metapneumovirus Not Detected     Human Rhinovirus/Enterovirus Not Detected     Influenza A PCR Not Detected     Influenza B PCR Not Detected     Parainfluenza Virus 1 Not Detected     Parainfluenza Virus 2 Not Detected     Parainfluenza Virus 3 Not Detected     Parainfluenza Virus 4 Not Detected     RSV, PCR Not Detected     Bordetella pertussis pcr Not Detected     Bordetella parapertussis PCR Not Detected     Chlamydophila pneumoniae PCR Not Detected     Mycoplasma pneumo by PCR Not Detected    Narrative:      In the setting of a positive respiratory panel with a viral infection PLUS a negative procalcitonin without other underlying concern for bacterial infection, consider observing off antibiotics or discontinuation of antibiotics and continue supportive care. If the respiratory panel is positive for atypical bacterial infection (Bordetella pertussis, Chlamydophila pneumoniae, or Mycoplasma pneumoniae), consider antibiotic de-escalation to target atypical bacterial infection.    Urine Culture - Urine, Urine, Clean Catch [[id number]]  (Abnormal) Collected: 02/22/25 0849     Lab Status: Final result Specimen: Urine, Clean Catch Updated: 02/24/25 1058     Urine Culture <10,000 CFU/mL Gram Negative Bacilli    Narrative:      Call if further workup needed.   Colonization of the urinary tract without infection is common. Treatment is discouraged unless the patient is symptomatic, pregnant, or undergoing an invasive urologic procedure.    Blood Culture - Blood, Leg, Left [[id number]]  (Abnormal)  (Susceptibility) Collected: 02/22/25 1922    Lab Status: Final result Specimen: Blood from Leg, Left Updated: 02/25/25 0620     Blood Culture Escherichia coli ESBL     Comment:   Consider infectious disease consult.  Susceptibility results may not correlate to clinical outcomes.  For ESBL-producing infections in the blood, a carbapenem is recommended as first-line therapy for optimal clinical outcomes.        Isolated from Aerobic and Anaerobic Bottles     Gram Stain Aerobic Bottle Gram negative bacilli      Anaerobic Bottle Gram negative bacilli    Narrative:      Less than seven (7) mL's of blood was collected.  Insufficient quantity may yield false negative results.    Susceptibility        Escherichia coli ESBL      VANDANA      Ciprofloxacin >=4 ug/ml Resistant      Ertapenem <=0.12 ug/ml Susceptible      Levofloxacin >=8 ug/ml Resistant      Meropenem <=0.25 ug/ml Susceptible      Trimethoprim + Sulfamethoxazole >=320 ug/ml Resistant                       Susceptibility Comments       Escherichia coli ESBL    With the exception of urinary-sourced infections, aminoglycosides should not be used as monotherapy.               Blood Culture ID, PCR - Blood, Leg, Left [[id number]]  (Abnormal) Collected: 02/22/25 1922    Lab Status: Final result Specimen: Blood from Leg, Left Updated: 02/23/25 1155     BCID, PCR Escherichia coli. Identification by BCID2 PCR.     BCID, PCR 2 CTX-M (ESBL) detected. Identification by BCID2 PCR     BOTTLE TYPE Anaerobic Bottle    Blood Culture - Blood, Arm, Left [[id number]]   (Normal) Collected: 02/22/25 1919    Lab Status: Preliminary result Specimen: Blood from Arm, Left Updated: 02/25/25 1931     Blood Culture No growth at 3 days    Narrative:      Less than seven (7) mL's of blood was collected.  Insufficient quantity may yield false negative results.    Wound Culture - Wound, Abdominal Wall [[id number]]  (Abnormal)  (Susceptibility) Collected: 02/22/25 1605    Lab Status: Final result Specimen: Wound from Abdominal Wall Updated: 02/26/25 0748     Wound Culture Moderate growth (3+) Escherichia coli ESBL     Comment:   Consider infectious disease consult.  Susceptibility results may not correlate to clinical outcomes.         Light growth (2+) Enterococcus faecium      Light growth (2+) Yeast, Not Candida albicans     Gram Stain Rare (1+) Gram negative bacilli    Susceptibility        Escherichia coli ESBL      VANDANA      Ertapenem 0.25 ug/ml Susceptible      Meropenem <=0.25 ug/ml Susceptible                       Susceptibility        Enterococcus faecium      VANDANA      Ampicillin >=32 ug/ml Resistant      Vancomycin <=0.5 ug/ml Susceptible                       Susceptibility Comments       Escherichia coli ESBL    With the exception of urinary-sourced infections, aminoglycosides should not be used as monotherapy.               Legionella Antigen, Urine - Urine, Straight Cath [[id number]]  (Normal) Collected: 02/21/25 2202    Lab Status: Final result Specimen: Urine from Straight Cath Updated: 02/22/25 1751     LEGIONELLA ANTIGEN, URINE Negative    S. Pneumo Ag Urine or CSF - Urine, Straight Cath [[id number]]  (Normal) Collected: 02/21/25 2202    Lab Status: Final result Specimen: Urine from Straight Cath Updated: 02/22/25 1752     Strep Pneumo Ag Negative    COVID-19, FLU A/B, RSV PCR 1 HR TAT - Swab, Nasopharynx [[id number]]  (Normal) Collected: 02/21/25 1745    Lab Status: Final result Specimen: Swab from Nasopharynx Updated: 02/21/25 1832     COVID19 Not Detected     Influenza A PCR  "Not Detected     Influenza B PCR Not Detected     RSV, PCR Not Detected    Narrative:      Fact sheet for providers: https://www.fda.gov/media/866916/download    Fact sheet for patients: https://www.fda.gov/media/260167/download    Test performed by PCR.              Vitals/Labs  Ht: 157.5 cm (62\"); Wt: 52.3 kg (115 lb 4.8 oz)   Temp (24hrs), Av.6 °F (36.4 °C), Min:97 °F (36.1 °C), Max:97.9 °F (36.6 °C)   Estimated Creatinine Clearance: 20.2 mL/min (A) (by C-G formula based on SCr of 2.88 mg/dL (H)).     Results from last 7 days   Lab Units 25  0248 25  0458 25  0526 25  0440 25  1058 25  0607   VANCOMYCIN RM mcg/mL  --   --   --  21.68  --  23.27   CREATININE mg/dL 2.88* 3.23*  --  3.53*   < >  --    WBC 10*3/mm3 3.62 9.26 6.61  --   --   --     < > = values in this interval not displayed.     Assessment/Plan    Current Vancomycin Dose: Pulse dosing s/t renal function  Will dose with 500mg x1 today   Next Vanc Random ordered for  at 0600  We will continue to monitor patient changes and renal function     Thank you for involving pharmacy in this patient's care. Please contact pharmacy with any questions or concerns.    Ivy Mayer  Clinical Pharmacist      "

## 2025-02-26 NOTE — PROGRESS NOTES
[facility]   Hospitalist Progress Note  Date: 2025  Patient Name: Mag Padilla  : 1980  MRN: [id number]  Date of admission: 2025  Room/Bed: [facility]      Subjective   Subjective     Chief Complaint: nausea, vomiting weakness     Summary:Mag Padilla is a 44 y.o. female who is a nursing home resident  with past medical history of Crohn disease status post resection,with complex abdominal surgical history.  Patient had a history of a duodenal hole and underwent extensive surgery that required a G-tube, J-tube and multiple drain placements this was all done at [facility].  Patient since that time has had multiple issues with abdominal wounds and abscesses.  Patient also has history of hypotension on midodrine, anxiety, chronic diarrhea, and GERD presenting to the ED for evaluation of nausea vomiting diarrhea with generalized weakness.  Patient states that for the last 3 days she has not been feeling well with persistent nausea and vomiting for the last 2 days and worsening of her chronic diarrhea.  Due to the symptoms patient is weakness and lethargy had also worsened to where she is mostly bedbound with excessive sleepiness.  Patient is seen by wound care for abdominal wounds after an ex lap with colon resection.  Due to worsening condition patient was eventually brought to the ED for further evaluation.  In the ED patient was significantly hypotensive on arrival with remaining vitals being within normal limits.  UA was positive for UTI with signs of dehydration, labs showed severely reduced renal function with hypokalemia, anemia, thrombocytopenia, and severe hypoalbuminemia.  Chest x-ray was negative for any acute findings.  When seen patient was resting comfortably and still having episode of diarrhea since being here although her nausea is much improved.  She did deny any recent fevers, chills, headaches, focal weakness, chest pain, palpitation, shortness of breath, cough,  abdominal pain, constipation, dysuria, hematuria, hematochezia, melena, or anxiety.  Patient admitted for further evaluation and treatment.  Patient seen by intensivist, urology and nephrology.      Interval Followup:   Remains on room air  Remains on Levophed  Episodes of bradycardia.  Midodrine put on hold  CT of the abdomen revealed 6 mm left ureteral stone with mild hydro utero nephrosis which required urology consult and patient was taken to the operating room  and had a cystoscopy with stent placement  Patient's blood cultures are growing ESBL E. Coli.  Urine with less than 10,000 gram-negative rods.  MRSA screen is also positive  Creatinine slowly improving  Chronic abdominal wounds follows with [facility]   Urinating well without Renee catheter    Review of Systems    All systems reviewed and negative except for what is outlined above.      Objective   Objective     Vitals:   Temp:  [97 °F (36.1 °C)-98.8 °F (37.1 °C)] 97.9 °F (36.6 °C)  Heart Rate:  [37-74] 67  Resp:  [14-24] 24  BP: ()/(54-79) 92/61    Physical Exam   General: Awake, alert, NAD resting in bed  HENT: NCAT, MMM  Eyes: pupils equal, no scleral icterus  Cardiovascular: RRR, no murmurs   Pulmonary: CTA bilaterally; no wheezes; no conversational dyspnea  Gastrointestinal: Soft patient has multiple abdominal wounds patient has a wound on her left lower side that is draining a significant amount of greenish drainage.  Patient has enterocutaneous fistula left upper quadrant.  Upper midline abdomen wound also appears to be a fistula.  Patient denies any significant pain  Musculoskeletal: No gross deformities  Skin: No jaundice, no rash on exposed skin appreciated  Neuro: CN II through XII grossly intact; speech clear; no tremor  Psych: Mood and affect appropriate  .  Off Renee    Result Review    Result Review:  I have personally reviewed these results:  [x]  Laboratory      Lab 02/25/25  1226 02/25/25  0953 02/25/25  0458 02/24/25  0517  02/23/25  2105 02/23/25  1058 02/23/25  0138 02/22/25  2201 02/22/25  2157 02/22/25  1919 02/22/25  1837 02/22/25  1648 02/22/25  0502 02/21/25  1821 02/21/25  1708   WBC  --   --  9.26 6.61  --   --  6.50 8.33  --   --   --  9.77   < > 9.27 11.98*   HEMOGLOBIN  --   --  7.5* 7.3* 8.1*  --  7.0* 8.0*  --   --   --  8.5*   < > 8.2* 9.0*   HEMATOCRIT  --   --  23.5* 23.3* 24.7*  --  22.4* 25.2*  --   --   --  27.9*   < > 26.8* 29.9*   PLATELETS  --   --  162 116*  --   --  84* 94*  --   --   --  100*   < > 71* 86*   NEUTROS ABS  --   --   --   --   --   --   --  7.41*  --   --   --  8.59*  --  8.31* 10.85*   IMMATURE GRANS (ABS)  --   --   --   --   --   --   --   --   --   --   --  0.09*  --  0.08* 0.06*   LYMPHS ABS  --   --   --   --   --   --   --   --   --   --   --  0.45*  --  0.36* 0.43*   MONOS ABS  --   --   --   --   --   --   --   --   --   --   --  0.52  --  0.42 0.49   EOS ABS  --   --   --   --   --   --   --  0.33  --   --   --  0.07  --  0.06 0.08   MCV  --   --  89.0 87.9  --   --  87.2 86.9  --   --   --  90.6   < > 93.1 92.9   PROCALCITONIN  --   --   --   --   --   --   --   --  20.74* 26.11*  --   --   --   --   --    LACTATE 1.6 2.2*  --   --   --   --  0.9  --   --   --    < >  --    < >  --   --    PROTIME  --   --   --   --   --  17.3*  --   --   --  34.3*  --   --   --   --   --    APTT  --   --   --   --   --   --   --   --   --  47.6*  --   --   --   --   --    D DIMER QUANT  --   --   --   --   --   --   --   --   --  1.76*  --   --   --   --   --     < > = values in this interval not displayed.         Lab 02/25/25  0458 02/24/25  0440 02/23/25  1058 02/23/25  0138   SODIUM 142 141 143 140   POTASSIUM 3.2* 3.5 4.1 3.0*   CHLORIDE 110* 110* 109* 112*   CO2 20.4* 20.4* 18.8* 15.5*   ANION GAP 11.6 10.6 15.2* 12.5   BUN 41* 38* 34* 38*   CREATININE 3.23* 3.53* 3.66* 3.64*   EGFR 17.3* 15.5* 14.9* 15.0*   GLUCOSE 89 133* 138* 149*   CALCIUM 8.7 8.4* 8.1* 8.3*   MAGNESIUM 2.0 2.3  --  1.9    PHOSPHORUS 2.4* 3.0 2.2* 2.7         Lab 02/25/25  0458 02/24/25  0440 02/23/25  1058 02/23/25  0138 02/21/25  1821 02/21/25  1708   TOTAL PROTEIN 5.7* 5.7*  --  5.6*   < > 6.1   ALBUMIN 2.3* 2.0* 1.9* 2.0*   < > 1.6*   GLOBULIN 3.4 3.7  --  3.6   < > 4.5   ALT (SGPT) <5 5  --  7   < > <5   AST (SGOT) 9 8  --  9   < > 13   BILIRUBIN 0.6 0.8  --  1.2   < > 0.9   ALK PHOS 96 93  --  91   < > 88   LIPASE  --   --   --   --   --  10*    < > = values in this interval not displayed.         Lab 02/23/25  1058 02/22/25  1919 02/21/25  1821   PROBNP  --   --  15,412.0*   PROTIME 17.3* 34.3*  --    INR 1.35* 3.17*  --              Lab 02/22/25  2157 02/22/25  1648 02/21/25  1821 02/21/25  1708   IRON  --   --  25*  --    IRON SATURATION (TSAT)  --   --  32  --    TIBC  --   --  79*  --    TRANSFERRIN  --   --  53*  --    FOLATE  --   --   --  7.99   VITAMIN B 12  --   --   --  545   ABO TYPING O   < >  --   --    RH TYPING Positive   < >  --   --    ANTIBODY SCREEN Negative  --   --   --     < > = values in this interval not displayed.         Lab 02/22/25  1837   PH, ARTERIAL 7.327*   PCO2, ARTERIAL 20.5*   PO2 .9*   O2 SATURATION ART 97.7   HCO3 ART 10.7*   BASE EXCESS ART -13.6*     Brief Urine Lab Results  (Last result in the past 365 days)        Color   Clarity   Blood   Leuk Est   Nitrite   Protein   CREAT   Urine HCG        02/22/25 2208 Dark Yellow   Cloudy   Large (3+)   Moderate (2+)   Negative   100 mg/dL (2+)                 [x]  Microbiology   Microbiology Results (last 10 days)       Procedure Component Value - Date/Time    Blood Culture - Blood, Arm, Left [[id number]]  (Normal) Collected: 02/24/25 1803    Lab Status: Preliminary result Specimen: Blood from Arm, Left Updated: 02/25/25 1831     Blood Culture No growth at 24 hours    Clostridioides difficile Toxin - Stool, Per Rectum [[id number]] Collected: 02/24/25 0856    Lab Status: Final result Specimen: Stool from Per Rectum Updated: 02/24/25 0990     Narrative:      The following orders were created for panel order Clostridioides difficile Toxin - Stool, Per Rectum.  Procedure                               Abnormality         Status                     ---------                               -----------         ------                     Clostridioides difficile...[[id number]]                      Final result                 Please view results for these tests on the individual orders.    Clostridioides difficile Toxin, PCR - Stool, Per Rectum [[id number]] Collected: 02/24/25 0856    Lab Status: Final result Specimen: Stool from Per Rectum Updated: 02/24/25 0950     Toxigenic C. difficile by PCR Negative     027 Toxin Presumptive Negative    Narrative:      The result indicates the absence of toxigenic C. difficile from stool specimen.     MRSA Screen, PCR (Inpatient) - Swab, Nares [[id number]]  (Abnormal) Collected: 02/23/25 0601    Lab Status: Final result Specimen: Swab from Nares Updated: 02/23/25 1001     MRSA PCR MRSA Detected    Narrative:      The negative predictive value of this diagnostic test is high and should only be used to consider de-escalating anti-MRSA therapy. A positive result may indicate colonization with MRSA and must be correlated clinically.    Respiratory Panel PCR w/COVID-19(SARS-CoV-2) BRIELLE/TOSHA/IMTIAZ/PAD/COR/NATACHA In-House, NP Swab in UTM/VTM, 2 HR TAT - Swab, Nasopharynx [[id number]]  (Normal) Collected: 02/23/25 0601    Lab Status: Final result Specimen: Swab from Nasopharynx Updated: 02/23/25 0827     ADENOVIRUS, PCR Not Detected     Coronavirus 229E Not Detected     Coronavirus HKU1 Not Detected     Coronavirus NL63 Not Detected     Coronavirus OC43 Not Detected     COVID19 Not Detected     Human Metapneumovirus Not Detected     Human Rhinovirus/Enterovirus Not Detected     Influenza A PCR Not Detected     Influenza B PCR Not Detected     Parainfluenza Virus 1 Not Detected     Parainfluenza Virus 2 Not Detected     Parainfluenza Virus 3  Not Detected     Parainfluenza Virus 4 Not Detected     RSV, PCR Not Detected     Bordetella pertussis pcr Not Detected     Bordetella parapertussis PCR Not Detected     Chlamydophila pneumoniae PCR Not Detected     Mycoplasma pneumo by PCR Not Detected    Narrative:      In the setting of a positive respiratory panel with a viral infection PLUS a negative procalcitonin without other underlying concern for bacterial infection, consider observing off antibiotics or discontinuation of antibiotics and continue supportive care. If the respiratory panel is positive for atypical bacterial infection (Bordetella pertussis, Chlamydophila pneumoniae, or Mycoplasma pneumoniae), consider antibiotic de-escalation to target atypical bacterial infection.    Urine Culture - Urine, Urine, Clean Catch [[id number]]  (Abnormal) Collected: 02/22/25 2208    Lab Status: Final result Specimen: Urine, Clean Catch Updated: 02/24/25 1058     Urine Culture <10,000 CFU/mL Gram Negative Bacilli    Narrative:      Call if further workup needed.   Colonization of the urinary tract without infection is common. Treatment is discouraged unless the patient is symptomatic, pregnant, or undergoing an invasive urologic procedure.    Blood Culture - Blood, Leg, Left [[id number]]  (Abnormal)  (Susceptibility) Collected: 02/22/25 1922    Lab Status: Final result Specimen: Blood from Leg, Left Updated: 02/25/25 0620     Blood Culture Escherichia coli ESBL     Comment:   Consider infectious disease consult.  Susceptibility results may not correlate to clinical outcomes.  For ESBL-producing infections in the blood, a carbapenem is recommended as first-line therapy for optimal clinical outcomes.        Isolated from Aerobic and Anaerobic Bottles     Gram Stain Aerobic Bottle Gram negative bacilli      Anaerobic Bottle Gram negative bacilli    Narrative:      Less than seven (7) mL's of blood was collected.  Insufficient quantity may yield false negative results.     Susceptibility        Escherichia coli ESBL      VANDANA      Ciprofloxacin Resistant      Ertapenem Susceptible      Levofloxacin Resistant      Meropenem Susceptible      Trimethoprim + Sulfamethoxazole Resistant                       Susceptibility Comments       Escherichia coli ESBL    With the exception of urinary-sourced infections, aminoglycosides should not be used as monotherapy.               Blood Culture ID, PCR - Blood, Leg, Left [[id number]]  (Abnormal) Collected: 02/22/25 1922    Lab Status: Final result Specimen: Blood from Leg, Left Updated: 02/23/25 1155     BCID, PCR Escherichia coli. Identification by BCID2 PCR.     BCID, PCR 2 CTX-M (ESBL) detected. Identification by BCID2 PCR     BOTTLE TYPE Anaerobic Bottle    Blood Culture - Blood, Arm, Left [[id number]]  (Normal) Collected: 02/22/25 1919    Lab Status: Preliminary result Specimen: Blood from Arm, Left Updated: 02/24/25 1931     Blood Culture No growth at 2 days    Narrative:      Less than seven (7) mL's of blood was collected.  Insufficient quantity may yield false negative results.    Wound Culture - Wound, Abdominal Wall [[id number]]  (Abnormal) Collected: 02/22/25 1605    Lab Status: Preliminary result Specimen: Wound from Abdominal Wall Updated: 02/25/25 1040     Wound Culture Moderate growth (3+) Gram Negative Bacilli      Light growth (2+) Gram Positive Cocci      Light growth (2+) Yeast, Not Candida albicans     Gram Stain Rare (1+) Gram negative bacilli    Legionella Antigen, Urine - Urine, Straight Cath [[id number]]  (Normal) Collected: 02/21/25 2202    Lab Status: Final result Specimen: Urine from Straight Cath Updated: 02/22/25 1751     LEGIONELLA ANTIGEN, URINE Negative    S. Pneumo Ag Urine or CSF - Urine, Straight Cath [[id number]]  (Normal) Collected: 02/21/25 2202    Lab Status: Final result Specimen: Urine from Straight Cath Updated: 02/22/25 1752     Strep Pneumo Ag Negative    COVID-19, FLU A/B, RSV PCR 1 HR TAT - Swab,  Nasopharynx [[id number]]  (Normal) Collected: 02/21/25 1745    Lab Status: Final result Specimen: Swab from Nasopharynx Updated: 02/21/25 1832     COVID19 Not Detected     Influenza A PCR Not Detected     Influenza B PCR Not Detected     RSV, PCR Not Detected    Narrative:      Fact sheet for providers: https://www.fda.gov/media/837065/download    Fact sheet for patients: https://www.fda.gov/media/780778/download    Test performed by PCR.          [x]  Radiology  XR Chest 1 View    Result Date: 2/23/2025  The distal tip of the right IJ central venous line/port is in the expected mid superior vena cava (SVC). No pneumothorax is seen. There are bilateral infiltrates present, suggestive of pulmonary edema with vascular congestion.    Portions of this note were completed with a voice recognition program.  2/23/2025 12:16 AM by James Hull MD on Workstation: [id number]      CT Abdomen Pelvis Without Contrast    Result Date: 2/22/2025  Impression: CT scan of the abdomen and pelvis without contrast demonstrating worsening subsegmental atelectasis at the lung bases. Small right effusion appears larger. New small left effusion. Hepatic configuration concerning for cirrhosis. A small amount of peritoneal fluid is again seen. Post cholecystectomy. Multiple nonobstructing renal stones are again seen. Mild left hydroureteronephrosis is not seen on the prior study. 6 mm stone in the mid left ureter at the L5 level was seen on the prior study in the lower pole collecting system of the left kidney. 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 Wound in the anterior abdominal wall is again seen. This appears smaller in comparison to 2/1/2025. Electronically Signed: Shun Milan MD  2/22/2025 5:22 PM EST  Workstation ID: [id number]    US Renal Bilateral    Result Date: 2/22/2025  Impression: 1.Bilateral nephrolithiasis without evidence of hydronephrosis. 2.Bilateral increased cortical echogenicity consistent with  chronic renal disease. 3.Asymmetrically edematous left kidney with decreased cortical medullary differentiation which is nonspecific but could be seen in the setting of pyelonephritis.. Electronically Signed: Raad Jansen MD  2/22/2025 5:10 PM EST  Workstation ID: [id number]    XR Chest 1 View    Result Date: 2/21/2025  No active disease. Electronically Signed: Vitaly Maya MD  2/21/2025 7:38 PM EST  Workstation ID: [id number]   []  EKG/Telemetry   []  Cardiology/Vascular   []  Pathology  []  Old records  []  Other:    Assessment & Plan   Assessment / Plan     Assessment:  Acute kidney injury due to obstructing nephrolithiasis on the left status post stent placement.  Improving  Septic shock, present on admission secondary to bacteremia  ESBL E. coli bacteremia  Chronic hypotension on midodrine  Episodes of bradycardia due to midodrine  History of Crohn's disease with complex surgery  Chronic nonhealing abdominal wounds and abscess  secondary to significant abdominal surgery in the past for duodenal ulcer rupture follows with   Chronic enterocutaneous fistulas followed by   Anxiety disorder  Positive MRSA PCR  Anemia.  Antibiotic associated diarrhea.  C. difficile negative.    Plan:  Continue Levophed keep MAP more than 65.  Midodrine on hold due to bradycardia  Continue meropenem and p.o. Flagyl  Repeat Blood cultures   CT of the abdomen and pelvis reviewed.  Only 1 enterocutaneous fistula located in the left upper quadrant reported.    Appreciate urology input.  Status post left ureteric stent.  Needs definitive stone treatment when stable  Nephrology following.  Renal function slowly improving  Off bicarb drip   wound culture from abdomen is currently pending but growing gram negative. Patient does have known chronic abdominal wounds with fistulous communication.  Patient follows with  for management of these wounds patient was just discharged this month from  for these wounds.  May need to be transferred  back to  once acute issues resolve  Continue wound care.  Discussed with wound care.  Appreciate input       Discussed with RN and patient.    VTE Prophylaxis:  Pharmacologic & mechanical VTE prophylaxis orders are present.        CODE STATUS:   Level Of Support Discussed With: Patient  Code Status (Patient has no pulse and is not breathing): CPR (Attempt to Resuscitate)  Medical Interventions (Patient has pulse or is breathing): Full Support      Electronically signed by Liam Israel MD, 2/25/2025, 19:05 EST.

## 2025-02-27 PROBLEM — R11.2 NAUSEA & VOMITING: Status: RESOLVED | Noted: 2025-02-22 | Resolved: 2025-02-27

## 2025-02-27 LAB
ALBUMIN SERPL-MCNC: 1.7 G/DL (ref 3.5–5.2)
ANION GAP SERPL CALCULATED.3IONS-SCNC: 7.4 MMOL/L (ref 5–15)
ANION GAP SERPL CALCULATED.3IONS-SCNC: 9.7 MMOL/L (ref 5–15)
BACTERIA SPEC AEROBE CULT: NORMAL
BASOPHILS # BLD AUTO: 0.01 10*3/MM3 (ref 0–0.2)
BASOPHILS NFR BLD AUTO: 0.1 % (ref 0–1.5)
BH BB BLOOD EXPIRATION DATE: NORMAL
BH BB BLOOD TYPE BARCODE: 5100
BH BB DISPENSE STATUS: NORMAL
BH BB PRODUCT CODE: NORMAL
BH BB UNIT NUMBER: NORMAL
BUN SERPL-MCNC: 25 MG/DL (ref 6–20)
BUN SERPL-MCNC: 27 MG/DL (ref 6–20)
BUN/CREAT SERPL: 11.7 (ref 7–25)
BUN/CREAT SERPL: 12 (ref 7–25)
CALCIUM SPEC-SCNC: 7.4 MG/DL (ref 8.6–10.5)
CALCIUM SPEC-SCNC: 8 MG/DL (ref 8.6–10.5)
CHLORIDE SERPL-SCNC: 115 MMOL/L (ref 98–107)
CHLORIDE SERPL-SCNC: 115 MMOL/L (ref 98–107)
CO2 SERPL-SCNC: 15.3 MMOL/L (ref 22–29)
CO2 SERPL-SCNC: 15.6 MMOL/L (ref 22–29)
CREAT SERPL-MCNC: 2.13 MG/DL (ref 0.57–1)
CREAT SERPL-MCNC: 2.25 MG/DL (ref 0.57–1)
CROSSMATCH INTERPRETATION: NORMAL
DEPRECATED RDW RBC AUTO: 53 FL (ref 37–54)
DEPRECATED RDW RBC AUTO: 54.5 FL (ref 37–54)
EGFRCR SERPLBLD CKD-EPI 2021: 26.6 ML/MIN/1.73
EGFRCR SERPLBLD CKD-EPI 2021: 28.5 ML/MIN/1.73
EOSINOPHIL # BLD AUTO: 0.14 10*3/MM3 (ref 0–0.4)
EOSINOPHIL NFR BLD AUTO: 1.8 % (ref 0.3–6.2)
ERYTHROCYTE [DISTWIDTH] IN BLOOD BY AUTOMATED COUNT: 17.7 % (ref 12.3–15.4)
ERYTHROCYTE [DISTWIDTH] IN BLOOD BY AUTOMATED COUNT: 18 % (ref 12.3–15.4)
GLUCOSE BLDC GLUCOMTR-MCNC: 71 MG/DL (ref 70–99)
GLUCOSE BLDC GLUCOMTR-MCNC: 88 MG/DL (ref 70–99)
GLUCOSE SERPL-MCNC: 79 MG/DL (ref 65–99)
GLUCOSE SERPL-MCNC: 97 MG/DL (ref 65–99)
HCT VFR BLD AUTO: 28.9 % (ref 34–46.6)
HCT VFR BLD AUTO: 30.3 % (ref 34–46.6)
HGB BLD-MCNC: 9.1 G/DL (ref 12–15.9)
HGB BLD-MCNC: 9.8 G/DL (ref 12–15.9)
IMM GRANULOCYTES # BLD AUTO: 0.23 10*3/MM3 (ref 0–0.05)
IMM GRANULOCYTES NFR BLD AUTO: 2.9 % (ref 0–0.5)
LYMPHOCYTES # BLD AUTO: 1.04 10*3/MM3 (ref 0.7–3.1)
LYMPHOCYTES NFR BLD AUTO: 13.2 % (ref 19.6–45.3)
MAGNESIUM SERPL-MCNC: 2.4 MG/DL (ref 1.6–2.6)
MCH RBC QN AUTO: 28.4 PG (ref 26.6–33)
MCH RBC QN AUTO: 29.5 PG (ref 26.6–33)
MCHC RBC AUTO-ENTMCNC: 31.5 G/DL (ref 31.5–35.7)
MCHC RBC AUTO-ENTMCNC: 32.3 G/DL (ref 31.5–35.7)
MCV RBC AUTO: 90.3 FL (ref 79–97)
MCV RBC AUTO: 91.3 FL (ref 79–97)
MONOCYTES # BLD AUTO: 0.32 10*3/MM3 (ref 0.1–0.9)
MONOCYTES NFR BLD AUTO: 4.1 % (ref 5–12)
NEUTROPHILS NFR BLD AUTO: 6.11 10*3/MM3 (ref 1.7–7)
NEUTROPHILS NFR BLD AUTO: 77.9 % (ref 42.7–76)
NRBC BLD AUTO-RTO: 0.3 /100 WBC (ref 0–0.2)
PHOSPHATE SERPL-MCNC: 2.1 MG/DL (ref 2.5–4.5)
PLATELET # BLD AUTO: 124 10*3/MM3 (ref 140–450)
PLATELET # BLD AUTO: 197 10*3/MM3 (ref 140–450)
PMV BLD AUTO: 10.8 FL (ref 6–12)
PMV BLD AUTO: 11.9 FL (ref 6–12)
POTASSIUM SERPL-SCNC: 3.8 MMOL/L (ref 3.5–5.2)
POTASSIUM SERPL-SCNC: 4.2 MMOL/L (ref 3.5–5.2)
RBC # BLD AUTO: 3.2 10*6/MM3 (ref 3.77–5.28)
RBC # BLD AUTO: 3.32 10*6/MM3 (ref 3.77–5.28)
SODIUM SERPL-SCNC: 138 MMOL/L (ref 136–145)
SODIUM SERPL-SCNC: 140 MMOL/L (ref 136–145)
UNIT  ABO: NORMAL
UNIT  RH: NORMAL
VANCOMYCIN SERPL-MCNC: 15.94 MCG/ML (ref 5–40)
WBC NRBC COR # BLD AUTO: 14.25 10*3/MM3 (ref 3.4–10.8)
WBC NRBC COR # BLD AUTO: 7.85 10*3/MM3 (ref 3.4–10.8)

## 2025-02-27 PROCEDURE — 99221 1ST HOSP IP/OBS SF/LOW 40: CPT | Performed by: SURGERY

## 2025-02-27 PROCEDURE — 99291 CRITICAL CARE FIRST HOUR: CPT | Performed by: STUDENT IN AN ORGANIZED HEALTH CARE EDUCATION/TRAINING PROGRAM

## 2025-02-27 PROCEDURE — 99232 SBSQ HOSP IP/OBS MODERATE 35: CPT | Performed by: INTERNAL MEDICINE

## 2025-02-27 PROCEDURE — 25010000002 MEROPENEM PER 100 MG: Performed by: INTERNAL MEDICINE

## 2025-02-27 PROCEDURE — 25810000003 DEXTROSE 5 % WITH KCL 20 MEQ 20 MEQ/L SOLUTION: Performed by: STUDENT IN AN ORGANIZED HEALTH CARE EDUCATION/TRAINING PROGRAM

## 2025-02-27 PROCEDURE — 25010000002 MAGNESIUM SULFATE IN D5W 1G/100ML (PREMIX) 1-5 GM/100ML-% SOLUTION: Performed by: INTERNAL MEDICINE

## 2025-02-27 PROCEDURE — 83735 ASSAY OF MAGNESIUM: CPT | Performed by: INTERNAL MEDICINE

## 2025-02-27 PROCEDURE — 85025 COMPLETE CBC W/AUTO DIFF WBC: CPT | Performed by: INTERNAL MEDICINE

## 2025-02-27 PROCEDURE — 25010000002 HEPARIN (PORCINE) PER 1000 UNITS: Performed by: INTERNAL MEDICINE

## 2025-02-27 PROCEDURE — 85027 COMPLETE CBC AUTOMATED: CPT | Performed by: INTERNAL MEDICINE

## 2025-02-27 PROCEDURE — 97161 PT EVAL LOW COMPLEX 20 MIN: CPT

## 2025-02-27 PROCEDURE — 97110 THERAPEUTIC EXERCISES: CPT

## 2025-02-27 PROCEDURE — 25010000002 VANCOMYCIN PER 500 MG: Performed by: INTERNAL MEDICINE

## 2025-02-27 PROCEDURE — 80202 ASSAY OF VANCOMYCIN: CPT | Performed by: STUDENT IN AN ORGANIZED HEALTH CARE EDUCATION/TRAINING PROGRAM

## 2025-02-27 PROCEDURE — 80048 BASIC METABOLIC PNL TOTAL CA: CPT | Performed by: STUDENT IN AN ORGANIZED HEALTH CARE EDUCATION/TRAINING PROGRAM

## 2025-02-27 PROCEDURE — 25010000002 MEROPENEM PER 100 MG: Performed by: STUDENT IN AN ORGANIZED HEALTH CARE EDUCATION/TRAINING PROGRAM

## 2025-02-27 PROCEDURE — 82948 REAGENT STRIP/BLOOD GLUCOSE: CPT

## 2025-02-27 PROCEDURE — 25010000003 DEXTROSE 5 % SOLUTION: Performed by: INTERNAL MEDICINE

## 2025-02-27 PROCEDURE — 25010000002 HEPARIN (PORCINE) PER 1000 UNITS: Performed by: STUDENT IN AN ORGANIZED HEALTH CARE EDUCATION/TRAINING PROGRAM

## 2025-02-27 PROCEDURE — 80069 RENAL FUNCTION PANEL: CPT | Performed by: INTERNAL MEDICINE

## 2025-02-27 RX ORDER — MAGNESIUM SULFATE 1 G/100ML
1 INJECTION INTRAVENOUS
Status: COMPLETED | OUTPATIENT
Start: 2025-02-27 | End: 2025-02-27

## 2025-02-27 RX ORDER — SODIUM BICARBONATE 650 MG/1
1300 TABLET ORAL 3 TIMES DAILY
Status: DISCONTINUED | OUTPATIENT
Start: 2025-02-27 | End: 2025-02-28

## 2025-02-27 RX ORDER — ALPRAZOLAM 0.25 MG
0.25 TABLET ORAL 3 TIMES DAILY PRN
Status: DISCONTINUED | OUTPATIENT
Start: 2025-02-27 | End: 2025-03-07 | Stop reason: HOSPADM

## 2025-02-27 RX ADMIN — SODIUM BICARBONATE 650 MG TABLET 1300 MG: at 17:24

## 2025-02-27 RX ADMIN — VANCOMYCIN HYDROCHLORIDE 500 MG: 500 INJECTION, POWDER, LYOPHILIZED, FOR SOLUTION INTRAVENOUS at 10:00

## 2025-02-27 RX ADMIN — MEROPENEM 1000 MG: 1 INJECTION INTRAVENOUS at 21:41

## 2025-02-27 RX ADMIN — MEROPENEM 1000 MG: 1 INJECTION INTRAVENOUS at 09:02

## 2025-02-27 RX ADMIN — SERTRALINE HYDROCHLORIDE 75 MG: 50 TABLET ORAL at 09:04

## 2025-02-27 RX ADMIN — CHOLESTYRAMINE 1 PACKET: 4 POWDER, FOR SUSPENSION ORAL at 09:04

## 2025-02-27 RX ADMIN — Medication 500 MG: at 09:04

## 2025-02-27 RX ADMIN — SODIUM BICARBONATE 650 MG TABLET 1300 MG: at 10:00

## 2025-02-27 RX ADMIN — MAGNESIUM SULFATE 1 G: 1 INJECTION INTRAVENOUS at 01:42

## 2025-02-27 RX ADMIN — POTASSIUM PHOSPHATE, MONOBASIC 500 MG: 500 TABLET, SOLUBLE ORAL at 09:30

## 2025-02-27 RX ADMIN — HEPARIN SODIUM 5000 UNITS: 5000 INJECTION INTRAVENOUS; SUBCUTANEOUS at 06:09

## 2025-02-27 RX ADMIN — MAGNESIUM SULFATE 1 G: 1 INJECTION INTRAVENOUS at 00:35

## 2025-02-27 RX ADMIN — FERROUS SULFATE TAB 325 MG (65 MG ELEMENTAL FE) 325 MG: 325 (65 FE) TAB at 09:04

## 2025-02-27 RX ADMIN — MUPIROCIN 1 APPLICATION: 20 OINTMENT TOPICAL at 09:04

## 2025-02-27 RX ADMIN — Medication 10 ML: at 21:42

## 2025-02-27 RX ADMIN — PANTOPRAZOLE SODIUM 40 MG: 40 TABLET, DELAYED RELEASE ORAL at 09:04

## 2025-02-27 RX ADMIN — Medication 10 ML: at 09:07

## 2025-02-27 RX ADMIN — HEPARIN SODIUM 5000 UNITS: 5000 INJECTION INTRAVENOUS; SUBCUTANEOUS at 21:41

## 2025-02-27 RX ADMIN — MAGNESIUM SULFATE 1 G: 1 INJECTION INTRAVENOUS at 02:40

## 2025-02-27 RX ADMIN — SODIUM BICARBONATE 650 MG TABLET 1300 MG: at 21:41

## 2025-02-27 RX ADMIN — Medication 1 TABLET: at 09:04

## 2025-02-27 RX ADMIN — POTASSIUM PHOSPHATE, MONOBASIC 500 MG: 500 TABLET, SOLUBLE ORAL at 21:41

## 2025-02-27 RX ADMIN — SODIUM PHOSPHATE, MONOBASIC, MONOHYDRATE AND SODIUM PHOSPHATE, DIBASIC, ANHYDROUS 15 MMOL: 276; 142 INJECTION, SOLUTION INTRAVENOUS at 17:48

## 2025-02-27 RX ADMIN — MUPIROCIN 1 APPLICATION: 20 OINTMENT TOPICAL at 21:42

## 2025-02-27 RX ADMIN — POTASSIUM CHLORIDE AND DEXTROSE MONOHYDRATE 100 ML/HR: 150; 5 INJECTION, SOLUTION INTRAVENOUS at 07:22

## 2025-02-27 NOTE — THERAPY EVALUATION
Acute Care - Physical Therapy Initial Evaluation   [place]     Patient Name: Mag Padilla  : 1980  MRN: [id number]  Today's Date: 2025      Visit Dx:     ICD-10-CM ICD-9-CM   1. Acute renal failure, unspecified acute renal failure type  N17.9 584.9   2. Hypotension due to hypovolemia  E86.1 458.8     276.52   3. Kidney stone  N20.0 592.0   4. Difficulty walking  R26.2 719.7     Patient Active Problem List   Diagnosis    Acute renal failure    Hypotension due to hypovolemia    Chronic diarrhea    Nausea & vomiting    Inflammatory bowel disease (Crohn's disease)    Kidney stone     Past Medical History:   Diagnosis Date    Abscess of peritoneum     Anemia     Anxiety     Breast cancer     Crohn's disease     GERD (gastroesophageal reflux disease)     HTN (hypertension)     Hyperlipidemia     Intestine disorder     SVT (supraventricular tachycardia)      Past Surgical History:   Procedure Laterality Date    ABDOMINAL SURGERY      CYSTOSCOPY W/ URETERAL STENT PLACEMENT Left 2025    Procedure: CYSTOSCOPY URETERAL CATHETER/STENT INSERTION;  Surgeon: Elliot Briceño MD;  Location: [facility];  Service: Urology;  Laterality: Left;     PT Assessment (Last 12 Hours)       PT Evaluation and Treatment       Row Name 25 1000          Physical Therapy Time and Intention    Subjective Information no complaints  -DP     Document Type evaluation  -DP     Mode of Treatment individual therapy;physical therapy  -DP     Patient Effort fair  -DP       Row Name 25 1000          General Information    Patient Profile Reviewed yes  -DP     Patient Observations alert;cooperative;agree to therapy  -DP     General Observations of Patient Pt lives in a nursing home. She reports being able to complete all transfers independently in her room and was able to ambulate wit ha RW.  -DP     Prior Level of Function independent:;gait;transfer;bed mobility;ADL's  -DP     Existing Precautions/Restrictions fall   -DP     Barriers to Rehab none identified  -DP       Row Name 02/27/25 1000          Living Environment    Current Living Arrangements extended care facility  -DP     People in Home facility resident  -DP       Row Name 02/27/25 1000          Cognition    Orientation Status (Cognition) oriented x 3  -DP       Row Name 02/27/25 1000          Range of Motion Comprehensive    General Range of Motion bilateral lower extremity ROM WFL  -DP       Row Name 02/27/25 1000          Strength (Manual Muscle Testing)    Strength (Manual Muscle Testing) bilateral lower extremities  4-/5  -DP       Row Name 02/27/25 1000          Bed Mobility    Bed Mobility supine-sit-supine  -DP     Supine-Sit-Supine [place] (Bed Mobility) moderate assist (50% patient effort)  -DP     Assistive Device (Bed Mobility) bed rails  -DP       Row Name 02/27/25 1000          Transfers    Transfers sit-stand transfer  -DP     Comment, (Transfers) pt decliend to stand up or ambualte  -DP       Row Name             Wound 02/22/25 0230 Right abdomen surgical    Wound - Properties Group Placement Date: 02/22/25  -AW Placement Time: 0230  -AW Side: Right  -AW Location: abdomen  -AW Primary Wound Type: Other  -AW, Old surgical wound  Type: surgical  -AW Present on Original Admission: Y  -AW    Retired Wound - Properties Group Placement Date: 02/22/25  -AW Placement Time: 0230  -AW Present on Original Admission: Y  -AW Side: Right  -AW Location: abdomen  -AW Primary Wound Type: Other  -AW, Old surgical wound  Type: surgical  -AW    Retired Wound - Properties Group Placement Date: 02/22/25  -AW Placement Time: 0230  -AW Present on Original Admission: Y  -AW Side: Right  -AW Location: abdomen  -AW Primary Wound Type: Other  -AW, Old surgical wound  Type: surgical  -AW    Retired Wound - Properties Group Date first assessed: 02/22/25  -AW Time first assessed: 0230  -AW Present on Original Admission: Y  -AW Side: Right  -AW Location: abdomen  -AW Primary  Wound Type: Other  -AW, Old surgical wound  Type: surgical  -AW      Row Name             Wound 02/25/25 1518 medial abdomen fistula    Wound - Properties Group Placement Date: 02/25/25 -MB Placement Time: 1518 -MB Orientation: medial  -MB Location: abdomen  -MB Primary Wound Type: Fistula  -MB Type: fistula  -MB Additional Comments: per MD - suspected fistula now due to drainage/effluent  type present  -MB    Retired Wound - Properties Group Placement Date: 02/25/25 -MB Placement Time: 1518 -MB Orientation: medial  -MB Location: abdomen  -MB Primary Wound Type: Fistula  -MB Additional Comments: per MD - suspected fistula now due to drainage/effluent  type present  -MB Type: fistula  -MB    Retired Wound - Properties Group Placement Date: 02/25/25 -MB Placement Time: 1518 -MB Orientation: medial  -MB Location: abdomen  -MB Primary Wound Type: Fistula  -MB Additional Comments: per MD - suspected fistula now due to drainage/effluent  type present  -MB Type: fistula  -MB    Retired Wound - Properties Group Date first assessed: 02/25/25 -MB Time first assessed: 1518 -MB Location: abdomen  -MB Primary Wound Type: Fistula  -MB Additional Comments: per MD - suspected fistula now due to drainage/effluent  type present  -MB Type: fistula  -MB      Row Name             Wound Left lower abdomen fistula    Wound - Properties Group Side: Left  -MB Orientation: lower  -MB Location: abdomen  -MB Primary Wound Type: Fistula  -MB Type: fistula  -MB Additional Comments: confirmed fistula per history  -MB    Retired Wound - Properties Group Side: Left  -MB Orientation: lower  -MB Location: abdomen  -MB Primary Wound Type: Fistula  -MB Additional Comments: confirmed fistula per history  -MB Type: fistula  -MB    Retired Wound - Properties Group Side: Left  -MB Orientation: lower  -MB Location: abdomen  -MB Primary Wound Type: Fistula  -MB Additional Comments: confirmed fistula per history  -MB Type: fistula  -MB    Retired  Wound - Properties Group Side: Left  -MB Location: abdomen  -MB Primary Wound Type: Fistula  -MB Additional Comments: confirmed fistula per history  -MB Type: fistula  -MB      Row Name 02/27/25 1000          Plan of Care Review    Plan of Care Reviewed With patient  -DP     Outcome Evaluation pt is a resident at a [facility] nursing home. She reports needing assistance with ADLs but was able to ambulate in her room witha RW. She will benefit from a PT consult upon return to the nursing home to maximize independence with functional mobility.  -DP       Row Name 02/27/25 1000          Therapy Assessment/Plan (PT)    Criteria for Skilled Interventions Met (PT) does not meet criteria for skilled intervention  -DP     Therapy Frequency (PT) evaluation only  -DP       Row Name 02/27/25 1000          PT Evaluation Complexity    History, PT Evaluation Complexity no personal factors and/or comorbidities  -DP     Examination of Body Systems (PT Eval Complexity) total of 4 or more elements  -DP     Clinical Presentation (PT Evaluation Complexity) stable  -DP     Clinical Decision Making (PT Evaluation Complexity) low complexity  -DP     Overall Complexity (PT Evaluation Complexity) low complexity  -DP       Row Name 02/27/25 1000          Physical Therapy Goals    Problem Specific Goal Selection (PT) problem specific goal 1, PT  -DP       Row Name 02/27/25 1000          Problem Specific Goal 1 (PT)    Problem Specific Goal 1 (PT) Complete PT evaluation.  -DP     Time Frame (Problem Specific Goal 1, PT) 1 day  -DP     Progress/Outcome (Problem Specific Goal 1, PT) goal met  -DP               User Key  (r) = Recorded By, (t) = Taken By, (c) = Cosigned By      Initials Name Provider Type    Mackenzie Benavidez, RN Registered Nurse    Liliane Dutta, PT Physical Therapist    Germaine Miranda RN Registered Nurse                    Physical Therapy Education        No education to display                  PT Recommendation and  Plan  Anticipated Discharge Disposition (PT): extended care facility  Therapy Frequency (PT): evaluation only  Plan of Care Reviewed With: patient  Outcome Evaluation: pt is a resident at a [facility] nursing home. She reports needing assistance with ADLs but was able to ambulate in her room witha RW. She will benefit from a PT consult upon return to the nursing home to maximize independence with functional mobility.   Outcome Measures       Row Name 02/27/25 1000             How much help from another person do you currently need...    Turning from your back to your side while in flat bed without using bedrails? 3  -DP      Moving from lying on back to sitting on the side of a flat bed without bedrails? 2  -DP      Moving to and from a bed to a chair (including a wheelchair)? 2  -DP      Standing up from a chair using your arms (e.g., wheelchair, bedside chair)? 2  -DP      Climbing 3-5 steps with a railing? 2  -DP      To walk in hospital room? 2  -DP      AM-PAC 6 Clicks Score (PT) 13  -DP         Functional Assessment    Outcome Measure Options AM-PAC 6 Clicks Basic Mobility (PT)  -DP                User Key  (r) = Recorded By, (t) = Taken By, (c) = Cosigned By      Initials Name Provider Type    Liliane Dutta, PT Physical Therapist                     Time Calculation:    PT Charges       Row Name 02/27/25 1014             Time Calculation    PT Received On 02/27/25  -DP         Untimed Charges    PT Eval/Re-eval Minutes 23  -DP         Total Minutes    Untimed Charges Total Minutes 23  -DP       Total Minutes 23  -DP                User Key  (r) = Recorded By, (t) = Taken By, (c) = Cosigned By      Initials Name Provider Type    Liliane Dutta PT Physical Therapist                      PT G-Codes  Outcome Measure Options: AM-PAC 6 Clicks Basic Mobility (PT)  AM-PAC 6 Clicks Score (PT): 13  AM-PAC 6 Clicks Score (OT): 16    Liliane Dee, PT  2/27/2025

## 2025-02-27 NOTE — PROGRESS NOTES
[facility]   Hospitalist Progress Note  Date: 2025  Patient Name: Mag Padilla  : 1980  MRN: [id number]  Date of admission: 2025  Room/Bed: [facility]      Subjective   Subjective     Chief Complaint: nausea, vomiting weakness     Summary:Mag Padilla is a 44 y.o. female who is a nursing home resident  with past medical history of Crohn disease status post resection,with complex abdominal surgical history.  Patient had a history of a duodenal hole and underwent extensive surgery that required a G-tube, J-tube and multiple drain placements this was all done at [facility].  Patient since that time has had multiple issues with abdominal wounds and abscesses.  Patient also has history of hypotension on midodrine, anxiety, chronic diarrhea, and GERD presenting to the ED for evaluation of nausea vomiting diarrhea with generalized weakness.  Patient states that for the last 3 days she has not been feeling well with persistent nausea and vomiting for the last 2 days and worsening of her chronic diarrhea.  Due to the symptoms patient is weakness and lethargy had also worsened to where she is mostly bedbound with excessive sleepiness.  Patient is seen by wound care for abdominal wounds after an ex lap with colon resection.  Due to worsening condition patient was eventually brought to the ED for further evaluation.  In the ED patient was significantly hypotensive on arrival with remaining vitals being within normal limits.  UA was positive for UTI with signs of dehydration, labs showed severely reduced renal function with hypokalemia, anemia, thrombocytopenia, and severe hypoalbuminemia.  Chest x-ray was negative for any acute findings.  When seen patient was resting comfortably and still having episode of diarrhea since being here although her nausea is much improved.  She did deny any recent fevers, chills, headaches, focal weakness, chest pain, palpitation, shortness of breath, cough,  abdominal pain, constipation, dysuria, hematuria, hematochezia, melena, or anxiety.  Patient admitted for further evaluation and treatment.  CT of the abdomen revealed 6 mm left ureteral stone with mild hydro utero nephrosis which required urology consult and patient was taken to the operating room  and had a cystoscopy with stent placement.  Patient's blood cultures are growing ESBL E. Coli.  Urine with less than 10,000 gram-negative rods.  MRSA screen is also positivePatient seen by intensivist, urology and nephrology.  GI consulted for diarrhea.  Previously patient has seen Dr. Pompa.  Patient seen by general surgery for fistula.      Interval Followup:   Remains on room air  Remains off Levophed since morning.  Blood pressure soft.  Mostly draws while sleeping  White cell count jumped up  Episodes of bradycardia improved since  Midodrine put on hold  Patient somewhat teary today.  Per patient something personal.  Agreeable to get anxiety medicine as needed.    Patient reluctant to drink contrast for CT scan it makes her sick  Creatinine slowly improving  Patient ambulates with the help of [facility]er nursing home  Chronic abdominal wounds follows with    Urinating well without Renee catheter  80 mL of yellowish cloudy fluid drained in midline upper abdominal bag  Diarrhea improving had 2 watery stools since morning    Review of Systems    All systems reviewed and negative except for what is outlined above.      Objective   Objective     Vitals:   Temp:  [97.7 °F (36.5 °C)-98 °F (36.7 °C)] 98 °F (36.7 °C)  Heart Rate:  [] 60  Resp:  [12-24] 15  BP: ()/(49-77) 86/61    Physical Exam   General: Awake, alert, NAD resting in bed  HENT: NCAT, MMM  Eyes: pupils equal, no scleral icterus  Cardiovascular: RRR, no murmurs   Pulmonary: CTA bilaterally; no wheezes; no conversational dyspnea  Gastrointestinal: Soft patient has multiple abdominal wounds patient has a wound on her left lower side that is draining a  significant amount of greenish drainage.  Patient has enterocutaneous fistula left upper quadrant.  Upper midline abdomen wound is also fistula as it is draining fluid .  Patient denies any significant pain  Musculoskeletal: No gross deformities  Skin: No jaundice, no rash on exposed skin appreciated  Neuro: CN II through XII grossly intact; speech clear; no tremor  Psych: Mood and affect appropriate  .  Off Renee    Result Review    Result Review:  I have personally reviewed these results:  [x]  Laboratory      Lab 02/27/25  1339 02/27/25  0603 02/26/25  1106 02/26/25  0248 02/25/25  1226 02/25/25  0953 02/23/25  2105 02/23/25  1058 02/23/25  0138 02/22/25  2201 02/22/25  2157 02/22/25  1919 02/22/25  1837 02/22/25  1648   WBC 7.85 14.25* 4.99   < >  --   --    < >  --  6.50 8.33  --   --   --  9.77   HEMOGLOBIN 9.1* 9.8* 8.8*   < >  --   --    < >  --  7.0* 8.0*  --   --   --  8.5*   HEMATOCRIT 28.9* 30.3* 28.3*   < >  --   --    < >  --  22.4* 25.2*  --   --   --  27.9*   PLATELETS 124* 197 115*   < >  --   --    < >  --  84* 94*  --   --   --  100*   NEUTROS ABS 6.11  --  3.62  --   --   --   --   --   --  7.41*  --   --   --  8.59*   IMMATURE GRANS (ABS) 0.23*  --  0.17*  --   --   --   --   --   --   --   --   --   --  0.09*   LYMPHS ABS 1.04  --  0.82  --   --   --   --   --   --   --   --   --   --  0.45*   MONOS ABS 0.32  --  0.28  --   --   --   --   --   --   --   --   --   --  0.52   EOS ABS 0.14  --  0.09  --   --   --   --   --   --  0.33  --   --   --  0.07   MCV 90.3 91.3 91.6   < >  --   --    < >  --  87.2 86.9  --   --   --  90.6   PROCALCITONIN  --   --   --   --   --   --   --   --   --   --  20.74* 26.11*  --   --    LACTATE  --   --   --   --  1.6 2.2*  --   --  0.9  --   --   --    < >  --    PROTIME  --   --   --   --   --   --   --  17.3*  --   --   --  34.3*  --   --    APTT  --   --   --   --   --   --   --   --   --   --   --  47.6*  --   --    D DIMER QUANT  --   --   --   --   --    --   --   --   --   --   --  1.76*  --   --     < > = values in this interval not displayed.         Lab 02/27/25  1339 02/27/25  0603 02/26/25  2320 02/26/25  0248 02/25/25  0458   SODIUM 138 140 144 146* 142   POTASSIUM 4.2 3.8 3.8 3.5 3.2*   CHLORIDE 115* 115* 119* 118* 110*   CO2 15.6* 15.3* 15.7* 18.9* 20.4*   ANION GAP 7.4 9.7 9.3 9.1 11.6   BUN 25* 27* 28* 32* 41*   CREATININE 2.13* 2.25* 2.64* 2.88* 3.23*   EGFR 28.5* 26.6* 22.0* 19.8* 17.3*   GLUCOSE 79 97 114* 71 89   CALCIUM 7.4* 8.0* 8.0* 8.4* 8.7   MAGNESIUM  --  2.4 1.4* 1.7 2.0   PHOSPHORUS 2.1*  --   --  2.0* 2.4*         Lab 02/27/25  1339 02/25/25  0458 02/24/25  0440 02/23/25  1058 02/23/25  0138 02/21/25  1821 02/21/25  1708   TOTAL PROTEIN  --  5.7* 5.7*  --  5.6*   < > 6.1   ALBUMIN 1.7* 2.3* 2.0*   < > 2.0*   < > 1.6*   GLOBULIN  --  3.4 3.7  --  3.6   < > 4.5   ALT (SGPT)  --  <5 5  --  7   < > <5   AST (SGOT)  --  9 8  --  9   < > 13   BILIRUBIN  --  0.6 0.8  --  1.2   < > 0.9   ALK PHOS  --  96 93  --  91   < > 88   LIPASE  --   --   --   --   --   --  10*    < > = values in this interval not displayed.         Lab 02/23/25  1058 02/22/25  1919 02/21/25  1821   PROBNP  --   --  15,412.0*   PROTIME 17.3* 34.3*  --    INR 1.35* 3.17*  --              Lab 02/26/25  0349 02/22/25  2157 02/22/25  1648 02/21/25  1821 02/21/25  1708   IRON  --   --   --  25*  --    IRON SATURATION (TSAT)  --   --   --  32  --    TIBC  --   --   --  79*  --    TRANSFERRIN  --   --   --  53*  --    FOLATE  --   --   --   --  7.99   VITAMIN B 12  --   --   --   --  545   ABO TYPING O O   < >  --   --    RH TYPING Positive Positive   < >  --   --    ANTIBODY SCREEN Negative Negative  --   --   --     < > = values in this interval not displayed.         Lab 02/22/25  1837   PH, ARTERIAL 7.327*   PCO2, ARTERIAL 20.5*   PO2 .9*   O2 SATURATION ART 97.7   HCO3 ART 10.7*   BASE EXCESS ART -13.6*     Brief Urine Lab Results  (Last result in the past 365 days)         Color   Clarity   Blood   Leuk Est   Nitrite   Protein   CREAT   Urine HCG        02/22/25 2208 Dark Yellow   Cloudy   Large (3+)   Moderate (2+)   Negative   100 mg/dL (2+)                 [x]  Microbiology   Microbiology Results (last 10 days)       Procedure Component Value - Date/Time    Blood Culture - Blood, Hand, Right [[id number]]  (Normal) Collected: 02/24/25 2016    Lab Status: Preliminary result Specimen: Blood from Hand, Right Updated: 02/26/25 2030     Blood Culture No growth at 2 days    Narrative:      Less than seven (7) mL's of blood was collected.  Insufficient quantity may yield false negative results.    Blood Culture - Blood, Arm, Left [[id number]]  (Normal) Collected: 02/24/25 1803    Lab Status: Preliminary result Specimen: Blood from Arm, Left Updated: 02/26/25 1831     Blood Culture No growth at 2 days    Clostridioides difficile Toxin - Stool, Per Rectum [[id number]] Collected: 02/24/25 0856    Lab Status: Final result Specimen: Stool from Per Rectum Updated: 02/24/25 0950    Narrative:      The following orders were created for panel order Clostridioides difficile Toxin - Stool, Per Rectum.  Procedure                               Abnormality         Status                     ---------                               -----------         ------                     Clostridioides difficile...[[id number]]                      Final result                 Please view results for these tests on the individual orders.    Clostridioides difficile Toxin, PCR - Stool, Per Rectum [[id number]] Collected: 02/24/25 0856    Lab Status: Final result Specimen: Stool from Per Rectum Updated: 02/24/25 0950     Toxigenic C. difficile by PCR Negative     027 Toxin Presumptive Negative    Narrative:      The result indicates the absence of toxigenic C. difficile from stool specimen.     MRSA Screen, PCR (Inpatient) - Swab, Nares [[id number]]  (Abnormal) Collected: 02/23/25 0601    Lab Status: Final result Specimen:  Swab from Nares Updated: 02/23/25 1001     MRSA PCR MRSA Detected    Narrative:      The negative predictive value of this diagnostic test is high and should only be used to consider de-escalating anti-MRSA therapy. A positive result may indicate colonization with MRSA and must be correlated clinically.    Respiratory Panel PCR w/COVID-19(SARS-CoV-2) BRIELLE/TOSHA/IMTIAZ/PAD/COR/NATACHA In-House, NP Swab in UTM/VTM, 2 HR TAT - Swab, Nasopharynx [[id number]]  (Normal) Collected: 02/23/25 0601    Lab Status: Final result Specimen: Swab from Nasopharynx Updated: 02/23/25 0827     ADENOVIRUS, PCR Not Detected     Coronavirus 229E Not Detected     Coronavirus HKU1 Not Detected     Coronavirus NL63 Not Detected     Coronavirus OC43 Not Detected     COVID19 Not Detected     Human Metapneumovirus Not Detected     Human Rhinovirus/Enterovirus Not Detected     Influenza A PCR Not Detected     Influenza B PCR Not Detected     Parainfluenza Virus 1 Not Detected     Parainfluenza Virus 2 Not Detected     Parainfluenza Virus 3 Not Detected     Parainfluenza Virus 4 Not Detected     RSV, PCR Not Detected     Bordetella pertussis pcr Not Detected     Bordetella parapertussis PCR Not Detected     Chlamydophila pneumoniae PCR Not Detected     Mycoplasma pneumo by PCR Not Detected    Narrative:      In the setting of a positive respiratory panel with a viral infection PLUS a negative procalcitonin without other underlying concern for bacterial infection, consider observing off antibiotics or discontinuation of antibiotics and continue supportive care. If the respiratory panel is positive for atypical bacterial infection (Bordetella pertussis, Chlamydophila pneumoniae, or Mycoplasma pneumoniae), consider antibiotic de-escalation to target atypical bacterial infection.    Urine Culture - Urine, Urine, Clean Catch [[id number]]  (Abnormal) Collected: 02/22/25 2208    Lab Status: Final result Specimen: Urine, Clean Catch Updated: 02/24/25 1058     Urine  Culture <10,000 CFU/mL Gram Negative Bacilli    Narrative:      Call if further workup needed.   Colonization of the urinary tract without infection is common. Treatment is discouraged unless the patient is symptomatic, pregnant, or undergoing an invasive urologic procedure.    Blood Culture - Blood, Leg, Left [[id number]]  (Abnormal)  (Susceptibility) Collected: 02/22/25 1922    Lab Status: Final result Specimen: Blood from Leg, Left Updated: 02/25/25 0620     Blood Culture Escherichia coli ESBL     Comment:   Consider infectious disease consult.  Susceptibility results may not correlate to clinical outcomes.  For ESBL-producing infections in the blood, a carbapenem is recommended as first-line therapy for optimal clinical outcomes.        Isolated from Aerobic and Anaerobic Bottles     Gram Stain Aerobic Bottle Gram negative bacilli      Anaerobic Bottle Gram negative bacilli    Narrative:      Less than seven (7) mL's of blood was collected.  Insufficient quantity may yield false negative results.    Susceptibility        Escherichia coli ESBL      VANDANA      Ciprofloxacin Resistant      Ertapenem Susceptible      Levofloxacin Resistant      Meropenem Susceptible      Trimethoprim + Sulfamethoxazole Resistant                       Susceptibility Comments       Escherichia coli ESBL    With the exception of urinary-sourced infections, aminoglycosides should not be used as monotherapy.               Blood Culture ID, PCR - Blood, Leg, Left [[id number]]  (Abnormal) Collected: 02/22/25 1922    Lab Status: Final result Specimen: Blood from Leg, Left Updated: 02/23/25 1155     BCID, PCR Escherichia coli. Identification by BCID2 PCR.     BCID, PCR 2 CTX-M (ESBL) detected. Identification by BCID2 PCR     BOTTLE TYPE Anaerobic Bottle    Blood Culture - Blood, Arm, Left [[id number]]  (Normal) Collected: 02/22/25 1919    Lab Status: Preliminary result Specimen: Blood from Arm, Left Updated: 02/26/25 1931     Blood Culture No  growth at 4 days    Narrative:      Less than seven (7) mL's of blood was collected.  Insufficient quantity may yield false negative results.    Wound Culture - Wound, Abdominal Wall [[id number]]  (Abnormal)  (Susceptibility) Collected: 02/22/25 1605    Lab Status: Final result Specimen: Wound from Abdominal Wall Updated: 02/26/25 0748     Wound Culture Moderate growth (3+) Escherichia coli ESBL     Comment:   Consider infectious disease consult.  Susceptibility results may not correlate to clinical outcomes.         Light growth (2+) Enterococcus faecium      Light growth (2+) Yeast, Not Candida albicans     Gram Stain Rare (1+) Gram negative bacilli    Susceptibility        Escherichia coli ESBL      VANDANA      Ertapenem Susceptible      Meropenem Susceptible                       Susceptibility        Enterococcus faecium      VANDANA      Ampicillin Resistant      Vancomycin Susceptible                       Susceptibility Comments       Escherichia coli ESBL    With the exception of urinary-sourced infections, aminoglycosides should not be used as monotherapy.               Legionella Antigen, Urine - Urine, Straight Cath [[id number]]  (Normal) Collected: 02/21/25 2202    Lab Status: Final result Specimen: Urine from Straight Cath Updated: 02/22/25 1751     LEGIONELLA ANTIGEN, URINE Negative    S. Pneumo Ag Urine or CSF - Urine, Straight Cath [[id number]]  (Normal) Collected: 02/21/25 2202    Lab Status: Final result Specimen: Urine from Straight Cath Updated: 02/22/25 1752     Strep Pneumo Ag Negative    COVID-19, FLU A/B, RSV PCR 1 HR TAT - Swab, Nasopharynx [[id number]]  (Normal) Collected: 02/21/25 1745    Lab Status: Final result Specimen: Swab from Nasopharynx Updated: 02/21/25 1832     COVID19 Not Detected     Influenza A PCR Not Detected     Influenza B PCR Not Detected     RSV, PCR Not Detected    Narrative:      Fact sheet for providers: https://www.fda.gov/media/783873/download    Fact sheet for patients:  https://www.fda.gov/media/146588/download    Test performed by PCR.          [x]  Radiology  XR Chest 1 View    Result Date: 2/23/2025  The distal tip of the right IJ central venous line/port is in the expected mid superior vena cava (SVC). No pneumothorax is seen. There are bilateral infiltrates present, suggestive of pulmonary edema with vascular congestion.    Portions of this note were completed with a voice recognition program.  2/23/2025 12:16 AM by James Hull MD on Workstation: [email or web address]      CT Abdomen Pelvis Without Contrast    Result Date: 2/22/2025  Impression: CT scan of the abdomen and pelvis without contrast demonstrating worsening subsegmental atelectasis at the lung bases. Small right effusion appears larger. New small left effusion. Hepatic configuration concerning for cirrhosis. A small amount of peritoneal fluid is again seen. Post cholecystectomy. Multiple nonobstructing renal stones are again seen. Mild left hydroureteronephrosis is not seen on the prior study. 6 mm stone in the mid left ureter at the L5 level was seen on the prior study in the lower pole collecting system of the left kidney. 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 Wound in the anterior abdominal wall is again seen. This appears smaller in comparison to 2/1/2025. Electronically Signed: Shun Milan MD  2/22/2025 5:22 PM EST  Workstation ID: [id number]    US Renal Bilateral    Result Date: 2/22/2025  Impression: 1.Bilateral nephrolithiasis without evidence of hydronephrosis. 2.Bilateral increased cortical echogenicity consistent with chronic renal disease. 3.Asymmetrically edematous left kidney with decreased cortical medullary differentiation which is nonspecific but could be seen in the setting of pyelonephritis.. Electronically Signed: Raad Jansen MD  2/22/2025 5:10 PM EST  Workstation ID: [id number]    XR Chest 1 View    Result Date: 2/21/2025  No active disease. Electronically Signed: Vitaly Maya MD   2/21/2025 7:38 PM EST  Workstation ID: [id number]   []  EKG/Telemetry   []  Cardiology/Vascular   []  Pathology  []  Old records  []  Other:    Assessment & Plan   Assessment / Plan     Assessment:  Acute kidney injury due to obstructing nephrolithiasis on the left status post stent placement.  Improving  Septic shock, present on admission secondary to bacteremia.  Improving  ESBL E. coli bacteremia.  Negative since February 24 blood culture  Chronic hypotension on midodrine  Episodes of bradycardia due to midodrine.  Improving  History of Crohn's disease with complex surgery  Chronic nonhealing abdominal wounds and abscess  secondary to significant abdominal surgery in the past for duodenal ulcer rupture follows with [facility].  Due to ESBL E. coli Enterococcus faecium.  Chronic enterocutaneous fistulas followed by   Anxiety disorder  Positive MRSA PCR  Acute on chronic anemia.  Status post 1 unit packed RBC transfusion  Antibiotic associated diarrhea.  C. difficile negative.  Hypernatremia.  Free water deficit.  Resolved  Thrombocytopenia.  Improving    Plan:  Off Levophed .  Midodrine on hold due to bradycardia  Continue meropenem, finished Flagyl  Repeat Blood cultures negative to date from February 24  Of vancomycin  CT of the abdomen and pelvis reviewed.  Only 1 enterocutaneous fistula located in the left upper quadrant reported.    Repeat CT scan abdomen pelvis with oral contrast ordered.  Patient reluctant to drink oral contrast as it makes her sick  Appreciate urology input.  Status post left ureteric stent.  Needs definitive stone treatment when stable  Nephrology following.  Renal function slowly improving.  Started on D5 water with potassium.  Discussed with GI about diarrhea.  Appreciate input  Discussed with general surgery to evaluate for fistulous.  Recommend follow-up with [facility]  As needed Imodium.  Scheduled [facility]  wound culture from abdomen noted with ESBL E. coli and Enterococcus faecium. Patient does  have known chronic abdominal wounds with fistulous communication.  Patient follows with  for management of these wounds patient was just discharged this month from  for these wounds.  May need to be transferred back to  once acute issues resolve  Continue wound care.  Discussed with wound care.  Appreciate input.  Electrolyte replacement protocol  Monitor white cell count  PT OT  Transfer out of the unit.       Discussed with RN,  and patient.  Return to [facility] when stable.    VTE Prophylaxis:  Pharmacologic & mechanical VTE prophylaxis orders are present.        CODE STATUS:   Level Of Support Discussed With: Patient  Code Status (Patient has no pulse and is not breathing): CPR (Attempt to Resuscitate)  Medical Interventions (Patient has pulse or is breathing): Full Support      Electronically signed by Liam Israel MD, 2/27/2025, 18:21 EST.

## 2025-02-27 NOTE — PROGRESS NOTES
Pulmonary / Critical Care Progress Note      Patient Name: Mag Padilla  : 1980  MRN: [id number]  Attending:  Liam Israel MD   Date of admission: 2025    Subjective   Subjective   Patient critically ill with septic shock    Over past 24 hours:  Doing fair this morning  Has been able to wean off of Levophed around 830 this morning  Renal function improving    ROS  Constitutional symptoms:  Denied complaints   Ear, nose, throat: Denied complaints  Cardiovascular:  Denied complaints  Respiratory: Denied complaints  Gastrointestinal: Denied complaints  Musculoskeletal: Denied complaints  Neurologic: Denied complaints  Skin: Denied complaints    Objective   Objective     Vitals:   Vital signs for last 24 hours:  Temp:  [97.3 °F (36.3 °C)-97.9 °F (36.6 °C)] 97.8 °F (36.6 °C)  Heart Rate:  [] 67  Resp:  [12-28] 16  BP: ()/(49-80) 91/59    Intake/Output last 3 shifts:  I/O last 3 completed shifts:  In: 5620.2 [P.O.:1530; I.V.:3220.2; Blood:370; IV Piggyback:500]  Out: 666 [Urine:550; Stool:1]  Intake/Output this shift:  No intake/output data recorded.    Vent settings for last 24 hours:       Hemodynamic parameters for last 24 hours:       Physical Exam:  Vital Signs Reviewed   General: Pale, lethargic, critically ill female, on chair  HEENT:  PERRL, EOMI.  OP, nares clear  Chest:  clear to auscultation bilaterally, no work of breathing noted on room air  CV: Bradycardic, no MGR, pulses 2+, equal.  Abd:  Soft, NT, ND, + BS, no HSM, multiple abdominal incisions noted, weeping wounds noted from enterocutaneous fistulas  EXT:  no clubbing, no cyanosis, no edema  Neuro:  A&Ox3, CN grossly intact, no focal deficits.  Skin: No rashes or lesions noted    Result Review    Result Review:  I have personally reviewed the results from the time of this admission to 2025 13:26 EST and agree with these findings:  [x]  Laboratory  [x]  Microbiology  [x]  Radiology  [x]  EKG/Telemetry   [x]   Cardiology/Vascular   []  Pathology  []  Old records  []  Other:  Most notable findings include:       Lab 02/27/25  0603 02/26/25  2320 02/26/25  1106 02/26/25  0248 02/25/25  0458 02/24/25  0526 02/24/25  0440 02/23/25  2105 02/23/25  1058 02/23/25  0138 02/22/25  2201 02/22/25  2157 02/22/25  1919 02/22/25  1837 02/22/25  1648 02/22/25  0502 02/21/25  1821 02/21/25  1708   WBC 14.25*  --  4.99 3.62 9.26 6.61  --   --   --  6.50 8.33  --   --   --  9.77   < > 9.27 11.98*   HEMOGLOBIN 9.8*  --  8.8* 6.8* 7.5* 7.3*  --  8.1*  --  7.0* 8.0*  --   --   --  8.5*   < > 8.2* 9.0*   HEMATOCRIT 30.3*  --  28.3* 22.0* 23.5* 23.3*  --  24.7*  --  22.4* 25.2*  --   --   --  27.9*   < > 26.8* 29.9*   PLATELETS 197  --  115* 98* 162 116*  --   --   --  84* 94*  --   --   --  100*   < > 71* 86*   SODIUM 140 144  --  146* 142  --  141  --  143 140  --  138  --   --  138   < > 135* 132*   POTASSIUM 3.8 3.8  --  3.5 3.2*  --  3.5  --  4.1 3.0*  --  2.9*  --   --  3.4*   < > 3.0* 3.3*   CHLORIDE 115* 119*  --  118* 110*  --  110*  --  109* 112*  --  114*  --   --  113*   < > 110* 106   CO2 15.3* 15.7*  --  18.9* 20.4*  --  20.4*  --  18.8* 15.5*  --  12.3*  --   --  11.3*   < > 11.5* 10.3*   BUN 27* 28*  --  32* 41*  --  38*  --  34* 38*  --  39*  --   --  45*   < > 53* 56*   CREATININE 2.25* 2.64*  --  2.88* 3.23*  --  3.53*  --  3.66* 3.64*  --  3.91*  --    < > 4.46*   < > 5.50* 5.67*   GLUCOSE 97 114*  --  71 89  --  133*  --  138* 149*  --  153*  --   --  72   < > 79 88   CALCIUM 8.0* 8.0*  --  8.4* 8.7  --  8.4*  --  8.1* 8.3*  --  8.4*  --   --  8.4*   < > 7.9* 8.2*   PHOSPHORUS  --   --   --  2.0* 2.4*  --  3.0  --  2.2* 2.7  --  2.2* 2.6  --   --   --   --   --    TOTAL PROTEIN  --   --   --   --  5.7*  --  5.7*  --   --  5.6*  --  5.2*  --   --  5.7*  --  5.4* 6.1   ALBUMIN  --   --   --   --  2.3*  --  2.0*  --  1.9* 2.0*  --  1.6*  --   --  1.5*  --  1.6* 1.6*   GLOBULIN  --   --   --   --  3.4  --  3.7  --   --  3.6  --   3.6  --   --  4.2  --  3.8 4.5    < > = values in this interval not displayed.     Results for orders placed during the hospital encounter of 02/21/25    Adult Transthoracic Echo Complete W/ Cont if Necessary Per Protocol    Interpretation Summary    Left ventricular ejection fraction appears to be 46 - 50%. Mild global hypokinesis    Left ventricular diastolic function is consistent with (grade I) impaired relaxation.    Estimated right ventricular systolic pressure from tricuspid regurgitation is normal (<35 mmHg).  CT Abdomen Pelvis Without Contrast    Result Date: 2/22/2025  CT ABDOMEN PELVIS WO CONTRAST Date of Exam: 2/22/2025 4:18 PM EST Indication: chronic subcutaneous abscess of abdomen. Comparison: CT AP 2/1/2025 Technique: Axial CT images were obtained of the abdomen and pelvis without the administration of contrast. Reconstructed coronal and sagittal images were also obtained. Automated exposure control and iterative construction methods were used. Findings: Subsegmental atelectasis at the lung bases appears worsened. No consolidation or mass is evident. Small right effusion appears larger. New small left effusion is evident. The right liver lobe is relatively small, which may be a manifestation of cirrhosis. The gallbladder is absent, surgical clips are seen in the gallbladder bed. No pancreatic mass is evident. Embolization coils are again seen adjacent to the pancreatic head. No adrenal mass is evident. The spleen is of normal size. A small amount of peritoneal fluid is again seen. Numerous tiny nonobstructing stones are seen in the renal collecting systems. Mild left hydroureteronephrosis is evident. 6 mm stone in the mid left ureter at the L5 level was seen on 2/1/2025 in the lower pole collecting system of the left kidney. This stone appears to be visible on the  image. 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 A wound is seen in the midline anterior abdominal wall, with  1.5 cm gas or air pocket seen on sagittal series 4 image 105, which appears slightly smaller in comparison to 2/1/2025. No fluid collection is evident. The uterus measures 4 cm in greatest dimension. No pelvic mass is seen.     Impression: Impression: CT scan of the abdomen and pelvis without contrast demonstrating worsening subsegmental atelectasis at the lung bases. Small right effusion appears larger. New small left effusion. Hepatic configuration concerning for cirrhosis. A small amount of peritoneal fluid is again seen. Post cholecystectomy. Multiple nonobstructing renal stones are again seen. Mild left hydroureteronephrosis is not seen on the prior study. 6 mm stone in the mid left ureter at the L5 level was seen on the prior study in the lower pole collecting system of the left kidney. 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 Wound in the anterior abdominal wall is again seen. This appears smaller in comparison to 2/1/2025. Electronically Signed: Shun Milan MD  2/22/2025 5:22 PM EST  Workstation ID: [id number]     Blood cultures positive for CTX-M E-coli      Assessment & Plan   Assessment / Plan     Active Hospital Problems:  Active Hospital Problems    Diagnosis     **Acute renal failure     Hypotension due to hypovolemia     Chronic diarrhea     Inflammatory bowel disease (Crohn's disease)     Kidney stone      Impression:  Septic shock, present on admission  Urinary tract infection from multidrug-resistant E. Coli  E. coli bacteremia  Obstructing nephrolithiasis status post ureteral stent placement  Dehydration  Acute kidney injury secondary to prerenal etiology  Metabolic acidosis  Supratherapeutic INR  Hypokalemia  Chronic hypotension on midodrine  History of Crohn's disease with complex abdominal surgeries  Chronic nonhealing abdominal wounds  Enterocutaneous fistulas  Bradycardia     Plan:  Norepinephrine off this morning.  Continue to monitor and keep MAP greater than  65  Holding midodrine due to bradycardia  Continue with meropenem, Flagyl; vancomycin stopped  Repeat culture until clearance  Renal function slowly improving  Lactic acid improving continue to trend until clearance  Status post ureteral stent by urology.  Management per them.  Renee to be removed today by them.  Cont aspiration precautions. Keep HOB 30 deg.   Replace electrolytes PRN to keep K 4.0, Mag 2.0, Phos 4.0.  Keep glucose 140-180 while in ICU. Cont SSI.  Transfuse blood to keep hemoglobin greater than 7 transfuse to keep Hgb >7  Continue wound care  GI prophylaxis with PPI  DVT prophylaxis with Lovenox    If patient is able to stay off of norepinephrine, can downgrade to telemetry    VTE Prophylaxis:  Pharmacologic & mechanical VTE prophylaxis orders are present.    CODE STATUS:   Level Of Support Discussed With: Patient  Code Status (Patient has no pulse and is not breathing): CPR (Attempt to Resuscitate)  Medical Interventions (Patient has pulse or is breathing): Full Support    Patient is critically ill with septic shock, ESBL E. coli bacteremia, JOHANA, metabolic acidosis, multiple electrolyte abnormalities. I, Dr. Anne Roger, spent 32 minutes of critical care time. Total Critical Care time spent: 32 minutes. This included personally reviewing all pertinent labs, imaging, microbiology and documentation. Also discussing the case with the patient and any available family, the admitting physician and any available ancillary staff. Electronically signed by Anne Roger MD, 02/27/25, 1:28 PM EST.

## 2025-02-27 NOTE — PROGRESS NOTES
[facility] Clinical Pharmacy Services: Renal Dose Adjustment     Meropenem has been appropriately renally dose adjusted based on our System P&T approved policy. Pharmacy will continue to monitor patient renal function while in-house.     Indra Almaraz [facility]  Clinical Pharmacist

## 2025-02-27 NOTE — PLAN OF CARE
Goal Outcome Evaluation:  Plan of Care Reviewed With: patient           Outcome Evaluation: pt is a resident at a lical nursing home. She reports needing assistance with ADLs but was able to ambulate in her room witha RW. She will benefit from a PT consult upon return to the nursing home to maximize independence with functional mobility.    Anticipated Discharge Disposition (PT): extended care facility

## 2025-02-27 NOTE — CONSULTS
[facility]   HISTORY AND PHYSICAL    Patient Name: Mag Padilla  : 1980   MRN: [id number]  Primary Care Physician:  Provider, No Known  Date of admission: 2025    Subjective   Subjective     Chief Complaint: Crohn's disease    HPI:    Mag Padilla is a 44 y.o. female who has a longstanding history of Crohn's disease with previous multiple surgeries and a known enterocutaneous fistula presented here for evaluation of chronic diarrhea.  Interevaluation she was noted to have more draining areas from her abdominal wall than she had previously.    Review of Systems   Constitutional:  Positive for activity change and appetite change.   Respiratory: Negative.     Cardiovascular: Negative.    Gastrointestinal:  Positive for abdominal pain, diarrhea and nausea.   Neurological: Negative.    Hematological: Negative.         Personal History     Past Medical History:   Diagnosis Date    Abscess of peritoneum     Anemia     Anxiety     Breast cancer     Crohn's disease     GERD (gastroesophageal reflux disease)     HTN (hypertension)     Hyperlipidemia     Intestine disorder     SVT (supraventricular tachycardia)        Past Surgical History:   Procedure Laterality Date    ABDOMINAL SURGERY      CYSTOSCOPY W/ URETERAL STENT PLACEMENT Left 2025    Procedure: CYSTOSCOPY URETERAL CATHETER/STENT INSERTION;  Surgeon: Elliot Briceño MD;  Location: [facility];  Service: Urology;  Laterality: Left;       Family History: family history is not on file. Otherwise pertinent FHx was reviewed and not pertinent to current issue.    Social History:  reports that she has never smoked. She has never used smokeless tobacco. She reports that she does not drink alcohol and does not use drugs.    Home Medications:  acetaminophen, ferrous sulfate, midodrine, multivitamin with minerals, ondansetron, pantoprazole, potassium phosphate (monobasic), saccharomyces boulardii, senna, and  sertraline      Allergies:  Allergies   Allergen Reactions    Iodine Unknown - High Severity    Penicillins Unknown - High Severity       Objective   Objective     Vitals:   Temp:  [97.7 °F (36.5 °C)-98 °F (36.7 °C)] 98 °F (36.7 °C)  Heart Rate:  [] 60  Resp:  [12-24] 15  BP: ()/(49-80) 86/61    Physical Exam  Constitutional:       Appearance: Normal appearance.   Cardiovascular:      Rate and Rhythm: Normal rate.   Pulmonary:      Effort: Pulmonary effort is normal.   Abdominal:      Palpations: Abdomen is soft.     Ostomy bags in multiple locations as well as Optifoam dressings in several areas controlling areas of drainage    Result Review    Result Review:  I have personally reviewed the results from the time of this admission to 2/27/2025 17:41 EST and agree with these findings:  [x]  Laboratory  []  Microbiology  []  Radiology  []  EKG/Telemetry   []  Cardiology/Vascular   []  Pathology  []  Old records  []  Other:    Lab Results   Component Value Date    WBC 7.85 02/27/2025    HGB 9.1 (L) 02/27/2025    HCT 28.9 (L) 02/27/2025    MCV 90.3 02/27/2025     (L) 02/27/2025      Lab Results   Component Value Date    GLUCOSE 79 02/27/2025    CALCIUM 7.4 (L) 02/27/2025     02/27/2025    K 4.2 02/27/2025    CO2 15.6 (L) 02/27/2025     (H) 02/27/2025    BUN 25 (H) 02/27/2025    CREATININE 2.13 (H) 02/27/2025    EGFR 28.5 (L) 02/27/2025    BCR 11.7 02/27/2025    ANIONGAP 7.4 02/27/2025        Lab Results   Component Value Date    ALT <5 02/25/2025      Most notable findings include: White count 7    Assessment & Plan   Assessment / Plan     Brief Patient Summary:  Mag Padilla is a 44 y.o. female who has a known history of Crohn's disease with enterocutaneous fistula    Active Hospital Problems:  Active Hospital Problems    Diagnosis     **Acute renal failure     Hypotension due to hypovolemia     Chronic diarrhea     Inflammatory bowel disease (Crohn's disease)     Kidney stone       Plan:  CT has been ordered by primary team/GI  We can see what the CT scan shows, however with fistulizing Crohn's disease surgery is typically rarely helpful and oftentimes harmful unless the Crohn's disease gets better medically controlled  If she were to need anything surgically, I think she would be best suited to go back to [facility] where she has been treated previously or at the very least a facility with colorectal/Crohn's specialization      VTE Prophylaxis:  Pharmacologic & mechanical VTE prophylaxis orders are present.        CODE STATUS:    Level Of Support Discussed With: Patient  Code Status (Patient has no pulse and is not breathing): CPR (Attempt to Resuscitate)  Medical Interventions (Patient has pulse or is breathing): Full Support    Admission Status:  I believe this patient meets inpatient status.    Electronically signed by Gregory Roth MD, 02/27/25, 5:41 PM EST.

## 2025-02-27 NOTE — PLAN OF CARE
Goal Outcome Evaluation:              Outcome Evaluation: This nurse taking over care of patient at this time. Patient transferred out of ICU this day. Pouch x2 to abdomen are intact without leaking noted. Dressing to R abdomen changed per order. CT ABD pending completion at this time. Patient is not agreeable to drink contrast at this time; states she will attempt after she has had time to digest dinner. No issues noted at this time. Will continue with plan of care.    CT made aware of contrast intake delay.

## 2025-02-27 NOTE — PROGRESS NOTES
[facility]     Progress Note    Patient Name: Mag Padilla  : 1980  MRN: [id number]  Primary Care Physician:  Provider, No Known  Date of admission: 2025    Subjective   Patient's renal dysfunction continues to improve  Blood pressures continue to stay soft  Urine output stable  Volume status appears to be optimized  White count has suddenly up trended    Scheduled Meds:cholestyramine, 1 packet, Oral, Daily  ferrous sulfate, 325 mg, Oral, Daily With Breakfast  heparin (porcine), 5,000 Units, Subcutaneous, Q8H  meropenem, 1,000 mg, Intravenous, Q12H  [Held by provider] midodrine, 10 mg, Oral, TID AC  multivitamin with minerals, 1 tablet, Oral, Daily  mupirocin, 1 Application, Each Nare, BID  pantoprazole, 40 mg, Oral, Daily  potassium phosphate (monobasic), 500 mg, Oral, BID  saccharomyces boulardii, 500 mg, Oral, Daily  sertraline, 75 mg, Oral, Daily  sodium chloride, 10 mL, Intravenous, Q12H  vancomycin, 500 mg, Intravenous, Once      Continuous Infusions:dextrose 5 % with KCl 20 mEq, 100 mL/hr, Last Rate: 100 mL/hr (25 0722)  norepinephrine, 0.02-0.3 mcg/kg/min, Last Rate: 0.02 mcg/kg/min (25 0723)  Pharmacy to dose vancomycin,       PRN Meds:.  acetaminophen    aluminum-magnesium hydroxide-simethicone    senna-docusate sodium **AND** polyethylene glycol **AND** bisacodyl **AND** bisacodyl    Calcium Replacement - Follow Nurse / BPA Driven Protocol    dextrose    loperamide    Magnesium Low Dose Replacement - Follow Nurse / BPA Driven Protocol    ondansetron    Pharmacy to dose vancomycin    Phosphorus Replacement - Follow Nurse / BPA Driven Protocol    sodium chloride    sodium chloride    sodium chloride       Review of Systems  Constitutional:        Weakness tiredness fatigue  Eyes:                       No blurry vision, eye discharge, eye irritation, eye pain  HEENT:                   No acute hair loss, earache and discharge, nasal congestion or discharge, sore throat,  postnasal drip  Respiratory:           No shortness of breath coughing sputum production wheezing hemoptysis pleuritic chest pain  Cardiovascular:     No chest pain, orthopnea, PND, dizziness, palpitation, lower extremity edema  Gastrointestinal:   No nausea vomiting diarrhea abdominal pain constipation  Genitourinary:       No urinary incontinence, hesitancy, frequency, urgency, dysuria  Hematologic:         No bruising, bleeding, pallor, lymphadenopathy  Endocrine:            No coldness, hot flashes, polyuria, abnormal hair growth  Musculoskeletal:    No body pains, aches, arthritic pains, muscle pain ,muscle wasting  Psychiatric:          No low or high mood, anxiety, hallucinations, delusions  Skin.                      No rash, ulcers, bruising, itching  Neurological:        No confusion, headache, focal weakness, numbness, dysphasia    Objective   Objective     Vitals:   Temp:  [97 °F (36.1 °C)-97.9 °F (36.6 °C)] 97.8 °F (36.6 °C)  Heart Rate:  [] 77  Resp:  [12-28] 16  BP: ()/(49-80) 97/67  Physical Exam    Constitutional: Awake, alert responsive, conversant, no obvious distress              Psychiatric:  Appropriate affect, cooperative   Neurologic:  Awake alert ,oriented x 3, strength symmetric in all extremities, Cranial Nerves grossly intact to confrontation, speech clear   Eyes:   PERRLA, sclerae anicteric, no conjunctival injection   HEENT:  Moist mucous membranes, no nasal or eye discharge, no throat congestion   Neck:   Supple, no thyromegaly, no lymphadenopathy, trachea midline, no elevated JVD   Respiratory:  Clear to auscultation bilaterally, nonlabored respirations    Cardiovascular: RRR, no murmurs, rubs, or gallops, palpable pedal pulses bilaterally, No bilateral ankle edema   Gastrointestinal: Positive bowel sounds, soft, nontender, nondistended, no organomegaly   Musculoskeletal:  No clubbing or cyanosis to extremities,muscle wasting, joint swelling, muscle weakness              Skin:                      No rashes, bruising, skin ulcers, petechiae or ecchymosis    Result Review    Result Review:  I have personally reviewed the results from the time of this admission to 2/27/2025 08:59 EST and agree with these findings:  []  Laboratory  []  Microbiology  []  Radiology  []  EKG/Telemetry   []  Cardiology/Vascular   []  Pathology  []  Old records  []  Other:    Assessment & Plan   Assessment / Plan       Active Hospital Problems:  Active Hospital Problems    Diagnosis     **Acute renal failure     Hypotension due to hypovolemia     Chronic diarrhea     Nausea & vomiting     Inflammatory bowel disease (Crohn's disease)     Kidney stone      44-year-old female with past medical history of Crohn's disease status post resection, chronically hypotensive on midodrine, anxiety, chronic diarrhea, GERD who who has not been feeling well and has had persistent nausea and vomiting for the last few days as well as her diarrhea with severe JOHANA and creatinine rise from baseline normal to peak of 5.6 with decreased urine output and worsening bicarb currently at 5.  JOHANA most likely due to hypotension, diarrhea and vomiting contributing with UA showing some hyaline cast.  Urine analysis also concerning for large amount of proteinuria and RBCs with CT scan showing obstructive stone which is likely the source of septic shock.  Creatinine has plateaued around 3.5 with poor urine output and concerning patient may have developed ATN.  Cultures with ESBL Klebsiella.  Responded to Lasix with brisk urine output and improving renal dysfunction     Plan:   Continue with supportive care  Agree with holding midodrine  Patient off pressors since midnight and will continue to monitor.  Patient chronically has low blood pressures  Concerning the patient's white count has suddenly trended back up  Will continue to monitor for any worsening infection.  Patient on meropenem for ESBL    Thank you for involving me in the care of  the patient.  Will continue to follow along

## 2025-02-27 NOTE — THERAPY TREATMENT NOTE
Patient Name: Mag Padilla  : 1980    MRN: [id number]                              Today's Date: 2025       Admit Date: 2025    Visit Dx:     ICD-10-CM ICD-9-CM   1. Acute renal failure, unspecified acute renal failure type  N17.9 584.9   2. Hypotension due to hypovolemia  E86.1 458.8     276.52   3. Kidney stone  N20.0 592.0   4. Difficulty walking  R26.2 719.7     Patient Active Problem List   Diagnosis    Acute renal failure    Hypotension due to hypovolemia    Chronic diarrhea    Nausea & vomiting    Inflammatory bowel disease (Crohn's disease)    Kidney stone     Past Medical History:   Diagnosis Date    Abscess of peritoneum     Anemia     Anxiety     Breast cancer     Crohn's disease     GERD (gastroesophageal reflux disease)     HTN (hypertension)     Hyperlipidemia     Intestine disorder     SVT (supraventricular tachycardia)      Past Surgical History:   Procedure Laterality Date    ABDOMINAL SURGERY      CYSTOSCOPY W/ URETERAL STENT PLACEMENT Left 2025    Procedure: CYSTOSCOPY URETERAL CATHETER/STENT INSERTION;  Surgeon: Elliot Briceño MD;  Location: [facility];  Service: Urology;  Laterality: Left;      General Information       Row Name 25 1031          OT Time and Intention    Document Type therapy note (daily note)  -AV     Mode of Treatment individual therapy;occupational therapy  -AV       Row Name 25 1031          General Information    Existing Precautions/Restrictions fall  contact isolation  -AV       Row Name 25 1031          Cognition    Orientation Status (Cognition) --  alert, pleasant and cooperative. able to perform therapeutic exercises with moderate cues/ demonstration.  -AV       Row Name 25 1031          Safety Issues/Impairments Affecting Functional Mobility    Impairments Affecting Function (Mobility) balance;endurance/activity tolerance;strength;cognition  -AV               User Key  (r) = Recorded By, (t) = Taken By, (c)  = Cosigned By      Initials Name Provider Type    Kaz Lux OT Occupational Therapist                     Mobility/ADL's    No documentation.                  Obj/Interventions       Row Name 02/27/25 1032          Shoulder (Therapeutic Exercise)    Shoulder (Therapeutic Exercise) AROM (active range of motion)  -AV     Shoulder AROM (Therapeutic Exercise) bilateral;aBduction;aDduction;10 repetitions  -AV       Row Name 02/27/25 1032          Elbow/Forearm (Therapeutic Exercise)    Elbow/Forearm (Therapeutic Exercise) AROM (active range of motion)  -AV     Elbow/Forearm AROM (Therapeutic Exercise) bilateral;flexion;extension;supination;pronation;10 repetitions  -AV       Row Name 02/27/25 1032          Motor Skills    Therapeutic Exercise shoulder;elbow/forearm  performed in semi-Onofre's/ on room air with sats %.  -AV               User Key  (r) = Recorded By, (t) = Taken By, (c) = Cosigned By      Initials Name Provider Type    Kaz Lux OT Occupational Therapist                   Goals/Plan    No documentation.                  Clinical Impression       Row Name 02/27/25 1033          Pain Assessment    Additional Documentation Pain Scale: FACES Pre/Post-Treatment (Group)  -AV       [facility] Name 02/27/25 1033          Pain Scale: FACES Pre/Post-Treatment    Pain: FACES Scale, Pretreatment 0-->no hurt  -AV     Posttreatment Pain Rating 0-->no hurt  -AV       [facility] Name 02/27/25 1033          Plan of Care Review    Progress no change  -AV     Outcome Evaluation Patient is in CCU over flow unit. She performed upper extremity AROM exercises (with cues and rest breaks) to increase strength and endurance/activity tolerance needed to support ADLs. Continued OT is indicated to remediate/ compensate for deficits to maximize independence and safety with functional ADL tasks.  -AV       Row Name 02/27/25 1033          Vital Signs    O2 Delivery Pre Treatment room air  -AV     O2 Delivery Intra Treatment  room air  -AV     O2 Delivery Post Treatment room air  -AV       Row Name 02/27/25 1033          Positioning and Restraints    Pre-Treatment Position in bed  -AV     Post Treatment Position bed  -AV     In Bed call light within reach;encouraged to call for assist;exit alarm on  -AV               User Key  (r) = Recorded By, (t) = Taken By, (c) = Cosigned By      Initials Name Provider Type    Kaz Lux, OT Occupational Therapist                   Outcome Measures       Row Name 02/27/25 1034          How much help from another is currently needed...    Putting on and taking off regular lower body clothing? 1  -AV     Bathing (including washing, rinsing, and drying) 2  -AV     Toileting (which includes using toilet bed pan or urinal) 1  -AV     Putting on and taking off regular upper body clothing 2  -AV     Taking care of personal grooming (such as brushing teeth) 2  -AV     Eating meals 4  -AV     AM-PAC 6 Clicks Score (OT) 12  -AV       Row Name 02/27/25 1000          How much help from another person do you currently need...    Turning from your back to your side while in flat bed without using bedrails? 3  -DP     Moving from lying on back to sitting on the side of a flat bed without bedrails? 2  -DP     Moving to and from a bed to a chair (including a wheelchair)? 2  -DP     Standing up from a chair using your arms (e.g., wheelchair, bedside chair)? 2  -DP     Climbing 3-5 steps with a railing? 2  -DP     To walk in hospital room? 2  -DP     AM-PAC 6 Clicks Score (PT) 13  -DP     Highest Level of Mobility Goal 4 --> Transfer to chair/commode  -DP       Row Name 02/27/25 1000          Functional Assessment    Outcome Measure Options AM-PAC 6 Clicks Basic Mobility (PT)  -DP       Row Name 02/27/25 1034          Optimal Instrument    Bending/Stooping 4  -AV     Standing 5  -AV     Reaching 1  -AV               User Key  (r) = Recorded By, (t) = Taken By, (c) = Cosigned By      Initials Name Provider Type     Kaz Lux, OT Occupational Therapist    Liliane Dutta, PT Physical Therapist                    Occupational Therapy Education       Title: PT OT SLP Therapies (Done)       Topic: Occupational Therapy (Done)       Point: ADL training (Done)       Description:   Instruct learner(s) on proper safety adaptation and remediation techniques during self care or transfers.   Instruct in proper use of assistive devices.                  Learning Progress Summary            Patient Acceptance, E,D, DU by PG at 2/25/2025 1034                      Point: Home exercise program (Done)       Description:   Instruct learner(s) on appropriate technique for monitoring, assisting and/or progressing therapeutic exercises/activities.                  Learning Progress Summary            Patient Acceptance, E,D, DU by PG at 2/25/2025 1034                      Point: Precautions (Done)       Description:   Instruct learner(s) on prescribed precautions during self-care and functional transfers.                  Learning Progress Summary            Patient Acceptance, E,D, DU by PG at 2/25/2025 1034                      Point: Body mechanics (Done)       Description:   Instruct learner(s) on proper positioning and spine alignment during self-care, functional mobility activities and/or exercises.                  Learning Progress Summary            Patient Acceptance, E,D, DU by PG at 2/25/2025 1034                                      User Key       Initials Effective Dates Name Provider Type Discipline    PG 06/16/21 -  Elvis Paul OT Occupational Therapist OT                  OT Recommendation and Plan     Plan of Care Review  Progress: no change  Outcome Evaluation: Patient is in CCU over flow unit. She performed upper extremity AROM exercises (with cues and rest breaks) to increase strength and endurance/activity tolerance needed to support ADLs. Continued OT is indicated to remediate/ compensate for deficits to  maximize independence and safety with functional ADL tasks.     Time Calculation:         Time Calculation- OT       Row Name 02/27/25 1035             Time Calculation- OT    OT Received On 02/27/25  -AV      OT Goal Re-Cert Due Date 03/06/25  -AV         Timed Charges    22838 - OT Therapeutic Exercise Minutes 10  -AV         Total Minutes    Timed Charges Total Minutes 10  -AV       Total Minutes 10  -AV                User Key  (r) = Recorded By, (t) = Taken By, (c) = Cosigned By      Initials Name Provider Type    AV Kza Eugene OT Occupational Therapist                  Therapy Charges for Today       Code Description Service Date Service Provider Modifiers Qty    [id number] HC OT THER PROC EA 15 MIN 2/27/2025 Kaz Eugene OT GO 1                 Kaz Eugene OT  2/27/2025

## 2025-02-27 NOTE — PLAN OF CARE
Problem: Adult Inpatient Plan of Care  Goal: Readiness for Transition of Care  Outcome: Not Progressing     Problem: Skin Injury Risk Increased  Goal: Skin Health and Integrity  Outcome: Not Progressing  Intervention: Optimize Skin Protection  Flowsheets  Taken 2/27/2025 0526  Activity Management: (pt wasn't turning very much on her own - made pt a q2 hour turn.) --  Taken 2/27/2025 0415  Activity Management: bedrest  Head of Bed (HOB) Positioning: HOB at 30 degrees  Taken 2/27/2025 0300  Head of Bed (HOB) Positioning: HOB at 20 degrees  Taken 2/27/2025 0200  Head of Bed (HOB) Positioning: HOB at 20-30 degrees  Taken 2/26/2025 2350  Activity Management: bedrest  Head of Bed (HOB) Positioning: HOB at 20 degrees  Taken 2/26/2025 2200  Head of Bed (HOB) Positioning: HOB at 20 degrees  Taken 2/26/2025 1900  Activity Management: bedrest  Head of Bed (HOB) Positioning: HOB at 30 degrees     Problem: Adult Inpatient Plan of Care  Goal: Plan of Care Review  Outcome: Progressing  Flowsheets (Taken 2/27/2025 0523)  Outcome Evaluation: Pt remains oriented x 4.  Levo on 0.06 mcg/kg/min most of the night whenever pt was sleeping.  Could be titrated down while awake.  Pt does have a reddened, edematous Left hip.  Pt had reported she could turn herself, but she really wasn't turning very much, so this RN started to turn her.  Crackles left mid to lower back posteriorly.  Bowel sounds were normoactive, but somewhat tinkling.  Ostomy bags changed to both sites. The medial site was bleeding just superior to opening.  Ordered duoderm for next ostomy bag change. . . hasn't arrived yet.  Emptied 80 cc purulent, yellow, cloudy drainage from the medial bag.  Emptied 20 cc from the other ostomy bag.  Plan of Care Reviewed With: patient  Goal: Patient-Specific Goal (Individualized)  Outcome: Progressing  Flowsheets (Taken 2/27/2025 0526)  Patient/Family-Specific Goals (Include Timeframe): Goal for the night: to promote sleep.  Pt appeared  to sleep well between interventions/assessments.  Goal: Absence of Hospital-Acquired Illness or Injury  Outcome: Progressing  Intervention: Identify and Manage Fall Risk  Recent Flowsheet Documentation  Taken 2/27/2025 0400 by Pepper Polk RN  Safety Promotion/Fall Prevention:   safety round/check completed   fall prevention program maintained  Taken 2/27/2025 0300 by Pepper Polk RN  Safety Promotion/Fall Prevention:   safety round/check completed   fall prevention program maintained  Taken 2/27/2025 0200 by Pepper Polk RN  Safety Promotion/Fall Prevention:   safety round/check completed   fall prevention program maintained  Taken 2/27/2025 0100 by Pepper Polk RN  Safety Promotion/Fall Prevention:   safety round/check completed   fall prevention program maintained  Taken 2/27/2025 0000 by Pepper Polk RN  Safety Promotion/Fall Prevention:   safety round/check completed   fall prevention program maintained  Taken 2/26/2025 2300 by Pepper Polk RN  Safety Promotion/Fall Prevention:   safety round/check completed   fall prevention program maintained  Taken 2/26/2025 2200 by Pepper Polk RN  Safety Promotion/Fall Prevention:   safety round/check completed   fall prevention program maintained  Taken 2/26/2025 2100 by Pepper Polk RN  Safety Promotion/Fall Prevention:   safety round/check completed   fall prevention program maintained  Taken 2/26/2025 2000 by Pepper Polk RN  Safety Promotion/Fall Prevention:   safety round/check completed   fall prevention program maintained  Taken 2/26/2025 1900 by Pepper Polk RN  Safety Promotion/Fall Prevention:   safety round/check completed   fall prevention program maintained  Intervention: Prevent Skin Injury  Recent Flowsheet Documentation  Taken 2/27/2025 0415 by Pepper Polk RN  Body Position: supine  Taken 2/27/2025 0300 by Pepper Polk RN  Body Position: (This Rn reminded pt to be turning so pt doesn't get a pressure injury.  This RN assisted pt  slightly, but pt did most of it.)   right   turned   position changed independently  Taken 2/27/2025 0200 by Pepper Polk RN  Body Position: position changed independently  Taken 2/26/2025 2350 by Pepper Polk RN  Body Position: position changed independently  Taken 2/26/2025 2200 by Pepper Polk RN  Body Position:   right   turned   position changed independently  Taken 2/26/2025 1900 by Pepper Polk RN  Body Position: position changed independently  Intervention: Prevent and Manage VTE (Venous Thromboembolism) Risk  Flowsheets  Taken 2/27/2025 0526  VTE Prevention/Management: (sequentials placed on patient this shift.)   bilateral   SCDs (sequential compression devices) on  Taken 2/27/2025 0415  VTE Prevention/Management:   bilateral   SCDs (sequential compression devices) on  Taken 2/27/2025 0242  VTE Prevention/Management:   bilateral   SCDs (sequential compression devices) on  Intervention: Prevent Infection  Recent Flowsheet Documentation  Taken 2/27/2025 0400 by Pepper Polk RN  Infection Prevention:   rest/sleep promoted   single patient room provided   personal protective equipment utilized   hand hygiene promoted   environmental surveillance performed  Taken 2/27/2025 0300 by Pepper Polk RN  Infection Prevention:   rest/sleep promoted   single patient room provided   personal protective equipment utilized   hand hygiene promoted   environmental surveillance performed  Taken 2/27/2025 0200 by Pepper Polk RN  Infection Prevention:   rest/sleep promoted   single patient room provided   personal protective equipment utilized   hand hygiene promoted   environmental surveillance performed  Taken 2/27/2025 0100 by Pepper Polk RN  Infection Prevention:   rest/sleep promoted   single patient room provided   personal protective equipment utilized   hand hygiene promoted   environmental surveillance performed  Taken 2/27/2025 0000 by Pepper Polk RN  Infection Prevention:   rest/sleep promoted    single patient room provided   personal protective equipment utilized   hand hygiene promoted   environmental surveillance performed  Taken 2/26/2025 2300 by Pepper Polk RN  Infection Prevention:   rest/sleep promoted   single patient room provided   personal protective equipment utilized   hand hygiene promoted   environmental surveillance performed  Taken 2/26/2025 2200 by Pepper Polk RN  Infection Prevention:   rest/sleep promoted   single patient room provided   personal protective equipment utilized   hand hygiene promoted   environmental surveillance performed  Taken 2/26/2025 2100 by Pepper Polk RN  Infection Prevention:   rest/sleep promoted   single patient room provided   personal protective equipment utilized   hand hygiene promoted   environmental surveillance performed  Taken 2/26/2025 2000 by Pepper Polk RN  Infection Prevention:   rest/sleep promoted   single patient room provided   personal protective equipment utilized   hand hygiene promoted   environmental surveillance performed  Taken 2/26/2025 1900 by Pepper Polk RN  Infection Prevention:   rest/sleep promoted   single patient room provided   personal protective equipment utilized   hand hygiene promoted   environmental surveillance performed  Goal: Optimal Comfort and Wellbeing  Outcome: Progressing  Intervention: Monitor Pain and Promote Comfort  Flowsheets (Taken 2/27/2025 0526)  Pain Management Interventions: (pt denied pain this shift, but abdomen is tender x 4 quadrants to even light palpation.) other (see comments)  Intervention: Provide Person-Centered Care  Flowsheets (Taken 2/27/2025 0526)  Trust Relationship/Rapport:   care explained   choices provided   empathic listening provided   questions answered   questions encouraged   thoughts/feelings acknowledged     Problem: Fall Injury Risk  Goal: Absence of Fall and Fall-Related Injury  Outcome: Progressing  Intervention: Promote Injury-Free Environment  Recent Flowsheet  Documentation  Taken 2/27/2025 0400 by Pepper Polk RN  Safety Promotion/Fall Prevention:   safety round/check completed   fall prevention program maintained  Taken 2/27/2025 0300 by Pepper Polk RN  Safety Promotion/Fall Prevention:   safety round/check completed   fall prevention program maintained  Taken 2/27/2025 0200 by Pepper Polk RN  Safety Promotion/Fall Prevention:   safety round/check completed   fall prevention program maintained  Taken 2/27/2025 0100 by Pepper Polk RN  Safety Promotion/Fall Prevention:   safety round/check completed   fall prevention program maintained  Taken 2/27/2025 0000 by Pepper Polk RN  Safety Promotion/Fall Prevention:   safety round/check completed   fall prevention program maintained  Taken 2/26/2025 2300 by Pepper Polk RN  Safety Promotion/Fall Prevention:   safety round/check completed   fall prevention program maintained  Taken 2/26/2025 2200 by Pepper Polk RN  Safety Promotion/Fall Prevention:   safety round/check completed   fall prevention program maintained  Taken 2/26/2025 2100 by Pepper Polk RN  Safety Promotion/Fall Prevention:   safety round/check completed   fall prevention program maintained  Taken 2/26/2025 2000 by Pepper Polk RN  Safety Promotion/Fall Prevention:   safety round/check completed   fall prevention program maintained  Taken 2/26/2025 1900 by Pepper Polk RN  Safety Promotion/Fall Prevention:   safety round/check completed   fall prevention program maintained   Goal Outcome Evaluation:  Plan of Care Reviewed With: patient           Outcome Evaluation: Pt remains oriented x 4.  Levo on 0.06 mcg/kg/min most of the night whenever pt was sleeping.  Could be titrated down while awake.  Pt does have a reddened, edematous Left hip.  Pt had reported she could turn herself, but she really wasn't turning very much, so this RN started to turn her.  Crackles left mid to lower back posteriorly.  Bowel sounds were normoactive, but somewhat  tinkling.  Ostomy bags changed to both sites. The medial site was bleeding just superior to opening.  Ordered duoderm for next ostomy bag change. . . hasn't arrived yet.  Emptied 80 cc purulent, yellow, cloudy drainage from the medial bag.  Emptied 20 cc from the other ostomy bag.

## 2025-02-27 NOTE — PROGRESS NOTES
[facility]  Inpatient Progress Note    Reason for Follow Up: Diarrhea    Subjective     Interval History:   Patient's diarrhea has improved but patient continued to drain from the fistula along the intra-abdominal wall.    Current Facility-Administered Medications:     acetaminophen (TYLENOL) tablet 1,000 mg, 1,000 mg, Oral, Q6H PRN, Liam Israel MD    aluminum-magnesium hydroxide-simethicone (MAALOX MAX) 400-400-40 MG/5ML suspension 15 mL, 15 mL, Oral, Q6H PRN, Patsy Lawrence PA, 15 mL at 02/25/25 1139    barium (READI-CAT 2) suspension 450 mL, 450 mL, Oral, Once, Liam Israel MD    sennosides-docusate (PERICOLACE) 8.6-50 MG per tablet 2 tablet, 2 tablet, Oral, BID PRN **AND** polyethylene glycol (MIRALAX) packet 17 g, 17 g, Oral, Daily PRN **AND** bisacodyl (DULCOLAX) EC tablet 5 mg, 5 mg, Oral, Daily PRN **AND** bisacodyl (DULCOLAX) suppository 10 mg, 10 mg, Rectal, Daily PRN, Elliot Briceño MD    Calcium Replacement - Follow Nurse / BPA Driven Protocol, , Not Applicable, PRN, Liam Israel MD    cholestyramine (QUESTRAN) packet 1 packet, 1 packet, Oral, Daily, Luis Mccrary MD, 1 packet at 02/27/25 0904    dextrose (D50W) (25 g/50 mL) IV injection 50 mL, 50 mL, Intravenous, Q1H PRN, Elliot Briceño MD, 50 mL at 02/22/25 2130    ferrous sulfate tablet 325 mg, 325 mg, Oral, Daily With Breakfast, Liam Israel MD, 325 mg at 02/27/25 0904    heparin (porcine) 5000 UNIT/ML injection 5,000 Units, 5,000 Units, Subcutaneous, Q8H, Anne Roger MD, 5,000 Units at 02/27/25 0609    Iohexol (OMNIPAQUE) oral compound 12 mg/mL, 500 mL, Oral, Once, Liam Israel MD    loperamide (IMODIUM) capsule 2 mg, 2 mg, Oral, 4x Daily PRN, Liam Israel MD, 2 mg at 02/26/25 0358    Magnesium Low Dose Replacement - Follow Nurse / BPA Driven Protocol, , Not Applicable, PRN, Liam Israel MD    meropenem (MERREM) 1,000 mg in sodium chloride 0.9 % 100 mL IVPB-VTB, 1,000 mg,  Intravenous, Q12H, Yaya San MD, 1,000 mg at 02/27/25 0902    [Held by provider] midodrine (PROAMATINE) tablet 10 mg, 10 mg, Oral, TID AC, Elliot Briceño MD, 10 mg at 02/24/25 0800    multivitamin with minerals 1 tablet, 1 tablet, Oral, Daily, Liam Israel MD, 1 tablet at 02/27/25 0904    mupirocin (BACTROBAN) 2 % nasal ointment 1 Application, 1 Application, Each Nare, BID, Brandon Estevez DO, 1 Application at 02/27/25 0904    ondansetron (ZOFRAN) injection 4 mg, 4 mg, Intravenous, Q6H PRN, Elliot Briceño MD, 4 mg at 02/25/25 1101    pantoprazole (PROTONIX) EC tablet 40 mg, 40 mg, Oral, Daily, Elliot Briceño MD, 40 mg at 02/27/25 0904    Pharmacy to dose vancomycin, , Not Applicable, Continuous PRN, Anne Roger MD    Phosphorus Replacement - Follow Nurse / BPA Driven Protocol, , Not Applicable, PRN, Liam Israel MD    potassium phosphate (monobasic) (K-PHOS) tablet 500 mg, 500 mg, Oral, BID, Liam Israel MD, 500 mg at 02/27/25 0930    saccharomyces boulardii (FLORASTOR) capsule 500 mg, 500 mg, Oral, Daily, Liam Israel MD, 500 mg at 02/27/25 0904    sertraline (ZOLOFT) tablet 75 mg, 75 mg, Oral, Daily, Elliot Birceño MD, 75 mg at 02/27/25 0904    sodium bicarbonate tablet 1,300 mg, 1,300 mg, Oral, TID, Yaya San MD, 1,300 mg at 02/27/25 1000    sodium chloride 0.9 % flush 10 mL, 10 mL, Intravenous, PRN, Elliot Briceño MD    sodium chloride 0.9 % flush 10 mL, 10 mL, Intravenous, Q12H, Elliot Briceño MD, 10 mL at 02/27/25 0907    sodium chloride 0.9 % flush 10 mL, 10 mL, Intravenous, PRN, Elliot Briceño MD    sodium chloride 0.9 % infusion 40 mL, 40 mL, Intravenous, PRN, Elliot Briceño MD  Review of Systems:    All system ROS is negative except what's mentioned in HPI.    Objective     Vital Signs  Temp:  [97.3 °F (36.3 °C)-98 °F (36.7 °C)] 98 °F (36.7 °C)  Heart Rate:  [] 60  Resp:  [12-28] 15  BP: ()/(49-80) 86/61  Body mass  index is 21.09 kg/m².    Intake/Output Summary (Last 24 hours) at 2/27/2025 1439  Last data filed at 2/27/2025 0622  Gross per 24 hour   Intake 2911.6 ml   Output 250 ml   Net 2661.6 ml     No intake/output data recorded.     Physical Exam:  General     Alert and oriented, normal affect   Head:    Normocephalic, without obvious abnormality, atraumatic   Eyes:          conjunctivae and sclerae normal, no icterus, pupils round and reactive to light   Oral cavity: Moist and intact, normal dentation   Neck:   Supple, no adenopathy   Chest Wall/Lungs:    No abnormalities observed, equal on expansion bilaterally, no use of extrarespiratory muscles noted   Abdomen:     Soft, nondistended, nontender; normal bowel sounds, no hepatospleenomegaly   Extremities:   no edema, clubbing, or cyanosis   Skin:   No bruising or rash   Psychiatric:  normal mood and insight          Results Review:      Results from last 7 days   Lab Units 02/27/25  1339 02/27/25  0603 02/26/25  1106   WBC 10*3/mm3 7.85 14.25* 4.99   HEMOGLOBIN g/dL 9.1* 9.8* 8.8*   HEMATOCRIT % 28.9* 30.3* 28.3*   PLATELETS 10*3/mm3 124* 197 115*     Results from last 7 days   Lab Units 02/27/25  1339 02/27/25  0603 02/26/25  2320 02/26/25  0248 02/25/25  0458 02/24/25  0440 02/23/25  1058 02/23/25  0138   SODIUM mmol/L 138 140 144   < > 142 141   < > 140   POTASSIUM mmol/L 4.2 3.8 3.8   < > 3.2* 3.5   < > 3.0*   CHLORIDE mmol/L 115* 115* 119*   < > 110* 110*   < > 112*   CO2 mmol/L 15.6* 15.3* 15.7*   < > 20.4* 20.4*   < > 15.5*   BUN mg/dL 25* 27* 28*   < > 41* 38*   < > 38*   CREATININE mg/dL 2.13* 2.25* 2.64*   < > 3.23* 3.53*   < > 3.64*   CALCIUM mg/dL 7.4* 8.0* 8.0*   < > 8.7 8.4*   < > 8.3*   BILIRUBIN mg/dL  --   --   --   --  0.6 0.8  --  1.2   ALK PHOS U/L  --   --   --   --  96 93  --  91   ALT (SGPT) U/L  --   --   --   --  <5 5  --  7   AST (SGOT) U/L  --   --   --   --  9 8  --  9   GLUCOSE mg/dL 79 97 114*   < > 89 133*   < > 149*    < > = values in this  interval not displayed.     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  Abnormal CT scan of the liver possible nodularity and cirrhosis    Plan:   We told the CT scan results.  Patient may need to be started on TPN    I discussed the patients findings and my recommendations with patient, hospitalist.    Anam Mccrary MD

## 2025-02-27 NOTE — PROGRESS NOTES
[facility] Clinical Pharmacy Services: Vancomycin Monitoring Note    Mag Padilla is a 44 y.o. female who is on day 4/7 of pharmacy to dose vancomycin for SSTI    Previous Vancomycin Dose:   500 mg IV x1 2/23 @ 1635  Imaging Reviewed?: Yes  Updated Cultures and Sensitivities:   Microbiology Results (last 10 days)       Procedure Component Value - Date/Time    Blood Culture - Blood, Hand, Right [[id number]]  (Normal) Collected: 02/24/25 2016    Lab Status: Preliminary result Specimen: Blood from Hand, Right Updated: 02/26/25 2030     Blood Culture No growth at 2 days    Narrative:      Less than seven (7) mL's of blood was collected.  Insufficient quantity may yield false negative results.    Blood Culture - Blood, Arm, Left [[id number]]  (Normal) Collected: 02/24/25 1803    Lab Status: Preliminary result Specimen: Blood from Arm, Left Updated: 02/26/25 1831     Blood Culture No growth at 2 days    Clostridioides difficile Toxin - Stool, Per Rectum [[id number]] Collected: 02/24/25 0856    Lab Status: Final result Specimen: Stool from Per Rectum Updated: 02/24/25 0950    Narrative:      The following orders were created for panel order Clostridioides difficile Toxin - Stool, Per Rectum.  Procedure                               Abnormality         Status                     ---------                               -----------         ------                     Clostridioides difficile...[[id number]]                      Final result                 Please view results for these tests on the individual orders.    Clostridioides difficile Toxin, PCR - Stool, Per Rectum [[id number]] Collected: 02/24/25 0856    Lab Status: Final result Specimen: Stool from Per Rectum Updated: 02/24/25 0950     Toxigenic C. difficile by PCR Negative     027 Toxin Presumptive Negative    Narrative:      The result indicates the absence of toxigenic C. difficile from stool specimen.     MRSA Screen, PCR (Inpatient) - Swab, Nares  [[id number]]  (Abnormal) Collected: 02/23/25 0601    Lab Status: Final result Specimen: Swab from Nares Updated: 02/23/25 1001     MRSA PCR MRSA Detected    Narrative:      The negative predictive value of this diagnostic test is high and should only be used to consider de-escalating anti-MRSA therapy. A positive result may indicate colonization with MRSA and must be correlated clinically.    Respiratory Panel PCR w/COVID-19(SARS-CoV-2) BRIELLE/TOSHA/IMTIAZ/PAD/COR/NATACHA In-House, NP Swab in UTM/VTM, 2 HR TAT - Swab, Nasopharynx [[id number]]  (Normal) Collected: 02/23/25 0601    Lab Status: Final result Specimen: Swab from Nasopharynx Updated: 02/23/25 0827     ADENOVIRUS, PCR Not Detected     Coronavirus 229E Not Detected     Coronavirus HKU1 Not Detected     Coronavirus NL63 Not Detected     Coronavirus OC43 Not Detected     COVID19 Not Detected     Human Metapneumovirus Not Detected     Human Rhinovirus/Enterovirus Not Detected     Influenza A PCR Not Detected     Influenza B PCR Not Detected     Parainfluenza Virus 1 Not Detected     Parainfluenza Virus 2 Not Detected     Parainfluenza Virus 3 Not Detected     Parainfluenza Virus 4 Not Detected     RSV, PCR Not Detected     Bordetella pertussis pcr Not Detected     Bordetella parapertussis PCR Not Detected     Chlamydophila pneumoniae PCR Not Detected     Mycoplasma pneumo by PCR Not Detected    Narrative:      In the setting of a positive respiratory panel with a viral infection PLUS a negative procalcitonin without other underlying concern for bacterial infection, consider observing off antibiotics or discontinuation of antibiotics and continue supportive care. If the respiratory panel is positive for atypical bacterial infection (Bordetella pertussis, Chlamydophila pneumoniae, or Mycoplasma pneumoniae), consider antibiotic de-escalation to target atypical bacterial infection.    Urine Culture - Urine, Urine, Clean Catch [[id number]]  (Abnormal) Collected: 02/22/25 9333     Lab Status: Final result Specimen: Urine, Clean Catch Updated: 02/24/25 1058     Urine Culture <10,000 CFU/mL Gram Negative Bacilli    Narrative:      Call if further workup needed.   Colonization of the urinary tract without infection is common. Treatment is discouraged unless the patient is symptomatic, pregnant, or undergoing an invasive urologic procedure.    Blood Culture - Blood, Leg, Left [[id number]]  (Abnormal)  (Susceptibility) Collected: 02/22/25 1922    Lab Status: Final result Specimen: Blood from Leg, Left Updated: 02/25/25 0620     Blood Culture Escherichia coli ESBL     Comment:   Consider infectious disease consult.  Susceptibility results may not correlate to clinical outcomes.  For ESBL-producing infections in the blood, a carbapenem is recommended as first-line therapy for optimal clinical outcomes.        Isolated from Aerobic and Anaerobic Bottles     Gram Stain Aerobic Bottle Gram negative bacilli      Anaerobic Bottle Gram negative bacilli    Narrative:      Less than seven (7) mL's of blood was collected.  Insufficient quantity may yield false negative results.    Susceptibility        Escherichia coli ESBL      VANDANA      Ciprofloxacin >=4 ug/ml Resistant      Ertapenem <=0.12 ug/ml Susceptible      Levofloxacin >=8 ug/ml Resistant      Meropenem <=0.25 ug/ml Susceptible      Trimethoprim + Sulfamethoxazole >=320 ug/ml Resistant                       Susceptibility Comments       Escherichia coli ESBL    With the exception of urinary-sourced infections, aminoglycosides should not be used as monotherapy.               Blood Culture ID, PCR - Blood, Leg, Left [[id number]]  (Abnormal) Collected: 02/22/25 1922    Lab Status: Final result Specimen: Blood from Leg, Left Updated: 02/23/25 1155     BCID, PCR Escherichia coli. Identification by BCID2 PCR.     BCID, PCR 2 CTX-M (ESBL) detected. Identification by BCID2 PCR     BOTTLE TYPE Anaerobic Bottle    Blood Culture - Blood, Arm, Left [[id number]]   (Normal) Collected: 02/22/25 1919    Lab Status: Preliminary result Specimen: Blood from Arm, Left Updated: 02/26/25 1931     Blood Culture No growth at 4 days    Narrative:      Less than seven (7) mL's of blood was collected.  Insufficient quantity may yield false negative results.    Wound Culture - Wound, Abdominal Wall [[id number]]  (Abnormal)  (Susceptibility) Collected: 02/22/25 1605    Lab Status: Final result Specimen: Wound from Abdominal Wall Updated: 02/26/25 0748     Wound Culture Moderate growth (3+) Escherichia coli ESBL     Comment:   Consider infectious disease consult.  Susceptibility results may not correlate to clinical outcomes.         Light growth (2+) Enterococcus faecium      Light growth (2+) Yeast, Not Candida albicans     Gram Stain Rare (1+) Gram negative bacilli    Susceptibility        Escherichia coli ESBL      VANDANA      Ertapenem 0.25 ug/ml Susceptible      Meropenem <=0.25 ug/ml Susceptible                       Susceptibility        Enterococcus faecium      VANDANA      Ampicillin >=32 ug/ml Resistant      Vancomycin <=0.5 ug/ml Susceptible                       Susceptibility Comments       Escherichia coli ESBL    With the exception of urinary-sourced infections, aminoglycosides should not be used as monotherapy.               Legionella Antigen, Urine - Urine, Straight Cath [[id number]]  (Normal) Collected: 02/21/25 2202    Lab Status: Final result Specimen: Urine from Straight Cath Updated: 02/22/25 1751     LEGIONELLA ANTIGEN, URINE Negative    S. Pneumo Ag Urine or CSF - Urine, Straight Cath [[id number]]  (Normal) Collected: 02/21/25 2202    Lab Status: Final result Specimen: Urine from Straight Cath Updated: 02/22/25 1752     Strep Pneumo Ag Negative    COVID-19, FLU A/B, RSV PCR 1 HR TAT - Swab, Nasopharynx [[id number]]  (Normal) Collected: 02/21/25 1745    Lab Status: Final result Specimen: Swab from Nasopharynx Updated: 02/21/25 1832     COVID19 Not Detected     Influenza A PCR  "Not Detected     Influenza B PCR Not Detected     RSV, PCR Not Detected    Narrative:      Fact sheet for providers: https://www.fda.gov/media/070787/download    Fact sheet for patients: https://www.fda.gov/media/342259/download    Test performed by PCR.              Vitals/Labs  Ht: 157.5 cm (62\"); Wt: 52.3 kg (115 lb 4.8 oz)   Temp (24hrs), Av.6 °F (36.4 °C), Min:97 °F (36.1 °C), Max:97.9 °F (36.6 °C)   Estimated Creatinine Clearance: 25.8 mL/min (A) (by C-G formula based on SCr of 2.25 mg/dL (H)).     Results from last 7 days   Lab Units 25  0603 25  2320 25  1106 25  0248 25  0526 25  0440 25  1058 25  0607   VANCOMYCIN RM mcg/mL 15.94  --   --   --   --  21.68  --  23.27   CREATININE mg/dL 2.25* 2.64*  --  2.88*   < > 3.53*   < >  --    WBC 10*3/mm3 14.25*  --  4.99 3.62   < >  --   --   --     < > = values in this interval not displayed.     Assessment/Plan    Current Vancomycin Dose: Pulse dosing s/t renal function  Will dose with 500mg x1 today   Next Vanc Random ordered for  at 0600  We will continue to monitor patient changes and renal function     Thank you for involving pharmacy in this patient's care. Please contact pharmacy with any questions or concerns.    Mariola Douglas [facility]  Clinical Pharmacist        "

## 2025-02-27 NOTE — NURSING NOTE
Made Dr. Medina and Pulmonary aware that pt's monitor has been having a lot of artifact (both when pt was in ICU and now that pt is in ICU overflow room 381).  Leads have been checked and new pads have been placed.  No change, still a lot of artifact.  EKG was done at the beginning of the shift as what this RN is presuming is just artifact could have also been atrial flutter.  EKG did show atrial flutter, but this RN really thinks it is just artifact.  This RN has let Dr. Medina and pulmonary know what EKG showed.  Monitor currently (now that pt has turned to her left side) is showing sinus rhythm with no artifact.  The artifact went away when pt was turned. Order received from pulmonary for magnesium and BMP.

## 2025-02-28 ENCOUNTER — APPOINTMENT (OUTPATIENT)
Dept: CT IMAGING | Facility: HOSPITAL | Age: 45
DRG: 853 | End: 2025-02-28
Payer: MEDICARE

## 2025-02-28 LAB
ALBUMIN SERPL-MCNC: 1.7 G/DL (ref 3.5–5.2)
ANION GAP SERPL CALCULATED.3IONS-SCNC: 7.6 MMOL/L (ref 5–15)
BASOPHILS # BLD AUTO: 0.02 10*3/MM3 (ref 0–0.2)
BASOPHILS NFR BLD AUTO: 0.2 % (ref 0–1.5)
BUN SERPL-MCNC: 28 MG/DL (ref 6–20)
BUN/CREAT SERPL: 14.4 (ref 7–25)
CALCIUM SPEC-SCNC: 7.7 MG/DL (ref 8.6–10.5)
CHLORIDE SERPL-SCNC: 119 MMOL/L (ref 98–107)
CO2 SERPL-SCNC: 16.4 MMOL/L (ref 22–29)
CREAT SERPL-MCNC: 1.94 MG/DL (ref 0.57–1)
DEPRECATED RDW RBC AUTO: 54.6 FL (ref 37–54)
EGFRCR SERPLBLD CKD-EPI 2021: 31.8 ML/MIN/1.73
EOSINOPHIL # BLD AUTO: 0.14 10*3/MM3 (ref 0–0.4)
EOSINOPHIL NFR BLD AUTO: 1.5 % (ref 0.3–6.2)
ERYTHROCYTE [DISTWIDTH] IN BLOOD BY AUTOMATED COUNT: 18.6 % (ref 12.3–15.4)
GLUCOSE BLDC GLUCOMTR-MCNC: 59 MG/DL (ref 70–99)
GLUCOSE BLDC GLUCOMTR-MCNC: 63 MG/DL (ref 70–99)
GLUCOSE BLDC GLUCOMTR-MCNC: 77 MG/DL (ref 70–99)
GLUCOSE BLDC GLUCOMTR-MCNC: 77 MG/DL (ref 70–99)
GLUCOSE BLDC GLUCOMTR-MCNC: 84 MG/DL (ref 70–99)
GLUCOSE SERPL-MCNC: 76 MG/DL (ref 65–99)
HCT VFR BLD AUTO: 27.6 % (ref 34–46.6)
HGB BLD-MCNC: 8.9 G/DL (ref 12–15.9)
IMM GRANULOCYTES # BLD AUTO: 0.28 10*3/MM3 (ref 0–0.05)
IMM GRANULOCYTES NFR BLD AUTO: 3 % (ref 0–0.5)
LYMPHOCYTES # BLD AUTO: 1.07 10*3/MM3 (ref 0.7–3.1)
LYMPHOCYTES NFR BLD AUTO: 11.5 % (ref 19.6–45.3)
MCH RBC QN AUTO: 29.5 PG (ref 26.6–33)
MCHC RBC AUTO-ENTMCNC: 32.2 G/DL (ref 31.5–35.7)
MCV RBC AUTO: 91.4 FL (ref 79–97)
MONOCYTES # BLD AUTO: 0.38 10*3/MM3 (ref 0.1–0.9)
MONOCYTES NFR BLD AUTO: 4.1 % (ref 5–12)
NEUTROPHILS NFR BLD AUTO: 7.42 10*3/MM3 (ref 1.7–7)
NEUTROPHILS NFR BLD AUTO: 79.7 % (ref 42.7–76)
NRBC BLD AUTO-RTO: 0 /100 WBC (ref 0–0.2)
PHOSPHATE SERPL-MCNC: 4 MG/DL (ref 2.5–4.5)
PLATELET # BLD AUTO: 137 10*3/MM3 (ref 140–450)
PMV BLD AUTO: 12 FL (ref 6–12)
POTASSIUM SERPL-SCNC: 3.9 MMOL/L (ref 3.5–5.2)
RBC # BLD AUTO: 3.02 10*6/MM3 (ref 3.77–5.28)
SODIUM SERPL-SCNC: 143 MMOL/L (ref 136–145)
VANCOMYCIN SERPL-MCNC: 11.8 MCG/ML (ref 5–40)
WBC NRBC COR # BLD AUTO: 9.31 10*3/MM3 (ref 3.4–10.8)

## 2025-02-28 PROCEDURE — 82948 REAGENT STRIP/BLOOD GLUCOSE: CPT | Performed by: INTERNAL MEDICINE

## 2025-02-28 PROCEDURE — 82274 ASSAY TEST FOR BLOOD FECAL: CPT | Performed by: INTERNAL MEDICINE

## 2025-02-28 PROCEDURE — 74176 CT ABD & PELVIS W/O CONTRAST: CPT

## 2025-02-28 PROCEDURE — 25010000002 HEPARIN (PORCINE) PER 1000 UNITS: Performed by: INTERNAL MEDICINE

## 2025-02-28 PROCEDURE — 99232 SBSQ HOSP IP/OBS MODERATE 35: CPT | Performed by: INTERNAL MEDICINE

## 2025-02-28 PROCEDURE — 82948 REAGENT STRIP/BLOOD GLUCOSE: CPT

## 2025-02-28 PROCEDURE — 25010000002 VANCOMYCIN PER 500 MG: Performed by: INTERNAL MEDICINE

## 2025-02-28 PROCEDURE — 25010000002 MEROPENEM PER 100 MG: Performed by: INTERNAL MEDICINE

## 2025-02-28 PROCEDURE — 85025 COMPLETE CBC W/AUTO DIFF WBC: CPT | Performed by: INTERNAL MEDICINE

## 2025-02-28 PROCEDURE — 83630 LACTOFERRIN FECAL (QUAL): CPT | Performed by: INTERNAL MEDICINE

## 2025-02-28 PROCEDURE — 80202 ASSAY OF VANCOMYCIN: CPT | Performed by: INTERNAL MEDICINE

## 2025-02-28 PROCEDURE — 80069 RENAL FUNCTION PANEL: CPT | Performed by: INTERNAL MEDICINE

## 2025-02-28 RX ORDER — SODIUM BICARBONATE 650 MG/1
1950 TABLET ORAL 3 TIMES DAILY
Status: DISCONTINUED | OUTPATIENT
Start: 2025-02-28 | End: 2025-03-03

## 2025-02-28 RX ADMIN — CHOLESTYRAMINE 1 PACKET: 4 POWDER, FOR SUSPENSION ORAL at 08:31

## 2025-02-28 RX ADMIN — MIDODRINE HYDROCHLORIDE 10 MG: 10 TABLET ORAL at 16:33

## 2025-02-28 RX ADMIN — Medication 10 ML: at 21:32

## 2025-02-28 RX ADMIN — SODIUM BICARBONATE 650 MG TABLET 1950 MG: at 16:33

## 2025-02-28 RX ADMIN — MEROPENEM 1000 MG: 1 INJECTION INTRAVENOUS at 08:32

## 2025-02-28 RX ADMIN — Medication 10 ML: at 08:32

## 2025-02-28 RX ADMIN — POTASSIUM PHOSPHATE, MONOBASIC 500 MG: 500 TABLET, SOLUBLE ORAL at 21:30

## 2025-02-28 RX ADMIN — HEPARIN SODIUM 5000 UNITS: 5000 INJECTION INTRAVENOUS; SUBCUTANEOUS at 06:35

## 2025-02-28 RX ADMIN — FERROUS SULFATE TAB 325 MG (65 MG ELEMENTAL FE) 325 MG: 325 (65 FE) TAB at 08:31

## 2025-02-28 RX ADMIN — Medication 500 MG: at 08:31

## 2025-02-28 RX ADMIN — POTASSIUM PHOSPHATE, MONOBASIC 500 MG: 500 TABLET, SOLUBLE ORAL at 08:31

## 2025-02-28 RX ADMIN — MEROPENEM 1000 MG: 1 INJECTION INTRAVENOUS at 21:31

## 2025-02-28 RX ADMIN — SODIUM BICARBONATE 650 MG TABLET 1950 MG: at 08:33

## 2025-02-28 RX ADMIN — SODIUM BICARBONATE 650 MG TABLET 1950 MG: at 21:30

## 2025-02-28 RX ADMIN — MIDODRINE HYDROCHLORIDE 10 MG: 10 TABLET ORAL at 11:52

## 2025-02-28 RX ADMIN — HEPARIN SODIUM 5000 UNITS: 5000 INJECTION INTRAVENOUS; SUBCUTANEOUS at 14:25

## 2025-02-28 RX ADMIN — Medication 1 TABLET: at 08:31

## 2025-02-28 RX ADMIN — VANCOMYCIN HYDROCHLORIDE 500 MG: 500 INJECTION, POWDER, LYOPHILIZED, FOR SOLUTION INTRAVENOUS at 08:32

## 2025-02-28 RX ADMIN — HEPARIN SODIUM 5000 UNITS: 5000 INJECTION INTRAVENOUS; SUBCUTANEOUS at 21:32

## 2025-02-28 RX ADMIN — SERTRALINE HYDROCHLORIDE 75 MG: 50 TABLET ORAL at 08:31

## 2025-02-28 RX ADMIN — PANTOPRAZOLE SODIUM 40 MG: 40 TABLET, DELAYED RELEASE ORAL at 08:31

## 2025-02-28 NOTE — PROGRESS NOTES
Pulmonary / Critical Care Progress Note      Patient Name: Mag Padilla  : 1980  MRN: [id number]  Attending:  Liam Israel MD   Date of admission: 2025    Subjective   Subjective   Follow-up for septic shock    Over past 24 hours: Transferred out of ICU.  Remains on meropenem and vancomycin    No acute events overnight    This morning,  Currently on room air  Lying in bed  Reports feeling okay today  Blood pressure labile  No fever or chills  Denies any abdominal pain  Renal function continues to improve    Objective   Objective     Vitals:   Vital signs for last 24 hours:  Temp:  [97.3 °F (36.3 °C)-98 °F (36.7 °C)] 97.3 °F (36.3 °C)  Heart Rate:  [57-90] 72  Resp:  [15-18] 18  BP: (71-98)/(54-72) 96/65    Intake/Output last 3 shifts:  I/O last 3 completed shifts:  In: 2007.7 [P.O.:240; I.V.:1767.7]  Out: 250 [Urine:150]  Intake/Output this shift:  No intake/output data recorded.    Vent settings for last 24 hours:       Hemodynamic parameters for last 24 hours:       Physical Exam:  Vital Signs Reviewed   General: WDWN, Alert, NAD.  Asael ill-appearing female  HEENT:  PERRL, EOMI.  OP, nares clear, no sinus tenderness  Neck:  Supple, no JVD, no thyromegaly  Chest:  good aeration, clear to auscultation bilaterally, no work of breathing noted on room air  CV: RRR, no MGR, pulses 2+, equal.  Abd:  Soft, NT, ND, + BS, no HSM  EXT:  no clubbing, no cyanosis, no edema  Neuro:  A&Ox3, CN grossly intact, no focal deficits.  Skin: No rashes or lesions noted    Result Review    Result Review:  I have personally reviewed the results from the time of this admission to 2025 07:12 EST and agree with these findings:  [x]  Laboratory  [x]  Microbiology  [x]  Radiology  [x]  EKG/Telemetry   [x]  Cardiology/Vascular   []  Pathology  []  Old records  []  Other:  Most notable findings include:       Lab 25  0419 25  1339 25  0603 25  2320 25  1106 25  0248 25  0458  02/24/25  0526 02/24/25  0440 02/23/25  2105 02/23/25  1058 02/23/25  0138 02/22/25  2201 02/22/25  2157 02/22/25  1837 02/22/25  1648 02/22/25  0502 02/21/25  1821 02/21/25  1708   WBC 9.31 7.85 14.25*  --  4.99 3.62 9.26 6.61  --   --   --  6.50   < >  --   --  9.77   < > 9.27 11.98*   HEMOGLOBIN 8.9* 9.1* 9.8*  --  8.8* 6.8* 7.5* 7.3*  --    < >  --  7.0*   < >  --   --  8.5*   < > 8.2* 9.0*   HEMATOCRIT 27.6* 28.9* 30.3*  --  28.3* 22.0* 23.5* 23.3*  --    < >  --  22.4*   < >  --   --  27.9*   < > 26.8* 29.9*   PLATELETS 137* 124* 197  --  115* 98* 162 116*  --   --   --  84*   < >  --   --  100*   < > 71* 86*   SODIUM 143 138 140 144  --  146* 142  --  141  --  143 140  --  138  --  138   < > 135* 132*   POTASSIUM 3.9 4.2 3.8 3.8  --  3.5 3.2*  --  3.5  --  4.1 3.0*  --  2.9*  --  3.4*   < > 3.0* 3.3*   CHLORIDE 119* 115* 115* 119*  --  118* 110*  --  110*  --  109* 112*  --  114*  --  113*   < > 110* 106   CO2 16.4* 15.6* 15.3* 15.7*  --  18.9* 20.4*  --  20.4*  --  18.8* 15.5*  --  12.3*  --  11.3*   < > 11.5* 10.3*   BUN 28* 25* 27* 28*  --  32* 41*  --  38*  --  34* 38*  --  39*  --  45*   < > 53* 56*   CREATININE 1.94* 2.13* 2.25* 2.64*  --  2.88* 3.23*  --  3.53*  --  3.66* 3.64*  --  3.91*   < > 4.46*   < > 5.50* 5.67*   GLUCOSE 76 79 97 114*  --  71 89  --  133*  --  138* 149*  --  153*  --  72   < > 79 88   CALCIUM 7.7* 7.4* 8.0* 8.0*  --  8.4* 8.7  --  8.4*  --  8.1* 8.3*  --  8.4*  --  8.4*   < > 7.9* 8.2*   PHOSPHORUS 4.0 2.1*  --   --   --  2.0* 2.4*  --  3.0  --  2.2* 2.7  --  2.2*   < >  --   --   --   --    TOTAL PROTEIN  --   --   --   --   --   --  5.7*  --  5.7*  --   --  5.6*  --  5.2*  --  5.7*  --  5.4* 6.1   ALBUMIN 1.7* 1.7*  --   --   --   --  2.3*  --  2.0*  --  1.9* 2.0*  --  1.6*  --  1.5*  --  1.6* 1.6*   GLOBULIN  --   --   --   --   --   --  3.4  --  3.7  --   --  3.6  --  3.6  --  4.2  --  3.8 4.5    < > = values in this interval not displayed.     Results for orders placed  during the hospital encounter of 02/21/25    Adult Transthoracic Echo Complete W/ Cont if Necessary Per Protocol    Interpretation Summary    Left ventricular ejection fraction appears to be 46 - 50%. Mild global hypokinesis    Left ventricular diastolic function is consistent with (grade I) impaired relaxation.    Estimated right ventricular systolic pressure from tricuspid regurgitation is normal (<35 mmHg).  CT Abdomen Pelvis Without Contrast    Result Date: 2/22/2025  CT ABDOMEN PELVIS WO CONTRAST Date of Exam: 2/22/2025 4:18 PM EST Indication: chronic subcutaneous abscess of abdomen. Comparison: CT AP 2/1/2025 Technique: Axial CT images were obtained of the abdomen and pelvis without the administration of contrast. Reconstructed coronal and sagittal images were also obtained. Automated exposure control and iterative construction methods were used. Findings: Subsegmental atelectasis at the lung bases appears worsened. No consolidation or mass is evident. Small right effusion appears larger. New small left effusion is evident. The right liver lobe is relatively small, which may be a manifestation of cirrhosis. The gallbladder is absent, surgical clips are seen in the gallbladder bed. No pancreatic mass is evident. Embolization coils are again seen adjacent to the pancreatic head. No adrenal mass is evident. The spleen is of normal size. A small amount of peritoneal fluid is again seen. Numerous tiny nonobstructing stones are seen in the renal collecting systems. Mild left hydroureteronephrosis is evident. 6 mm stone in the mid left ureter at the L5 level was seen on 2/1/2025 in the lower pole collecting system of the left kidney. This stone appears to be visible on the  image. 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 A wound is seen in the midline anterior abdominal wall, with 1.5 cm gas or air pocket seen on sagittal series 4 image 105, which appears slightly smaller in comparison to  2/1/2025. No fluid collection is evident. The uterus measures 4 cm in greatest dimension. No pelvic mass is seen.     Impression: Impression: CT scan of the abdomen and pelvis without contrast demonstrating worsening subsegmental atelectasis at the lung bases. Small right effusion appears larger. New small left effusion. Hepatic configuration concerning for cirrhosis. A small amount of peritoneal fluid is again seen. Post cholecystectomy. Multiple nonobstructing renal stones are again seen. Mild left hydroureteronephrosis is not seen on the prior study. 6 mm stone in the mid left ureter at the L5 level was seen on the prior study in the lower pole collecting system of the left kidney. 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 Wound in the anterior abdominal wall is again seen. This appears smaller in comparison to 2/1/2025. Electronically Signed: Shun Milan MD  2/22/2025 5:22 PM EST  Workstation ID: [id number]     Blood cultures positive for CTX-M E-coli      Assessment & Plan   Assessment / Plan     Active Hospital Problems:  Active Hospital Problems    Diagnosis     **Acute renal failure     Hypotension due to hypovolemia     Chronic diarrhea     Inflammatory bowel disease (Crohn's disease)     Kidney stone      Impression:  Septic shock, present on admission  Urinary tract infection from multidrug-resistant E. Coli  E. coli bacteremia  Obstructing nephrolithiasis status post ureteral stent placement  Dehydration  Acute kidney injury secondary to prerenal etiology  Metabolic acidosis  Supratherapeutic INR  Hypokalemia  Chronic hypotension on midodrine  History of Crohn's disease with complex abdominal surgeries  Chronic nonhealing abdominal wounds  Enterocutaneous fistulas  Bradycardia     Plan:  -Remain on room air  -Continue meropenem and vancomycin per primary  -Recommend discontinuing central line  -Continue to monitor renal panel and electrolytes.  Replace electrolytes  necessary  -Continue Protonix  -Continue midodrine  -Encourage activity as tolerated and incentive spirometer use  -Pain control per primary    Unless otherwise needed,pulmonary will sign off at this time. Please call with any questions or concerns.    VTE Prophylaxis:  Pharmacologic & mechanical VTE prophylaxis orders are present.    CODE STATUS:   Level Of Support Discussed With: Patient  Code Status (Patient has no pulse and is not breathing): CPR (Attempt to Resuscitate)  Medical Interventions (Patient has pulse or is breathing): Full Support    I have reviewed labs, imaging, pertinent clinical data and provider notes.   I have discussed with bedside nurse and primary service.     Electronically signed by FABIANA Bernard, 02/28/25, 12:02 PM EST.    This visit was performed by BOTH a physician and an APC. I personally evaluated and examined the patient. I performed all aspects of MDM as documented. , I have reviewed and confirmed the accuracy of the patient's history as documented in this note., and I have reexamined the patient and the results are consistent with the previously documented exam. I have updated the documentation as necessary.     Electronically signed by Didier King MD, 02/28/25, 3:12 PM EST.     Electronically signed by Didier King MD, 02/28/25, 7:12 AM EST.

## 2025-02-28 NOTE — PROGRESS NOTES
General Surgery    CT is apparently still pending.  Patient with fistulizing Crohn's disease.  Patient is been referred and being treated at [facility]-if she needs anything surgically I would recommend transfer to a center with colorectal or Crohn's specialization.  I do not feel at this time she has any obvious surgical needs and as stated in my consult note, surgical intervention at this point is likely to do more harm than good.  But I will defer expert opinion to [place]

## 2025-02-28 NOTE — PLAN OF CARE
Goal Outcome Evaluation:           Progress: no change  Outcome Evaluation: Patient alert and able to make needs known. Continues to have soft BPs and low blood sugars, MD aware. Drank contrast for CT at 1445 and will have CT around 1645. Has used bedpan frequently and been continent throughout the day. Frequently used items within easy reach.

## 2025-02-28 NOTE — PLAN OF CARE
Goal Outcome Evaluation:  Plan of Care Reviewed With: patient        Progress: no change  Outcome Evaluation: aox4, soft BP, MD notified. Bags have yellow drainage but no leaking. refused CT drink, asked to do later. Able to turn and ask for bedpan as needed.

## 2025-02-28 NOTE — PROGRESS NOTES
[facility] Clinical Pharmacy Services: Vancomycin Monitoring Note    Mag Padilla is a 44 y.o. female who is on day 5/7 of pharmacy to dose vancomycin for SSTI    Previous Vancomycin Dose:   500 mg IV x1 2/23 @ 1635  Imaging Reviewed?: Yes  Updated Cultures and Sensitivities:   Microbiology Results (last 10 days)       Procedure Component Value - Date/Time    Blood Culture - Blood, Hand, Right [[id number]]  (Normal) Collected: 02/24/25 2016    Lab Status: Preliminary result Specimen: Blood from Hand, Right Updated: 02/27/25 2030     Blood Culture No growth at 3 days    Narrative:      Less than seven (7) mL's of blood was collected.  Insufficient quantity may yield false negative results.    Blood Culture - Blood, Arm, Left [[id number]]  (Normal) Collected: 02/24/25 1803    Lab Status: Preliminary result Specimen: Blood from Arm, Left Updated: 02/27/25 1831     Blood Culture No growth at 3 days    Clostridioides difficile Toxin - Stool, Per Rectum [[id number]] Collected: 02/24/25 0856    Lab Status: Final result Specimen: Stool from Per Rectum Updated: 02/24/25 0950    Narrative:      The following orders were created for panel order Clostridioides difficile Toxin - Stool, Per Rectum.  Procedure                               Abnormality         Status                     ---------                               -----------         ------                     Clostridioides difficile...[[id number]]                      Final result                 Please view results for these tests on the individual orders.    Clostridioides difficile Toxin, PCR - Stool, Per Rectum [[id number]] Collected: 02/24/25 0856    Lab Status: Final result Specimen: Stool from Per Rectum Updated: 02/24/25 0950     Toxigenic C. difficile by PCR Negative     027 Toxin Presumptive Negative    Narrative:      The result indicates the absence of toxigenic C. difficile from stool specimen.     MRSA Screen, PCR (Inpatient) - Swab, Nares  [[id number]]  (Abnormal) Collected: 02/23/25 0601    Lab Status: Final result Specimen: Swab from Nares Updated: 02/23/25 1001     MRSA PCR MRSA Detected    Narrative:      The negative predictive value of this diagnostic test is high and should only be used to consider de-escalating anti-MRSA therapy. A positive result may indicate colonization with MRSA and must be correlated clinically.    Respiratory Panel PCR w/COVID-19(SARS-CoV-2) BRIELLE/TOSHA/IMTIAZ/PAD/COR/NATACHA In-House, NP Swab in UTM/VTM, 2 HR TAT - Swab, Nasopharynx [[id number]]  (Normal) Collected: 02/23/25 0601    Lab Status: Final result Specimen: Swab from Nasopharynx Updated: 02/23/25 0827     ADENOVIRUS, PCR Not Detected     Coronavirus 229E Not Detected     Coronavirus HKU1 Not Detected     Coronavirus NL63 Not Detected     Coronavirus OC43 Not Detected     COVID19 Not Detected     Human Metapneumovirus Not Detected     Human Rhinovirus/Enterovirus Not Detected     Influenza A PCR Not Detected     Influenza B PCR Not Detected     Parainfluenza Virus 1 Not Detected     Parainfluenza Virus 2 Not Detected     Parainfluenza Virus 3 Not Detected     Parainfluenza Virus 4 Not Detected     RSV, PCR Not Detected     Bordetella pertussis pcr Not Detected     Bordetella parapertussis PCR Not Detected     Chlamydophila pneumoniae PCR Not Detected     Mycoplasma pneumo by PCR Not Detected    Narrative:      In the setting of a positive respiratory panel with a viral infection PLUS a negative procalcitonin without other underlying concern for bacterial infection, consider observing off antibiotics or discontinuation of antibiotics and continue supportive care. If the respiratory panel is positive for atypical bacterial infection (Bordetella pertussis, Chlamydophila pneumoniae, or Mycoplasma pneumoniae), consider antibiotic de-escalation to target atypical bacterial infection.    Urine Culture - Urine, Urine, Clean Catch [[id number]]  (Abnormal) Collected: 02/22/25 8357     Lab Status: Final result Specimen: Urine, Clean Catch Updated: 02/24/25 1058     Urine Culture <10,000 CFU/mL Gram Negative Bacilli    Narrative:      Call if further workup needed.   Colonization of the urinary tract without infection is common. Treatment is discouraged unless the patient is symptomatic, pregnant, or undergoing an invasive urologic procedure.    Blood Culture - Blood, Leg, Left [[id number]]  (Abnormal)  (Susceptibility) Collected: 02/22/25 1922    Lab Status: Final result Specimen: Blood from Leg, Left Updated: 02/25/25 0620     Blood Culture Escherichia coli ESBL     Comment:   Consider infectious disease consult.  Susceptibility results may not correlate to clinical outcomes.  For ESBL-producing infections in the blood, a carbapenem is recommended as first-line therapy for optimal clinical outcomes.        Isolated from Aerobic and Anaerobic Bottles     Gram Stain Aerobic Bottle Gram negative bacilli      Anaerobic Bottle Gram negative bacilli    Narrative:      Less than seven (7) mL's of blood was collected.  Insufficient quantity may yield false negative results.    Susceptibility        Escherichia coli ESBL      VANDANA      Ciprofloxacin >=4 ug/ml Resistant      Ertapenem <=0.12 ug/ml Susceptible      Levofloxacin >=8 ug/ml Resistant      Meropenem <=0.25 ug/ml Susceptible      Trimethoprim + Sulfamethoxazole >=320 ug/ml Resistant                       Susceptibility Comments       Escherichia coli ESBL    With the exception of urinary-sourced infections, aminoglycosides should not be used as monotherapy.               Blood Culture ID, PCR - Blood, Leg, Left [[id number]]  (Abnormal) Collected: 02/22/25 1922    Lab Status: Final result Specimen: Blood from Leg, Left Updated: 02/23/25 1155     BCID, PCR Escherichia coli. Identification by BCID2 PCR.     BCID, PCR 2 CTX-M (ESBL) detected. Identification by BCID2 PCR     BOTTLE TYPE Anaerobic Bottle    Blood Culture - Blood, Arm, Left [[id number]]   (Normal) Collected: 02/22/25 1919    Lab Status: Final result Specimen: Blood from Arm, Left Updated: 02/27/25 1931     Blood Culture No growth at 5 days    Narrative:      Less than seven (7) mL's of blood was collected.  Insufficient quantity may yield false negative results.    Wound Culture - Wound, Abdominal Wall [[id number]]  (Abnormal)  (Susceptibility) Collected: 02/22/25 1605    Lab Status: Final result Specimen: Wound from Abdominal Wall Updated: 02/26/25 0748     Wound Culture Moderate growth (3+) Escherichia coli ESBL     Comment:   Consider infectious disease consult.  Susceptibility results may not correlate to clinical outcomes.         Light growth (2+) Enterococcus faecium      Light growth (2+) Yeast, Not Candida albicans     Gram Stain Rare (1+) Gram negative bacilli    Susceptibility        Escherichia coli ESBL      VANDANA      Ertapenem 0.25 ug/ml Susceptible      Meropenem <=0.25 ug/ml Susceptible                       Susceptibility        Enterococcus faecium      VANDANA      Ampicillin >=32 ug/ml Resistant      Vancomycin <=0.5 ug/ml Susceptible                       Susceptibility Comments       Escherichia coli ESBL    With the exception of urinary-sourced infections, aminoglycosides should not be used as monotherapy.               Legionella Antigen, Urine - Urine, Straight Cath [[id number]]  (Normal) Collected: 02/21/25 2202    Lab Status: Final result Specimen: Urine from Straight Cath Updated: 02/22/25 1751     LEGIONELLA ANTIGEN, URINE Negative    S. Pneumo Ag Urine or CSF - Urine, Straight Cath [[id number]]  (Normal) Collected: 02/21/25 2202    Lab Status: Final result Specimen: Urine from Straight Cath Updated: 02/22/25 1752     Strep Pneumo Ag Negative    COVID-19, FLU A/B, RSV PCR 1 HR TAT - Swab, Nasopharynx [[id number]]  (Normal) Collected: 02/21/25 1745    Lab Status: Final result Specimen: Swab from Nasopharynx Updated: 02/21/25 1832     COVID19 Not Detected     Influenza A PCR Not  "Detected     Influenza B PCR Not Detected     RSV, PCR Not Detected    Narrative:      Fact sheet for providers: https://www.fda.gov/media/408712/download    Fact sheet for patients: https://www.fda.gov/media/925699/download    Test performed by PCR.              Vitals/Labs  Ht: 157.5 cm (62\"); Wt: 52.3 kg (115 lb 4.8 oz)   Temp (24hrs), Av.7 °F (36.5 °C), Min:97.3 °F (36.3 °C), Max:98 °F (36.7 °C)   Estimated Creatinine Clearance: 29.9 mL/min (A) (by C-G formula based on SCr of 1.94 mg/dL (H)).     Results from last 7 days   Lab Units 25  0419 25  1339 25  0603 25  0526 25  0440 25  1058 25  0607   VANCOMYCIN RM mcg/mL 11.80  --  15.94  --  21.68  --  23.27   CREATININE mg/dL 1.94* 2.13* 2.25*   < > 3.53*   < >  --    WBC 10*3/mm3 9.31 7.85 14.25*   < >  --   --   --     < > = values in this interval not displayed.     Assessment/Plan    Current Vancomycin Dose: Will go to scheduled vancomycin due to trending downward of pt SCr and expectation of continued improvement  Regimen: 500 mg IV every 18 hours.  Start time: 08:32 on 2025  Exposure target: AUC24 (range)400-600 mg/L.hr   AUC24,ss: 567 mg/L.hr  Probability of AUC24 > 400: 84 %  Ctrough,ss: 18.4 mg/L  Probability of Ctrough,ss > 20: 43 %  Probability of nephrotoxicity (Lodise DARIUSZ ): 15 %    Pt has daily renal function panel ordered.  No current vancomycin level ordered, will monitor renal function and will order if it worsens or if therapy is extended.  We will continue to monitor patient changes.    Thank you for involving pharmacy in this patient's care. Please contact pharmacy with any questions or concerns.    Mariola Douglas, [facility]  Clinical Pharmacist          "

## 2025-02-28 NOTE — PROGRESS NOTES
[facility]   Hospitalist Progress Note  Date: 2025  Patient Name: Mag Padilal  : 1980  MRN: [id number]  Date of admission: 2025  Room/Bed: [facility]/      Subjective   Subjective     Chief Complaint: nausea, vomiting weakness     Summary:Mag Padilla is a 44 y.o. female who is a nursing home resident  with past medical history of Crohn disease status post resection,with complex abdominal surgical history.  Patient had a history of a duodenal hole and underwent extensive surgery that required a G-tube, J-tube and multiple drain placements this was all done at [facility].  Patient since that time has had multiple issues with abdominal wounds and abscesses.  Patient also has history of hypotension on midodrine, anxiety, chronic diarrhea, and GERD presenting to the ED for evaluation of nausea vomiting diarrhea with generalized weakness.  Patient states that for the last 3 days she has not been feeling well with persistent nausea and vomiting for the last 2 days and worsening of her chronic diarrhea.  Due to the symptoms patient is weakness and lethargy had also worsened to where she is mostly bedbound with excessive sleepiness.  Patient is seen by wound care for abdominal wounds after an ex lap with colon resection.  Due to worsening condition patient was eventually brought to the ED for further evaluation.  In the ED patient was significantly hypotensive on arrival with remaining vitals being within normal limits.  UA was positive for UTI with signs of dehydration, labs showed severely reduced renal function with hypokalemia, anemia, thrombocytopenia, and severe hypoalbuminemia.  Chest x-ray was negative for any acute findings.  When seen patient was resting comfortably and still having episode of diarrhea since being here although her nausea is much improved.  She did deny any recent fevers, chills, headaches, focal weakness, chest pain, palpitation, shortness of breath, cough,  abdominal pain, constipation, dysuria, hematuria, hematochezia, melena, or anxiety.  Patient admitted for further evaluation and treatment.  CT of the abdomen revealed 6 mm left ureteral stone with mild hydro utero nephrosis which required urology consult and patient was taken to the operating room  and had a cystoscopy with stent placement.  Patient's blood cultures are growing ESBL E. Coli.  Urine with less than 10,000 gram-negative rods.  MRSA screen is also positivePatient seen by intensivist, urology and nephrology.  GI consulted for diarrhea.  Previously patient has seen Dr. Pompa.  Patient seen by general surgery for fistula.  Recommendation is to follow-up with [facility].      Interval Followup:   Remains on room air  Blood pressure soft.  Mostly dropped while sleeping.  Restarted on midodrine by nephrology.  White cell count down  Episodes of bradycardia improved since  Midodrine was put on hold  CT scan with oral contrast still pending.  Patient has been reluctant to drink contrast  Creatinine slowly improving  Patient ambulates with the help of [facility]er nursing home  Chronic abdominal wounds follows with [facility]   Urinating well without Renee catheter  Yellowish fluid in ostomy bags from ventral abdomen.   continues to have diarrhea    Review of Systems    All systems reviewed and negative except for what is outlined above.      Objective   Objective     Vitals:   Temp:  [97.3 °F (36.3 °C)-98.2 °F (36.8 °C)] 98.2 °F (36.8 °C)  Heart Rate:  [] 87  Resp:  [18] 18  BP: ()/(58-72) 100/67    Physical Exam   General: Awake, alert, NAD resting in bed  HENT: NCAT, MMM  Eyes: pupils equal, no scleral icterus  Cardiovascular: RRR, no murmurs   Pulmonary: CTA bilaterally; no wheezes; no conversational dyspnea  Gastrointestinal: Soft patient has multiple abdominal wounds patient has a wound on her left lower side that is draining a significant amount of greenish drainage.  Patient has enterocutaneous fistula left  upper quadrant.  Upper midline abdomen wound is also fistula as it is draining fluid .  Patient denies any significant pain  Musculoskeletal: No gross deformities  Skin: No jaundice, no rash on exposed skin appreciated  Neuro: CN II through XII grossly intact; speech clear; no tremor  Psych: Mood and affect appropriate  .  Off Renee    Result Review    Result Review:  I have personally reviewed these results:  [x]  Laboratory      Lab 02/28/25  0419 02/27/25  1339 02/27/25  0603 02/26/25  1106 02/26/25  0248 02/25/25  1226 02/25/25  0953 02/23/25  2105 02/23/25  1058 02/23/25  0138 02/22/25  2201 02/22/25  2157 02/22/25  1919   WBC 9.31 7.85 14.25* 4.99   < >  --   --    < >  --  6.50   < >  --   --    HEMOGLOBIN 8.9* 9.1* 9.8* 8.8*   < >  --   --    < >  --  7.0*   < >  --   --    HEMATOCRIT 27.6* 28.9* 30.3* 28.3*   < >  --   --    < >  --  22.4*   < >  --   --    PLATELETS 137* 124* 197 115*   < >  --   --    < >  --  84*   < >  --   --    NEUTROS ABS 7.42* 6.11  --  3.62  --   --   --   --   --   --    < >  --   --    IMMATURE GRANS (ABS) 0.28* 0.23*  --  0.17*  --   --   --   --   --   --   --   --   --    LYMPHS ABS 1.07 1.04  --  0.82  --   --   --   --   --   --   --   --   --    MONOS ABS 0.38 0.32  --  0.28  --   --   --   --   --   --   --   --   --    EOS ABS 0.14 0.14  --  0.09  --   --   --   --   --   --    < >  --   --    MCV 91.4 90.3 91.3 91.6   < >  --   --    < >  --  87.2   < >  --   --    PROCALCITONIN  --   --   --   --   --   --   --   --   --   --   --  20.74* 26.11*   LACTATE  --   --   --   --   --  1.6 2.2*  --   --  0.9  --   --   --    PROTIME  --   --   --   --   --   --   --   --  17.3*  --   --   --  34.3*   APTT  --   --   --   --   --   --   --   --   --   --   --   --  47.6*   D DIMER QUANT  --   --   --   --   --   --   --   --   --   --   --   --  1.76*    < > = values in this interval not displayed.         Lab 02/28/25  0419 02/27/25  1339 02/27/25  0603 02/26/25  1356  02/26/25  0248   SODIUM 143 138 140 144 146*   POTASSIUM 3.9 4.2 3.8 3.8 3.5   CHLORIDE 119* 115* 115* 119* 118*   CO2 16.4* 15.6* 15.3* 15.7* 18.9*   ANION GAP 7.6 7.4 9.7 9.3 9.1   BUN 28* 25* 27* 28* 32*   CREATININE 1.94* 2.13* 2.25* 2.64* 2.88*   EGFR 31.8* 28.5* 26.6* 22.0* 19.8*   GLUCOSE 76 79 97 114* 71   CALCIUM 7.7* 7.4* 8.0* 8.0* 8.4*   MAGNESIUM  --   --  2.4 1.4* 1.7   PHOSPHORUS 4.0 2.1*  --   --  2.0*         Lab 02/28/25  0419 02/27/25  1339 02/25/25  0458 02/24/25  0440 02/23/25  1058 02/23/25  0138 02/21/25  1821 02/21/25  1708   TOTAL PROTEIN  --   --  5.7* 5.7*  --  5.6*   < > 6.1   ALBUMIN 1.7* 1.7* 2.3* 2.0*   < > 2.0*   < > 1.6*   GLOBULIN  --   --  3.4 3.7  --  3.6   < > 4.5   ALT (SGPT)  --   --  <5 5  --  7   < > <5   AST (SGOT)  --   --  9 8  --  9   < > 13   BILIRUBIN  --   --  0.6 0.8  --  1.2   < > 0.9   ALK PHOS  --   --  96 93  --  91   < > 88   LIPASE  --   --   --   --   --   --   --  10*    < > = values in this interval not displayed.         Lab 02/23/25  1058 02/22/25  1919 02/21/25  1821   PROBNP  --   --  15,412.0*   PROTIME 17.3* 34.3*  --    INR 1.35* 3.17*  --              Lab 02/26/25  0349 02/22/25  2157 02/22/25  1648 02/21/25  1821 02/21/25  1708   IRON  --   --   --  25*  --    IRON SATURATION (TSAT)  --   --   --  32  --    TIBC  --   --   --  79*  --    TRANSFERRIN  --   --   --  53*  --    FOLATE  --   --   --   --  7.99   VITAMIN B 12  --   --   --   --  545   ABO TYPING O O   < >  --   --    RH TYPING Positive Positive   < >  --   --    ANTIBODY SCREEN Negative Negative  --   --   --     < > = values in this interval not displayed.         Lab 02/22/25  1837   PH, ARTERIAL 7.327*   PCO2, ARTERIAL 20.5*   PO2 .9*   O2 SATURATION ART 97.7   HCO3 ART 10.7*   BASE EXCESS ART -13.6*     Brief Urine Lab Results  (Last result in the past 365 days)        Color   Clarity   Blood   Leuk Est   Nitrite   Protein   CREAT   Urine HCG        02/22/25 2208 Dark Yellow    Cloudy   Large (3+)   Moderate (2+)   Negative   100 mg/dL (2+)                 [x]  Microbiology   Microbiology Results (last 10 days)       Procedure Component Value - Date/Time    Blood Culture - Blood, Hand, Right [[id number]]  (Normal) Collected: 02/24/25 2016    Lab Status: Preliminary result Specimen: Blood from Hand, Right Updated: 02/27/25 2030     Blood Culture No growth at 3 days    Narrative:      Less than seven (7) mL's of blood was collected.  Insufficient quantity may yield false negative results.    Blood Culture - Blood, Arm, Left [[id number]]  (Normal) Collected: 02/24/25 1803    Lab Status: Preliminary result Specimen: Blood from Arm, Left Updated: 02/27/25 1831     Blood Culture No growth at 3 days    Clostridioides difficile Toxin - Stool, Per Rectum [[id number]] Collected: 02/24/25 0856    Lab Status: Final result Specimen: Stool from Per Rectum Updated: 02/24/25 0950    Narrative:      The following orders were created for panel order Clostridioides difficile Toxin - Stool, Per Rectum.  Procedure                               Abnormality         Status                     ---------                               -----------         ------                     Clostridioides difficile...[[id number]]                      Final result                 Please view results for these tests on the individual orders.    Clostridioides difficile Toxin, PCR - Stool, Per Rectum [[id number]] Collected: 02/24/25 0856    Lab Status: Final result Specimen: Stool from Per Rectum Updated: 02/24/25 0950     Toxigenic C. difficile by PCR Negative     027 Toxin Presumptive Negative    Narrative:      The result indicates the absence of toxigenic C. difficile from stool specimen.     MRSA Screen, PCR (Inpatient) - Swab, Nares [[id number]]  (Abnormal) Collected: 02/23/25 0601    Lab Status: Final result Specimen: Swab from Nares Updated: 02/23/25 1001     MRSA PCR MRSA Detected    Narrative:      The negative predictive  value of this diagnostic test is high and should only be used to consider de-escalating anti-MRSA therapy. A positive result may indicate colonization with MRSA and must be correlated clinically.    Respiratory Panel PCR w/COVID-19(SARS-CoV-2) BRIELLE/TOSHA/IMTIAZ/PAD/COR/NATACHA In-House, NP Swab in UTM/VTM, 2 HR TAT - Swab, Nasopharynx [[id number]]  (Normal) Collected: 02/23/25 0601    Lab Status: Final result Specimen: Swab from Nasopharynx Updated: 02/23/25 0827     ADENOVIRUS, PCR Not Detected     Coronavirus 229E Not Detected     Coronavirus HKU1 Not Detected     Coronavirus NL63 Not Detected     Coronavirus OC43 Not Detected     COVID19 Not Detected     Human Metapneumovirus Not Detected     Human Rhinovirus/Enterovirus Not Detected     Influenza A PCR Not Detected     Influenza B PCR Not Detected     Parainfluenza Virus 1 Not Detected     Parainfluenza Virus 2 Not Detected     Parainfluenza Virus 3 Not Detected     Parainfluenza Virus 4 Not Detected     RSV, PCR Not Detected     Bordetella pertussis pcr Not Detected     Bordetella parapertussis PCR Not Detected     Chlamydophila pneumoniae PCR Not Detected     Mycoplasma pneumo by PCR Not Detected    Narrative:      In the setting of a positive respiratory panel with a viral infection PLUS a negative procalcitonin without other underlying concern for bacterial infection, consider observing off antibiotics or discontinuation of antibiotics and continue supportive care. If the respiratory panel is positive for atypical bacterial infection (Bordetella pertussis, Chlamydophila pneumoniae, or Mycoplasma pneumoniae), consider antibiotic de-escalation to target atypical bacterial infection.    Urine Culture - Urine, Urine, Clean Catch [[id number]]  (Abnormal) Collected: 02/22/25 2208    Lab Status: Final result Specimen: Urine, Clean Catch Updated: 02/24/25 1058     Urine Culture <10,000 CFU/mL Gram Negative Bacilli    Narrative:      Call if further workup needed.   Colonization  of the urinary tract without infection is common. Treatment is discouraged unless the patient is symptomatic, pregnant, or undergoing an invasive urologic procedure.    Blood Culture - Blood, Leg, Left [[id number]]  (Abnormal)  (Susceptibility) Collected: 02/22/25 1922    Lab Status: Final result Specimen: Blood from Leg, Left Updated: 02/25/25 0620     Blood Culture Escherichia coli ESBL     Comment:   Consider infectious disease consult.  Susceptibility results may not correlate to clinical outcomes.  For ESBL-producing infections in the blood, a carbapenem is recommended as first-line therapy for optimal clinical outcomes.        Isolated from Aerobic and Anaerobic Bottles     Gram Stain Aerobic Bottle Gram negative bacilli      Anaerobic Bottle Gram negative bacilli    Narrative:      Less than seven (7) mL's of blood was collected.  Insufficient quantity may yield false negative results.    Susceptibility        Escherichia coli ESBL      VANDANA      Ciprofloxacin Resistant      Ertapenem Susceptible      Levofloxacin Resistant      Meropenem Susceptible      Trimethoprim + Sulfamethoxazole Resistant                       Susceptibility Comments       Escherichia coli ESBL    With the exception of urinary-sourced infections, aminoglycosides should not be used as monotherapy.               Blood Culture ID, PCR - Blood, Leg, Left [[id number]]  (Abnormal) Collected: 02/22/25 1922    Lab Status: Final result Specimen: Blood from Leg, Left Updated: 02/23/25 1155     BCID, PCR Escherichia coli. Identification by BCID2 PCR.     BCID, PCR 2 CTX-M (ESBL) detected. Identification by BCID2 PCR     BOTTLE TYPE Anaerobic Bottle    Blood Culture - Blood, Arm, Left [[id number]]  (Normal) Collected: 02/22/25 1919    Lab Status: Final result Specimen: Blood from Arm, Left Updated: 02/27/25 1931     Blood Culture No growth at 5 days    Narrative:      Less than seven (7) mL's of blood was collected.  Insufficient quantity may yield  false negative results.    Wound Culture - Wound, Abdominal Wall [[id number]]  (Abnormal)  (Susceptibility) Collected: 02/22/25 1605    Lab Status: Final result Specimen: Wound from Abdominal Wall Updated: 02/26/25 0748     Wound Culture Moderate growth (3+) Escherichia coli ESBL     Comment:   Consider infectious disease consult.  Susceptibility results may not correlate to clinical outcomes.         Light growth (2+) Enterococcus faecium      Light growth (2+) Yeast, Not Candida albicans     Gram Stain Rare (1+) Gram negative bacilli    Susceptibility        Escherichia coli ESBL      VANDANA      Ertapenem Susceptible      Meropenem Susceptible                       Susceptibility        Enterococcus faecium      VANDANA      Ampicillin Resistant      Vancomycin Susceptible                       Susceptibility Comments       Escherichia coli ESBL    With the exception of urinary-sourced infections, aminoglycosides should not be used as monotherapy.               Legionella Antigen, Urine - Urine, Straight Cath [[id number]]  (Normal) Collected: 02/21/25 2202    Lab Status: Final result Specimen: Urine from Straight Cath Updated: 02/22/25 1751     LEGIONELLA ANTIGEN, URINE Negative    S. Pneumo Ag Urine or CSF - Urine, Straight Cath [[id number]]  (Normal) Collected: 02/21/25 2202    Lab Status: Final result Specimen: Urine from Straight Cath Updated: 02/22/25 1752     Strep Pneumo Ag Negative    COVID-19, FLU A/B, RSV PCR 1 HR TAT - Swab, Nasopharynx [[id number]]  (Normal) Collected: 02/21/25 1745    Lab Status: Final result Specimen: Swab from Nasopharynx Updated: 02/21/25 1832     COVID19 Not Detected     Influenza A PCR Not Detected     Influenza B PCR Not Detected     RSV, PCR Not Detected    Narrative:      Fact sheet for providers: https://www.fda.gov/media/210036/download    Fact sheet for patients: https://www.fda.gov/media/525324/download    Test performed by PCR.          [x]  Radiology  XR Chest 1 View    Result  Date: 2/23/2025  The distal tip of the right IJ central venous line/port is in the expected mid superior vena cava (SVC). No pneumothorax is seen. There are bilateral infiltrates present, suggestive of pulmonary edema with vascular congestion.    Portions of this note were completed with a voice recognition program.  2/23/2025 12:16 AM by James Hull MD on Workstation: [email or web address]      CT Abdomen Pelvis Without Contrast    Result Date: 2/22/2025  Impression: CT scan of the abdomen and pelvis without contrast demonstrating worsening subsegmental atelectasis at the lung bases. Small right effusion appears larger. New small left effusion. Hepatic configuration concerning for cirrhosis. A small amount of peritoneal fluid is again seen. Post cholecystectomy. Multiple nonobstructing renal stones are again seen. Mild left hydroureteronephrosis is not seen on the prior study. 6 mm stone in the mid left ureter at the L5 level was seen on the prior study in the lower pole collecting system of the left kidney. 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 Wound in the anterior abdominal wall is again seen. This appears smaller in comparison to 2/1/2025. Electronically Signed: Shun Milan MD  2/22/2025 5:22 PM EST  Workstation ID: [id number]    US Renal Bilateral    Result Date: 2/22/2025  Impression: 1.Bilateral nephrolithiasis without evidence of hydronephrosis. 2.Bilateral increased cortical echogenicity consistent with chronic renal disease. 3.Asymmetrically edematous left kidney with decreased cortical medullary differentiation which is nonspecific but could be seen in the setting of pyelonephritis.. Electronically Signed: Raad Jansen MD  2/22/2025 5:10 PM EST  Workstation ID: [id number]    XR Chest 1 View    Result Date: 2/21/2025  No active disease. Electronically Signed: Vitaly Maya MD  2/21/2025 7:38 PM EST  Workstation ID: [id number]   []  EKG/Telemetry   []  Cardiology/Vascular   []  Pathology  []  Old  records  []  Other:    Assessment & Plan   Assessment / Plan     Assessment:  Acute kidney injury due to obstructing nephrolithiasis on the left status post stent placement.  Improving  Septic shock, present on admission secondary to bacteremia.  Improving  ESBL E. coli bacteremia.  Subsequent blood culture negative since February 24.  Chronic hypotension on midodrine  Episodes of bradycardia due to midodrine.  Improving.  Midodrine resumed  History of Crohn's disease with complex surgery  Chronic nonhealing abdominal wounds and abscess  secondary to significant abdominal surgery in the past for duodenal ulcer rupture follows with [facility].  Due to ESBL E. coli and Enterococcus faecium.  Chronic enterocutaneous fistulas followed by   Anxiety disorder  Positive MRSA PCR  Acute on chronic anemia.  Status post 1 unit packed RBC transfusion  Antibiotic associated diarrhea.  C. difficile negative.  Hypernatremia.  Free water deficit.  Resolved  Thrombocytopenia.  Improving    Plan:  Remains off Levophed .  Midodrine resumed by nephrology as bradycardia improved  Continue meropenem, finished Flagyl  Repeat Blood cultures negative to date from February 24  CT of the abdomen and pelvis reviewed.  Only 1 enterocutaneous fistula located in the left upper quadrant reported.    Repeat CT scan abdomen pelvis with oral contrast ordered.  Patient reluctant to drink oral contrast as it makes her sick  Appreciate urology input.  Status post left ureteric stent.  Needs definitive stone treatment when stable  Nephrology following.  Renal function slowly improving.  Finished D5 water with potassium.  Discussed with GI about diarrhea.  Appreciate input  Discussed with general surgery to evaluate for fistulous.  Recommend follow-up with .  Await CT scan  As needed Imodium.  Scheduled Questran  On IV Vanco for wound culture from abdomen noted with ESBL E. coli and Enterococcus faecium. Patient does have known chronic abdominal wounds with  fistulous communication.  Patient follows with  for management of these wounds patient was just discharged this month from  for these wounds.  May need to be transferred back to  once acute issues resolve  Continue wound care.  Discussed with wound care.  Appreciate input.  Electrolyte replacement protocol  Monitor white cell count.  Leukocytosis resolved  PT OT         Discussed with RN,  and patient.  Return to [facility] when stable.    VTE Prophylaxis:  Pharmacologic & mechanical VTE prophylaxis orders are present.        CODE STATUS:   Level Of Support Discussed With: Patient  Code Status (Patient has no pulse and is not breathing): CPR (Attempt to Resuscitate)  Medical Interventions (Patient has pulse or is breathing): Full Support      Electronically signed by Liam Israel MD, 2/28/2025, 15:49 EST.

## 2025-02-28 NOTE — PROGRESS NOTES
[facility]     Progress Note    Patient Name: Mag Padilla  : 1980  MRN: [id number]  Primary Care Physician:  Provider, No Known  Date of admission: 2025    Subjective   Patient's been transferred out of the ICU  Renal dysfunction continues to improve  No major acute events otherwise overnight    Scheduled Meds:barium, 450 mL, Oral, Once  cholestyramine, 1 packet, Oral, Daily  ferrous sulfate, 325 mg, Oral, Daily With Breakfast  heparin (porcine), 5,000 Units, Subcutaneous, Q8H  Iohexol (OMNIPAQUE) oral compound 12 mg/mL, 500 mL, Oral, Once  meropenem, 1,000 mg, Intravenous, Q12H  [Held by provider] midodrine, 10 mg, Oral, TID AC  multivitamin with minerals, 1 tablet, Oral, Daily  mupirocin, 1 Application, Each Nare, BID  pantoprazole, 40 mg, Oral, Daily  potassium phosphate (monobasic), 500 mg, Oral, BID  saccharomyces boulardii, 500 mg, Oral, Daily  sertraline, 75 mg, Oral, Daily  sodium bicarbonate, 1,300 mg, Oral, TID  sodium chloride, 10 mL, Intravenous, Q12H  vancomycin, 500 mg, Intravenous, Q18H      Continuous Infusions:Pharmacy to dose vancomycin,       PRN Meds:.  acetaminophen    ALPRAZolam    aluminum-magnesium hydroxide-simethicone    senna-docusate sodium **AND** polyethylene glycol **AND** bisacodyl **AND** bisacodyl    Calcium Replacement - Follow Nurse / BPA Driven Protocol    dextrose    loperamide    Magnesium Low Dose Replacement - Follow Nurse / BPA Driven Protocol    ondansetron    Pharmacy to dose vancomycin    Phosphorus Replacement - Follow Nurse / BPA Driven Protocol    sodium chloride    sodium chloride    sodium chloride       Review of Systems  Constitutional:        Weakness tiredness fatigue  Eyes:                       No blurry vision, eye discharge, eye irritation, eye pain  HEENT:                   No acute hair loss, earache and discharge, nasal congestion or discharge, sore throat, postnasal drip  Respiratory:           No shortness of breath coughing  sputum production wheezing hemoptysis pleuritic chest pain  Cardiovascular:     No chest pain, orthopnea, PND, dizziness, palpitation, lower extremity edema  Gastrointestinal:   No nausea vomiting diarrhea abdominal pain constipation  Genitourinary:       No urinary incontinence, hesitancy, frequency, urgency, dysuria  Hematologic:         No bruising, bleeding, pallor, lymphadenopathy  Endocrine:            No coldness, hot flashes, polyuria, abnormal hair growth  Musculoskeletal:    No body pains, aches, arthritic pains, muscle pain ,muscle wasting  Psychiatric:          No low or high mood, anxiety, hallucinations, delusions  Skin.                      No rash, ulcers, bruising, itching  Neurological:        No confusion, headache, focal weakness, numbness, dysphasia    Objective   Objective     Vitals:   Temp:  [97.3 °F (36.3 °C)-98 °F (36.7 °C)] 97.8 °F (36.6 °C)  Heart Rate:  [58-91] 91  Resp:  [15-18] 18  BP: (71-99)/(54-72) 99/60  Physical Exam    Constitutional: Awake, alert responsive, conversant, no obvious distress              Psychiatric:  Appropriate affect, cooperative   Neurologic:  Awake alert ,oriented x 3, strength symmetric in all extremities, Cranial Nerves grossly intact to confrontation, speech clear   Eyes:   PERRLA, sclerae anicteric, no conjunctival injection   HEENT:  Moist mucous membranes, no nasal or eye discharge, no throat congestion   Neck:   Supple, no thyromegaly, no lymphadenopathy, trachea midline, no elevated JVD   Respiratory:  Clear to auscultation bilaterally, nonlabored respirations    Cardiovascular: RRR, no murmurs, rubs, or gallops, palpable pedal pulses bilaterally, No bilateral ankle edema   Gastrointestinal: Positive bowel sounds, soft, nontender, nondistended, no organomegaly   Musculoskeletal:  No clubbing or cyanosis to extremities,muscle wasting, joint swelling, muscle weakness             Skin:                      No rashes, bruising, skin ulcers, petechiae or  ecchymosis    Result Review    Result Review:  I have personally reviewed the results from the time of this admission to 2/28/2025 08:26 EST and agree with these findings:  []  Laboratory  []  Microbiology  []  Radiology  []  EKG/Telemetry   []  Cardiology/Vascular   []  Pathology  []  Old records  []  Other:    Assessment & Plan   Assessment / Plan       Active Hospital Problems:  Active Hospital Problems    Diagnosis     **Acute renal failure     Hypotension due to hypovolemia     Chronic diarrhea     Inflammatory bowel disease (Crohn's disease)     Kidney stone      44-year-old female with past medical history of Crohn's disease status post resection, chronically hypotensive on midodrine, anxiety, chronic diarrhea, GERD who who has not been feeling well and has had persistent nausea and vomiting for the last few days as well as her diarrhea with severe JOHANA and creatinine rise from baseline normal to peak of 5.6 with decreased urine output and worsening bicarb currently at 5.  JOHANA most likely due to hypotension, diarrhea and vomiting contributing with UA showing some hyaline cast.  Urine analysis also concerning for large amount of proteinuria and RBCs with CT scan showing obstructive stone which is likely the source of septic shock.  Creatinine has plateaued around 3.5 with poor urine output and concerning patient may have developed ATN.  Cultures with ESBL Klebsiella.  Responded to Lasix with brisk urine output and improving renal dysfunction     Plan:   Continue with supportive care  Will restart midodrine 10 mg 3 times daily  Will get repeat urine analysis due to prior proteinuria  Patient on meropenem for ESBL  Increase bicarb to 1950 3 times daily    Thank you for involving me in the care of the patient.  Will continue to follow along

## 2025-03-01 LAB
ALBUMIN SERPL-MCNC: 1.5 G/DL (ref 3.5–5.2)
ANION GAP SERPL CALCULATED.3IONS-SCNC: 10.2 MMOL/L (ref 5–15)
BACTERIA SPEC AEROBE CULT: NORMAL
BACTERIA SPEC AEROBE CULT: NORMAL
BACTERIA UR QL AUTO: ABNORMAL /HPF
BASOPHILS # BLD AUTO: 0.02 10*3/MM3 (ref 0–0.2)
BASOPHILS NFR BLD AUTO: 0.2 % (ref 0–1.5)
BILIRUB UR QL STRIP: NEGATIVE
BUN SERPL-MCNC: 31 MG/DL (ref 6–20)
BUN/CREAT SERPL: 19 (ref 7–25)
CALCIUM SPEC-SCNC: 7.6 MG/DL (ref 8.6–10.5)
CHLORIDE SERPL-SCNC: 120 MMOL/L (ref 98–107)
CLARITY UR: ABNORMAL
CO2 SERPL-SCNC: 14.8 MMOL/L (ref 22–29)
COLOR UR: YELLOW
CREAT SERPL-MCNC: 1.63 MG/DL (ref 0.57–1)
CREAT UR-MCNC: 33.9 MG/DL
DEPRECATED RDW RBC AUTO: 58.1 FL (ref 37–54)
EGFRCR SERPLBLD CKD-EPI 2021: 39.2 ML/MIN/1.73
EOSINOPHIL # BLD AUTO: 0.15 10*3/MM3 (ref 0–0.4)
EOSINOPHIL NFR BLD AUTO: 1.5 % (ref 0.3–6.2)
ERYTHROCYTE [DISTWIDTH] IN BLOOD BY AUTOMATED COUNT: 19.4 % (ref 12.3–15.4)
GLUCOSE BLDC GLUCOMTR-MCNC: 63 MG/DL (ref 70–99)
GLUCOSE BLDC GLUCOMTR-MCNC: 71 MG/DL (ref 70–99)
GLUCOSE BLDC GLUCOMTR-MCNC: 76 MG/DL (ref 70–99)
GLUCOSE BLDC GLUCOMTR-MCNC: 79 MG/DL (ref 70–99)
GLUCOSE BLDC GLUCOMTR-MCNC: 95 MG/DL (ref 70–99)
GLUCOSE SERPL-MCNC: 86 MG/DL (ref 65–99)
GLUCOSE UR STRIP-MCNC: NEGATIVE MG/DL
HCT VFR BLD AUTO: 27.4 % (ref 34–46.6)
HEMOCCULT STL QL IA: POSITIVE
HGB BLD-MCNC: 8.7 G/DL (ref 12–15.9)
HGB UR QL STRIP.AUTO: ABNORMAL
HYALINE CASTS UR QL AUTO: ABNORMAL /LPF
IMM GRANULOCYTES # BLD AUTO: 0.19 10*3/MM3 (ref 0–0.05)
IMM GRANULOCYTES NFR BLD AUTO: 1.9 % (ref 0–0.5)
KETONES UR QL STRIP: NEGATIVE
LACTOFERRIN STL QL LA: POSITIVE
LEUKOCYTE ESTERASE UR QL STRIP.AUTO: ABNORMAL
LYMPHOCYTES # BLD AUTO: 1.04 10*3/MM3 (ref 0.7–3.1)
LYMPHOCYTES NFR BLD AUTO: 10.5 % (ref 19.6–45.3)
MCH RBC QN AUTO: 29.1 PG (ref 26.6–33)
MCHC RBC AUTO-ENTMCNC: 31.8 G/DL (ref 31.5–35.7)
MCV RBC AUTO: 91.6 FL (ref 79–97)
MONOCYTES # BLD AUTO: 0.54 10*3/MM3 (ref 0.1–0.9)
MONOCYTES NFR BLD AUTO: 5.4 % (ref 5–12)
NEUTROPHILS NFR BLD AUTO: 8 10*3/MM3 (ref 1.7–7)
NEUTROPHILS NFR BLD AUTO: 80.5 % (ref 42.7–76)
NITRITE UR QL STRIP: NEGATIVE
NRBC BLD AUTO-RTO: 0 /100 WBC (ref 0–0.2)
PH UR STRIP.AUTO: 6 [PH] (ref 5–8)
PHOSPHATE SERPL-MCNC: 3.6 MG/DL (ref 2.5–4.5)
PLATELET # BLD AUTO: 151 10*3/MM3 (ref 140–450)
PMV BLD AUTO: 12.2 FL (ref 6–12)
POTASSIUM SERPL-SCNC: 4.1 MMOL/L (ref 3.5–5.2)
PROT ?TM UR-MCNC: 95.8 MG/DL
PROT UR QL STRIP: ABNORMAL
PROT/CREAT UR: 2.83 MG/G{CREAT}
RBC # BLD AUTO: 2.99 10*6/MM3 (ref 3.77–5.28)
RBC # UR STRIP: ABNORMAL /HPF
REF LAB TEST METHOD: ABNORMAL
SODIUM SERPL-SCNC: 145 MMOL/L (ref 136–145)
SP GR UR STRIP: 1.01 (ref 1–1.03)
SQUAMOUS #/AREA URNS HPF: ABNORMAL /HPF
UROBILINOGEN UR QL STRIP: ABNORMAL
WBC # UR STRIP: ABNORMAL /HPF
WBC NRBC COR # BLD AUTO: 9.94 10*3/MM3 (ref 3.4–10.8)

## 2025-03-01 PROCEDURE — 99232 SBSQ HOSP IP/OBS MODERATE 35: CPT | Performed by: INTERNAL MEDICINE

## 2025-03-01 PROCEDURE — 85025 COMPLETE CBC W/AUTO DIFF WBC: CPT | Performed by: INTERNAL MEDICINE

## 2025-03-01 PROCEDURE — 81001 URINALYSIS AUTO W/SCOPE: CPT | Performed by: STUDENT IN AN ORGANIZED HEALTH CARE EDUCATION/TRAINING PROGRAM

## 2025-03-01 PROCEDURE — 84156 ASSAY OF PROTEIN URINE: CPT | Performed by: STUDENT IN AN ORGANIZED HEALTH CARE EDUCATION/TRAINING PROGRAM

## 2025-03-01 PROCEDURE — 82570 ASSAY OF URINE CREATININE: CPT | Performed by: STUDENT IN AN ORGANIZED HEALTH CARE EDUCATION/TRAINING PROGRAM

## 2025-03-01 PROCEDURE — 80069 RENAL FUNCTION PANEL: CPT | Performed by: INTERNAL MEDICINE

## 2025-03-01 PROCEDURE — 25010000002 HEPARIN (PORCINE) PER 1000 UNITS: Performed by: INTERNAL MEDICINE

## 2025-03-01 PROCEDURE — 82948 REAGENT STRIP/BLOOD GLUCOSE: CPT | Performed by: INTERNAL MEDICINE

## 2025-03-01 PROCEDURE — 82948 REAGENT STRIP/BLOOD GLUCOSE: CPT

## 2025-03-01 PROCEDURE — 25010000002 VANCOMYCIN PER 500 MG: Performed by: INTERNAL MEDICINE

## 2025-03-01 PROCEDURE — 25010000002 MEROPENEM PER 100 MG: Performed by: INTERNAL MEDICINE

## 2025-03-01 RX ORDER — CHOLESTYRAMINE 4 G/9G
1 POWDER, FOR SUSPENSION ORAL 2 TIMES DAILY
Status: DISCONTINUED | OUTPATIENT
Start: 2025-03-01 | End: 2025-03-03

## 2025-03-01 RX ADMIN — Medication 10 ML: at 14:53

## 2025-03-01 RX ADMIN — HEPARIN SODIUM 5000 UNITS: 5000 INJECTION INTRAVENOUS; SUBCUTANEOUS at 14:53

## 2025-03-01 RX ADMIN — Medication 10 ML: at 22:05

## 2025-03-01 RX ADMIN — SODIUM BICARBONATE 650 MG TABLET 1950 MG: at 16:54

## 2025-03-01 RX ADMIN — FERROUS SULFATE TAB 325 MG (65 MG ELEMENTAL FE) 325 MG: 325 (65 FE) TAB at 07:23

## 2025-03-01 RX ADMIN — POTASSIUM PHOSPHATE, MONOBASIC 500 MG: 500 TABLET, SOLUBLE ORAL at 22:03

## 2025-03-01 RX ADMIN — PANTOPRAZOLE SODIUM 40 MG: 40 TABLET, DELAYED RELEASE ORAL at 09:06

## 2025-03-01 RX ADMIN — Medication 1 TABLET: at 09:06

## 2025-03-01 RX ADMIN — MEROPENEM 1000 MG: 1 INJECTION INTRAVENOUS at 09:05

## 2025-03-01 RX ADMIN — MIDODRINE HYDROCHLORIDE 15 MG: 10 TABLET ORAL at 16:53

## 2025-03-01 RX ADMIN — SODIUM BICARBONATE 650 MG TABLET 1950 MG: at 09:06

## 2025-03-01 RX ADMIN — HEPARIN SODIUM 5000 UNITS: 5000 INJECTION INTRAVENOUS; SUBCUTANEOUS at 05:42

## 2025-03-01 RX ADMIN — SODIUM BICARBONATE 650 MG TABLET 1950 MG: at 22:04

## 2025-03-01 RX ADMIN — HEPARIN SODIUM 5000 UNITS: 5000 INJECTION INTRAVENOUS; SUBCUTANEOUS at 22:04

## 2025-03-01 RX ADMIN — Medication 500 MG: at 09:06

## 2025-03-01 RX ADMIN — MIDODRINE HYDROCHLORIDE 15 MG: 10 TABLET ORAL at 10:45

## 2025-03-01 RX ADMIN — CHOLESTYRAMINE 1 PACKET: 4 POWDER, FOR SUSPENSION ORAL at 09:23

## 2025-03-01 RX ADMIN — CHOLESTYRAMINE 1 PACKET: 4 POWDER, FOR SUSPENSION ORAL at 22:04

## 2025-03-01 RX ADMIN — MEROPENEM 1000 MG: 1 INJECTION INTRAVENOUS at 23:22

## 2025-03-01 RX ADMIN — LOPERAMIDE HYDROCHLORIDE 2 MG: 2 CAPSULE ORAL at 22:03

## 2025-03-01 RX ADMIN — VANCOMYCIN HYDROCHLORIDE 500 MG: 500 INJECTION, POWDER, LYOPHILIZED, FOR SOLUTION INTRAVENOUS at 02:09

## 2025-03-01 RX ADMIN — LOPERAMIDE HYDROCHLORIDE 2 MG: 2 CAPSULE ORAL at 14:15

## 2025-03-01 RX ADMIN — SERTRALINE HYDROCHLORIDE 75 MG: 50 TABLET ORAL at 09:06

## 2025-03-01 RX ADMIN — POTASSIUM PHOSPHATE, MONOBASIC 500 MG: 500 TABLET, SOLUBLE ORAL at 09:06

## 2025-03-01 RX ADMIN — VANCOMYCIN HYDROCHLORIDE 500 MG: 500 INJECTION, POWDER, LYOPHILIZED, FOR SOLUTION INTRAVENOUS at 22:04

## 2025-03-01 RX ADMIN — Medication 10 ML: at 09:16

## 2025-03-01 RX ADMIN — MIDODRINE HYDROCHLORIDE 10 MG: 10 TABLET ORAL at 07:23

## 2025-03-01 NOTE — PROGRESS NOTES
[facility]   Hospitalist Progress Note  Date: 3/1/2025  Patient Name: Mag Padilla  : 1980  MRN: [id number]  Date of admission: 2025  Room/Bed: [facility]      Subjective   Subjective     Chief Complaint: nausea, vomiting weakness     Summary:Mag Padilla is a 44 y.o. female who is a nursing home resident  with past medical history of Crohn disease status post resection,with complex abdominal surgical history.  Patient had a history of a duodenal hole and underwent extensive surgery that required a G-tube, J-tube and multiple drain placements this was all done at [facility].  Patient since that time has had multiple issues with abdominal wounds and abscesses.  Patient also has history of hypotension on midodrine, anxiety, chronic diarrhea, and GERD presenting to the ED for evaluation of nausea vomiting diarrhea with generalized weakness.  Patient states that for the last 3 days she has not been feeling well with persistent nausea and vomiting for the last 2 days and worsening of her chronic diarrhea.  Due to the symptoms patient is weakness and lethargy had also worsened to where she is mostly bedbound with excessive sleepiness.  Patient is seen by wound care for abdominal wounds after an ex lap with colon resection.  Due to worsening condition patient was eventually brought to the ED for further evaluation.  In the ED patient was significantly hypotensive on arrival with remaining vitals being within normal limits.  UA was positive for UTI with signs of dehydration, labs showed severely reduced renal function with hypokalemia, anemia, thrombocytopenia, and severe hypoalbuminemia.  Chest x-ray was negative for any acute findings.  When seen patient was resting comfortably and still having episode of diarrhea since being here although her nausea is much improved.  She did deny any recent fevers, chills, headaches, focal weakness, chest pain, palpitation, shortness of breath, cough,  abdominal pain, constipation, dysuria, hematuria, hematochezia, melena, or anxiety.  Patient admitted for further evaluation and treatment.  CT of the abdomen revealed 6 mm left ureteral stone with mild hydro utero nephrosis which required urology consult and patient was taken to the operating room  and had a cystoscopy with stent placement.  Patient's blood cultures are growing ESBL E. Coli.  Urine with less than 10,000 gram-negative rods.  MRSA screen is also positivePatient seen by intensivist, urology and nephrology.  GI consulted for diarrhea.  Previously patient has seen Dr. Pompa.  Patient seen by general surgery for fistula.  Recommendation is to follow-up with .      Interval Followup:   Remains on room air, bradycardia improved.  Poor historian does not recall her previous care much  Blood pressure soft.  Mostly dropped while sleeping.  Restarted on midodrine by nephrology.  Dose titrated up  CT abdomen with oral contrast noted.  Not significant  Creatinine slowly improving  Patient ambulates with the help of walker nursing home  Chronic abdominal wounds follows with .  Per patient she does have appointment end of this month  Urinating well without Renee catheter  Yellowish fluid in 2 ostomy bags from ventral abdomen.   continues to have diarrhea  FOBT positive.    Review of Systems    All systems reviewed and negative except for what is outlined above.      Objective   Objective     Vitals:   Temp:  [97.5 °F (36.4 °C)-98.5 °F (36.9 °C)] 98.2 °F (36.8 °C)  Heart Rate:  [90] 90  Resp:  [18] 18  BP: ()/(58-70) 98/62    Physical Exam   General: Awake, alert, NAD resting in bed  HENT: NCAT, MMM, right IJ central line  Eyes: pupils equal, no scleral icterus  Cardiovascular: RRR, no murmurs   Pulmonary: CTA bilaterally; no wheezes; no conversational dyspnea  Gastrointestinal: Soft patient has multiple abdominal wounds patient has a wound on her left lower side that is draining a significant amount  of greenish drainage.  Patient has enterocutaneous fistula left upper quadrant.  Upper midline abdomen wound is also fistula as it is draining fluid .  Patient denies any significant pain  Musculoskeletal: No gross deformities  Skin: No jaundice, no rash on exposed skin appreciated  Neuro: CN II through XII grossly intact; speech clear; no tremor  Psych: Mood and affect appropriate  .  Off Thelma    Result Review    Result Review:  I have personally reviewed these results:  [x]  Laboratory      Lab 03/01/25  0654 02/28/25  0419 02/27/25  1339 02/26/25  0248 02/25/25  1226 02/25/25  0953 02/23/25  2105 02/23/25  1058 02/23/25  0138 02/22/25  2201 02/22/25  2157 02/22/25  1919   WBC 9.94 9.31 7.85   < >  --   --    < >  --  6.50   < >  --   --    HEMOGLOBIN 8.7* 8.9* 9.1*   < >  --   --    < >  --  7.0*   < >  --   --    HEMATOCRIT 27.4* 27.6* 28.9*   < >  --   --    < >  --  22.4*   < >  --   --    PLATELETS 151 137* 124*   < >  --   --    < >  --  84*   < >  --   --    NEUTROS ABS 8.00* 7.42* 6.11   < >  --   --   --   --   --    < >  --   --    IMMATURE GRANS (ABS) 0.19* 0.28* 0.23*   < >  --   --   --   --   --   --   --   --    LYMPHS ABS 1.04 1.07 1.04   < >  --   --   --   --   --   --   --   --    MONOS ABS 0.54 0.38 0.32   < >  --   --   --   --   --   --   --   --    EOS ABS 0.15 0.14 0.14   < >  --   --   --   --   --    < >  --   --    MCV 91.6 91.4 90.3   < >  --   --    < >  --  87.2   < >  --   --    PROCALCITONIN  --   --   --   --   --   --   --   --   --   --  20.74* 26.11*   LACTATE  --   --   --   --  1.6 2.2*  --   --  0.9  --   --   --    PROTIME  --   --   --   --   --   --   --  17.3*  --   --   --  34.3*   APTT  --   --   --   --   --   --   --   --   --   --   --  47.6*   D DIMER QUANT  --   --   --   --   --   --   --   --   --   --   --  1.76*    < > = values in this interval not displayed.         Lab 03/01/25  0654 02/28/25  0419 02/27/25  1339 02/27/25  0603 02/26/25  3402  02/26/25  0248   SODIUM 145 143 138 140 144 146*   POTASSIUM 4.1 3.9 4.2 3.8 3.8 3.5   CHLORIDE 120* 119* 115* 115* 119* 118*   CO2 14.8* 16.4* 15.6* 15.3* 15.7* 18.9*   ANION GAP 10.2 7.6 7.4 9.7 9.3 9.1   BUN 31* 28* 25* 27* 28* 32*   CREATININE 1.63* 1.94* 2.13* 2.25* 2.64* 2.88*   EGFR 39.2* 31.8* 28.5* 26.6* 22.0* 19.8*   GLUCOSE 86 76 79 97 114* 71   CALCIUM 7.6* 7.7* 7.4* 8.0* 8.0* 8.4*   MAGNESIUM  --   --   --  2.4 1.4* 1.7   PHOSPHORUS 3.6 4.0 2.1*  --   --  2.0*         Lab 03/01/25  0654 02/28/25  0419 02/27/25  1339 02/25/25  0458 02/24/25  0440 02/23/25  1058 02/23/25  0138   TOTAL PROTEIN  --   --   --  5.7* 5.7*  --  5.6*   ALBUMIN 1.5* 1.7* 1.7* 2.3* 2.0*   < > 2.0*   GLOBULIN  --   --   --  3.4 3.7  --  3.6   ALT (SGPT)  --   --   --  <5 5  --  7   AST (SGOT)  --   --   --  9 8  --  9   BILIRUBIN  --   --   --  0.6 0.8  --  1.2   ALK PHOS  --   --   --  96 93  --  91    < > = values in this interval not displayed.         Lab 02/23/25  1058 02/22/25  1919   PROTIME 17.3* 34.3*   INR 1.35* 3.17*             Lab 02/26/25  0349 02/22/25 2157   ABO TYPING O O   RH TYPING Positive Positive   ANTIBODY SCREEN Negative Negative         Lab 02/22/25  1837   PH, ARTERIAL 7.327*   PCO2, ARTERIAL 20.5*   PO2 .9*   O2 SATURATION ART 97.7   HCO3 ART 10.7*   BASE EXCESS ART -13.6*     Brief Urine Lab Results  (Last result in the past 365 days)        Color   Clarity   Blood   Leuk Est   Nitrite   Protein   CREAT   Urine HCG        03/01/25 0005             33.9         03/01/25 0005 Yellow   Cloudy   Large (3+)   Large (3+)   Negative   100 mg/dL (2+)                 [x]  Microbiology   Microbiology Results (last 10 days)       Procedure Component Value - Date/Time    Blood Culture - Blood, Hand, Right [[id number]]  (Normal) Collected: 02/24/25 2016    Lab Status: Preliminary result Specimen: Blood from Hand, Right Updated: 02/28/25 2030     Blood Culture No growth at 4 days    Narrative:      Less than  seven (7) mL's of blood was collected.  Insufficient quantity may yield false negative results.    Blood Culture - Blood, Arm, Left [[id number]]  (Normal) Collected: 02/24/25 1803    Lab Status: Preliminary result Specimen: Blood from Arm, Left Updated: 02/28/25 1831     Blood Culture No growth at 4 days    Clostridioides difficile Toxin - Stool, Per Rectum [[id number]] Collected: 02/24/25 0856    Lab Status: Final result Specimen: Stool from Per Rectum Updated: 02/24/25 0950    Narrative:      The following orders were created for panel order Clostridioides difficile Toxin - Stool, Per Rectum.  Procedure                               Abnormality         Status                     ---------                               -----------         ------                     Clostridioides difficile...[[id number]]                      Final result                 Please view results for these tests on the individual orders.    Clostridioides difficile Toxin, PCR - Stool, Per Rectum [[id number]] Collected: 02/24/25 0856    Lab Status: Final result Specimen: Stool from Per Rectum Updated: 02/24/25 0950     Toxigenic C. difficile by PCR Negative     027 Toxin Presumptive Negative    Narrative:      The result indicates the absence of toxigenic C. difficile from stool specimen.     MRSA Screen, PCR (Inpatient) - Swab, Nares [[id number]]  (Abnormal) Collected: 02/23/25 0601    Lab Status: Final result Specimen: Swab from Nares Updated: 02/23/25 1001     MRSA PCR MRSA Detected    Narrative:      The negative predictive value of this diagnostic test is high and should only be used to consider de-escalating anti-MRSA therapy. A positive result may indicate colonization with MRSA and must be correlated clinically.    Respiratory Panel PCR w/COVID-19(SARS-CoV-2) BRIELLE/TOSHA/IMTIAZ/PAD/COR/NATACHA In-House, NP Swab in UTM/VTM, 2 HR TAT - Swab, Nasopharynx [[id number]]  (Normal) Collected: 02/23/25 0601    Lab Status: Final result Specimen: Swab from  Nasopharynx Updated: 02/23/25 0827     ADENOVIRUS, PCR Not Detected     Coronavirus 229E Not Detected     Coronavirus HKU1 Not Detected     Coronavirus NL63 Not Detected     Coronavirus OC43 Not Detected     COVID19 Not Detected     Human Metapneumovirus Not Detected     Human Rhinovirus/Enterovirus Not Detected     Influenza A PCR Not Detected     Influenza B PCR Not Detected     Parainfluenza Virus 1 Not Detected     Parainfluenza Virus 2 Not Detected     Parainfluenza Virus 3 Not Detected     Parainfluenza Virus 4 Not Detected     RSV, PCR Not Detected     Bordetella pertussis pcr Not Detected     Bordetella parapertussis PCR Not Detected     Chlamydophila pneumoniae PCR Not Detected     Mycoplasma pneumo by PCR Not Detected    Narrative:      In the setting of a positive respiratory panel with a viral infection PLUS a negative procalcitonin without other underlying concern for bacterial infection, consider observing off antibiotics or discontinuation of antibiotics and continue supportive care. If the respiratory panel is positive for atypical bacterial infection (Bordetella pertussis, Chlamydophila pneumoniae, or Mycoplasma pneumoniae), consider antibiotic de-escalation to target atypical bacterial infection.    Urine Culture - Urine, Urine, Clean Catch [[id number]]  (Abnormal) Collected: 02/22/25 2208    Lab Status: Final result Specimen: Urine, Clean Catch Updated: 02/24/25 1058     Urine Culture <10,000 CFU/mL Gram Negative Bacilli    Narrative:      Call if further workup needed.   Colonization of the urinary tract without infection is common. Treatment is discouraged unless the patient is symptomatic, pregnant, or undergoing an invasive urologic procedure.    Blood Culture - Blood, Leg, Left [[id number]]  (Abnormal)  (Susceptibility) Collected: 02/22/25 1922    Lab Status: Final result Specimen: Blood from Leg, Left Updated: 02/25/25 0620     Blood Culture Escherichia coli ESBL     Comment:   Consider  infectious disease consult.  Susceptibility results may not correlate to clinical outcomes.  For ESBL-producing infections in the blood, a carbapenem is recommended as first-line therapy for optimal clinical outcomes.        Isolated from Aerobic and Anaerobic Bottles     Gram Stain Aerobic Bottle Gram negative bacilli      Anaerobic Bottle Gram negative bacilli    Narrative:      Less than seven (7) mL's of blood was collected.  Insufficient quantity may yield false negative results.    Susceptibility        Escherichia coli ESBL      VANDANA      Ciprofloxacin Resistant      Ertapenem Susceptible      Levofloxacin Resistant      Meropenem Susceptible      Trimethoprim + Sulfamethoxazole Resistant                       Susceptibility Comments       Escherichia coli ESBL    With the exception of urinary-sourced infections, aminoglycosides should not be used as monotherapy.               Blood Culture ID, PCR - Blood, Leg, Left [[id number]]  (Abnormal) Collected: 02/22/25 1922    Lab Status: Final result Specimen: Blood from Leg, Left Updated: 02/23/25 1155     BCID, PCR Escherichia coli. Identification by BCID2 PCR.     BCID, PCR 2 CTX-M (ESBL) detected. Identification by BCID2 PCR     BOTTLE TYPE Anaerobic Bottle    Blood Culture - Blood, Arm, Left [[id number]]  (Normal) Collected: 02/22/25 1919    Lab Status: Final result Specimen: Blood from Arm, Left Updated: 02/27/25 1931     Blood Culture No growth at 5 days    Narrative:      Less than seven (7) mL's of blood was collected.  Insufficient quantity may yield false negative results.    Wound Culture - Wound, Abdominal Wall [[id number]]  (Abnormal)  (Susceptibility) Collected: 02/22/25 1605    Lab Status: Final result Specimen: Wound from Abdominal Wall Updated: 02/26/25 0748     Wound Culture Moderate growth (3+) Escherichia coli ESBL     Comment:   Consider infectious disease consult.  Susceptibility results may not correlate to clinical outcomes.         Light growth  (2+) Enterococcus faecium      Light growth (2+) Yeast, Not Candida albicans     Gram Stain Rare (1+) Gram negative bacilli    Susceptibility        Escherichia coli ESBL      VANDANA      Ertapenem Susceptible      Meropenem Susceptible                       Susceptibility        Enterococcus faecium      VANDANA      Ampicillin Resistant      Vancomycin Susceptible                       Susceptibility Comments       Escherichia coli ESBL    With the exception of urinary-sourced infections, aminoglycosides should not be used as monotherapy.               Legionella Antigen, Urine - Urine, Straight Cath [[id number]]  (Normal) Collected: 02/21/25 2202    Lab Status: Final result Specimen: Urine from Straight Cath Updated: 02/22/25 1751     LEGIONELLA ANTIGEN, URINE Negative    S. Pneumo Ag Urine or CSF - Urine, Straight Cath [[id number]]  (Normal) Collected: 02/21/25 2202    Lab Status: Final result Specimen: Urine from Straight Cath Updated: 02/22/25 1752     Strep Pneumo Ag Negative    COVID-19, FLU A/B, RSV PCR 1 HR TAT - Swab, Nasopharynx [[id number]]  (Normal) Collected: 02/21/25 1745    Lab Status: Final result Specimen: Swab from Nasopharynx Updated: 02/21/25 1832     COVID19 Not Detected     Influenza A PCR Not Detected     Influenza B PCR Not Detected     RSV, PCR Not Detected    Narrative:      Fact sheet for providers: https://www.fda.gov/media/761798/download    Fact sheet for patients: https://www.fda.gov/media/689797/download    Test performed by PCR.          [x]  Radiology  CT Abdomen Pelvis Without Contrast    Result Date: 2/28/2025  Impression: Limited study. 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 Oral contrast is not visualized in the fistulous. No evidence of bowel obstruction. Moderate distention of the colon with air and oral contrast. Findings suggest colonic ileus. Small to moderate bilateral pleural effusions, left greater than right, increased in size when  compared to prior study. Additional findings per body of the report. Electronically Signed: Alessandro Love MD  2/28/2025 7:20 PM EST  Workstation ID: [id number]    XR Chest 1 View    Result Date: 2/23/2025  The distal tip of the right IJ central venous line/port is in the expected mid superior vena cava (SVC). No pneumothorax is seen. There are bilateral infiltrates present, suggestive of pulmonary edema with vascular congestion.    Portions of this note were completed with a voice recognition program.  2/23/2025 12:16 AM by James Hull MD on Workstation: [id number]      CT Abdomen Pelvis Without Contrast    Result Date: 2/22/2025  Impression: CT scan of the abdomen and pelvis without contrast demonstrating worsening subsegmental atelectasis at the lung bases. Small right effusion appears larger. New small left effusion. Hepatic configuration concerning for cirrhosis. A small amount of peritoneal fluid is again seen. Post cholecystectomy. Multiple nonobstructing renal stones are again seen. Mild left hydroureteronephrosis is not seen on the prior study. 6 mm stone in the mid left ureter at the L5 level was seen on the prior study in the lower pole collecting system of the left kidney. 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 Wound in the anterior abdominal wall is again seen. This appears smaller in comparison to 2/1/2025. Electronically Signed: Shun Milan MD  2/22/2025 5:22 PM EST  Workstation ID: [id number]    US Renal Bilateral    Result Date: 2/22/2025  Impression: 1.Bilateral nephrolithiasis without evidence of hydronephrosis. 2.Bilateral increased cortical echogenicity consistent with chronic renal disease. 3.Asymmetrically edematous left kidney with decreased cortical medullary differentiation which is nonspecific but could be seen in the setting of pyelonephritis.. Electronically Signed: Raad Jansen MD  2/22/2025 5:10 PM EST  Workstation ID: [id number]    XR Chest 1 View    Result Date:  2/21/2025  No active disease. Electronically Signed: Vitaly aMya MD  2/21/2025 7:38 PM EST  Workstation ID: [id number]   []  EKG/Telemetry   []  Cardiology/Vascular   []  Pathology  []  Old records  []  Other:    Assessment & Plan   Assessment / Plan     Assessment:  Acute kidney injury due to obstructing nephrolithiasis on the left status post stent placement.  Improving  Septic shock, present on admission secondary to bacteremia.  Improving  ESBL E. coli bacteremia.  Subsequent blood culture negative since February 24.  Chronic hypotension on midodrine  Episodes of bradycardia due to midodrine.  Improving.  Midodrine resumed  History of Crohn's disease with complex surgery  Chronic nonhealing abdominal wounds and abscess  secondary to significant abdominal surgery in the past for duodenal ulcer rupture follows with .  Due to ESBL E. coli and Enterococcus faecium.  Chronic enterocutaneous fistulas followed by   Anxiety disorder  Positive MRSA PCR  Acute on chronic anemia.  Status post 1 unit packed RBC transfusion  Antibiotic associated diarrhea.  C. difficile negative.  Hypernatremia.  Free water deficit.  Resolved  Thrombocytopenia.  Improving  Left more than right pleural effusion    Plan:   Midodrine resumed by nephrology as bradycardia improved  Continue meropenem, finished Flagyl  DC central line.  Will get IV meropenem.  Finishing IV Vanco for  Wound culture from the abdomen  Repeat Blood cultures negative to date from February 24  CT of the abdomen and pelvis reviewed.  Only 1 enterocutaneous fistula located in the left upper quadrant reported.    Repeat CT scan abdomen pelvis with oral contrast ordered.  Patient reluctant to drink oral contrast as it makes her sick  Appreciate urology input.  Status post left ureteric stent.  Needs definitive stone treatment when stable  Nephrology following.  Renal function slowly improving.  Finished D5 water with potassium.  Discussed with GI about diarrhea.   Appreciate input  Discussed with general surgery to evaluate for fistulous.  Recommend follow-up with .    As needed Imodium.  Scheduled Questran  wound culture from abdomen noted with ESBL E. coli and Enterococcus faecium. Patient does have known chronic abdominal wounds with fistulous communication.  Patient follows with  for management of these wounds patient was just discharged this month from  for these wounds.  May need to be transferred back to  once acute issues resolve  Continue wound care.  Discussed with wound care.  Appreciate input.  Electrolyte replacement protocol  Monitor white cell count.  Leukocytosis resolved  PT OT         Discussed with RN,  and patient.  Return to [facility] when stable.  Patient to follow-up with  for her Crohn's fistula.  Per patient she has appointment end of this month    VTE Prophylaxis:  Pharmacologic & mechanical VTE prophylaxis orders are present.        CODE STATUS:   Level Of Support Discussed With: Patient  Code Status (Patient has no pulse and is not breathing): CPR (Attempt to Resuscitate)  Medical Interventions (Patient has pulse or is breathing): Full Support      Electronically signed by Liam Israel MD, 3/1/2025, 17:00 EST.

## 2025-03-01 NOTE — PROGRESS NOTES
"[facility] Clinical Pharmacy Services: Vancomycin Monitoring Note    Mag Padilla is a 44 y.o. female who is on day 6 of pharmacy to dose vancomycin for SSTI    Previous Vancomycin Dose: 500 mg IV x1  @ 1635  Imaging Reviewed?: Yes  Updated Cultures and Sensitivities:    Blood culture- NGTD    Vitals/Labs  Ht: 157.5 cm (62\"); Wt: 52.3 kg (115 lb 4.8 oz)   Temp (24hrs), Av °F (36.7 °C), Min:97.5 °F (36.4 °C), Max:98.5 °F (36.9 °C)   Estimated Creatinine Clearance: 35.6 mL/min (A) (by C-G formula based on SCr of 1.63 mg/dL (H)).     Results from last 7 days   Lab Units 25  0654 25  0419 25  1339 25  0603 25  0526 25  0440 25  1058 25  0607   VANCOMYCIN RM mcg/mL  --  11.80  --  15.94  --  21.68  --  23.27   CREATININE mg/dL 1.63* 1.94* 2.13* 2.25*   < > 3.53*   < >  --    WBC 10*3/mm3 9.94 9.31 7.85 14.25*   < >  --   --   --     < > = values in this interval not displayed.     Assessment/Plan  Current Vancomycin Dose: 500 mg every 18 hours    Regimen: 500 mg IV every 18 hours.  Start time: 20:09 on 2025  Exposure target: AUC24 (range)400-600 mg/L.hr   AUC24,ss: 512 mg/L.hr  Probability of AUC24 > 400: 74 %  Ctrough,ss: 16 mg/L  Probability of Ctrough,ss > 20: 32 %  Probability of nephrotoxicity (Lodise DARIUSZ ): 11 %    OK to continue on current dose.    Thank you for involving pharmacy in this patient's care. Please contact pharmacy with any questions or concerns.    Alex Judge  Clinical Pharmacist  "

## 2025-03-01 NOTE — PROGRESS NOTES
[facility]     Progress Note    Patient Name: Mag Padilla  : 1980  MRN: [id number]  Primary Care Physician:  Provider, No Known  Date of admission: 2025    Subjective   No major acute events otherwise overnight    Scheduled Meds:cholestyramine, 1 packet, Oral, Daily  ferrous sulfate, 325 mg, Oral, Daily With Breakfast  heparin (porcine), 5,000 Units, Subcutaneous, Q8H  Iohexol (OMNIPAQUE) oral compound 12 mg/mL, 500 mL, Oral, Once  meropenem, 1,000 mg, Intravenous, Q12H  midodrine, 10 mg, Oral, TID AC  multivitamin with minerals, 1 tablet, Oral, Daily  pantoprazole, 40 mg, Oral, Daily  potassium phosphate (monobasic), 500 mg, Oral, BID  saccharomyces boulardii, 500 mg, Oral, Daily  sertraline, 75 mg, Oral, Daily  sodium bicarbonate, 1,950 mg, Oral, TID  sodium chloride, 10 mL, Intravenous, Q12H  vancomycin, 500 mg, Intravenous, Q18H      Continuous Infusions:Pharmacy to dose vancomycin,       PRN Meds:.  acetaminophen    ALPRAZolam    aluminum-magnesium hydroxide-simethicone    senna-docusate sodium **AND** polyethylene glycol **AND** bisacodyl **AND** bisacodyl    Calcium Replacement - Follow Nurse / BPA Driven Protocol    dextrose    loperamide    Magnesium Low Dose Replacement - Follow Nurse / BPA Driven Protocol    ondansetron    Pharmacy to dose vancomycin    Phosphorus Replacement - Follow Nurse / BPA Driven Protocol    sodium chloride    sodium chloride    sodium chloride       Review of Systems  Constitutional:        Weakness tiredness fatigue  Eyes:                       No blurry vision, eye discharge, eye irritation, eye pain  HEENT:                   No acute hair loss, earache and discharge, nasal congestion or discharge, sore throat, postnasal drip  Respiratory:           No shortness of breath coughing sputum production wheezing hemoptysis pleuritic chest pain  Cardiovascular:     No chest pain, orthopnea, PND, dizziness, palpitation, lower extremity edema  Gastrointestinal:    No nausea vomiting diarrhea abdominal pain constipation  Genitourinary:       No urinary incontinence, hesitancy, frequency, urgency, dysuria  Hematologic:         No bruising, bleeding, pallor, lymphadenopathy  Endocrine:            No coldness, hot flashes, polyuria, abnormal hair growth  Musculoskeletal:    No body pains, aches, arthritic pains, muscle pain ,muscle wasting  Psychiatric:          No low or high mood, anxiety, hallucinations, delusions  Skin.                      No rash, ulcers, bruising, itching  Neurological:        No confusion, headache, focal weakness, numbness, dysphasia    Objective   Objective     Vitals:   Temp:  [97.9 °F (36.6 °C)-98.5 °F (36.9 °C)] 98 °F (36.7 °C)  Heart Rate:  [] 90  Resp:  [18] 18  BP: ()/(58-69) 92/61  Physical Exam    Constitutional: Awake, alert responsive, conversant, no obvious distress              Psychiatric:  Appropriate affect, cooperative   Neurologic:  Awake alert ,oriented x 3, strength symmetric in all extremities, Cranial Nerves grossly intact to confrontation, speech clear   Eyes:   PERRLA, sclerae anicteric, no conjunctival injection   HEENT:  Moist mucous membranes, no nasal or eye discharge, no throat congestion   Neck:   Supple, no thyromegaly, no lymphadenopathy, trachea midline, no elevated JVD   Respiratory:  Clear to auscultation bilaterally, nonlabored respirations    Cardiovascular: RRR, no murmurs, rubs, or gallops, palpable pedal pulses bilaterally, No bilateral ankle edema   Gastrointestinal: Positive bowel sounds, soft, nontender, nondistended, no organomegaly   Musculoskeletal:  No clubbing or cyanosis to extremities,muscle wasting, joint swelling, muscle weakness             Skin:                      No rashes, bruising, skin ulcers, petechiae or ecchymosis    Result Review    Result Review:  I have personally reviewed the results from the time of this admission to 3/1/2025 08:17 EST and agree with these findings:  [x]   Laboratory  []  Microbiology  []  Radiology  []  EKG/Telemetry   []  Cardiology/Vascular   []  Pathology  []  Old records  []  Other:    Assessment & Plan   Assessment / Plan       Active Hospital Problems:  Active Hospital Problems    Diagnosis     **Acute renal failure     Hypotension due to hypovolemia     Chronic diarrhea     Inflammatory bowel disease (Crohn's disease)     Kidney stone      44-year-old female with past medical history of Crohn's disease status post resection, chronically hypotensive on midodrine, anxiety, chronic diarrhea, GERD who who has not been feeling well and has had persistent nausea and vomiting for the last few days as well as her diarrhea with severe JOHANA and creatinine rise from baseline normal to peak of 5.6 with decreased urine output and worsening bicarb currently at 5.  JOHANA most likely due to hypotension, diarrhea and vomiting contributing with UA showing some hyaline cast.  Urine analysis also concerning for large amount of proteinuria and RBCs with CT scan showing obstructive stone which is likely the source of septic shock.  Creatinine has plateaued around 3.5 with poor urine output and concerning patient may have developed ATN.  Cultures with ESBL Klebsiella.  Responded to Lasix with brisk urine output and improving renal dysfunction       Plan:   Creatinine continues to downtrend today to 1.6  His blood pressure continues to be low, I will increase the dose of midodrine  UPCR is 2800 mg/g from sample which had RBCs, WBCs and squamous epithelial cells.  Most likely, will have to repeat urinalysis on abdominal sample.  Patient on meropenem for ESBL  Continue bicarb 1950 3 times daily

## 2025-03-01 NOTE — PLAN OF CARE
Goal Outcome Evaluation:              Outcome Evaluation: Patient aox4, able to make needs known. Continues to have soft blood pressures even with midodrine. Patient continues to have low blood glucose, last check was 79. No c/o pain or discomfort. Ostomys still in place. Wound care performed as ordered. Patient experiencing diarrhea, given immodium as ordered. All needs have been met, call light in reach.

## 2025-03-02 LAB
ALBUMIN SERPL-MCNC: 1.6 G/DL (ref 3.5–5.2)
ANION GAP SERPL CALCULATED.3IONS-SCNC: 9.2 MMOL/L (ref 5–15)
BUN SERPL-MCNC: 33 MG/DL (ref 6–20)
BUN/CREAT SERPL: 21.3 (ref 7–25)
CALCIUM SPEC-SCNC: 7.7 MG/DL (ref 8.6–10.5)
CHLORIDE SERPL-SCNC: 121 MMOL/L (ref 98–107)
CO2 SERPL-SCNC: 16.8 MMOL/L (ref 22–29)
CREAT SERPL-MCNC: 1.55 MG/DL (ref 0.57–1)
EGFRCR SERPLBLD CKD-EPI 2021: 41.7 ML/MIN/1.73
GLUCOSE BLDC GLUCOMTR-MCNC: 73 MG/DL (ref 70–99)
GLUCOSE BLDC GLUCOMTR-MCNC: 80 MG/DL (ref 70–99)
GLUCOSE SERPL-MCNC: 79 MG/DL (ref 65–99)
HCT VFR BLD AUTO: 29.9 % (ref 34–46.6)
HGB BLD-MCNC: 9.2 G/DL (ref 12–15.9)
PHOSPHATE SERPL-MCNC: 3.4 MG/DL (ref 2.5–4.5)
POTASSIUM SERPL-SCNC: 4.3 MMOL/L (ref 3.5–5.2)
SODIUM SERPL-SCNC: 147 MMOL/L (ref 136–145)

## 2025-03-02 PROCEDURE — 25010000002 MEROPENEM PER 100 MG: Performed by: INTERNAL MEDICINE

## 2025-03-02 PROCEDURE — 25010000003 DEXTROSE 5 % SOLUTION: Performed by: STUDENT IN AN ORGANIZED HEALTH CARE EDUCATION/TRAINING PROGRAM

## 2025-03-02 PROCEDURE — 82948 REAGENT STRIP/BLOOD GLUCOSE: CPT

## 2025-03-02 PROCEDURE — 85014 HEMATOCRIT: CPT | Performed by: INTERNAL MEDICINE

## 2025-03-02 PROCEDURE — 25010000002 HEPARIN (PORCINE) PER 1000 UNITS: Performed by: INTERNAL MEDICINE

## 2025-03-02 PROCEDURE — 25010000002 VANCOMYCIN PER 500 MG: Performed by: INTERNAL MEDICINE

## 2025-03-02 PROCEDURE — 80069 RENAL FUNCTION PANEL: CPT | Performed by: INTERNAL MEDICINE

## 2025-03-02 PROCEDURE — 99232 SBSQ HOSP IP/OBS MODERATE 35: CPT | Performed by: INTERNAL MEDICINE

## 2025-03-02 PROCEDURE — 85018 HEMOGLOBIN: CPT | Performed by: INTERNAL MEDICINE

## 2025-03-02 RX ORDER — DIPHENOXYLATE HYDROCHLORIDE AND ATROPINE SULFATE 2.5; .025 MG/1; MG/1
1 TABLET ORAL 2 TIMES DAILY
Status: DISCONTINUED | OUTPATIENT
Start: 2025-03-02 | End: 2025-03-03

## 2025-03-02 RX ORDER — DEXTROSE MONOHYDRATE 50 MG/ML
75 INJECTION, SOLUTION INTRAVENOUS CONTINUOUS
Status: DISCONTINUED | OUTPATIENT
Start: 2025-03-02 | End: 2025-03-03

## 2025-03-02 RX ADMIN — MEROPENEM 1000 MG: 1 INJECTION INTRAVENOUS at 08:32

## 2025-03-02 RX ADMIN — MEROPENEM 1000 MG: 1 INJECTION INTRAVENOUS at 21:53

## 2025-03-02 RX ADMIN — LOPERAMIDE HYDROCHLORIDE 2 MG: 2 CAPSULE ORAL at 21:52

## 2025-03-02 RX ADMIN — Medication 1 TABLET: at 08:10

## 2025-03-02 RX ADMIN — MIDODRINE HYDROCHLORIDE 15 MG: 10 TABLET ORAL at 17:22

## 2025-03-02 RX ADMIN — CHOLESTYRAMINE 1 PACKET: 4 POWDER, FOR SUSPENSION ORAL at 21:51

## 2025-03-02 RX ADMIN — PANTOPRAZOLE SODIUM 40 MG: 40 TABLET, DELAYED RELEASE ORAL at 08:10

## 2025-03-02 RX ADMIN — HEPARIN SODIUM 5000 UNITS: 5000 INJECTION INTRAVENOUS; SUBCUTANEOUS at 05:14

## 2025-03-02 RX ADMIN — DIPHENOXYLATE HYDROCHLORIDE AND ATROPINE SULFATE 1 TABLET: 2.5; .025 TABLET ORAL at 18:32

## 2025-03-02 RX ADMIN — HEPARIN SODIUM 5000 UNITS: 5000 INJECTION INTRAVENOUS; SUBCUTANEOUS at 15:00

## 2025-03-02 RX ADMIN — POTASSIUM PHOSPHATE, MONOBASIC 500 MG: 500 TABLET, SOLUBLE ORAL at 08:10

## 2025-03-02 RX ADMIN — MIDODRINE HYDROCHLORIDE 15 MG: 10 TABLET ORAL at 08:10

## 2025-03-02 RX ADMIN — SERTRALINE HYDROCHLORIDE 75 MG: 50 TABLET ORAL at 08:10

## 2025-03-02 RX ADMIN — DEXTROSE MONOHYDRATE 75 ML/HR: 50 INJECTION, SOLUTION INTRAVENOUS at 08:18

## 2025-03-02 RX ADMIN — SODIUM BICARBONATE 650 MG TABLET 1950 MG: at 08:10

## 2025-03-02 RX ADMIN — HEPARIN SODIUM 5000 UNITS: 5000 INJECTION INTRAVENOUS; SUBCUTANEOUS at 21:54

## 2025-03-02 RX ADMIN — LOPERAMIDE HYDROCHLORIDE 2 MG: 2 CAPSULE ORAL at 11:10

## 2025-03-02 RX ADMIN — VANCOMYCIN HYDROCHLORIDE 500 MG: 500 INJECTION, POWDER, LYOPHILIZED, FOR SOLUTION INTRAVENOUS at 14:59

## 2025-03-02 RX ADMIN — CHOLESTYRAMINE 1 PACKET: 4 POWDER, FOR SUSPENSION ORAL at 08:17

## 2025-03-02 RX ADMIN — Medication 10 ML: at 21:52

## 2025-03-02 RX ADMIN — MIDODRINE HYDROCHLORIDE 15 MG: 10 TABLET ORAL at 11:10

## 2025-03-02 RX ADMIN — FERROUS SULFATE TAB 325 MG (65 MG ELEMENTAL FE) 325 MG: 325 (65 FE) TAB at 08:10

## 2025-03-02 RX ADMIN — Medication 500 MG: at 08:10

## 2025-03-02 RX ADMIN — Medication 10 ML: at 08:28

## 2025-03-02 RX ADMIN — SODIUM BICARBONATE 650 MG TABLET 1950 MG: at 21:52

## 2025-03-02 RX ADMIN — SODIUM BICARBONATE 650 MG TABLET 1950 MG: at 15:00

## 2025-03-02 NOTE — PROGRESS NOTES
[facility]   Hospitalist Progress Note  Date: 3/2/2025  Patient Name: Mag Padilla  : 1980  MRN: [id number]  Date of admission: 2025  Room/Bed: [facility]/      Subjective   Subjective     Chief Complaint: nausea, vomiting weakness     Summary:Mag Padilla is a 44 y.o. female who is a nursing home resident  with past medical history of Crohn disease status post resection,with complex abdominal surgical history.  Patient had a history of a duodenal hole and underwent extensive surgery that required a G-tube, J-tube and multiple drain placements this was all done at [facility].  Patient since that time has had multiple issues with abdominal wounds and abscesses.  Patient also has history of hypotension on midodrine, anxiety, chronic diarrhea, and GERD presenting to the ED for evaluation of nausea vomiting diarrhea with generalized weakness.  Patient states that for the last 3 days she has not been feeling well with persistent nausea and vomiting for the last 2 days and worsening of her chronic diarrhea.  Due to the symptoms patient is weakness and lethargy had also worsened to where she is mostly bedbound with excessive sleepiness.  Patient is seen by wound care for abdominal wounds after an ex lap with colon resection.  Due to worsening condition patient was eventually brought to the ED for further evaluation.  In the ED patient was significantly hypotensive on arrival with remaining vitals being within normal limits.  UA was positive for UTI with signs of dehydration, labs showed severely reduced renal function with hypokalemia, anemia, thrombocytopenia, and severe hypoalbuminemia.  Chest x-ray was negative for any acute findings.  When seen patient was resting comfortably and still having episode of diarrhea since being here although her nausea is much improved.  She did deny any recent fevers, chills, headaches, focal weakness, chest pain, palpitation, shortness of breath, cough,  abdominal pain, constipation, dysuria, hematuria, hematochezia, melena, or anxiety.  Patient admitted for further evaluation and treatment.  CT of the abdomen revealed 6 mm left ureteral stone with mild hydro utero nephrosis which required urology consult and patient was taken to the operating room  and had a cystoscopy with stent placement.  Patient's blood cultures are growing ESBL E. Coli.  Urine with less than 10,000 gram-negative rods.  MRSA screen is also positivePatient seen by intensivist, urology and nephrology.  GI consulted for diarrhea.  Previously patient has seen Dr. Pompa.  Patient seen by general surgery for fistula.  Recommendation is to follow-up with .      Interval Followup:   Remains on room air, bradycardia improved.  Poor historian does not recall her previous care much  Blood pressure soft.  Mostly dropped while sleeping.  Restarted on midodrine by nephrology.  Dose titrated up  CT abdomen with oral contrast noted.  Not significant  Creatinine slowly improving  Started on D5 for hypernatremia by nephrology  Patient ambulates with the help of walker nursing home  Chronic abdominal wounds follows with .  Per patient she does have appointment end of this month  Urinating well without Renee catheter  Yellowish fluid in 2 ostomy bags from ventral abdomen and left upper quadrant.   continues to have diarrhea patient agreeable to take Lomotil.  Per patient Imodium has not worked in the past  FOBT positive.    Review of Systems    All systems reviewed and negative except for what is outlined above.      Objective   Objective     Vitals:   Temp:  [97.7 °F (36.5 °C)-98.1 °F (36.7 °C)] 98.1 °F (36.7 °C)  Heart Rate:  [60-92] 60  Resp:  [16-20] 16  BP: (84-96)/(51-71) 96/58    Physical Exam   General: Awake, alert, NAD resting in bed  HENT: NCAT, MMM, right IJ central line  Eyes: pupils equal, no scleral icterus  Cardiovascular: RRR, no murmurs   Pulmonary: CTA bilaterally; no wheezes; no  conversational dyspnea  Gastrointestinal: Soft patient has multiple abdominal wounds patient has a wound on her left lower side that is draining a significant amount of greenish drainage.  Patient has enterocutaneous fistula left upper quadrant.  Upper midline abdomen wound is also fistula as it is draining fluid .  Patient denies any significant pain  Musculoskeletal: No gross deformities  Skin: No jaundice, no rash on exposed skin appreciated  Neuro: CN II through XII grossly intact; speech clear; no tremor  Psych: Mood and affect appropriate  .  Off Renee    Result Review    Result Review:  I have personally reviewed these results:  [x]  Laboratory      Lab 03/02/25  0631 03/01/25  0654 02/28/25 0419 02/27/25  1339 02/26/25  0248 02/25/25  1226 02/25/25  0953   WBC  --  9.94 9.31 7.85   < >  --   --    HEMOGLOBIN 9.2* 8.7* 8.9* 9.1*   < >  --   --    HEMATOCRIT 29.9* 27.4* 27.6* 28.9*   < >  --   --    PLATELETS  --  151 137* 124*   < >  --   --    NEUTROS ABS  --  8.00* 7.42* 6.11   < >  --   --    IMMATURE GRANS (ABS)  --  0.19* 0.28* 0.23*   < >  --   --    LYMPHS ABS  --  1.04 1.07 1.04   < >  --   --    MONOS ABS  --  0.54 0.38 0.32   < >  --   --    EOS ABS  --  0.15 0.14 0.14   < >  --   --    MCV  --  91.6 91.4 90.3   < >  --   --    LACTATE  --   --   --   --   --  1.6 2.2*    < > = values in this interval not displayed.         Lab 03/02/25  0631 03/01/25  0654 02/28/25 0419 02/27/25  1339 02/27/25  0603 02/26/25  2320 02/26/25  0248   SODIUM 147* 145 143   < > 140 144 146*   POTASSIUM 4.3 4.1 3.9   < > 3.8 3.8 3.5   CHLORIDE 121* 120* 119*   < > 115* 119* 118*   CO2 16.8* 14.8* 16.4*   < > 15.3* 15.7* 18.9*   ANION GAP 9.2 10.2 7.6   < > 9.7 9.3 9.1   BUN 33* 31* 28*   < > 27* 28* 32*   CREATININE 1.55* 1.63* 1.94*   < > 2.25* 2.64* 2.88*   EGFR 41.7* 39.2* 31.8*   < > 26.6* 22.0* 19.8*   GLUCOSE 79 86 76   < > 97 114* 71   CALCIUM 7.7* 7.6* 7.7*   < > 8.0* 8.0* 8.4*   MAGNESIUM  --   --   --   --   2.4 1.4* 1.7   PHOSPHORUS 3.4 3.6 4.0   < >  --   --  2.0*    < > = values in this interval not displayed.         Lab 03/02/25  0631 03/01/25  0654 02/28/25  0419 02/27/25  1339 02/25/25  0458 02/24/25  0440   TOTAL PROTEIN  --   --   --   --  5.7* 5.7*   ALBUMIN 1.6* 1.5* 1.7*   < > 2.3* 2.0*   GLOBULIN  --   --   --   --  3.4 3.7   ALT (SGPT)  --   --   --   --  <5 5   AST (SGOT)  --   --   --   --  9 8   BILIRUBIN  --   --   --   --  0.6 0.8   ALK PHOS  --   --   --   --  96 93    < > = values in this interval not displayed.                   Lab 02/26/25  0349   ABO TYPING O   RH TYPING Positive   ANTIBODY SCREEN Negative           Brief Urine Lab Results  (Last result in the past 365 days)        Color   Clarity   Blood   Leuk Est   Nitrite   Protein   CREAT   Urine HCG        03/01/25 0005             33.9         03/01/25 0005 Yellow   Cloudy   Large (3+)   Large (3+)   Negative   100 mg/dL (2+)                 [x]  Microbiology   Microbiology Results (last 10 days)       Procedure Component Value - Date/Time    Blood Culture - Blood, Hand, Right [[id number]]  (Normal) Collected: 02/24/25 2016    Lab Status: Final result Specimen: Blood from Hand, Right Updated: 03/01/25 2031     Blood Culture No growth at 5 days    Narrative:      Less than seven (7) mL's of blood was collected.  Insufficient quantity may yield false negative results.    Blood Culture - Blood, Arm, Left [[id number]]  (Normal) Collected: 02/24/25 1803    Lab Status: Final result Specimen: Blood from Arm, Left Updated: 03/01/25 1831     Blood Culture No growth at 5 days    Clostridioides difficile Toxin - Stool, Per Rectum [[id number]] Collected: 02/24/25 0856    Lab Status: Final result Specimen: Stool from Per Rectum Updated: 02/24/25 0950    Narrative:      The following orders were created for panel order Clostridioides difficile Toxin - Stool, Per Rectum.  Procedure                               Abnormality         Status                      ---------                               -----------         ------                     Clostridioides difficile...[[id number]]                      Final result                 Please view results for these tests on the individual orders.    Clostridioides difficile Toxin, PCR - Stool, Per Rectum [[id number]] Collected: 02/24/25 0856    Lab Status: Final result Specimen: Stool from Per Rectum Updated: 02/24/25 0950     Toxigenic C. difficile by PCR Negative     027 Toxin Presumptive Negative    Narrative:      The result indicates the absence of toxigenic C. difficile from stool specimen.     MRSA Screen, PCR (Inpatient) - Swab, Nares [[id number]]  (Abnormal) Collected: 02/23/25 0601    Lab Status: Final result Specimen: Swab from Nares Updated: 02/23/25 1001     MRSA PCR MRSA Detected    Narrative:      The negative predictive value of this diagnostic test is high and should only be used to consider de-escalating anti-MRSA therapy. A positive result may indicate colonization with MRSA and must be correlated clinically.    Respiratory Panel PCR w/COVID-19(SARS-CoV-2) BRIELLE/TOSHA/IMTIAZ/PAD/COR/NATACHA In-House, NP Swab in UTM/VTM, 2 HR TAT - Swab, Nasopharynx [[id number]]  (Normal) Collected: 02/23/25 0601    Lab Status: Final result Specimen: Swab from Nasopharynx Updated: 02/23/25 0827     ADENOVIRUS, PCR Not Detected     Coronavirus 229E Not Detected     Coronavirus HKU1 Not Detected     Coronavirus NL63 Not Detected     Coronavirus OC43 Not Detected     COVID19 Not Detected     Human Metapneumovirus Not Detected     Human Rhinovirus/Enterovirus Not Detected     Influenza A PCR Not Detected     Influenza B PCR Not Detected     Parainfluenza Virus 1 Not Detected     Parainfluenza Virus 2 Not Detected     Parainfluenza Virus 3 Not Detected     Parainfluenza Virus 4 Not Detected     RSV, PCR Not Detected     Bordetella pertussis pcr Not Detected     Bordetella parapertussis PCR Not Detected     Chlamydophila pneumoniae PCR Not  Detected     Mycoplasma pneumo by PCR Not Detected    Narrative:      In the setting of a positive respiratory panel with a viral infection PLUS a negative procalcitonin without other underlying concern for bacterial infection, consider observing off antibiotics or discontinuation of antibiotics and continue supportive care. If the respiratory panel is positive for atypical bacterial infection (Bordetella pertussis, Chlamydophila pneumoniae, or Mycoplasma pneumoniae), consider antibiotic de-escalation to target atypical bacterial infection.    Urine Culture - Urine, Urine, Clean Catch [[id number]]  (Abnormal) Collected: 02/22/25 2208    Lab Status: Final result Specimen: Urine, Clean Catch Updated: 02/24/25 1058     Urine Culture <10,000 CFU/mL Gram Negative Bacilli    Narrative:      Call if further workup needed.   Colonization of the urinary tract without infection is common. Treatment is discouraged unless the patient is symptomatic, pregnant, or undergoing an invasive urologic procedure.    Blood Culture - Blood, Leg, Left [[id number]]  (Abnormal)  (Susceptibility) Collected: 02/22/25 1922    Lab Status: Final result Specimen: Blood from Leg, Left Updated: 02/25/25 0620     Blood Culture Escherichia coli ESBL     Comment:   Consider infectious disease consult.  Susceptibility results may not correlate to clinical outcomes.  For ESBL-producing infections in the blood, a carbapenem is recommended as first-line therapy for optimal clinical outcomes.        Isolated from Aerobic and Anaerobic Bottles     Gram Stain Aerobic Bottle Gram negative bacilli      Anaerobic Bottle Gram negative bacilli    Narrative:      Less than seven (7) mL's of blood was collected.  Insufficient quantity may yield false negative results.    Susceptibility        Escherichia coli ESBL      VANDANA      Ciprofloxacin Resistant      Ertapenem Susceptible      Levofloxacin Resistant      Meropenem Susceptible      Trimethoprim + Sulfamethoxazole  Resistant                       Susceptibility Comments       Escherichia coli ESBL    With the exception of urinary-sourced infections, aminoglycosides should not be used as monotherapy.               Blood Culture ID, PCR - Blood, Leg, Left [[id number]]  (Abnormal) Collected: 02/22/25 1922    Lab Status: Final result Specimen: Blood from Leg, Left Updated: 02/23/25 1155     BCID, PCR Escherichia coli. Identification by BCID2 PCR.     BCID, PCR 2 CTX-M (ESBL) detected. Identification by BCID2 PCR     BOTTLE TYPE Anaerobic Bottle    Blood Culture - Blood, Arm, Left [[id number]]  (Normal) Collected: 02/22/25 1919    Lab Status: Final result Specimen: Blood from Arm, Left Updated: 02/27/25 1931     Blood Culture No growth at 5 days    Narrative:      Less than seven (7) mL's of blood was collected.  Insufficient quantity may yield false negative results.    Wound Culture - Wound, Abdominal Wall [[id number]]  (Abnormal)  (Susceptibility) Collected: 02/22/25 1605    Lab Status: Final result Specimen: Wound from Abdominal Wall Updated: 02/26/25 0748     Wound Culture Moderate growth (3+) Escherichia coli ESBL     Comment:   Consider infectious disease consult.  Susceptibility results may not correlate to clinical outcomes.         Light growth (2+) Enterococcus faecium      Light growth (2+) Yeast, Not Candida albicans     Gram Stain Rare (1+) Gram negative bacilli    Susceptibility        Escherichia coli ESBL      VANDANA      Ertapenem Susceptible      Meropenem Susceptible                       Susceptibility        Enterococcus faecium      VANDANA      Ampicillin Resistant      Vancomycin Susceptible                       Susceptibility Comments       Escherichia coli ESBL    With the exception of urinary-sourced infections, aminoglycosides should not be used as monotherapy.               Legionella Antigen, Urine - Urine, Straight Cath [[id number]]  (Normal) Collected: 02/21/25 2202    Lab Status: Final result Specimen: Urine  from Straight Cath Updated: 02/22/25 1751     LEGIONELLA ANTIGEN, URINE Negative    S. Pneumo Ag Urine or CSF - Urine, Straight Cath [[id number]]  (Normal) Collected: 02/21/25 2202    Lab Status: Final result Specimen: Urine from Straight Cath Updated: 02/22/25 1752     Strep Pneumo Ag Negative    COVID-19, FLU A/B, RSV PCR 1 HR TAT - Swab, Nasopharynx [[id number]]  (Normal) Collected: 02/21/25 1745    Lab Status: Final result Specimen: Swab from Nasopharynx Updated: 02/21/25 1832     COVID19 Not Detected     Influenza A PCR Not Detected     Influenza B PCR Not Detected     RSV, PCR Not Detected    Narrative:      Fact sheet for providers: https://www.fda.gov/media/977641/download    Fact sheet for patients: https://www.fda.gov/media/848292/download    Test performed by PCR.          [x]  Radiology  CT Abdomen Pelvis Without Contrast    Result Date: 2/28/2025  Impression: Limited study. 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 Oral contrast is not visualized in the fistulous. No evidence of bowel obstruction. Moderate distention of the colon with air and oral contrast. Findings suggest colonic ileus. Small to moderate bilateral pleural effusions, left greater than right, increased in size when compared to prior study. Additional findings per body of the report. Electronically Signed: Alessandro Love MD  2/28/2025 7:20 PM EST  Workstation ID: [id number]    XR Chest 1 View    Result Date: 2/23/2025  The distal tip of the right IJ central venous line/port is in the expected mid superior vena cava (SVC). No pneumothorax is seen. There are bilateral infiltrates present, suggestive of pulmonary edema with vascular congestion.    Portions of this note were completed with a voice recognition program.  2/23/2025 12:16 AM by James Hull MD on Workstation: [email or web address]     []  EKG/Telemetry   []  Cardiology/Vascular   []  Pathology  []  Old records  []  Other:    Assessment & Plan   Assessment /  Plan     Assessment:  Acute kidney injury due to obstructing nephrolithiasis on the left status post stent placement.  Improving  Septic shock, present on admission secondary to bacteremia.  Improving  ESBL E. coli bacteremia.  Subsequent blood culture negative since February 24.  Chronic hypotension on midodrine  Episodes of bradycardia due to midodrine.  Improving.  Midodrine resumed  History of Crohn's disease with complex surgery  Chronic nonhealing abdominal wounds and abscess  secondary to significant abdominal surgery in the past for duodenal ulcer rupture follows with [facility].  Due to ESBL E. coli and Enterococcus faecium.  Chronic enterocutaneous fistulas followed by   Anxiety disorder  Positive MRSA PCR  Acute on chronic anemia.  Status post 1 unit packed RBC transfusion  Antibiotic associated diarrhea.  C. difficile negative.  Hypernatremia.  Free water deficit.  Resolved  Thrombocytopenia.  Improving  Left more than right pleural effusion    Plan:   Midodrine resumed by nephrology as bradycardia improved  Continue meropenem, finished Flagyl  Of central line.  Will get midline in a.m.    Finishing IV Vanco for wound culture from the abdomen.  Repeat Blood cultures negative to date from February 24  CT of the abdomen and pelvis reviewed.  Only 1 enterocutaneous fistula located in the left upper quadrant reported.    Repeat CT scan abdomen pelvis with oral contrast ordered.  Patient reluctant to drink oral contrast as it makes her sick  Appreciate urology input.  Status post left ureteric stent.  Needs definitive stone treatment when stable  Nephrology following.  Renal function slowly improving.   Discussed with GI about diarrhea.  Appreciate input  Discussed with general surgery to evaluate for fistulous.  Recommend follow-up with [facility].    As needed Imodium.  Scheduled Questran and Lomotil  wound culture from abdomen noted with ESBL E. coli and Enterococcus faecium. Patient does have known chronic abdominal  wounds with fistulous communication.  Patient follows with  for management of these wounds patient was just discharged this month from  for these wounds.  May need to be transferred back to  once acute issues resolve  Continue wound care.  Discussed with wound care.  Appreciate input.  Electrolyte replacement protocol  Monitor white cell count.  Leukocytosis resolved  PT OT         Discussed with RN,  and patient.  Return to [facility] when stable.  Patient to follow-up with  for her Crohn's fistula.  Per patient she has appointment end of this month    VTE Prophylaxis:  Pharmacologic & mechanical VTE prophylaxis orders are present.        CODE STATUS:   Level Of Support Discussed With: Patient  Code Status (Patient has no pulse and is not breathing): CPR (Attempt to Resuscitate)  Medical Interventions (Patient has pulse or is breathing): Full Support      Electronically signed by Liam Israel MD, 3/2/2025, 17:54 EST.

## 2025-03-02 NOTE — PROGRESS NOTES
"[facility] Clinical Pharmacy Services: Vancomycin Monitoring Note    Mag Padilla is a 44 y.o. female who is on day 7 of pharmacy to dose vancomycin for SSTI    Previous Vancomycin Dose: 500 mg IV x1  @ 1635  Imaging Reviewed?: Yes  Updated Cultures and Sensitivities:    Blood culture- NGTD    Vitals/Labs  Ht: 157.5 cm (62\"); Wt: 52.3 kg (115 lb 4.8 oz)   Temp (24hrs), Av °F (36.7 °C), Min:97.7 °F (36.5 °C), Max:98.2 °F (36.8 °C)   Estimated Creatinine Clearance: 37.4 mL/min (A) (by C-G formula based on SCr of 1.55 mg/dL (H)).     Results from last 7 days   Lab Units 25  0631 25  0654 25  0419 25  1339 25  0603 25  0526 25  0440   VANCOMYCIN RM mcg/mL  --   --  11.80  --  15.94  --  21.68   CREATININE mg/dL 1.55* 1.63* 1.94* 2.13* 2.25*   < > 3.53*   WBC 10*3/mm3  --  9.94 9.31 7.85 14.25*   < >  --     < > = values in this interval not displayed.     Assessment/Plan  Current Vancomycin Dose: 500 mg every 18 hours    Regimen: 500 mg IV every 18 hours.  Start time: 16:04 on 2025  Exposure target: AUC24 (range)400-600 mg/L.hr   AUC24,ss: 493 mg/L.hr  Probability of AUC24 > 400: 70 %  Ctrough,ss: 15.2 mg/L  Probability of Ctrough,ss > 20: 29 %  Probability of nephrotoxicity (Lodise DARIUSZ ): 10 %    Final dose scheduled for tomorrow AM.    Thank you for involving pharmacy in this patient's care. Please contact pharmacy with any questions or concerns.    Alex Judge  Clinical Pharmacist  "

## 2025-03-02 NOTE — PROGRESS NOTES
[facility]     Progress Note    Patient Name: Mag Padilla  : 1980  MRN: [id number]  Primary Care Physician:  Provider, No Known  Date of admission: 2025    Subjective   No major acute events otherwise overnight    Scheduled Meds:cholestyramine, 1 packet, Oral, BID  ferrous sulfate, 325 mg, Oral, Daily With Breakfast  heparin (porcine), 5,000 Units, Subcutaneous, Q8H  Iohexol (OMNIPAQUE) oral compound 12 mg/mL, 500 mL, Oral, Once  meropenem, 1,000 mg, Intravenous, Q12H  midodrine, 15 mg, Oral, TID AC  multivitamin with minerals, 1 tablet, Oral, Daily  pantoprazole, 40 mg, Oral, Daily  potassium phosphate (monobasic), 500 mg, Oral, BID  saccharomyces boulardii, 500 mg, Oral, Daily  sertraline, 75 mg, Oral, Daily  sodium bicarbonate, 1,950 mg, Oral, TID  sodium chloride, 10 mL, Intravenous, Q12H  vancomycin, 500 mg, Intravenous, Q18H      Continuous Infusions:Pharmacy to dose vancomycin,       PRN Meds:.  acetaminophen    ALPRAZolam    aluminum-magnesium hydroxide-simethicone    senna-docusate sodium **AND** polyethylene glycol **AND** bisacodyl **AND** bisacodyl    Calcium Replacement - Follow Nurse / BPA Driven Protocol    dextrose    loperamide    Magnesium Low Dose Replacement - Follow Nurse / BPA Driven Protocol    ondansetron    Pharmacy to dose vancomycin    Phosphorus Replacement - Follow Nurse / BPA Driven Protocol    sodium chloride    sodium chloride    sodium chloride       Review of Systems  Constitutional:        Weakness tiredness fatigue  Eyes:                       No blurry vision, eye discharge, eye irritation, eye pain  HEENT:                   No acute hair loss, earache and discharge, nasal congestion or discharge, sore throat, postnasal drip  Respiratory:           No shortness of breath coughing sputum production wheezing hemoptysis pleuritic chest pain  Cardiovascular:     No chest pain, orthopnea, PND, dizziness, palpitation, lower extremity edema  Gastrointestinal:    No nausea vomiting diarrhea abdominal pain constipation  Genitourinary:       No urinary incontinence, hesitancy, frequency, urgency, dysuria  Hematologic:         No bruising, bleeding, pallor, lymphadenopathy  Endocrine:            No coldness, hot flashes, polyuria, abnormal hair growth  Musculoskeletal:    No body pains, aches, arthritic pains, muscle pain ,muscle wasting  Psychiatric:          No low or high mood, anxiety, hallucinations, delusions  Skin.                      No rash, ulcers, bruising, itching  Neurological:        No confusion, headache, focal weakness, numbness, dysphasia    Objective   Objective     Vitals:   Temp:  [97.5 °F (36.4 °C)-98.2 °F (36.8 °C)] 98.1 °F (36.7 °C)  Heart Rate:  [64-92] 66  Resp:  [16-18] 16  BP: ()/(51-70) 95/65  Physical Exam    Constitutional: Awake, alert responsive, conversant, no obvious distress              Psychiatric:  Appropriate affect, cooperative   Neurologic:  Awake alert ,oriented x 3, strength symmetric in all extremities, Cranial Nerves grossly intact to confrontation, speech clear   Eyes:   PERRLA, sclerae anicteric, no conjunctival injection   HEENT:  Moist mucous membranes, no nasal or eye discharge, no throat congestion   Neck:   Supple, no thyromegaly, no lymphadenopathy, trachea midline, no elevated JVD   Respiratory:  Clear to auscultation bilaterally, nonlabored respirations    Cardiovascular: RRR, no murmurs, rubs, or gallops, palpable pedal pulses bilaterally, No bilateral ankle edema   Gastrointestinal: Positive bowel sounds, soft, nontender, nondistended, no organomegaly   Musculoskeletal:  No clubbing or cyanosis to extremities,muscle wasting, joint swelling, muscle weakness             Skin:                      No rashes, bruising, skin ulcers, petechiae or ecchymosis    Result Review    Result Review:  I have personally reviewed the results from the time of this admission to 3/2/2025 08:06 EST and agree with these findings:  [x]   Laboratory  []  Microbiology  []  Radiology  []  EKG/Telemetry   []  Cardiology/Vascular   []  Pathology  []  Old records  []  Other:    Assessment & Plan   Assessment / Plan       Active Hospital Problems:  Active Hospital Problems    Diagnosis     **Acute renal failure     Hypotension due to hypovolemia     Chronic diarrhea     Inflammatory bowel disease (Crohn's disease)     Kidney stone      44-year-old female with past medical history of Crohn's disease status post resection, chronically hypotensive on midodrine, anxiety, chronic diarrhea, GERD who who has not been feeling well and has had persistent nausea and vomiting for the last few days as well as her diarrhea with severe JOHANA and creatinine rise from baseline normal to peak of 5.6 with decreased urine output and worsening bicarb currently at 5.  JOHANA most likely due to hypotension, diarrhea and vomiting contributing with UA showing some hyaline cast.  Urine analysis also concerning for large amount of proteinuria and RBCs with CT scan showing obstructive stone which is likely the source of septic shock.  Creatinine has plateaued around 3.5 with poor urine output and concerning patient may have developed ATN.  Cultures with ESBL Klebsiella.  Responded to Lasix with brisk urine output and improving renal dysfunction       Plan:   Creatinine continues to downtrend today to 1.5  Her blood pressure continues to be low and she is becoming hypernatremic in the setting of diarrhea. ->  I will start her on D5W  Continue midodrine  UPCR is 2800 mg/g from sample which had RBCs, WBCs and squamous epithelial cells.  Most likely, will have to repeat urinalysis on abdominal sample.  Continue bicarb 1950 3 times daily

## 2025-03-02 NOTE — PLAN OF CARE
Goal Outcome Evaluation:              Outcome Evaluation: Patient A&Ox4. Blood pressures soft overnight, other VSS. Continued diarrhea as well as green/yellow output from abdominal fistulas collecting in ostomy pouches. Administerd scheduled medications as ordered as well as PRN Immodium. No needs at this time. Plan of care ongoing.

## 2025-03-02 NOTE — PLAN OF CARE
Goal Outcome Evaluation:                    Patient is able to make needs known. She is fully oriented. VSS this shift. IV antibiotics continue for ESBL and MRSA.

## 2025-03-03 LAB
ALBUMIN SERPL-MCNC: 1.5 G/DL (ref 3.5–5.2)
ANION GAP SERPL CALCULATED.3IONS-SCNC: 10.9 MMOL/L (ref 5–15)
BUN SERPL-MCNC: 32 MG/DL (ref 6–20)
BUN/CREAT SERPL: 23.9 (ref 7–25)
CALCIUM SPEC-SCNC: 7.8 MG/DL (ref 8.6–10.5)
CHLORIDE SERPL-SCNC: 118 MMOL/L (ref 98–107)
CO2 SERPL-SCNC: 13.1 MMOL/L (ref 22–29)
CORTIS SERPL-MCNC: 8.82 MCG/DL
CREAT SERPL-MCNC: 1.34 MG/DL (ref 0.57–1)
EGFRCR SERPLBLD CKD-EPI 2021: 49.6 ML/MIN/1.73
GLUCOSE BLDC GLUCOMTR-MCNC: 77 MG/DL (ref 70–99)
GLUCOSE BLDC GLUCOMTR-MCNC: 83 MG/DL (ref 70–99)
GLUCOSE SERPL-MCNC: 79 MG/DL (ref 65–99)
HCT VFR BLD AUTO: 30 % (ref 34–46.6)
HGB BLD-MCNC: 9.1 G/DL (ref 12–15.9)
PHOSPHATE SERPL-MCNC: 2.9 MG/DL (ref 2.5–4.5)
POTASSIUM SERPL-SCNC: 4.4 MMOL/L (ref 3.5–5.2)
SODIUM SERPL-SCNC: 142 MMOL/L (ref 136–145)

## 2025-03-03 PROCEDURE — 82533 TOTAL CORTISOL: CPT | Performed by: STUDENT IN AN ORGANIZED HEALTH CARE EDUCATION/TRAINING PROGRAM

## 2025-03-03 PROCEDURE — 99232 SBSQ HOSP IP/OBS MODERATE 35: CPT | Performed by: INTERNAL MEDICINE

## 2025-03-03 PROCEDURE — 25010000002 MEROPENEM PER 100 MG: Performed by: INTERNAL MEDICINE

## 2025-03-03 PROCEDURE — 25010000002 HEPARIN (PORCINE) PER 1000 UNITS: Performed by: INTERNAL MEDICINE

## 2025-03-03 PROCEDURE — 25010000003 DEXTROSE 5 % SOLUTION: Performed by: STUDENT IN AN ORGANIZED HEALTH CARE EDUCATION/TRAINING PROGRAM

## 2025-03-03 PROCEDURE — 85014 HEMATOCRIT: CPT | Performed by: INTERNAL MEDICINE

## 2025-03-03 PROCEDURE — 36410 VNPNXR 3YR/> PHY/QHP DX/THER: CPT

## 2025-03-03 PROCEDURE — 85018 HEMOGLOBIN: CPT | Performed by: INTERNAL MEDICINE

## 2025-03-03 PROCEDURE — 82948 REAGENT STRIP/BLOOD GLUCOSE: CPT | Performed by: INTERNAL MEDICINE

## 2025-03-03 PROCEDURE — 82948 REAGENT STRIP/BLOOD GLUCOSE: CPT

## 2025-03-03 PROCEDURE — C1751 CATH, INF, PER/CENT/MIDLINE: HCPCS

## 2025-03-03 PROCEDURE — 80069 RENAL FUNCTION PANEL: CPT | Performed by: INTERNAL MEDICINE

## 2025-03-03 RX ORDER — DIPHENOXYLATE HYDROCHLORIDE AND ATROPINE SULFATE 2.5; .025 MG/1; MG/1
1 TABLET ORAL 4 TIMES DAILY PRN
Status: DISCONTINUED | OUTPATIENT
Start: 2025-03-03 | End: 2025-03-07 | Stop reason: HOSPADM

## 2025-03-03 RX ORDER — SODIUM CHLORIDE 0.9 % (FLUSH) 0.9 %
10 SYRINGE (ML) INJECTION EVERY 12 HOURS SCHEDULED
Status: DISCONTINUED | OUTPATIENT
Start: 2025-03-03 | End: 2025-03-07 | Stop reason: HOSPADM

## 2025-03-03 RX ORDER — SODIUM CHLORIDE 0.9 % (FLUSH) 0.9 %
10 SYRINGE (ML) INJECTION AS NEEDED
Status: DISCONTINUED | OUTPATIENT
Start: 2025-03-03 | End: 2025-03-07 | Stop reason: HOSPADM

## 2025-03-03 RX ORDER — CHOLESTYRAMINE 4 G/9G
1 POWDER, FOR SUSPENSION ORAL 3 TIMES DAILY
Status: DISCONTINUED | OUTPATIENT
Start: 2025-03-03 | End: 2025-03-07 | Stop reason: HOSPADM

## 2025-03-03 RX ADMIN — CHOLESTYRAMINE 1 PACKET: 4 POWDER, FOR SUSPENSION ORAL at 10:30

## 2025-03-03 RX ADMIN — Medication 10 ML: at 10:30

## 2025-03-03 RX ADMIN — HEPARIN SODIUM 5000 UNITS: 5000 INJECTION INTRAVENOUS; SUBCUTANEOUS at 22:08

## 2025-03-03 RX ADMIN — FERROUS SULFATE TAB 325 MG (65 MG ELEMENTAL FE) 325 MG: 325 (65 FE) TAB at 09:29

## 2025-03-03 RX ADMIN — MIDODRINE HYDROCHLORIDE 15 MG: 10 TABLET ORAL at 09:29

## 2025-03-03 RX ADMIN — POTASSIUM PHOSPHATE, MONOBASIC 500 MG: 500 TABLET, SOLUBLE ORAL at 09:28

## 2025-03-03 RX ADMIN — HEPARIN SODIUM 5000 UNITS: 5000 INJECTION INTRAVENOUS; SUBCUTANEOUS at 15:24

## 2025-03-03 RX ADMIN — CHOLESTYRAMINE 1 PACKET: 4 POWDER, FOR SUSPENSION ORAL at 22:06

## 2025-03-03 RX ADMIN — Medication 10 ML: at 22:06

## 2025-03-03 RX ADMIN — PANTOPRAZOLE SODIUM 40 MG: 40 TABLET, DELAYED RELEASE ORAL at 09:29

## 2025-03-03 RX ADMIN — MEROPENEM 1000 MG: 1 INJECTION INTRAVENOUS at 22:07

## 2025-03-03 RX ADMIN — SODIUM BICARBONATE 150 MEQ: 84 INJECTION INTRAVENOUS at 13:40

## 2025-03-03 RX ADMIN — SERTRALINE HYDROCHLORIDE 75 MG: 50 TABLET ORAL at 09:28

## 2025-03-03 RX ADMIN — Medication 1 TABLET: at 09:29

## 2025-03-03 RX ADMIN — Medication 500 MG: at 09:29

## 2025-03-03 RX ADMIN — DIPHENOXYLATE HYDROCHLORIDE AND ATROPINE SULFATE 1 TABLET: 2.5; .025 TABLET ORAL at 09:29

## 2025-03-03 RX ADMIN — CHOLESTYRAMINE 1 PACKET: 4 POWDER, FOR SUSPENSION ORAL at 15:24

## 2025-03-03 RX ADMIN — MEROPENEM 1000 MG: 1 INJECTION INTRAVENOUS at 10:17

## 2025-03-03 RX ADMIN — MIDODRINE HYDROCHLORIDE 15 MG: 10 TABLET ORAL at 18:16

## 2025-03-03 RX ADMIN — Medication 10 ML: at 22:08

## 2025-03-03 RX ADMIN — MIDODRINE HYDROCHLORIDE 15 MG: 10 TABLET ORAL at 11:57

## 2025-03-03 NOTE — PROGRESS NOTES
[facility]     Progress Note    Patient Name: Mag Padilla  : 1980  MRN: [id number]  Primary Care Physician:  Provider, No Known  Date of admission: 2025    Subjective   Patient's bicarb continues to trend down despite a normal anion gap  Renal dysfunction also continues to improve  Hemoglobin is stable  Blood pressures continue to be soft    Scheduled Meds:cholestyramine, 1 packet, Oral, BID  diphenoxylate-atropine, 1 tablet, Oral, BID  ferrous sulfate, 325 mg, Oral, Daily With Breakfast  heparin (porcine), 5,000 Units, Subcutaneous, Q8H  Iohexol (OMNIPAQUE) oral compound 12 mg/mL, 500 mL, Oral, Once  meropenem, 1,000 mg, Intravenous, Q12H  midodrine, 15 mg, Oral, TID AC  multivitamin with minerals, 1 tablet, Oral, Daily  pantoprazole, 40 mg, Oral, Daily  potassium phosphate (monobasic), 500 mg, Oral, Daily  saccharomyces boulardii, 500 mg, Oral, Daily  sertraline, 75 mg, Oral, Daily  sodium bicarbonate, 1,950 mg, Oral, TID  sodium chloride, 10 mL, Intravenous, Q12H      Continuous Infusions:dextrose, 75 mL/hr, Last Rate: 75 mL/hr (25 0818)      PRN Meds:.  acetaminophen    ALPRAZolam    aluminum-magnesium hydroxide-simethicone    senna-docusate sodium **AND** polyethylene glycol **AND** bisacodyl **AND** bisacodyl    Calcium Replacement - Follow Nurse / BPA Driven Protocol    dextrose    loperamide    Magnesium Low Dose Replacement - Follow Nurse / BPA Driven Protocol    ondansetron    Phosphorus Replacement - Follow Nurse / BPA Driven Protocol    sodium chloride    sodium chloride    sodium chloride       Review of Systems  Constitutional:        Weakness tiredness fatigue  Eyes:                       No blurry vision, eye discharge, eye irritation, eye pain  HEENT:                   No acute hair loss, earache and discharge, nasal congestion or discharge, sore throat, postnasal drip  Respiratory:           No shortness of breath coughing sputum production wheezing hemoptysis  pleuritic chest pain  Cardiovascular:     No chest pain, orthopnea, PND, dizziness, palpitation, lower extremity edema  Gastrointestinal:   No nausea vomiting diarrhea abdominal pain constipation  Genitourinary:       No urinary incontinence, hesitancy, frequency, urgency, dysuria  Hematologic:         No bruising, bleeding, pallor, lymphadenopathy  Endocrine:            No coldness, hot flashes, polyuria, abnormal hair growth  Musculoskeletal:    No body pains, aches, arthritic pains, muscle pain ,muscle wasting  Psychiatric:          No low or high mood, anxiety, hallucinations, delusions  Skin.                      No rash, ulcers, bruising, itching  Neurological:        No confusion, headache, focal weakness, numbness, dysphasia    Objective   Objective     Vitals:   Temp:  [97.7 °F (36.5 °C)-98.1 °F (36.7 °C)] 97.7 °F (36.5 °C)  Heart Rate:  [60-92] 61  Resp:  [16-20] 20  BP: (85-96)/(57-64) 85/62  Physical Exam    Constitutional: Awake, alert responsive, conversant, no obvious distress              Psychiatric:  Appropriate affect, cooperative   Neurologic:  Awake alert ,oriented x 3, strength symmetric in all extremities, Cranial Nerves grossly intact to confrontation, speech clear   Eyes:   PERRLA, sclerae anicteric, no conjunctival injection   HEENT:  Moist mucous membranes, no nasal or eye discharge, no throat congestion   Neck:   Supple, no thyromegaly, no lymphadenopathy, trachea midline, no elevated JVD   Respiratory:  Clear to auscultation bilaterally, nonlabored respirations    Cardiovascular: RRR, no murmurs, rubs, or gallops, palpable pedal pulses bilaterally, No bilateral ankle edema   Gastrointestinal: Positive bowel sounds, soft, nontender, nondistended, no organomegaly   Musculoskeletal:  No clubbing or cyanosis to extremities,muscle wasting, joint swelling, muscle weakness             Skin:                      No rashes, bruising, skin ulcers, petechiae or ecchymosis    Result Review    Result  Review:  I have personally reviewed the results from the time of this admission to 3/3/2025 08:49 EST and agree with these findings:  []  Laboratory  []  Microbiology  []  Radiology  []  EKG/Telemetry   []  Cardiology/Vascular   []  Pathology  []  Old records  []  Other:    Assessment & Plan   Assessment / Plan       Active Hospital Problems:  Active Hospital Problems    Diagnosis     **Acute renal failure     Hypotension due to hypovolemia     Chronic diarrhea     Inflammatory bowel disease (Crohn's disease)     Kidney stone      44-year-old female with past medical history of Crohn's disease status post resection, chronically hypotensive on midodrine, anxiety, chronic diarrhea, GERD who who has not been feeling well and has had persistent nausea and vomiting for the last few days as well as her diarrhea with severe JOHANA and creatinine rise from baseline normal to peak of 5.6 with decreased urine output and worsening bicarb currently at 5.  JOHANA most likely due to hypotension, diarrhea and vomiting contributing with UA showing some hyaline cast.  Urine analysis also concerning for large amount of proteinuria and RBCs with CT scan showing obstructive stone which is likely the source of septic shock.  Creatinine has plateaued around 3.5 with ongoing improvement despite soft blood pressures.  Cultures with ESBL Klebsiella.  On 3/2 bicarb trending down due to diarrhea and serologies and stool test consistent with ongoing inflammation.  Patient has about 2.8 g of proteinuria.     Plan:   Patient is having diarrhea and getting supportive care.  Noted general surgery and gastroenterology were following prior and bicarb is trending down and will start bicarb at 50 cc/h  Continue midodrine 15 mg 3 times daily  Cortisol levels ordered  Patient on meropenem for ESBL  Continue bicarb to 1950 3 times daily    Thank you for involving me in the care of the patient.  Will continue to follow along

## 2025-03-03 NOTE — CONSULTS
"Nutrition Services    Patient Name: Mag Padilla  YOB: 1980  MRN: [id number]  Admission date: 2/21/2025      CLINICAL NUTRITION ASSESSMENT      Reason for Assessment  Follow Up      H&P:  Past Medical History:   Diagnosis Date    Abscess of peritoneum     Anemia     Anxiety     Breast cancer     Crohn's disease     GERD (gastroesophageal reflux disease)     HTN (hypertension)     Hyperlipidemia     Intestine disorder     SVT (supraventricular tachycardia)         Current Problems:   Active Hospital Problems    Diagnosis     **Acute renal failure     Hypotension due to hypovolemia     Chronic diarrhea     Inflammatory bowel disease (Crohn's disease)     Kidney stone         Nutrition/Diet History         Narrative   Pt has good po intake (% documented). However, also continues to have diarrhea. Banatrol BID in place to help resolve. Colby BID and Boost Plus BID in lace for wound healing and nutrition support. Cardiac diet remains appropriate.Continue nutrition interventions.      Anthropometrics        Current Height, Weight Height: 157.5 cm (62\")  Weight: 52.3 kg (115 lb 4.8 oz)   Current BMI Body mass index is 21.09 kg/m².   BMI Classification Normal range   % %   Adjusted Body Weight (ABW)    Weight Hx  Wt Readings from Last 30 Encounters:   02/22/25 0132 52.3 kg (115 lb 4.8 oz)   02/21/25 1655 52 kg (114 lb 10.2 oz)          Wt Change Observation No history available to trend     Estimated/Assessed Needs  Estimated Needs based on: Current Body Weight       Energy Requirements 25-30 kcal/kg   EST Needs (kcal/day) 1706-7758       Protein Requirements 1.0-1.2 g/kg   EST Daily Needs (g/day) 52-62       Fluid Requirements 30 ml/kg    Estimated Needs (mL/day) 1569     Labs/Medications         Pertinent Labs Reviewed.   Results from last 7 days   Lab Units 03/03/25  0455 03/02/25  0631 03/01/25  0654 02/26/25  0248 02/25/25  0458   SODIUM mmol/L 142 147* 145   < > 142   POTASSIUM mmol/L " "4.4 4.3 4.1   < > 3.2*   CHLORIDE mmol/L 118* 121* 120*   < > 110*   CO2 mmol/L 13.1* 16.8* 14.8*   < > 20.4*   BUN mg/dL 32* 33* 31*   < > 41*   CREATININE mg/dL 1.34* 1.55* 1.63*   < > 3.23*   CALCIUM mg/dL 7.8* 7.7* 7.6*   < > 8.7   BILIRUBIN mg/dL  --   --   --   --  0.6   ALK PHOS U/L  --   --   --   --  96   ALT (SGPT) U/L  --   --   --   --  <5   AST (SGOT) U/L  --   --   --   --  9   GLUCOSE mg/dL 79 79 86   < > 89    < > = values in this interval not displayed.     Results from last 7 days   Lab Units 03/03/25  0455 02/27/25  1339 02/27/25  0603 02/26/25  2320 02/26/25  1106 02/26/25  0248   MAGNESIUM mg/dL  --   --  2.4 1.4*  --  1.7   PHOSPHORUS mg/dL 2.9   < >  --   --   --  2.0*   HEMOGLOBIN g/dL 9.1*   < > 9.8*  --    < > 6.8*   HEMATOCRIT % 30.0*   < > 30.3*  --    < > 22.0*    < > = values in this interval not displayed.     COVID19   Date Value Ref Range Status   02/23/2025 Not Detected Not Detected - Ref. Range Final     No results found for: \"HGBA1C\"      Pertinent Medications Reviewed.     Malnutrition Severity Assessment              Nutrition Diagnosis         Nutrition Dx Problem 1 Inadequate oral Intake related to GI dysfunction as evidenced by  Pt report of n/v prior to admit, chronic diarrhea, and wounds increasing needs     Nutrition Intervention           Current Nutrition Orders & Evaluation of Intake       Current PO Diet Diet: Cardiac; Healthy Heart (2-3 Na+); Texture: Regular (IDDSI 7); Fluid Consistency: Thin (IDDSI 0)   Supplement Orders Placed This Encounter      Dietary Nutrition Supplements Boost Plus (Ensure Plus)      Dietary Nutrition Supplements Colby      Dietary Nutrition Supplements Banatrol (Packet)           Nutrition Intervention/Prescription        Heart Healthy diet as ordered  Boost BID (240 kcal, 10 g pro each)  Banatrol TID  Colby BID        Medical Nutrition Therapy/Nutrition Education          Learner     Readiness Patient  Acceptance     Method     Response " Explanation  Verbalizes understanding     Monitor/Evaluation        Monitor PO intake, Supplement intake, Weight, GI status     Nutrition Discharge Plan         To be determined     Electronically signed by:  Bg Antonio RD  03/03/25 10:40 EST

## 2025-03-03 NOTE — PLAN OF CARE
Goal Outcome Evaluation:  Plan of Care Reviewed With: patient           Outcome Evaluation: Pt a/ox4, no significant changes this shift, POC ongoing

## 2025-03-03 NOTE — PROGRESS NOTES
[facility]   Hospitalist Progress Note  Date: 3/3/2025  Patient Name: Mag Padilla  : 1980  MRN: [id number]  Date of admission: 2025  Room/Bed: [facility]/      Subjective   Subjective     Chief Complaint: nausea, vomiting weakness     Summary:Mag Padilla is a 44 y.o. female who is a nursing home resident  with past medical history of Crohn disease status post resection,with complex abdominal surgical history.  Patient had a history of a duodenal hole and underwent extensive surgery that required a G-tube, J-tube and multiple drain placements this was all done at [facility].  Patient since that time has had multiple issues with abdominal wounds and abscesses.  Patient also has history of hypotension on midodrine, anxiety, chronic diarrhea, and GERD presenting to the ED for evaluation of nausea vomiting diarrhea with generalized weakness.  Patient states that for the last 3 days she has not been feeling well with persistent nausea and vomiting for the last 2 days and worsening of her chronic diarrhea.  Due to the symptoms patient is weakness and lethargy had also worsened to where she is mostly bedbound with excessive sleepiness.  Patient is seen by wound care for abdominal wounds after an ex lap with colon resection.  Due to worsening condition patient was eventually brought to the ED for further evaluation.  In the ED patient was significantly hypotensive on arrival with remaining vitals being within normal limits.  UA was positive for UTI with signs of dehydration, labs showed severely reduced renal function with hypokalemia, anemia, thrombocytopenia, and severe hypoalbuminemia.  Chest x-ray was negative for any acute findings.  When seen patient was resting comfortably and still having episode of diarrhea since being here although her nausea is much improved.  She did deny any recent fevers, chills, headaches, focal weakness, chest pain, palpitation, shortness of breath, cough,  abdominal pain, constipation, dysuria, hematuria, hematochezia, melena, or anxiety.  Patient admitted for further evaluation and treatment.  CT of the abdomen revealed 6 mm left ureteral stone with mild hydro utero nephrosis which required urology consult and patient was taken to the operating room  and had a cystoscopy with left ureteric stent placement.  Patient's blood cultures are growing ESBL E. Coli.  Urine with less than 10,000 gram-negative rods.  MRSA screen is also positivePatient seen by intensivist, urology and nephrology.  GI consulted for diarrhea.  Previously patient has seen Dr. Pompa.  Patient seen by general surgery for fistula.  Recommendation is to follow-up with [facility].  No intervention as per GI and general surgery.      Interval Followup:   Remains on room air, bradycardia improved.  Poor historian does not recall her previous care much  Blood pressure soft.  Mostly dropped while sleeping.  Restarted on midodrine by nephrology.  Dose titrated up  CT abdomen with oral contrast noted.  Not significant  Creatinine slowly improving  Started on bicarb drip by nephrology  Patient ambulates with the help of walker nursing home  Chronic abdominal wounds follows with [facility].  Per patient she does have appointment end of this month  Urinating well without Renee catheter  Yellowish fluid in 2 ostomy bags from ventral abdomen and left upper quadrant.  Diarrhea better .  Only 2 BMs today per patient  FOBT positive.    Review of Systems    All systems reviewed and negative except for what is outlined above.      Objective   Objective     Vitals:   Temp:  [97.7 °F (36.5 °C)-99 °F (37.2 °C)] 99 °F (37.2 °C)  Heart Rate:  [61-92] 68  Resp:  [16-20] 20  BP: (85-98)/(57-68) 95/59    Physical Exam   General: Awake, alert, NAD resting in bed  HENT: NCAT, MMM, right IJ central line  Eyes: pupils equal, no scleral icterus  Cardiovascular: RRR, no murmurs   Pulmonary: CTA bilaterally; no wheezes; no conversational  dyspnea  Gastrointestinal: Soft patient has multiple abdominal wounds patient has a wound on her left lower side that is draining a significant amount of greenish drainage.  Patient has enterocutaneous fistula left upper quadrant.  Upper midline abdomen wound is also fistula as it is draining fluid .  Patient denies any significant pain  Musculoskeletal: No gross deformities  Skin: No jaundice, no rash on exposed skin appreciated  Neuro: CN II through XII grossly intact; speech clear; no tremor  Psych: Mood and affect appropriate  .  Off Renee    Result Review    Result Review:  I have personally reviewed these results:  [x]  Laboratory      Lab 03/03/25  0455 03/02/25  0631 03/01/25  0654 02/28/25  0419 02/27/25  1339 02/26/25  0248 02/25/25  1226 02/25/25  0953   WBC  --   --  9.94 9.31 7.85   < >  --   --    HEMOGLOBIN 9.1* 9.2* 8.7* 8.9* 9.1*   < >  --   --    HEMATOCRIT 30.0* 29.9* 27.4* 27.6* 28.9*   < >  --   --    PLATELETS  --   --  151 137* 124*   < >  --   --    NEUTROS ABS  --   --  8.00* 7.42* 6.11   < >  --   --    IMMATURE GRANS (ABS)  --   --  0.19* 0.28* 0.23*   < >  --   --    LYMPHS ABS  --   --  1.04 1.07 1.04   < >  --   --    MONOS ABS  --   --  0.54 0.38 0.32   < >  --   --    EOS ABS  --   --  0.15 0.14 0.14   < >  --   --    MCV  --   --  91.6 91.4 90.3   < >  --   --    LACTATE  --   --   --   --   --   --  1.6 2.2*    < > = values in this interval not displayed.         Lab 03/03/25  0455 03/02/25  0631 03/01/25  0654 02/27/25  1339 02/27/25  0603 02/26/25  2320 02/26/25  0248   SODIUM 142 147* 145   < > 140 144 146*   POTASSIUM 4.4 4.3 4.1   < > 3.8 3.8 3.5   CHLORIDE 118* 121* 120*   < > 115* 119* 118*   CO2 13.1* 16.8* 14.8*   < > 15.3* 15.7* 18.9*   ANION GAP 10.9 9.2 10.2   < > 9.7 9.3 9.1   BUN 32* 33* 31*   < > 27* 28* 32*   CREATININE 1.34* 1.55* 1.63*   < > 2.25* 2.64* 2.88*   EGFR 49.6* 41.7* 39.2*   < > 26.6* 22.0* 19.8*   GLUCOSE 79 79 86   < > 97 114* 71   CALCIUM 7.8* 7.7*  7.6*   < > 8.0* 8.0* 8.4*   MAGNESIUM  --   --   --   --  2.4 1.4* 1.7   PHOSPHORUS 2.9 3.4 3.6   < >  --   --  2.0*    < > = values in this interval not displayed.         Lab 03/03/25  0455 03/02/25  0631 03/01/25  0654 02/27/25  1339 02/25/25  0458   TOTAL PROTEIN  --   --   --   --  5.7*   ALBUMIN 1.5* 1.6* 1.5*   < > 2.3*   GLOBULIN  --   --   --   --  3.4   ALT (SGPT)  --   --   --   --  <5   AST (SGOT)  --   --   --   --  9   BILIRUBIN  --   --   --   --  0.6   ALK PHOS  --   --   --   --  96    < > = values in this interval not displayed.                   Lab 02/26/25  0349   ABO TYPING O   RH TYPING Positive   ANTIBODY SCREEN Negative           Brief Urine Lab Results  (Last result in the past 365 days)        Color   Clarity   Blood   Leuk Est   Nitrite   Protein   CREAT   Urine HCG        03/01/25 0005             33.9         03/01/25 0005 Yellow   Cloudy   Large (3+)   Large (3+)   Negative   100 mg/dL (2+)                 [x]  Microbiology   Microbiology Results (last 10 days)       Procedure Component Value - Date/Time    Blood Culture - Blood, Hand, Right [[id number]]  (Normal) Collected: 02/24/25 2016    Lab Status: Final result Specimen: Blood from Hand, Right Updated: 03/01/25 2031     Blood Culture No growth at 5 days    Narrative:      Less than seven (7) mL's of blood was collected.  Insufficient quantity may yield false negative results.    Blood Culture - Blood, Arm, Left [[id number]]  (Normal) Collected: 02/24/25 1803    Lab Status: Final result Specimen: Blood from Arm, Left Updated: 03/01/25 1831     Blood Culture No growth at 5 days    Clostridioides difficile Toxin - Stool, Per Rectum [[id number]] Collected: 02/24/25 0856    Lab Status: Final result Specimen: Stool from Per Rectum Updated: 02/24/25 0950    Narrative:      The following orders were created for panel order Clostridioides difficile Toxin - Stool, Per Rectum.  Procedure                               Abnormality         Status                      ---------                               -----------         ------                     Clostridioides difficile...[[id number]]                      Final result                 Please view results for these tests on the individual orders.    Clostridioides difficile Toxin, PCR - Stool, Per Rectum [[id number]] Collected: 02/24/25 0856    Lab Status: Final result Specimen: Stool from Per Rectum Updated: 02/24/25 0950     Toxigenic C. difficile by PCR Negative     027 Toxin Presumptive Negative    Narrative:      The result indicates the absence of toxigenic C. difficile from stool specimen.     MRSA Screen, PCR (Inpatient) - Swab, Nares [[id number]]  (Abnormal) Collected: 02/23/25 0601    Lab Status: Final result Specimen: Swab from Nares Updated: 02/23/25 1001     MRSA PCR MRSA Detected    Narrative:      The negative predictive value of this diagnostic test is high and should only be used to consider de-escalating anti-MRSA therapy. A positive result may indicate colonization with MRSA and must be correlated clinically.    Respiratory Panel PCR w/COVID-19(SARS-CoV-2) BRIELLE/TOSHA/IMTIAZ/PAD/COR/NATACHA In-House, NP Swab in UTM/VTM, 2 HR TAT - Swab, Nasopharynx [[id number]]  (Normal) Collected: 02/23/25 0601    Lab Status: Final result Specimen: Swab from Nasopharynx Updated: 02/23/25 0827     ADENOVIRUS, PCR Not Detected     Coronavirus 229E Not Detected     Coronavirus HKU1 Not Detected     Coronavirus NL63 Not Detected     Coronavirus OC43 Not Detected     COVID19 Not Detected     Human Metapneumovirus Not Detected     Human Rhinovirus/Enterovirus Not Detected     Influenza A PCR Not Detected     Influenza B PCR Not Detected     Parainfluenza Virus 1 Not Detected     Parainfluenza Virus 2 Not Detected     Parainfluenza Virus 3 Not Detected     Parainfluenza Virus 4 Not Detected     RSV, PCR Not Detected     Bordetella pertussis pcr Not Detected     Bordetella parapertussis PCR Not Detected     Chlamydophila  pneumoniae PCR Not Detected     Mycoplasma pneumo by PCR Not Detected    Narrative:      In the setting of a positive respiratory panel with a viral infection PLUS a negative procalcitonin without other underlying concern for bacterial infection, consider observing off antibiotics or discontinuation of antibiotics and continue supportive care. If the respiratory panel is positive for atypical bacterial infection (Bordetella pertussis, Chlamydophila pneumoniae, or Mycoplasma pneumoniae), consider antibiotic de-escalation to target atypical bacterial infection.    Urine Culture - Urine, Urine, Clean Catch [[id number]]  (Abnormal) Collected: 02/22/25 2208    Lab Status: Final result Specimen: Urine, Clean Catch Updated: 02/24/25 1058     Urine Culture <10,000 CFU/mL Gram Negative Bacilli    Narrative:      Call if further workup needed.   Colonization of the urinary tract without infection is common. Treatment is discouraged unless the patient is symptomatic, pregnant, or undergoing an invasive urologic procedure.    Blood Culture - Blood, Leg, Left [[id number]]  (Abnormal)  (Susceptibility) Collected: 02/22/25 1922    Lab Status: Final result Specimen: Blood from Leg, Left Updated: 02/25/25 0620     Blood Culture Escherichia coli ESBL     Comment:   Consider infectious disease consult.  Susceptibility results may not correlate to clinical outcomes.  For ESBL-producing infections in the blood, a carbapenem is recommended as first-line therapy for optimal clinical outcomes.        Isolated from Aerobic and Anaerobic Bottles     Gram Stain Aerobic Bottle Gram negative bacilli      Anaerobic Bottle Gram negative bacilli    Narrative:      Less than seven (7) mL's of blood was collected.  Insufficient quantity may yield false negative results.    Susceptibility        Escherichia coli ESBL      VANDANA      Ciprofloxacin Resistant      Ertapenem Susceptible      Levofloxacin Resistant      Meropenem Susceptible      Trimethoprim  + Sulfamethoxazole Resistant                       Susceptibility Comments       Escherichia coli ESBL    With the exception of urinary-sourced infections, aminoglycosides should not be used as monotherapy.               Blood Culture ID, PCR - Blood, Leg, Left [[id number]]  (Abnormal) Collected: 02/22/25 1922    Lab Status: Final result Specimen: Blood from Leg, Left Updated: 02/23/25 1155     BCID, PCR Escherichia coli. Identification by BCID2 PCR.     BCID, PCR 2 CTX-M (ESBL) detected. Identification by BCID2 PCR     BOTTLE TYPE Anaerobic Bottle    Blood Culture - Blood, Arm, Left [[id number]]  (Normal) Collected: 02/22/25 1919    Lab Status: Final result Specimen: Blood from Arm, Left Updated: 02/27/25 1931     Blood Culture No growth at 5 days    Narrative:      Less than seven (7) mL's of blood was collected.  Insufficient quantity may yield false negative results.    Wound Culture - Wound, Abdominal Wall [[id number]]  (Abnormal)  (Susceptibility) Collected: 02/22/25 1605    Lab Status: Final result Specimen: Wound from Abdominal Wall Updated: 02/26/25 0748     Wound Culture Moderate growth (3+) Escherichia coli ESBL     Comment:   Consider infectious disease consult.  Susceptibility results may not correlate to clinical outcomes.         Light growth (2+) Enterococcus faecium      Light growth (2+) Yeast, Not Candida albicans     Gram Stain Rare (1+) Gram negative bacilli    Susceptibility        Escherichia coli ESBL      VANDANA      Ertapenem Susceptible      Meropenem Susceptible                       Susceptibility        Enterococcus faecium      VANDANA      Ampicillin Resistant      Vancomycin Susceptible                       Susceptibility Comments       Escherichia coli ESBL    With the exception of urinary-sourced infections, aminoglycosides should not be used as monotherapy.               Legionella Antigen, Urine - Urine, Straight Cath [[id number]]  (Normal) Collected: 02/21/25 2202    Lab Status: Final  result Specimen: Urine from Straight Cath Updated: 02/22/25 1751     LEGIONELLA ANTIGEN, URINE Negative    S. Pneumo Ag Urine or CSF - Urine, Straight Cath [[id number]]  (Normal) Collected: 02/21/25 2202    Lab Status: Final result Specimen: Urine from Straight Cath Updated: 02/22/25 1752     Strep Pneumo Ag Negative          [x]  Radiology  CT Abdomen Pelvis Without Contrast    Result Date: 2/28/2025  Impression: Limited study. 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 Oral contrast is not visualized in the fistulous. No evidence of bowel obstruction. Moderate distention of the colon with air and oral contrast. Findings suggest colonic ileus. Small to moderate bilateral pleural effusions, left greater than right, increased in size when compared to prior study. Additional findings per body of the report. Electronically Signed: Alessandro Love MD  2/28/2025 7:20 PM EST  Workstation ID: [id number]   []  EKG/Telemetry   []  Cardiology/Vascular   []  Pathology  []  Old records  []  Other:    Assessment & Plan   Assessment / Plan     Assessment:  Acute kidney injury due to obstructing nephrolithiasis on the left status post stent placement.  Improving  Septic shock, present on admission secondary to bacteremia.  Improving  ESBL E. coli bacteremia.  Subsequent blood culture negative since February 24.  Chronic hypotension on midodrine  Episodes of bradycardia due to midodrine.  Resolved.  Midodrine resumed  History of Crohn's disease with complex surgery  Chronic nonhealing abdominal wounds and abscess  secondary to significant abdominal surgery in the past for duodenal ulcer rupture follows with .  Due to ESBL E. coli and Enterococcus faecium.  Finished IV Vanco  Chronic enterocutaneous fistulas followed by   Anxiety disorder  Positive MRSA PCR  Acute on chronic anemia.  Status post 1 unit packed RBC transfusion  Antibiotic associated diarrhea.  C. difficile negative.  Hypernatremia.   Free water deficit.  Resolved  Thrombocytopenia.  Improving  Left more than right pleural effusion    Plan:   Midodrine resumed by nephrology as bradycardia improved  Continue meropenem, discussed with ID to finish IV antibiotics on March 8.  Okay to discharge on IV Invanz  Status post midline placement.    Finished IV Vanco for wound culture from the abdomen.  Repeat Blood cultures negative to date from February 24   Repeat CT scan abdomen pelvis with oral contrast noted.    Appreciate urology input.  Status post left ureteric stent.  Needs definitive stone treatment when stable  Nephrology following.  Renal function slowly improving.  Continue p.o. bicarb  Discussed with GI about diarrhea.  Appreciate input  Discussed with general surgery to evaluate for fistulous.  Recommend follow-up with .    As needed Imodium and Lomotil.  Scheduled Questran   wound culture from abdomen noted with ESBL E. coli and Enterococcus faecium. Patient does have known chronic abdominal wounds with fistulous communication.  Patient follows with  for management of these wounds patient was just discharged this month from  for these wounds.    Continue wound care.  Discussed with wound care.  Appreciate input.  Electrolyte replacement protocol  Monitor white cell count.  Leukocytosis resolved  PT OT         Discussed with RN,  and patient.  Return to [facility] in a.m. after IV Invanz test dose by pharmacy, to finish IV Invanz on March 8 at nursing home.  Patient to follow-up with  for her Crohn's fistula.  Per patient she has appointment end of this month    VTE Prophylaxis:  Pharmacologic & mechanical VTE prophylaxis orders are present.        CODE STATUS:   Level Of Support Discussed With: Patient  Code Status (Patient has no pulse and is not breathing): CPR (Attempt to Resuscitate)  Medical Interventions (Patient has pulse or is breathing): Full Support      Electronically signed by Liam Israel MD,  3/3/2025, 18:07 EST.

## 2025-03-04 PROBLEM — R78.81 BACTEREMIA: Status: ACTIVE | Noted: 2025-03-04

## 2025-03-04 PROBLEM — D64.9 CHRONIC ANEMIA: Status: ACTIVE | Noted: 2025-03-04

## 2025-03-04 PROBLEM — E87.20 METABOLIC ACIDOSIS, NAG, BICARBONATE LOSSES: Status: ACTIVE | Noted: 2025-03-04

## 2025-03-04 LAB
ALBUMIN SERPL-MCNC: 1.8 G/DL (ref 3.5–5.2)
ANION GAP SERPL CALCULATED.3IONS-SCNC: 5.2 MMOL/L (ref 5–15)
BUN SERPL-MCNC: 26 MG/DL (ref 6–20)
BUN/CREAT SERPL: 20.2 (ref 7–25)
CALCIUM SPEC-SCNC: 8.2 MG/DL (ref 8.6–10.5)
CHLORIDE SERPL-SCNC: 118 MMOL/L (ref 98–107)
CO2 SERPL-SCNC: 16.8 MMOL/L (ref 22–29)
CREAT SERPL-MCNC: 1.29 MG/DL (ref 0.57–1)
EGFRCR SERPLBLD CKD-EPI 2021: 51.9 ML/MIN/1.73
GLUCOSE BLDC GLUCOMTR-MCNC: 107 MG/DL (ref 70–99)
GLUCOSE BLDC GLUCOMTR-MCNC: 75 MG/DL (ref 70–99)
GLUCOSE BLDC GLUCOMTR-MCNC: 76 MG/DL (ref 70–99)
GLUCOSE BLDC GLUCOMTR-MCNC: 83 MG/DL (ref 70–99)
GLUCOSE BLDC GLUCOMTR-MCNC: 90 MG/DL (ref 70–99)
GLUCOSE BLDC GLUCOMTR-MCNC: 97 MG/DL (ref 70–99)
GLUCOSE SERPL-MCNC: 81 MG/DL (ref 65–99)
HCT VFR BLD AUTO: 33 % (ref 34–46.6)
HGB BLD-MCNC: 10.1 G/DL (ref 12–15.9)
PHOSPHATE SERPL-MCNC: 2.7 MG/DL (ref 2.5–4.5)
POTASSIUM SERPL-SCNC: 4.8 MMOL/L (ref 3.5–5.2)
SODIUM SERPL-SCNC: 140 MMOL/L (ref 136–145)

## 2025-03-04 PROCEDURE — 85014 HEMATOCRIT: CPT | Performed by: INTERNAL MEDICINE

## 2025-03-04 PROCEDURE — 85018 HEMOGLOBIN: CPT | Performed by: INTERNAL MEDICINE

## 2025-03-04 PROCEDURE — 25010000002 ERTAPENEM PER 500 MG: Performed by: INTERNAL MEDICINE

## 2025-03-04 PROCEDURE — 25010000002 MEROPENEM PER 100 MG: Performed by: INTERNAL MEDICINE

## 2025-03-04 PROCEDURE — 82948 REAGENT STRIP/BLOOD GLUCOSE: CPT

## 2025-03-04 PROCEDURE — 80069 RENAL FUNCTION PANEL: CPT | Performed by: INTERNAL MEDICINE

## 2025-03-04 PROCEDURE — 25010000002 HEPARIN (PORCINE) PER 1000 UNITS: Performed by: INTERNAL MEDICINE

## 2025-03-04 PROCEDURE — 99232 SBSQ HOSP IP/OBS MODERATE 35: CPT | Performed by: STUDENT IN AN ORGANIZED HEALTH CARE EDUCATION/TRAINING PROGRAM

## 2025-03-04 RX ORDER — SODIUM BICARBONATE 650 MG/1
1300 TABLET ORAL 3 TIMES DAILY
Status: DISCONTINUED | OUTPATIENT
Start: 2025-03-04 | End: 2025-03-07 | Stop reason: HOSPADM

## 2025-03-04 RX ADMIN — Medication 1 TABLET: at 08:40

## 2025-03-04 RX ADMIN — HEPARIN SODIUM 5000 UNITS: 5000 INJECTION INTRAVENOUS; SUBCUTANEOUS at 22:10

## 2025-03-04 RX ADMIN — ERTAPENEM 1000 MG: 1 INJECTION INTRAMUSCULAR; INTRAVENOUS at 15:37

## 2025-03-04 RX ADMIN — CHOLESTYRAMINE 1 PACKET: 4 POWDER, FOR SUSPENSION ORAL at 22:10

## 2025-03-04 RX ADMIN — DIPHENOXYLATE HYDROCHLORIDE AND ATROPINE SULFATE 1 TABLET: 2.5; .025 TABLET ORAL at 09:50

## 2025-03-04 RX ADMIN — PANTOPRAZOLE SODIUM 40 MG: 40 TABLET, DELAYED RELEASE ORAL at 08:40

## 2025-03-04 RX ADMIN — SODIUM BICARBONATE 650 MG TABLET 1300 MG: at 15:37

## 2025-03-04 RX ADMIN — Medication 500 MG: at 08:40

## 2025-03-04 RX ADMIN — SERTRALINE HYDROCHLORIDE 75 MG: 50 TABLET ORAL at 08:40

## 2025-03-04 RX ADMIN — MIDODRINE HYDROCHLORIDE 15 MG: 10 TABLET ORAL at 08:40

## 2025-03-04 RX ADMIN — Medication 10 ML: at 22:12

## 2025-03-04 RX ADMIN — LOPERAMIDE HYDROCHLORIDE 2 MG: 2 CAPSULE ORAL at 22:11

## 2025-03-04 RX ADMIN — MEROPENEM 1000 MG: 1 INJECTION INTRAVENOUS at 08:41

## 2025-03-04 RX ADMIN — Medication 10 ML: at 08:48

## 2025-03-04 RX ADMIN — SODIUM BICARBONATE 650 MG TABLET 1300 MG: at 22:11

## 2025-03-04 RX ADMIN — FERROUS SULFATE TAB 325 MG (65 MG ELEMENTAL FE) 325 MG: 325 (65 FE) TAB at 08:41

## 2025-03-04 RX ADMIN — CHOLESTYRAMINE 1 PACKET: 4 POWDER, FOR SUSPENSION ORAL at 08:41

## 2025-03-04 RX ADMIN — HEPARIN SODIUM 5000 UNITS: 5000 INJECTION INTRAVENOUS; SUBCUTANEOUS at 14:34

## 2025-03-04 RX ADMIN — MIDODRINE HYDROCHLORIDE 15 MG: 10 TABLET ORAL at 11:58

## 2025-03-04 RX ADMIN — MIDODRINE HYDROCHLORIDE 15 MG: 10 TABLET ORAL at 17:08

## 2025-03-04 RX ADMIN — POTASSIUM PHOSPHATE, MONOBASIC 500 MG: 500 TABLET, SOLUBLE ORAL at 08:40

## 2025-03-04 RX ADMIN — DIPHENOXYLATE HYDROCHLORIDE AND ATROPINE SULFATE 1 TABLET: 2.5; .025 TABLET ORAL at 22:11

## 2025-03-04 RX ADMIN — SODIUM BICARBONATE 650 MG TABLET 1300 MG: at 08:40

## 2025-03-04 NOTE — PROGRESS NOTES
[facility]   Hospitalist Progress Note  Date: 3/4/2025  Patient Name: Mag Padilla  : 1980  MRN: [id number]  Date of admission: 2025  Room/Bed: [facility]/      Subjective   Subjective     Chief Complaint: nausea, vomiting weakness     Summary:Mag Padilla is a 44 y.o. female who is a nursing home resident  with past medical history of Crohn disease status post resection,with complex abdominal surgical history.  Patient had a history of a duodenal hole and underwent extensive surgery that required a G-tube, J-tube and multiple drain placements this was all done at [facility].  Patient since that time has had multiple issues with abdominal wounds and abscesses.  Patient also has history of hypotension on midodrine, anxiety, chronic diarrhea, and GERD presenting to the ED for evaluation of nausea vomiting diarrhea with generalized weakness.  Patient states that for the last 3 days she has not been feeling well with persistent nausea and vomiting for the last 2 days and worsening of her chronic diarrhea.  Due to the symptoms patient is weakness and lethargy had also worsened to where she is mostly bedbound with excessive sleepiness.  Patient is seen by wound care for abdominal wounds after an ex lap with colon resection.  Due to worsening condition patient was eventually brought to the ED for further evaluation.  In the ED patient was significantly hypotensive on arrival with remaining vitals being within normal limits.  UA was positive for UTI with signs of dehydration, labs showed severely reduced renal function with hypokalemia, anemia, thrombocytopenia, and severe hypoalbuminemia.  Chest x-ray was negative for any acute findings.  When seen patient was resting comfortably and still having episode of diarrhea since being here although her nausea is much improved.  She did deny any recent fevers, chills, headaches, focal weakness, chest pain, palpitation, shortness of breath, cough,  abdominal pain, constipation, dysuria, hematuria, hematochezia, melena, or anxiety.  Patient admitted for further evaluation and treatment.  CT of the abdomen revealed 6 mm left ureteral stone with mild hydro utero nephrosis which required urology consult and patient was taken to the operating room  and had a cystoscopy with left ureteric stent placement.Patient's blood cultures are growing ESBL E. Coli.  Urine with less than 10,000 gram-negative rods. MRSA screen is also positive. Patient seen by intensivist, urology and nephrology.  GI consulted for diarrhea.  Previously patient has seen Dr. Pompa.  Patient seen by general surgery for fistula. Recommendation is to follow-up with .  No intervention as per GI and general surgery.  Nephrology on board.    Interval Followup: No acute events overnight.  Laying in bed, no active complaints.  Hemoglobin stable, 10.1 today.  Started on p.o. sodium bicarb by nephrology for metabolic acidosis, improving.    Review of Systems    All systems reviewed and negative except for what is outlined above.      Objective   Objective     Vitals:   Temp:  [97.5 °F (36.4 °C)-98.6 °F (37 °C)] 98.1 °F (36.7 °C)  Heart Rate:  [61-82] 73  Resp:  [16-18] 18  BP: ()/(58-66) 105/62    Physical Exam   General: Awake, alert, NAD  Cardiovascular: RRR, no murmurs   Pulmonary: CTA bilaterally; no wheezes; no conversational dyspnea  Gastrointestinal: S/ND/NT, +BS  Neuro: Alert, awake, oriented x 3; speech clear; no tremor      Result Review    Result Review:  I have personally reviewed these results:  [x]  Laboratory      Lab 03/04/25  0504 03/03/25  0455 03/02/25  0631 03/01/25  0654 02/28/25  0419 02/27/25  1339   WBC  --   --   --  9.94 9.31 7.85   HEMOGLOBIN 10.1* 9.1* 9.2* 8.7* 8.9* 9.1*   HEMATOCRIT 33.0* 30.0* 29.9* 27.4* 27.6* 28.9*   PLATELETS  --   --   --  151 137* 124*   NEUTROS ABS  --   --   --  8.00* 7.42* 6.11   IMMATURE GRANS (ABS)  --   --   --  0.19* 0.28* 0.23*   LYMPHS  ABS  --   --   --  1.04 1.07 1.04   MONOS ABS  --   --   --  0.54 0.38 0.32   EOS ABS  --   --   --  0.15 0.14 0.14   MCV  --   --   --  91.6 91.4 90.3         Lab 03/04/25  0504 03/03/25  0455 03/02/25  0631 02/27/25  1339 02/27/25  0603 02/26/25  2320 02/26/25  0248   SODIUM 140 142 147*   < > 140 144 146*   POTASSIUM 4.8 4.4 4.3   < > 3.8 3.8 3.5   CHLORIDE 118* 118* 121*   < > 115* 119* 118*   CO2 16.8* 13.1* 16.8*   < > 15.3* 15.7* 18.9*   ANION GAP 5.2 10.9 9.2   < > 9.7 9.3 9.1   BUN 26* 32* 33*   < > 27* 28* 32*   CREATININE 1.29* 1.34* 1.55*   < > 2.25* 2.64* 2.88*   EGFR 51.9* 49.6* 41.7*   < > 26.6* 22.0* 19.8*   GLUCOSE 81 79 79   < > 97 114* 71   CALCIUM 8.2* 7.8* 7.7*   < > 8.0* 8.0* 8.4*   MAGNESIUM  --   --   --   --  2.4 1.4* 1.7   PHOSPHORUS 2.7 2.9 3.4   < >  --   --  2.0*    < > = values in this interval not displayed.         Lab 03/04/25  0504 03/03/25 0455 03/02/25  0631   ALBUMIN 1.8* 1.5* 1.6*                 Lab 02/26/25  0349   ABO TYPING O   RH TYPING Positive   ANTIBODY SCREEN Negative         Brief Urine Lab Results  (Last result in the past 365 days)        Color   Clarity   Blood   Leuk Est   Nitrite   Protein   CREAT   Urine HCG        03/01/25 0005             33.9         03/01/25 0005 Yellow   Cloudy   Large (3+)   Large (3+)   Negative   100 mg/dL (2+)                 [x]  Microbiology   Microbiology Results (last 10 days)       Procedure Component Value - Date/Time    Blood Culture - Blood, Hand, Right [[id number]]  (Normal) Collected: 02/24/25 2016    Lab Status: Final result Specimen: Blood from Hand, Right Updated: 03/01/25 2031     Blood Culture No growth at 5 days    Narrative:      Less than seven (7) mL's of blood was collected.  Insufficient quantity may yield false negative results.    Blood Culture - Blood, Arm, Left [[id number]]  (Normal) Collected: 02/24/25 1803    Lab Status: Final result Specimen: Blood from Arm, Left Updated: 03/01/25 1831     Blood Culture No  growth at 5 days    Clostridioides difficile Toxin - Stool, Per Rectum [[id number]] Collected: 02/24/25 0856    Lab Status: Final result Specimen: Stool from Per Rectum Updated: 02/24/25 0950    Narrative:      The following orders were created for panel order Clostridioides difficile Toxin - Stool, Per Rectum.  Procedure                               Abnormality         Status                     ---------                               -----------         ------                     Clostridioides difficile...[[id number]]                      Final result                 Please view results for these tests on the individual orders.    Clostridioides difficile Toxin, PCR - Stool, Per Rectum [[id number]] Collected: 02/24/25 0856    Lab Status: Final result Specimen: Stool from Per Rectum Updated: 02/24/25 0950     Toxigenic C. difficile by PCR Negative     027 Toxin Presumptive Negative    Narrative:      The result indicates the absence of toxigenic C. difficile from stool specimen.     MRSA Screen, PCR (Inpatient) - Swab, Nares [[id number]]  (Abnormal) Collected: 02/23/25 0601    Lab Status: Final result Specimen: Swab from Nares Updated: 02/23/25 1001     MRSA PCR MRSA Detected    Narrative:      The negative predictive value of this diagnostic test is high and should only be used to consider de-escalating anti-MRSA therapy. A positive result may indicate colonization with MRSA and must be correlated clinically.    Respiratory Panel PCR w/COVID-19(SARS-CoV-2) BRIELLE/TOSHA/IMTIAZ/PAD/COR/NAATCHA In-House, NP Swab in UTM/VTM, 2 HR TAT - Swab, Nasopharynx [[id number]]  (Normal) Collected: 02/23/25 0601    Lab Status: Final result Specimen: Swab from Nasopharynx Updated: 02/23/25 0827     ADENOVIRUS, PCR Not Detected     Coronavirus 229E Not Detected     Coronavirus HKU1 Not Detected     Coronavirus NL63 Not Detected     Coronavirus OC43 Not Detected     COVID19 Not Detected     Human Metapneumovirus Not Detected     Human  Rhinovirus/Enterovirus Not Detected     Influenza A PCR Not Detected     Influenza B PCR Not Detected     Parainfluenza Virus 1 Not Detected     Parainfluenza Virus 2 Not Detected     Parainfluenza Virus 3 Not Detected     Parainfluenza Virus 4 Not Detected     RSV, PCR Not Detected     Bordetella pertussis pcr Not Detected     Bordetella parapertussis PCR Not Detected     Chlamydophila pneumoniae PCR Not Detected     Mycoplasma pneumo by PCR Not Detected    Narrative:      In the setting of a positive respiratory panel with a viral infection PLUS a negative procalcitonin without other underlying concern for bacterial infection, consider observing off antibiotics or discontinuation of antibiotics and continue supportive care. If the respiratory panel is positive for atypical bacterial infection (Bordetella pertussis, Chlamydophila pneumoniae, or Mycoplasma pneumoniae), consider antibiotic de-escalation to target atypical bacterial infection.    Urine Culture - Urine, Urine, Clean Catch [[id number]]  (Abnormal) Collected: 02/22/25 2208    Lab Status: Final result Specimen: Urine, Clean Catch Updated: 02/24/25 1058     Urine Culture <10,000 CFU/mL Gram Negative Bacilli    Narrative:      Call if further workup needed.   Colonization of the urinary tract without infection is common. Treatment is discouraged unless the patient is symptomatic, pregnant, or undergoing an invasive urologic procedure.    Blood Culture - Blood, Leg, Left [[id number]]  (Abnormal)  (Susceptibility) Collected: 02/22/25 1922    Lab Status: Final result Specimen: Blood from Leg, Left Updated: 02/25/25 0620     Blood Culture Escherichia coli ESBL     Comment:   Consider infectious disease consult.  Susceptibility results may not correlate to clinical outcomes.  For ESBL-producing infections in the blood, a carbapenem is recommended as first-line therapy for optimal clinical outcomes.        Isolated from Aerobic and Anaerobic Bottles     Gram Stain  Aerobic Bottle Gram negative bacilli      Anaerobic Bottle Gram negative bacilli    Narrative:      Less than seven (7) mL's of blood was collected.  Insufficient quantity may yield false negative results.    Susceptibility        Escherichia coli ESBL      VANDANA      Ciprofloxacin Resistant      Ertapenem Susceptible      Levofloxacin Resistant      Meropenem Susceptible      Trimethoprim + Sulfamethoxazole Resistant                       Susceptibility Comments       Escherichia coli ESBL    With the exception of urinary-sourced infections, aminoglycosides should not be used as monotherapy.               Blood Culture ID, PCR - Blood, Leg, Left [[id number]]  (Abnormal) Collected: 02/22/25 1922    Lab Status: Final result Specimen: Blood from Leg, Left Updated: 02/23/25 1155     BCID, PCR Escherichia coli. Identification by BCID2 PCR.     BCID, PCR 2 CTX-M (ESBL) detected. Identification by BCID2 PCR     BOTTLE TYPE Anaerobic Bottle    Blood Culture - Blood, Arm, Left [[id number]]  (Normal) Collected: 02/22/25 1919    Lab Status: Final result Specimen: Blood from Arm, Left Updated: 02/27/25 1931     Blood Culture No growth at 5 days    Narrative:      Less than seven (7) mL's of blood was collected.  Insufficient quantity may yield false negative results.          [x]  Radiology  CT Abdomen Pelvis Without Contrast    Result Date: 2/28/2025  Impression: Limited study. 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 Oral contrast is not visualized in the fistulous. No evidence of bowel obstruction. Moderate distention of the colon with air and oral contrast. Findings suggest colonic ileus. Small to moderate bilateral pleural effusions, left greater than right, increased in size when compared to prior study. Additional findings per body of the report. Electronically Signed: Alessandro Love MD  2/28/2025 7:20 PM EST  Workstation ID: [id number]   []  EKG/Telemetry   []  Cardiology/Vascular   []   Pathology  []  Old records  []  Other:    Assessment & Plan   Assessment / Plan     Assessment:  Acute kidney injury due to obstructing nephrolithiasis on the left status post stent placement; Improving  Septic shock, present on admission secondary to bacteremia; Improving  ESBL E. coli bacteremia.  Subsequent blood culture negative since February 24.  Chronic hypotension on midodrine  Episodes of bradycardia due to midodrine.  Resolved.  Midodrine resumed  History of Crohn's disease with complex surgery  Chronic nonhealing abdominal wounds and abscess  secondary to significant abdominal surgery in the past for duodenal ulcer rupture follows with [facility].  Due to ESBL E. coli and Enterococcus faecium.  Finished IV Vanco  Chronic enterocutaneous fistulas followed by [facility]  Anxiety disorder  Positive MRSA PCR  Acute on chronic anemia.  Status post 1 unit packed RBC transfusion  Antibiotic associated diarrhea.  C. difficile negative.  Hypernatremia.  Free water deficit.  Resolved  Thrombocytopenia.  Improving  Left more than right pleural effusion    Plan:  Currently being managed with subsurface.  Midodrine resumed by nephrology as bradycardia improved  Continue meropenem, discussed with ID to finish IV antibiotics on March 8.  Okay to discharge on IV Invanz  S/p midline placement.    Finished IV Vanco for wound culture from the abdomen.  Repeat Blood cultures negative to date from February 24   Repeat CT scan abdomen pelvis with oral contrast noted.    Appreciate urology input.  Status post left ureteric stent.  Needs definitive stone treatment when stable  Nephrology following.  Renal function slowly improving.  Continue p.o. bicarb.  Metabolic acidosis improving.  Discussed with GI about diarrhea.  Appreciate input  Discussed with general surgery to evaluate for fistulous.  Recommend follow-up with [facility] as outpatient once discharged.    As needed Imodium and Lomotil.  Scheduled Questran .  wound culture from abdomen noted  with ESBL E. coli and Enterococcus faecium. Patient does have known chronic abdominal wounds with fistulous communication.  Patient follows with  for management of these wounds patient was just discharged this month from  for these wounds.    Continue wound care.  Discussed with wound care.  Appreciate input.  Electrolyte replacement protocol  Monitor white cell count.  Leukocytosis resolved  PT OT  Likely discharge in next 24 hours once stable.     Discussed with RN.    VTE Prophylaxis:  Pharmacologic & mechanical VTE prophylaxis orders are present.        CODE STATUS:   Level Of Support Discussed With: Patient  Code Status (Patient has no pulse and is not breathing): CPR (Attempt to Resuscitate)  Medical Interventions (Patient has pulse or is breathing): Full Support      Electronically signed by Amrik Ramires MD, 03/04/25, 5:02 PM EST.

## 2025-03-04 NOTE — PLAN OF CARE
Goal Outcome Evaluation:              Outcome Evaluation: A&Ox4, VSS this shift. Continued diarrhea. New midline yesterday. No needs at this time.

## 2025-03-04 NOTE — PROGRESS NOTES
[facility]     Progress Note    Patient Name: Mag Padilla  : 1980  MRN: [id number]  Primary Care Physician:  Provider, No Known  Date of admission: 2025    Subjective     Review of Systems  All review of systems are negative except as mentioned in subjective complaints.    Objective   Objective     Vitals:   Temp:  [97.5 °F (36.4 °C)-99 °F (37.2 °C)] 97.5 °F (36.4 °C)  Heart Rate:  [61-82] 61  Resp:  [16-20] 16  BP: (85-98)/(59-68) 96/66  Physical Exam    Constitutional: Awake, alert responsive, conversant, no obvious distress              Psychiatric:  Appropriate affect, cooperative   Neurologic:  Awake alert ,oriented x 3, strength symmetric in all extremities, Cranial Nerves grossly intact to confrontation, speech clear   Eyes:   PERRLA, sclerae anicteric, no conjunctival injection   HEENT:  Moist mucous membranes, no nasal or eye discharge, no throat congestion   Neck:   Supple, no thyromegaly, no lymphadenopathy, trachea midline, no elevated JVD   Respiratory:  Clear to auscultation bilaterally, nonlabored respirations    Cardiovascular: RRR, no murmurs, rubs, or gallops, palpable pedal pulses bilaterally, No bilateral ankle edema   Gastrointestinal: Positive bowel sounds, soft, nontender, nondistended, no organomegaly   Musculoskeletal:  No clubbing or cyanosis to extremities,muscle wasting, joint swelling, muscle weakness             Skin:                      No rashes, bruising, skin ulcers, petechiae or ecchymosis    Result Review    Result Review:  I have personally reviewed the results from the time of this admission to 3/4/2025 08:01 EST and agree with these findings:  []  Laboratory  []  Microbiology  []  Radiology  []  EKG/Telemetry   []  Cardiology/Vascular   []  Pathology  []  Old records  []  Other:    Results from last 7 days   Lab Units 25  0504 25  0455 25  0631 25  0654 25  0419 25  1339 25  0603 25  1106 25  0248    WBC 10*3/mm3  --   --   --  9.94 9.31 7.85 14.25* 4.99 3.62   HEMOGLOBIN g/dL 10.1* 9.1* 9.2* 8.7* 8.9* 9.1* 9.8* 8.8* 6.8*   PLATELETS 10*3/mm3  --   --   --  151 137* 124* 197 115* 98*     Results from last 7 days   Lab Units 03/04/25  0504 03/03/25  0455 03/02/25  0631 03/01/25  0654 02/28/25  0419 02/27/25  1339 02/27/25  0603   SODIUM mmol/L 140 142 147* 145 143 138 140   POTASSIUM mmol/L 4.8 4.4 4.3 4.1 3.9 4.2 3.8   CHLORIDE mmol/L 118* 118* 121* 120* 119* 115* 115*   CO2 mmol/L 16.8* 13.1* 16.8* 14.8* 16.4* 15.6* 15.3*   ANION GAP mmol/L 5.2 10.9 9.2 10.2 7.6 7.4 9.7   BUN mg/dL 26* 32* 33* 31* 28* 25* 27*   CREATININE mg/dL 1.29* 1.34* 1.55* 1.63* 1.94* 2.13* 2.25*   GLUCOSE mg/dL 81 79 79 86 76 79 97   EGFR mL/min/1.73 51.9* 49.6* 41.7* 39.2* 31.8* 28.5* 26.6*   CALCIUM mg/dL 8.2* 7.8* 7.7* 7.6* 7.7* 7.4* 8.0*   MAGNESIUM mg/dL  --   --   --   --   --   --  2.4       Assessment & Plan   Assessment / Plan       Active Hospital Problems:    Active Hospital Problems    Diagnosis  POA    **Acute renal failure [N17.9]  Yes     Secondary to severe dehydration      Metabolic acidosis, NAG, bicarbonate losses [E87.20]  Unknown     Secondary to chronic diarrhea      Chronic anemia [D64.9]  Unknown     Most likely iron deficiency and chronic inflammation      Bacteremia [R78.81]  Unknown     ESBL E. coli      Hypotension due to hypovolemia [E86.1]  Yes    Chronic diarrhea [K52.9]  Yes     Patient has a Crohn's disease  Chronic diarrhea      Inflammatory bowel disease (Crohn's disease) [K50.90]  Yes    Kidney stone [N20.0]  Yes       Plan:   Sodium bicarbonate by mouth  Improvement in diarrhea will improve metabolic acidosis  Should finish 2 weeks of antibiotics    Electronically signed by Brenda Jones MD, 03/04/25, 7:44 AM EST.

## 2025-03-04 NOTE — PLAN OF CARE
Goal Outcome Evaluation:  Plan of Care Reviewed With: patient   Pt vss. Pt has no complaints of moderate/severe pain throughout shift. Blood sugar WNL. Continuing with plan of care.

## 2025-03-05 LAB
ALBUMIN SERPL-MCNC: 1.6 G/DL (ref 3.5–5.2)
ANION GAP SERPL CALCULATED.3IONS-SCNC: 5.9 MMOL/L (ref 5–15)
BASOPHILS # BLD AUTO: 0.11 10*3/MM3 (ref 0–0.2)
BASOPHILS NFR BLD AUTO: 1.1 % (ref 0–1.5)
BUN SERPL-MCNC: 31 MG/DL (ref 6–20)
BUN/CREAT SERPL: 26.3 (ref 7–25)
CALCIUM SPEC-SCNC: 8.5 MG/DL (ref 8.6–10.5)
CHLORIDE SERPL-SCNC: 116 MMOL/L (ref 98–107)
CO2 SERPL-SCNC: 19.1 MMOL/L (ref 22–29)
CREAT SERPL-MCNC: 1.18 MG/DL (ref 0.57–1)
DEPRECATED RDW RBC AUTO: 68.4 FL (ref 37–54)
EGFRCR SERPLBLD CKD-EPI 2021: 57.8 ML/MIN/1.73
EOSINOPHIL # BLD AUTO: 0.16 10*3/MM3 (ref 0–0.4)
EOSINOPHIL NFR BLD AUTO: 1.6 % (ref 0.3–6.2)
ERYTHROCYTE [DISTWIDTH] IN BLOOD BY AUTOMATED COUNT: 19.9 % (ref 12.3–15.4)
GLUCOSE BLDC GLUCOMTR-MCNC: 226 MG/DL (ref 70–99)
GLUCOSE BLDC GLUCOMTR-MCNC: 35 MG/DL (ref 70–99)
GLUCOSE BLDC GLUCOMTR-MCNC: 63 MG/DL (ref 70–99)
GLUCOSE BLDC GLUCOMTR-MCNC: 77 MG/DL (ref 70–99)
GLUCOSE BLDC GLUCOMTR-MCNC: 93 MG/DL (ref 70–99)
GLUCOSE BLDC GLUCOMTR-MCNC: 98 MG/DL (ref 70–99)
GLUCOSE SERPL-MCNC: 76 MG/DL (ref 65–99)
HCT VFR BLD AUTO: 28.5 % (ref 34–46.6)
HGB BLD-MCNC: 8.7 G/DL (ref 12–15.9)
IMM GRANULOCYTES # BLD AUTO: 0.06 10*3/MM3 (ref 0–0.05)
IMM GRANULOCYTES NFR BLD AUTO: 0.6 % (ref 0–0.5)
LYMPHOCYTES # BLD AUTO: 1.64 10*3/MM3 (ref 0.7–3.1)
LYMPHOCYTES NFR BLD AUTO: 15.9 % (ref 19.6–45.3)
MAGNESIUM SERPL-MCNC: 1.3 MG/DL (ref 1.6–2.6)
MCH RBC QN AUTO: 29 PG (ref 26.6–33)
MCHC RBC AUTO-ENTMCNC: 30.5 G/DL (ref 31.5–35.7)
MCV RBC AUTO: 95 FL (ref 79–97)
MONOCYTES # BLD AUTO: 1.04 10*3/MM3 (ref 0.1–0.9)
MONOCYTES NFR BLD AUTO: 10.1 % (ref 5–12)
NEUTROPHILS NFR BLD AUTO: 7.29 10*3/MM3 (ref 1.7–7)
NEUTROPHILS NFR BLD AUTO: 70.7 % (ref 42.7–76)
NRBC BLD AUTO-RTO: 0 /100 WBC (ref 0–0.2)
PHOSPHATE SERPL-MCNC: 2.9 MG/DL (ref 2.5–4.5)
PLATELET # BLD AUTO: 129 10*3/MM3 (ref 140–450)
PMV BLD AUTO: 12.4 FL (ref 6–12)
POTASSIUM SERPL-SCNC: 4.9 MMOL/L (ref 3.5–5.2)
QT INTERVAL: 350 MS
QTC INTERVAL: 407 MS
RBC # BLD AUTO: 3 10*6/MM3 (ref 3.77–5.28)
SODIUM SERPL-SCNC: 141 MMOL/L (ref 136–145)
WBC NRBC COR # BLD AUTO: 10.3 10*3/MM3 (ref 3.4–10.8)

## 2025-03-05 PROCEDURE — 82948 REAGENT STRIP/BLOOD GLUCOSE: CPT

## 2025-03-05 PROCEDURE — 83735 ASSAY OF MAGNESIUM: CPT | Performed by: STUDENT IN AN ORGANIZED HEALTH CARE EDUCATION/TRAINING PROGRAM

## 2025-03-05 PROCEDURE — 85025 COMPLETE CBC W/AUTO DIFF WBC: CPT | Performed by: STUDENT IN AN ORGANIZED HEALTH CARE EDUCATION/TRAINING PROGRAM

## 2025-03-05 PROCEDURE — 25010000002 MAGNESIUM SULFATE IN D5W 1G/100ML (PREMIX) 1-5 GM/100ML-% SOLUTION: Performed by: STUDENT IN AN ORGANIZED HEALTH CARE EDUCATION/TRAINING PROGRAM

## 2025-03-05 PROCEDURE — 25010000002 MEROPENEM PER 100 MG: Performed by: STUDENT IN AN ORGANIZED HEALTH CARE EDUCATION/TRAINING PROGRAM

## 2025-03-05 PROCEDURE — 99232 SBSQ HOSP IP/OBS MODERATE 35: CPT | Performed by: STUDENT IN AN ORGANIZED HEALTH CARE EDUCATION/TRAINING PROGRAM

## 2025-03-05 PROCEDURE — 25010000002 HEPARIN (PORCINE) PER 1000 UNITS: Performed by: INTERNAL MEDICINE

## 2025-03-05 PROCEDURE — 80069 RENAL FUNCTION PANEL: CPT | Performed by: INTERNAL MEDICINE

## 2025-03-05 RX ORDER — MAGNESIUM SULFATE 1 G/100ML
1 INJECTION INTRAVENOUS
Status: COMPLETED | OUTPATIENT
Start: 2025-03-05 | End: 2025-03-05

## 2025-03-05 RX ADMIN — MAGNESIUM SULFATE 1 G: 1 INJECTION INTRAVENOUS at 11:03

## 2025-03-05 RX ADMIN — MIDODRINE HYDROCHLORIDE 15 MG: 10 TABLET ORAL at 09:43

## 2025-03-05 RX ADMIN — Medication 10 ML: at 09:44

## 2025-03-05 RX ADMIN — Medication 10 ML: at 22:06

## 2025-03-05 RX ADMIN — PANTOPRAZOLE SODIUM 40 MG: 40 TABLET, DELAYED RELEASE ORAL at 09:44

## 2025-03-05 RX ADMIN — SERTRALINE HYDROCHLORIDE 75 MG: 50 TABLET ORAL at 09:43

## 2025-03-05 RX ADMIN — LOPERAMIDE HYDROCHLORIDE 2 MG: 2 CAPSULE ORAL at 22:02

## 2025-03-05 RX ADMIN — HEPARIN SODIUM 5000 UNITS: 5000 INJECTION INTRAVENOUS; SUBCUTANEOUS at 14:44

## 2025-03-05 RX ADMIN — DEXTROSE MONOHYDRATE 50 ML: 25 INJECTION, SOLUTION INTRAVENOUS at 07:51

## 2025-03-05 RX ADMIN — MIDODRINE HYDROCHLORIDE 15 MG: 10 TABLET ORAL at 16:42

## 2025-03-05 RX ADMIN — DIPHENOXYLATE HYDROCHLORIDE AND ATROPINE SULFATE 1 TABLET: 2.5; .025 TABLET ORAL at 22:02

## 2025-03-05 RX ADMIN — MAGNESIUM SULFATE 1 G: 1 INJECTION INTRAVENOUS at 13:25

## 2025-03-05 RX ADMIN — MIDODRINE HYDROCHLORIDE 15 MG: 10 TABLET ORAL at 11:03

## 2025-03-05 RX ADMIN — SODIUM BICARBONATE 650 MG TABLET 1300 MG: at 09:44

## 2025-03-05 RX ADMIN — HEPARIN SODIUM 5000 UNITS: 5000 INJECTION INTRAVENOUS; SUBCUTANEOUS at 22:02

## 2025-03-05 RX ADMIN — SODIUM BICARBONATE 650 MG TABLET 1300 MG: at 22:02

## 2025-03-05 RX ADMIN — Medication 10 ML: at 22:05

## 2025-03-05 RX ADMIN — CHOLESTYRAMINE 1 PACKET: 4 POWDER, FOR SUSPENSION ORAL at 14:44

## 2025-03-05 RX ADMIN — POTASSIUM PHOSPHATE, MONOBASIC 500 MG: 500 TABLET, SOLUBLE ORAL at 11:03

## 2025-03-05 RX ADMIN — Medication 500 MG: at 09:43

## 2025-03-05 RX ADMIN — CHOLESTYRAMINE 1 PACKET: 4 POWDER, FOR SUSPENSION ORAL at 09:43

## 2025-03-05 RX ADMIN — MAGNESIUM SULFATE 1 G: 1 INJECTION INTRAVENOUS at 09:42

## 2025-03-05 RX ADMIN — Medication 1 TABLET: at 09:43

## 2025-03-05 RX ADMIN — SODIUM BICARBONATE 650 MG TABLET 1300 MG: at 16:42

## 2025-03-05 RX ADMIN — MEROPENEM 1000 MG: 1 INJECTION INTRAVENOUS at 14:43

## 2025-03-05 NOTE — PROGRESS NOTES
[facility]   Hospitalist Progress Note  Date: 3/5/2025  Patient Name: Mag Padilla  : 1980  MRN: [id number]  Date of admission: 2025  Room/Bed: [facility]/      Subjective   Subjective     Chief Complaint: nausea, vomiting weakness     Summary:Mag Padilla is a 44 y.o. female who is a nursing home resident  with past medical history of Crohn disease status post resection,with complex abdominal surgical history.  Patient had a history of a duodenal hole and underwent extensive surgery that required a G-tube, J-tube and multiple drain placements this was all done at [facility].  Patient since that time has had multiple issues with abdominal wounds and abscesses.  Patient also has history of hypotension on midodrine, anxiety, chronic diarrhea, and GERD presenting to the ED for evaluation of nausea vomiting diarrhea with generalized weakness.  Patient states that for the last 3 days she has not been feeling well with persistent nausea and vomiting for the last 2 days and worsening of her chronic diarrhea.  Due to the symptoms patient is weakness and lethargy had also worsened to where she is mostly bedbound with excessive sleepiness.  Patient is seen by wound care for abdominal wounds after an ex lap with colon resection.  Due to worsening condition patient was eventually brought to the ED for further evaluation.  In the ED patient was significantly hypotensive on arrival with remaining vitals being within normal limits.  UA was positive for UTI with signs of dehydration, labs showed severely reduced renal function with hypokalemia, anemia, thrombocytopenia, and severe hypoalbuminemia.  Chest x-ray was negative for any acute findings.  When seen patient was resting comfortably and still having episode of diarrhea since being here although her nausea is much improved.  She did deny any recent fevers, chills, headaches, focal weakness, chest pain, palpitation, shortness of breath, cough,  abdominal pain, constipation, dysuria, hematuria, hematochezia, melena, or anxiety.  Patient admitted for further evaluation and treatment.  CT of the abdomen revealed 6 mm left ureteral stone with mild hydro utero nephrosis which required urology consult and patient was taken to the operating room  and had a cystoscopy with left ureteric stent placement.Patient's blood cultures are growing ESBL E. Coli.  Urine with less than 10,000 gram-negative rods. MRSA screen is also positive. Patient seen by intensivist, urology and nephrology.  GI consulted for diarrhea.  Previously patient has seen Dr. Pompa.  Patient seen by general surgery for fistula. Recommendation is to follow-up with .  No intervention as per GI and general surgery.  Nephrology on board.    Interval Followup: No acute events overnight.  Laying in bed, no active complaints.  Hemoglobin stable, 8.7 today. Blood pressure borderline low. On p.o. sodium bicarb by nephrology for metabolic acidosis, improving. Hypoglycemic; asymptomatic and stated no change PO intake.Will monitor.    Review of Systems    All systems reviewed and negative except for what is outlined above.      Objective   Objective     Vitals:   Temp:  [97.6 °F (36.4 °C)-98.3 °F (36.8 °C)] 97.6 °F (36.4 °C)  Heart Rate:  [64-83] 83  Resp:  [18] 18  BP: ()/(56-64) 89/56  Flow (L/min) (Oxygen Therapy):  [2] 2    Physical Exam   General: Awake, alert, NAD  Cardiovascular: RRR, no murmurs   Pulmonary: CTA bilaterally; no wheezes; no conversational dyspnea  Gastrointestinal: S/ND/NT, +BS  Neuro: Alert, awake, oriented x 3; speech clear; no tremor      Result Review    Result Review:  I have personally reviewed these results:  [x]  Laboratory      Lab 03/05/25  0447 03/04/25  0504 03/03/25  0455 03/02/25  0631 03/01/25  0654 02/28/25  0419   WBC 10.30  --   --   --  9.94 9.31   HEMOGLOBIN 8.7* 10.1* 9.1*   < > 8.7* 8.9*   HEMATOCRIT 28.5* 33.0* 30.0*   < > 27.4* 27.6*   PLATELETS 129*   --   --   --  151 137*   NEUTROS ABS 7.29*  --   --   --  8.00* 7.42*   IMMATURE GRANS (ABS) 0.06*  --   --   --  0.19* 0.28*   LYMPHS ABS 1.64  --   --   --  1.04 1.07   MONOS ABS 1.04*  --   --   --  0.54 0.38   EOS ABS 0.16  --   --   --  0.15 0.14   MCV 95.0  --   --   --  91.6 91.4    < > = values in this interval not displayed.         Lab 03/05/25 0447 03/04/25  0504 03/03/25  0455 02/27/25  1339 02/27/25  0603 02/26/25  2320   SODIUM 141 140 142   < > 140 144   POTASSIUM 4.9 4.8 4.4   < > 3.8 3.8   CHLORIDE 116* 118* 118*   < > 115* 119*   CO2 19.1* 16.8* 13.1*   < > 15.3* 15.7*   ANION GAP 5.9 5.2 10.9   < > 9.7 9.3   BUN 31* 26* 32*   < > 27* 28*   CREATININE 1.18* 1.29* 1.34*   < > 2.25* 2.64*   EGFR 57.8* 51.9* 49.6*   < > 26.6* 22.0*   GLUCOSE 76 81 79   < > 97 114*   CALCIUM 8.5* 8.2* 7.8*   < > 8.0* 8.0*   MAGNESIUM 1.3*  --   --   --  2.4 1.4*   PHOSPHORUS 2.9 2.7 2.9   < >  --   --     < > = values in this interval not displayed.         Lab 03/05/25 0447 03/04/25  0504 03/03/25 0455   ALBUMIN 1.6* 1.8* 1.5*                       Brief Urine Lab Results  (Last result in the past 365 days)        Color   Clarity   Blood   Leuk Est   Nitrite   Protein   CREAT   Urine HCG        03/01/25 0005             33.9         03/01/25 0005 Yellow   Cloudy   Large (3+)   Large (3+)   Negative   100 mg/dL (2+)                 [x]  Microbiology   Microbiology Results (last 10 days)       Procedure Component Value - Date/Time    Blood Culture - Blood, Hand, Right [[id number]]  (Normal) Collected: 02/24/25 2016    Lab Status: Final result Specimen: Blood from Hand, Right Updated: 03/01/25 2031     Blood Culture No growth at 5 days    Narrative:      Less than seven (7) mL's of blood was collected.  Insufficient quantity may yield false negative results.    Blood Culture - Blood, Arm, Left [[id number]]  (Normal) Collected: 02/24/25 1803    Lab Status: Final result Specimen: Blood from Arm, Left Updated: 03/01/25  1831     Blood Culture No growth at 5 days    Clostridioides difficile Toxin - Stool, Per Rectum [[id number]] Collected: 02/24/25 0856    Lab Status: Final result Specimen: Stool from Per Rectum Updated: 02/24/25 0950    Narrative:      The following orders were created for panel order Clostridioides difficile Toxin - Stool, Per Rectum.  Procedure                               Abnormality         Status                     ---------                               -----------         ------                     Clostridioides difficile...[[id number]]                      Final result                 Please view results for these tests on the individual orders.    Clostridioides difficile Toxin, PCR - Stool, Per Rectum [[id number]] Collected: 02/24/25 0856    Lab Status: Final result Specimen: Stool from Per Rectum Updated: 02/24/25 0950     Toxigenic C. difficile by PCR Negative     027 Toxin Presumptive Negative    Narrative:      The result indicates the absence of toxigenic C. difficile from stool specimen.           [x]  Radiology  CT Abdomen Pelvis Without Contrast    Result Date: 2/28/2025  Impression: Limited study. 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 Oral contrast is not visualized in the fistulous. No evidence of bowel obstruction. Moderate distention of the colon with air and oral contrast. Findings suggest colonic ileus. Small to moderate bilateral pleural effusions, left greater than right, increased in size when compared to prior study. Additional findings per body of the report. Electronically Signed: Alessandro Love MD  2/28/2025 7:20 PM EST  Workstation ID: [id number]   []  EKG/Telemetry   []  Cardiology/Vascular   []  Pathology  []  Old records  []  Other:    Assessment & Plan   Assessment / Plan     Assessment:  Acute kidney injury due to obstructing nephrolithiasis on the left status post stent placement; Improving  Septic shock, present on admission secondary to  bacteremia; Improving  ESBL E. coli bacteremia.  Subsequent blood culture negative since February 24.  Chronic hypotension on midodrine  Episodes of bradycardia due to midodrine.  Resolved.  Midodrine resumed  History of Crohn's disease with complex surgery  Chronic nonhealing abdominal wounds and abscess  secondary to significant abdominal surgery in the past for duodenal ulcer rupture follows with .  Due to ESBL E. coli and Enterococcus faecium.  Finished IV Vanco  Chronic enterocutaneous fistulas followed by   Anxiety disorder  Positive MRSA PCR  Acute on chronic anemia.  Status post 1 unit packed RBC transfusion  Antibiotic associated diarrhea.  C. difficile negative.  Hypernatremia.  Free water deficit.  Resolved  Thrombocytopenia.  Improving  Left more than right pleural effusion    Plan:  Currently being managed in medicine service.  Midodrine resumed by nephrology as bradycardia improved.  Continue meropenem, discussed with ID to finish IV antibiotics on March 8.  Okay to discharge on IV Invanz.  S/p midline placement.    Finished IV Vanco for wound culture from the abdomen.  Repeat Blood cultures negative to date from February 24   Repeat CT scan abdomen pelvis with oral contrast noted.    Appreciate urology input.  Status post left ureteric stent.  Needs definitive stone treatment when stable  Nephrology following.  Renal function slowly improving.  Continue p.o. bicarb.  Metabolic acidosis improving.  Discussed with GI about diarrhea.  Appreciate input  Discussed with general surgery to evaluate for fistulous.  Recommend follow-up with  as outpatient once discharged.    As needed Imodium and Lomotil.  Scheduled Questran .  wound culture from abdomen noted with ESBL E. coli and Enterococcus faecium. Patient does have known chronic abdominal wounds with fistulous communication.  Patient follows with  for management of these wounds patient was just discharged this month from  for these wounds.     Continue wound care.  Discussed with wound care.  Appreciate input.  Hypoglycemic this morning; asymptomatic. No change in PO intake; monitor.  Electrolyte replacement protocol. Hypomagnesemia repleted.  Monitor white cell count.  Leukocytosis resolved.  PT OT  Likely discharge in next 24 hours once stable.     Discussed with RN.    VTE Prophylaxis:  Pharmacologic & mechanical VTE prophylaxis orders are present.        CODE STATUS:   Level Of Support Discussed With: Patient  Code Status (Patient has no pulse and is not breathing): CPR (Attempt to Resuscitate)  Medical Interventions (Patient has pulse or is breathing): Full Support      Electronically signed by Amrik Ramires MD, 03/05/25, 5:02 PM EST.

## 2025-03-05 NOTE — PLAN OF CARE
Goal Outcome Evaluation:              Outcome Evaluation: Alert & oriented x4; blood pressure remains hypotensive throughout shift, pt asymptomatic & reports this being their baseline. Skin & wound care performed per orders. Blood glucose checked at 0738 this shift & was 35; D50W IV given per PRN orders; glucose checked Q6h and patient did not require any additional D50W. Denies c/o pain. Plan of care ongoinh.

## 2025-03-05 NOTE — PROGRESS NOTES
[facility]     Progress Note    Patient Name: Mag Padilla  : 1980  MRN: [id number]  Primary Care Physician:  Provider, No Known  Date of admission: 2025    Subjective patient is feeling much better and has no new issues or concerns this morning diarrhea is improving    Review of Systems  All review of systems are negative except as mentioned in subjective complaints.    Objective   Objective     Vitals:   Temp:  [97.6 °F (36.4 °C)-98.3 °F (36.8 °C)] 97.6 °F (36.4 °C)  Heart Rate:  [64-83] 83  Resp:  [18] 18  BP: ()/(56-64) 89/56  Flow (L/min) (Oxygen Therapy):  [2] 2  Physical Exam    Constitutional: Awake, alert responsive, conversant, no obvious distress              Psychiatric:  Appropriate affect, cooperative   Neurologic:  Awake alert ,oriented x 3, strength symmetric in all extremities, Cranial Nerves grossly intact to confrontation, speech clear   Eyes:   PERRLA, sclerae anicteric, no conjunctival injection   HEENT:  Moist mucous membranes, no nasal or eye discharge, no throat congestion   Neck:   Supple, no thyromegaly, no lymphadenopathy, trachea midline, no elevated JVD   Respiratory:  Clear to auscultation bilaterally, nonlabored respirations    Cardiovascular: RRR, no murmurs, rubs, or gallops, palpable pedal pulses bilaterally, No bilateral ankle edema   Gastrointestinal: Positive bowel sounds, soft, nontender, nondistended, no organomegaly   Musculoskeletal:  No clubbing or cyanosis to extremities,muscle wasting, joint swelling, muscle weakness             Skin:                      No rashes, bruising, skin ulcers, petechiae or ecchymosis    Result Review    Result Review:  I have personally reviewed the results from the time of this admission to 3/5/2025 08:19 EST and agree with these findings:  []  Laboratory  []  Microbiology  []  Radiology  []  EKG/Telemetry   []  Cardiology/Vascular   []  Pathology  []  Old records  []  Other:    Results from last 7 days   Lab Units  03/05/25 0447 03/04/25  0504 03/03/25  0455 03/02/25  0631 03/01/25  0654 02/28/25  0419 02/27/25  1339 02/27/25  0603 02/26/25  1106   WBC 10*3/mm3 10.30  --   --   --  9.94 9.31 7.85 14.25* 4.99   HEMOGLOBIN g/dL 8.7* 10.1* 9.1* 9.2* 8.7* 8.9* 9.1* 9.8* 8.8*   PLATELETS 10*3/mm3 129*  --   --   --  151 137* 124* 197 115*     Results from last 7 days   Lab Units 03/05/25 0447 03/04/25  0504 03/03/25 0455 03/02/25  0631 03/01/25  0654 02/28/25  0419 02/27/25  1339   SODIUM mmol/L 141 140 142 147* 145 143 138   POTASSIUM mmol/L 4.9 4.8 4.4 4.3 4.1 3.9 4.2   CHLORIDE mmol/L 116* 118* 118* 121* 120* 119* 115*   CO2 mmol/L 19.1* 16.8* 13.1* 16.8* 14.8* 16.4* 15.6*   ANION GAP mmol/L 5.9 5.2 10.9 9.2 10.2 7.6 7.4   BUN mg/dL 31* 26* 32* 33* 31* 28* 25*   CREATININE mg/dL 1.18* 1.29* 1.34* 1.55* 1.63* 1.94* 2.13*   GLUCOSE mg/dL 76 81 79 79 86 76 79   EGFR mL/min/1.73 57.8* 51.9* 49.6* 41.7* 39.2* 31.8* 28.5*   CALCIUM mg/dL 8.5* 8.2* 7.8* 7.7* 7.6* 7.7* 7.4*   MAGNESIUM mg/dL 1.3*  --   --   --   --   --   --        Assessment & Plan   Assessment / Plan       Active Hospital Problems:    Active Hospital Problems    Diagnosis  POA    **Acute renal failure [N17.9]  Yes     Secondary to severe dehydration      Metabolic acidosis, NAG, bicarbonate losses [E87.20]  Unknown     Secondary to chronic diarrhea      Chronic anemia [D64.9]  Unknown     Most likely iron deficiency and chronic inflammation      Bacteremia [R78.81]  Unknown     ESBL E. coli      Hypotension due to hypovolemia [E86.1]  Yes    Chronic diarrhea [K52.9]  Yes     Patient has a Crohn's disease  Chronic diarrhea      Inflammatory bowel disease (Crohn's disease) [K50.90]  Yes    Kidney stone [N20.0]  Yes       Plan:   Continue present supportive care  Improved p.o. intake  And should stay on sodium bicarbonate  JOHANA almost resolved       Electronically signed by Brenda Jones MD, 03/05/25, 8:19 AM EST.

## 2025-03-06 VITALS
TEMPERATURE: 97.9 F | SYSTOLIC BLOOD PRESSURE: 94 MMHG | WEIGHT: 115.3 LBS | RESPIRATION RATE: 16 BRPM | HEART RATE: 59 BPM | HEIGHT: 62 IN | DIASTOLIC BLOOD PRESSURE: 57 MMHG | OXYGEN SATURATION: 100 % | BODY MASS INDEX: 21.22 KG/M2

## 2025-03-06 LAB
ALBUMIN SERPL-MCNC: 2 G/DL (ref 3.5–5.2)
ANION GAP SERPL CALCULATED.3IONS-SCNC: 2.8 MMOL/L (ref 5–15)
ANION GAP SERPL CALCULATED.3IONS-SCNC: 6.5 MMOL/L (ref 5–15)
BASOPHILS # BLD AUTO: 0.15 10*3/MM3 (ref 0–0.2)
BASOPHILS NFR BLD AUTO: 1.5 % (ref 0–1.5)
BUN SERPL-MCNC: 28 MG/DL (ref 6–20)
BUN SERPL-MCNC: 29 MG/DL (ref 6–20)
BUN/CREAT SERPL: 20.9 (ref 7–25)
BUN/CREAT SERPL: 22.8 (ref 7–25)
CALCIUM SPEC-SCNC: 8.2 MG/DL (ref 8.6–10.5)
CALCIUM SPEC-SCNC: 8.5 MG/DL (ref 8.6–10.5)
CHLORIDE SERPL-SCNC: 114 MMOL/L (ref 98–107)
CHLORIDE SERPL-SCNC: 116 MMOL/L (ref 98–107)
CO2 SERPL-SCNC: 19.5 MMOL/L (ref 22–29)
CO2 SERPL-SCNC: 20.2 MMOL/L (ref 22–29)
CREAT SERPL-MCNC: 1.23 MG/DL (ref 0.57–1)
CREAT SERPL-MCNC: 1.39 MG/DL (ref 0.57–1)
DEPRECATED RDW RBC AUTO: 70 FL (ref 37–54)
EGFRCR SERPLBLD CKD-EPI 2021: 47.5 ML/MIN/1.73
EGFRCR SERPLBLD CKD-EPI 2021: 55 ML/MIN/1.73
EOSINOPHIL # BLD AUTO: 0.14 10*3/MM3 (ref 0–0.4)
EOSINOPHIL NFR BLD AUTO: 1.4 % (ref 0.3–6.2)
ERYTHROCYTE [DISTWIDTH] IN BLOOD BY AUTOMATED COUNT: 19.9 % (ref 12.3–15.4)
GLUCOSE BLDC GLUCOMTR-MCNC: 89 MG/DL (ref 70–99)
GLUCOSE BLDC GLUCOMTR-MCNC: 90 MG/DL (ref 70–99)
GLUCOSE SERPL-MCNC: 74 MG/DL (ref 65–99)
GLUCOSE SERPL-MCNC: 78 MG/DL (ref 65–99)
HCT VFR BLD AUTO: 30.9 % (ref 34–46.6)
HGB BLD-MCNC: 9.2 G/DL (ref 12–15.9)
IMM GRANULOCYTES # BLD AUTO: 0.05 10*3/MM3 (ref 0–0.05)
IMM GRANULOCYTES NFR BLD AUTO: 0.5 % (ref 0–0.5)
LYMPHOCYTES # BLD AUTO: 1.83 10*3/MM3 (ref 0.7–3.1)
LYMPHOCYTES NFR BLD AUTO: 18 % (ref 19.6–45.3)
MAGNESIUM SERPL-MCNC: 2 MG/DL (ref 1.6–2.6)
MCH RBC QN AUTO: 28.8 PG (ref 26.6–33)
MCHC RBC AUTO-ENTMCNC: 29.8 G/DL (ref 31.5–35.7)
MCV RBC AUTO: 96.9 FL (ref 79–97)
MONOCYTES # BLD AUTO: 1 10*3/MM3 (ref 0.1–0.9)
MONOCYTES NFR BLD AUTO: 9.9 % (ref 5–12)
NEUTROPHILS NFR BLD AUTO: 6.97 10*3/MM3 (ref 1.7–7)
NEUTROPHILS NFR BLD AUTO: 68.7 % (ref 42.7–76)
NRBC BLD AUTO-RTO: 0 /100 WBC (ref 0–0.2)
PHOSPHATE SERPL-MCNC: 2.7 MG/DL (ref 2.5–4.5)
PLATELET # BLD AUTO: 138 10*3/MM3 (ref 140–450)
PMV BLD AUTO: 12.5 FL (ref 6–12)
POTASSIUM SERPL-SCNC: 5.1 MMOL/L (ref 3.5–5.2)
POTASSIUM SERPL-SCNC: 5.4 MMOL/L (ref 3.5–5.2)
QT INTERVAL: 498 MS
QTC INTERVAL: 435 MS
RBC # BLD AUTO: 3.19 10*6/MM3 (ref 3.77–5.28)
SODIUM SERPL-SCNC: 139 MMOL/L (ref 136–145)
SODIUM SERPL-SCNC: 140 MMOL/L (ref 136–145)
WBC NRBC COR # BLD AUTO: 10.14 10*3/MM3 (ref 3.4–10.8)

## 2025-03-06 PROCEDURE — 80069 RENAL FUNCTION PANEL: CPT | Performed by: INTERNAL MEDICINE

## 2025-03-06 PROCEDURE — 97110 THERAPEUTIC EXERCISES: CPT

## 2025-03-06 PROCEDURE — 82948 REAGENT STRIP/BLOOD GLUCOSE: CPT

## 2025-03-06 PROCEDURE — 85025 COMPLETE CBC W/AUTO DIFF WBC: CPT | Performed by: STUDENT IN AN ORGANIZED HEALTH CARE EDUCATION/TRAINING PROGRAM

## 2025-03-06 PROCEDURE — 80048 BASIC METABOLIC PNL TOTAL CA: CPT | Performed by: STUDENT IN AN ORGANIZED HEALTH CARE EDUCATION/TRAINING PROGRAM

## 2025-03-06 PROCEDURE — 83735 ASSAY OF MAGNESIUM: CPT | Performed by: STUDENT IN AN ORGANIZED HEALTH CARE EDUCATION/TRAINING PROGRAM

## 2025-03-06 PROCEDURE — 25010000002 ERTAPENEM PER 500 MG: Performed by: STUDENT IN AN ORGANIZED HEALTH CARE EDUCATION/TRAINING PROGRAM

## 2025-03-06 PROCEDURE — 25010000002 MEROPENEM PER 100 MG: Performed by: STUDENT IN AN ORGANIZED HEALTH CARE EDUCATION/TRAINING PROGRAM

## 2025-03-06 PROCEDURE — 99239 HOSP IP/OBS DSCHRG MGMT >30: CPT | Performed by: STUDENT IN AN ORGANIZED HEALTH CARE EDUCATION/TRAINING PROGRAM

## 2025-03-06 RX ORDER — CHOLESTYRAMINE 4 G/9G
1 POWDER, FOR SUSPENSION ORAL 3 TIMES DAILY
Qty: 6 PACKET | Refills: 0 | Status: SHIPPED | OUTPATIENT
Start: 2025-03-06 | End: 2025-03-08

## 2025-03-06 RX ORDER — SODIUM BICARBONATE 650 MG/1
1300 TABLET ORAL 3 TIMES DAILY
Start: 2025-03-06 | End: 2025-03-20

## 2025-03-06 RX ORDER — LOPERAMIDE HYDROCHLORIDE 2 MG/1
2 CAPSULE ORAL 4 TIMES DAILY PRN
Qty: 28 CAPSULE | Refills: 0 | Status: SHIPPED | OUTPATIENT
Start: 2025-03-06 | End: 2025-03-13

## 2025-03-06 RX ADMIN — MIDODRINE HYDROCHLORIDE 15 MG: 10 TABLET ORAL at 11:52

## 2025-03-06 RX ADMIN — CHOLESTYRAMINE 1 PACKET: 4 POWDER, FOR SUSPENSION ORAL at 09:11

## 2025-03-06 RX ADMIN — SODIUM ZIRCONIUM CYCLOSILICATE 10 G: 10 POWDER, FOR SUSPENSION ORAL at 11:52

## 2025-03-06 RX ADMIN — MIDODRINE HYDROCHLORIDE 15 MG: 10 TABLET ORAL at 17:44

## 2025-03-06 RX ADMIN — Medication 1 TABLET: at 09:12

## 2025-03-06 RX ADMIN — POTASSIUM PHOSPHATE, MONOBASIC 500 MG: 500 TABLET, SOLUBLE ORAL at 09:12

## 2025-03-06 RX ADMIN — SODIUM BICARBONATE 650 MG TABLET 1300 MG: at 09:12

## 2025-03-06 RX ADMIN — PANTOPRAZOLE SODIUM 40 MG: 40 TABLET, DELAYED RELEASE ORAL at 09:11

## 2025-03-06 RX ADMIN — MIDODRINE HYDROCHLORIDE 15 MG: 10 TABLET ORAL at 09:12

## 2025-03-06 RX ADMIN — DIPHENOXYLATE HYDROCHLORIDE AND ATROPINE SULFATE 1 TABLET: 2.5; .025 TABLET ORAL at 09:13

## 2025-03-06 RX ADMIN — SODIUM BICARBONATE 650 MG TABLET 1300 MG: at 17:44

## 2025-03-06 RX ADMIN — ERTAPENEM 1000 MG: 1 INJECTION INTRAMUSCULAR; INTRAVENOUS at 13:47

## 2025-03-06 RX ADMIN — Medication 500 MG: at 09:12

## 2025-03-06 RX ADMIN — Medication 10 ML: at 09:14

## 2025-03-06 RX ADMIN — MEROPENEM 1000 MG: 1 INJECTION INTRAVENOUS at 04:37

## 2025-03-06 RX ADMIN — Medication 10 ML: at 09:15

## 2025-03-06 RX ADMIN — SERTRALINE HYDROCHLORIDE 75 MG: 50 TABLET ORAL at 09:12

## 2025-03-06 NOTE — DISCHARGE SUMMARY
[facility]         HOSPITALIST  DISCHARGE SUMMARY    Patient Name: Mag Padilla  : 1980  MRN: [id number]    Date of Admission: 2025  Date of Discharge:  3/6/2025  Primary Care Physician: Provider, No Known    Consults       Date and Time Order Name Status Description    2025 12:10 PM Inpatient General Surgery Consult Completed     2025  1:20 PM Inpatient Gastroenterology Consult      2025  1:11 PM Inpatient Pulmonology Consult Completed     2025  6:42 PM Inpatient Urology Consult      2025 10:01 PM Inpatient Hospitalist Consult      2025  7:24 PM Inpatient Nephrology Consult              Active and Resolved Hospital Problems:  Active Hospital Problems    Diagnosis POA    **Acute renal failure [N17.9] Yes    Metabolic acidosis, NAG, bicarbonate losses [E87.20] Unknown    Chronic anemia [D64.9] Unknown    Bacteremia [R78.81] Unknown    Hypotension due to hypovolemia [E86.1] Yes    Chronic diarrhea [K52.9] Yes    Inflammatory bowel disease (Crohn's disease) [K50.90] Yes    Kidney stone [N20.0] Yes      Resolved Hospital Problems    Diagnosis POA    Nausea & vomiting [R11.2] Yes       Hospital Course     Hospital Course:  Mag Padilla is a 44 y.o. female who is a nursing home resident with past medical history of Crohn disease status post resection,with complex abdominal surgical history. Patient had a history of a duodenal hole and underwent extensive surgery that required a G-tube, J-tube and multiple drain placements this was all done at [facility]. Patient since that time has had multiple issues with abdominal wounds and abscesses. Patient also has history of hypotension on midodrine, anxiety, chronic diarrhea, and GERD presenting to the ED for evaluation of nausea vomiting diarrhea with generalized weakness. Patient states that for the last 3 days she has not been feeling well with persistent nausea and vomiting for the last 2 days  and worsening of her chronic diarrhea. Due to the symptoms patient is weakness and lethargy had also worsened to where she is mostly bedbound with excessive sleepiness. Patient is seen by wound care for abdominal wounds after an ex lap with colon resection. Due to worsening condition patient was eventually brought to the ED for further evaluation. In the ED patient was significantly hypotensive on arrival with remaining vitals being within normal limits. UA was positive for UTI with signs of dehydration, labs showed severely reduced renal function with hypokalemia, anemia, thrombocytopenia, and severe hypoalbuminemia. Chest x-ray was negative for any acute findings. When seen patient was resting comfortably and still having episode of diarrhea since being here although her nausea is much improved. She did deny any recent fevers, chills, headaches, focal weakness, chest pain, palpitation, shortness of breath, cough, abdominal pain, constipation, dysuria, hematuria, hematochezia, melena, or anxiety. Patient admitted for further evaluation and treatment. CT of the abdomen revealed 6 mm left ureteral stone with mild hydro utero nephrosis which required urology consult and patient was taken to the operating room and had a cystoscopy with left ureteric stent placement.Patient's blood cultures are growing ESBL E. Coli. Urine with less than 10,000 gram-negative rods. MRSA screen is also positive. Patient seen by intensivist, urology and nephrology. GI consulted for diarrhea. Previously patient has seen Dr. Pompa. Patient seen by general surgery for fistula. Recommendation is to follow-up with [facility]. No intervention as per GI and general surgery. Nephrology on board.  Potassium furosemide discontinued given her hyperkalemia which has resolved.  Okay to be discharged from nephrology standpoint as well.    Patient is being discharged back to her long-term facility today.  She is stable at the time of discharge.  She will  follow-up with PCP in 3-7 days, needs CBC and chemistry standard during follow-up.  Follow-up with general surgery and wound care in [facility].  Follow-up with nephrology as outpatient.  Advised to be compliant with medications and diet.  Fall precautions.  Patient to be discharged on IV ertapenem for 2 more days to complete course of antibiotic for ESBL E. coli bacteremia.        DISCHARGE Follow Up Recommendations for labs and diagnostics: As mentioned above.      Day of Discharge     Vital Signs:  Temp:  [97.7 °F (36.5 °C)-98.4 °F (36.9 °C)] 97.9 °F (36.6 °C)  Heart Rate:  [59-81] 59  Resp:  [16-18] 16  BP: ()/(50-71) 94/57  Physical Exam:   General: Awake, alert, NAD  Cardiovascular: RRR, no murmurs   Pulmonary: CTA bilaterally; no wheezes; no conversational dyspnea  Gastrointestinal: S/ND/NT, +BS  Neuro: Alert, awake, oriented x 3; speech clear; no tremor     Discharge Details        Discharge Medications        New Medications        Instructions Start Date   cholestyramine 4 g packet  Commonly known as: QUESTRAN   1 packet, Oral, 3 times daily      ertapenem 1000 mg/100ml solution IV  Commonly known as: INVanz   1 g, Intravenous, Every 24 Hours   Start Date: March 7, 2025     loperamide 2 MG capsule  Commonly known as: IMODIUM   2 mg, Oral, 4 Times Daily PRN      sodium bicarbonate 650 MG tablet   1,300 mg, Oral, 3 Times Daily             Continue These Medications        Instructions Start Date   acetaminophen 500 MG tablet  Commonly known as: TYLENOL   1,000 mg, Every 6 Hours PRN      ferrous sulfate 325 (65 FE) MG tablet   325 mg, Daily With Breakfast      midodrine 10 MG tablet  Commonly known as: PROAMATINE   10 mg, 3 Times Daily Before Meals      multivitamin with minerals tablet tablet   1 tablet, Daily      ondansetron 4 MG tablet  Commonly known as: ZOFRAN   4 mg, Every 8 Hours PRN      pantoprazole 40 MG EC tablet  Commonly known as: PROTONIX   40 mg, Daily      saccharomyces  boulardii 250 MG capsule  Commonly known as: FLORASTOR   2 capsules, Daily      senna 8.6 MG tablet  Commonly known as: SENOKOT   2 tablets, Daily PRN      sertraline 50 MG tablet  Commonly known as: ZOLOFT   75 mg, Oral, Daily             Stop These Medications      potassium phosphate (monobasic) 500 MG tablet  Commonly known as: K-PHOS            ASK your doctor about these medications        Instructions Start Date   cephalexin 500 MG capsule  Commonly known as: KEFLEX  Ask about: Should I take this medication?   500 mg, 4 Times Daily      metroNIDAZOLE 500 MG tablet  Commonly known as: FLAGYL  Ask about: Should I take this medication?   500 mg, 3 Times Daily               Allergies   Allergen Reactions    Iodine Unknown - High Severity    Penicillins Unknown - High Severity       Discharge Disposition:  Long Term Care (DC - External)    Diet:  Hospital:  Diet Order   Procedures    Diet: Cardiac; Healthy Heart (2-3 Na+); Texture: Regular (IDDSI 7); Fluid Consistency: Thin (IDDSI 0)       Discharge Activity:       CODE STATUS:  Code Status and Medical Interventions: CPR (Attempt to Resuscitate); Full Support   Ordered at: 02/21/25 5860     Level Of Support Discussed With:    Patient     Code Status (Patient has no pulse and is not breathing):    CPR (Attempt to Resuscitate)     Medical Interventions (Patient has pulse or is breathing):    Full Support       No future appointments.    Additional Instructions for the Follow-ups that You Need to Schedule       Discharge Follow-up with PCP   As directed       Currently Documented PCP:    Provider, No Known    PCP Phone Number:    None     Follow Up Details: In 3-7 days; needs CBC and chemistries trended during follow up.        Discharge Follow-up with Specified Provider: Surgery at [facility]   As directed      To: Surgery at [facility]   Follow Up Details: Follow up with Surgeon at [facility] .        Discharge Follow-up with Specified  Provider: Wound care at [facility]   As directed      To: Wound care at [facility]                Pertinent  and/or Most Recent Results     PROCEDURES:       LAB RESULTS:      Lab 03/06/25  0459 03/05/25  0447 03/04/25  0504 03/03/25  0455 03/02/25  0631 03/01/25  0654 02/28/25  0419   WBC 10.14 10.30  --   --   --  9.94 9.31   HEMOGLOBIN 9.2* 8.7* 10.1* 9.1* 9.2* 8.7* 8.9*   HEMATOCRIT 30.9* 28.5* 33.0* 30.0* 29.9* 27.4* 27.6*   PLATELETS 138* 129*  --   --   --  151 137*   NEUTROS ABS 6.97 7.29*  --   --   --  8.00* 7.42*   IMMATURE GRANS (ABS) 0.05 0.06*  --   --   --  0.19* 0.28*   LYMPHS ABS 1.83 1.64  --   --   --  1.04 1.07   MONOS ABS 1.00* 1.04*  --   --   --  0.54 0.38   EOS ABS 0.14 0.16  --   --   --  0.15 0.14   MCV 96.9 95.0  --   --   --  91.6 91.4         Lab 03/06/25  1147 03/06/25  0459 03/05/25 0447 03/04/25  0504 03/03/25  0455 03/02/25  0631   SODIUM 139 140 141 140 142 147*   POTASSIUM 5.1 5.4* 4.9 4.8 4.4 4.3   CHLORIDE 116* 114* 116* 118* 118* 121*   CO2 20.2* 19.5* 19.1* 16.8* 13.1* 16.8*   ANION GAP 2.8* 6.5 5.9 5.2 10.9 9.2   BUN 28* 29* 31* 26* 32* 33*   CREATININE 1.23* 1.39* 1.18* 1.29* 1.34* 1.55*   EGFR 55.0* 47.5* 57.8* 51.9* 49.6* 41.7*   GLUCOSE 78 74 76 81 79 79   CALCIUM 8.5* 8.2* 8.5* 8.2* 7.8* 7.7*   MAGNESIUM  --  2.0 1.3*  --   --   --    PHOSPHORUS  --  2.7 2.9 2.7 2.9 3.4         Lab 03/06/25  0459 03/05/25  0447 03/04/25  0504 03/03/25  0455 03/02/25  0631   ALBUMIN 2.0* 1.6* 1.8* 1.5* 1.6*                     Brief Urine Lab Results  (Last result in the past 365 days)        Color   Clarity   Blood   Leuk Est   Nitrite   Protein   CREAT   Urine HCG        03/01/25 0005             33.9         03/01/25 0005 Yellow   Cloudy   Large (3+)   Large (3+)   Negative   100 mg/dL (2+)                 Microbiology Results (last 10 days)       Procedure Component Value - Date/Time    Blood Culture - Blood, Hand, Right [[id number]]  (Normal) Collected:  02/24/25 2016    Lab Status: Final result Specimen: Blood from Hand, Right Updated: 03/01/25 2031     Blood Culture No growth at 5 days    Narrative:      Less than seven (7) mL's of blood was collected.  Insufficient quantity may yield false negative results.    Blood Culture - Blood, Arm, Left [[id number]]  (Normal) Collected: 02/24/25 1803    Lab Status: Final result Specimen: Blood from Arm, Left Updated: 03/01/25 1831     Blood Culture No growth at 5 days            CT Abdomen Pelvis Without Contrast    Result Date: 2/28/2025  Impression: Limited study. 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 Oral contrast is not visualized in the fistulous. No evidence of bowel obstruction. Moderate distention of the colon with air and oral contrast. Findings suggest colonic ileus. Small to moderate bilateral pleural effusions, left greater than right, increased in size when compared to prior study. Additional findings per body of the report. Electronically Signed: Alessandro Love MD  2/28/2025 7:20 PM EST  Workstation ID: [id number]              Results for orders placed during the hospital encounter of 02/21/25    Adult Transthoracic Echo Complete W/ Cont if Necessary Per Protocol    Interpretation Summary    Left ventricular ejection fraction appears to be 46 - 50%. Mild global hypokinesis    Left ventricular diastolic function is consistent with (grade I) impaired relaxation.    Estimated right ventricular systolic pressure from tricuspid regurgitation is normal (<35 mmHg).      Labs Pending at Discharge:        Time spent on Discharge including face to face service:  35 minutes    Electronically signed by Amrik Ramires MD, 03/06/25, 4:15 PM EST.

## 2025-03-06 NOTE — PLAN OF CARE
Goal Outcome Evaluation:  Plan of Care Reviewed With: patient        Progress: no change  Outcome Evaluation: Patient performed upper extremity AROM exercises to improve strength and endurance needed to support ADLs.  Continued OT services are indicated to remediate/compensate for deficits to maximize independence.    Anticipated Discharge Disposition (OT): sub acute care setting

## 2025-03-06 NOTE — PLAN OF CARE
Goal Outcome Evaluation:              Outcome Evaluation: A&Ox4, VSS. Administered scheduled medications per MAR. Blood sugars stable. Abdominal wounds have minimal output, sites remain clean dry and intact. No needs at this time. Plan of care ongoing.

## 2025-03-06 NOTE — PROGRESS NOTES
[facility]     Progress Note    Patient Name: Mag Padilla  : 1980  MRN: [id number]  Primary Care Physician:  Provider, No Known  Date of admission: 2025    Subjective patient was inquiring when she can go home  She is eating drinking well  Review of Systems  All review of systems are negative except as mentioned in subjective complaints.    Objective   Objective     Vitals:   Temp:  [97.5 °F (36.4 °C)-98.4 °F (36.9 °C)] 97.9 °F (36.6 °C)  Heart Rate:  [63-81] 79  Resp:  [18] 18  BP: ()/(50-71) 106/66  Physical Exam    Constitutional: Awake, alert responsive, conversant, no obvious distress              Psychiatric:  Appropriate affect, cooperative   Neurologic:  Awake alert ,oriented x 3, strength symmetric in all extremities, Cranial Nerves grossly intact to confrontation, speech clear   Eyes:   PERRLA, sclerae anicteric, no conjunctival injection   HEENT:  Moist mucous membranes, no nasal or eye discharge, no throat congestion   Neck:   Supple, no thyromegaly, no lymphadenopathy, trachea midline, no elevated JVD   Respiratory:  Clear to auscultation bilaterally, nonlabored respirations    Cardiovascular: RRR, no murmurs, rubs, or gallops, palpable pedal pulses bilaterally, No bilateral ankle edema   Gastrointestinal: Positive bowel sounds, soft, nontender, nondistended, no organomegaly   Musculoskeletal:  No clubbing or cyanosis to extremities,muscle wasting, joint swelling, muscle weakness             Skin:                      No rashes, bruising, skin ulcers, petechiae or ecchymosis    Result Review    Result Review:  I have personally reviewed the results from the time of this admission to 3/6/2025 09:47 EST and agree with these findings:  []  Laboratory  []  Microbiology  []  Radiology  []  EKG/Telemetry   []  Cardiology/Vascular   []  Pathology  []  Old records  []  Other:    Results from last 7 days   Lab Units 25  0459 25  0447 25  0504 25  0452  03/02/25  0631 03/01/25  0654 02/28/25  0419 02/27/25  1339   WBC 10*3/mm3 10.14 10.30  --   --   --  9.94 9.31 7.85   HEMOGLOBIN g/dL 9.2* 8.7* 10.1* 9.1* 9.2* 8.7* 8.9* 9.1*   PLATELETS 10*3/mm3 138* 129*  --   --   --  151 137* 124*     Results from last 7 days   Lab Units 03/06/25  0459 03/05/25  0447 03/04/25  0504 03/04/25  0504 03/03/25  0455 03/02/25 0631 03/01/25 0654 02/28/25  0419   SODIUM mmol/L 140 141  --  140 142 147* 145 143   POTASSIUM mmol/L 5.4* 4.9  --  4.8 4.4 4.3 4.1 3.9   CHLORIDE mmol/L 114* 116*  --  118* 118* 121* 120* 119*   CO2 mmol/L 19.5* 19.1*  --  16.8* 13.1* 16.8* 14.8* 16.4*   ANION GAP mmol/L 6.5 5.9  --  5.2 10.9 9.2 10.2 7.6   BUN mg/dL 29* 31*  --  26* 32* 33* 31* 28*   CREATININE mg/dL 1.39* 1.18*  --  1.29* 1.34* 1.55* 1.63* 1.94*   GLUCOSE mg/dL 74 76  --  81 79 79 86 76   EGFR mL/min/1.73 47.5* 57.8*  --  51.9* 49.6* 41.7* 39.2* 31.8*   CALCIUM mg/dL 8.2* 8.5*  --  8.2* 7.8* 7.7* 7.6* 7.7*   MAGNESIUM mg/dL 2.0 1.3*   < >  --   --   --   --   --    ALBUMIN g/dL 2.0* 1.6*  --  1.8* 1.5* 1.6* 1.5* 1.7*    < > = values in this interval not displayed.       Scheduled Meds:cholestyramine, 1 packet, Oral, TID  heparin (porcine), 5,000 Units, Subcutaneous, Q8H  Iohexol (OMNIPAQUE) oral compound 12 mg/mL, 500 mL, Oral, Once  meropenem, 1,000 mg, Intravenous, Q12H  midodrine, 15 mg, Oral, TID AC  multivitamin with minerals, 1 tablet, Oral, Daily  pantoprazole, 40 mg, Oral, Daily  potassium phosphate (monobasic), 500 mg, Oral, Daily  saccharomyces boulardii, 500 mg, Oral, Daily  sertraline, 75 mg, Oral, Daily  sodium bicarbonate, 1,300 mg, Oral, TID  sodium chloride, 10 mL, Intravenous, Q12H  sodium chloride, 10 mL, Intravenous, Q12H  sodium zirconium cyclosilicate, 10 g, Oral, Once      Continuous Infusions:Pharmacy Consult - Pharmacy to dose,       PRN Meds:.  acetaminophen    ALPRAZolam    aluminum-magnesium hydroxide-simethicone    senna-docusate sodium **AND** polyethylene  glycol **AND** bisacodyl **AND** bisacodyl    Calcium Replacement - Follow Nurse / BPA Driven Protocol    dextrose    diphenoxylate-atropine    loperamide    Magnesium Low Dose Replacement - Follow Nurse / BPA Driven Protocol    ondansetron    Pharmacy Consult - Pharmacy to dose    Phosphorus Replacement - Follow Nurse / BPA Driven Protocol    sodium chloride    sodium chloride    sodium chloride    sodium chloride    Assessment & Plan   Assessment / Plan       Active Hospital Problems:    Active Hospital Problems    Diagnosis  POA    **Acute renal failure [N17.9]  Yes     Secondary to severe dehydration      Metabolic acidosis, NAG, bicarbonate losses [E87.20]  Unknown     Secondary to chronic diarrhea      Chronic anemia [D64.9]  Unknown     Most likely iron deficiency and chronic inflammation      Bacteremia [R78.81]  Unknown     ESBL E. coli      Hypotension due to hypovolemia [E86.1]  Yes    Chronic diarrhea [K52.9]  Yes     Patient has a Crohn's disease  Chronic diarrhea      Inflammatory bowel disease (Crohn's disease) [K50.90]  Yes    Kidney stone [N20.0]  Yes       Plan:   DC potassium phosphate, as potassium is on the higher side  Discharge planning per primary team       Electronically signed by Brenda Jones MD, 03/06/25, 9:47 AM EST.

## 2025-03-06 NOTE — THERAPY PROGRESS REPORT/RE-CERT
Patient Name: Mag Padilla  : 1980    MRN: [id number]                              Today's Date: 3/6/2025       Admit Date: 2025    Visit Dx:     ICD-10-CM ICD-9-CM   1. Acute renal failure, unspecified acute renal failure type  N17.9 584.9   2. Hypotension due to hypovolemia  E86.1 458.8     276.52   3. Kidney stone  N20.0 592.0   4. Difficulty walking  R26.2 719.7   5. Decreased activities of daily living (ADL)  Z78.9 V49.89     Patient Active Problem List   Diagnosis    Acute renal failure    Hypotension due to hypovolemia    Chronic diarrhea    Inflammatory bowel disease (Crohn's disease)    Kidney stone    Metabolic acidosis, NAG, bicarbonate losses    Chronic anemia    Bacteremia     Past Medical History:   Diagnosis Date    Abscess of peritoneum     Anemia     Anxiety     Breast cancer     Crohn's disease     GERD (gastroesophageal reflux disease)     HTN (hypertension)     Hyperlipidemia     Intestine disorder     SVT (supraventricular tachycardia)      Past Surgical History:   Procedure Laterality Date    ABDOMINAL SURGERY      CYSTOSCOPY W/ URETERAL STENT PLACEMENT Left 2025    Procedure: CYSTOSCOPY URETERAL CATHETER/STENT INSERTION;  Surgeon: Elliot Briceño MD;  Location: [facility] MAIN OR;  Service: Urology;  Laterality: Left;      General Information       Row Name 25 1141          OT Time and Intention    Document Type progress note/recertification  -AV     Mode of Treatment individual therapy;occupational therapy  -AV       Row Name 25 1141          General Information    Patient Profile Reviewed yes  -AV     Prior Level of Function independent:;ADL's;transfer  -AV     Existing Precautions/Restrictions fall  Contact isolation  -AV     Barriers to Rehab none identified  -AV       Row Name 25 1141          Occupational Profile    Reason for Services/Referral (Occupational Profile) OT re-certification as patient remains hospitalized and continues to present with  "limitations impacting ADL/transfer independence.  -       Row Name 03/06/25 1141          Living Environment    People in Home --  [facility]  -       Row Name 03/06/25 1141          Cognition    Orientation Status (Cognition) --  Alert, pleasant and cooperative.  Able to perform therapeutic exercises with moderate cues/demonstration.  -AV       Row Name 03/06/25 1141          Safety Issues/Impairments Affecting Functional Mobility    Impairments Affecting Function (Mobility) balance;endurance/activity tolerance;strength;cognition  -AV               User Key  (r) = Recorded By, (t) = Taken By, (c) = Cosigned By      Initials Name Provider Type    Kaz Lux OT Occupational Therapist                     Mobility/ADL's       Row Name 03/06/25 1143          Activities of Daily Living    BADL Assessment/Intervention --  Independent feeding and grooming with set up in bed.  Min-mod assist bathing.  Mod assist dressing.  Dependent toilet hygiene (has been using bedpan).  -AV               User Key  (r) = Recorded By, (t) = Taken By, (c) = Cosigned By      Initials Name Provider Type    Kaz Lux OT Occupational Therapist                   Obj/Interventions       Row Name 03/06/25 1143          Sensory Assessment (Somatosensory)    Sensory Assessment Sensory awareness intact to light touch bilateral upper extremities.  She reports hands and feet have a \"numb feeling\" at baseline  -       Row Name 03/06/25 1143          Vision Assessment/Intervention    Visual Impairment/Limitations WFL  -AV     Vision Assessment Comment Eye deviation noted  -       Row Name 03/06/25 1143          Range of Motion Comprehensive    General Range of Motion bilateral upper extremity ROM WFL  -AV     Comment, General Range of Motion AROM  -[facility] Name 03/06/25 1143          Strength Comprehensive (MMT)    Comment, General Manual Muscle Testing (MMT) Assessment 4(-)/5 bilateral biceps, triceps and "   -AV       Row Name 03/06/25 1143          Shoulder (Therapeutic Exercise)    Shoulder AROM (Therapeutic Exercise) bilateral;flexion;aBduction;aDduction;horizontal aBduction/aDduction;15 repititions  -AV       Row Name 03/06/25 1143          Elbow/Forearm (Therapeutic Exercise)    Elbow/Forearm AROM (Therapeutic Exercise) bilateral;flexion;extension;supination;pronation;15 repititions  -AV       Row Name 03/06/25 1143          Motor Skills    Therapeutic Exercise shoulder;elbow/forearm  Performed in high Fowlers/on room air.  -AV               User Key  (r) = Recorded By, (t) = Taken By, (c) = Cosigned By      Initials Name Provider Type    Kaz Lux OT Occupational Therapist                   Goals/Plan       Row Name 03/06/25 1146          Transfer Goal 1 (OT)    Activity/Assistive Device (Transfer Goal 1, OT) transfers, all  -AV     [place] Level/Cues Needed (Transfer Goal 1, OT) modified independence  -AV     Time Frame (Transfer Goal 1, OT) long term goal (LTG);10 days  -AV     Progress/Outcome (Transfer Goal 1, OT) goal ongoing  -AV       Row Name 03/06/25 1146          Bathing Goal 1 (OT)    Activity/Device (Bathing Goal 1, OT) bathing skills, all  -AV     [place] Level/Cues Needed (Bathing Goal 1, OT) modified independence  -AV     Time Frame (Bathing Goal 1, OT) long term goal (LTG);10 days  -AV     Progress/Outcomes (Bathing Goal 1, OT) goal ongoing  -AV       Row Name 03/06/25 1146          Dressing Goal 1 (OT)    Activity/Device (Dressing Goal 1, OT) dressing skills, all  -AV     [place]/Cues Needed (Dressing Goal 1, OT) modified independence  -AV     Time Frame (Dressing Goal 1, OT) long term goal (LTG);10 days  -AV     Progress/Outcome (Dressing Goal 1, OT) goal ongoing  -AV       Row Name 03/06/25 1146          Toileting Goal 1 (OT)    Activity/Device (Toileting Goal 1, OT) toileting skills, all  -AV     [place] Level/Cues Needed (Toileting Goal 1, OT) modified  independence  -AV     Time Frame (Toileting Goal 1, OT) long term goal (LTG);10 days  -AV     Progress/Outcome (Toileting Goal 1, OT) goal ongoing  -AV       Row Name 03/06/25 1146          Grooming Goal 1 (OT)    Activity/Device (Grooming Goal 1, OT) grooming skills, all  -AV     [place] (Grooming Goal 1, OT) modified independence  -AV     Time Frame (Grooming Goal 1, OT) long term goal (LTG);10 days  -AV     Progress/Outcome (Grooming Goal 1, OT) goal ongoing  -AV       Row Name 03/06/25 1146          Problem Specific Goal 1 (OT)    Problem Specific Goal 1 (OT) Pt will improve activity tolerance to good minus to support ADL independence  -AV     Time Frame (Problem Specific Goal 1, OT) long term goal (LTG);10 days  -AV     Progress/Outcome (Problem Specific Goal 1, OT) goal ongoing  -AV       Row Name 03/06/25 1146          Therapy Assessment/Plan (OT)    Planned Therapy Interventions (OT) activity tolerance training;BADL retraining;functional balance retraining;occupation/activity based interventions;patient/caregiver education/training;ROM/therapeutic exercise;strengthening exercise;transfer/mobility retraining  -AV               User Key  (r) = Recorded By, (t) = Taken By, (c) = Cosigned By      Initials Name Provider Type    Kaz Lux OT Occupational Therapist                   Clinical Impression       Row Name 03/06/25 1144          Pain Scale: FACES Pre/Post-Treatment    Pain: FACES Scale, Pretreatment 0-->no hurt  -AV     Posttreatment Pain Rating 0-->no hurt  -AV       Row Name 03/06/25 1144          Plan of Care Review    Plan of Care Reviewed With patient  -AV     Progress no change  -AV     Outcome Evaluation Patient performed upper extremity AROM exercises to improve strength and endurance needed to support ADLs.  Continued OT services are indicated to remediate/compensate for deficits to maximize independence.  -AV       Row Name 03/06/25 1142          Therapy Assessment/Plan (OT)     Patient/Family Therapy Goal Statement (OT) Regain independence  -AV     Rehab Potential (OT) good  -AV     Criteria for Skilled Therapeutic Interventions Met (OT) yes;meets criteria;skilled treatment is necessary  -AV     Therapy Frequency (OT) 5 times/wk  -AV       Row Name 03/06/25 1144          Therapy Plan Review/Discharge Plan (OT)    Anticipated Discharge Disposition (OT) sub acute care setting  -AV       Row Name 03/06/25 1144          Vital Signs    O2 Delivery Pre Treatment room air  -AV     O2 Delivery Intra Treatment room air  -AV     O2 Delivery Post Treatment room air  -AV       Row Name 03/06/25 1144          Positioning and Restraints    Pre-Treatment Position in bed  -AV     Post Treatment Position bed  -AV     In Bed call light within reach;encouraged to call for assist  -AV               User Key  (r) = Recorded By, (t) = Taken By, (c) = Cosigned By      Initials Name Provider Type    Kaz Lux OT Occupational Therapist                   Outcome Measures       Row Name 03/06/25 1146          How much help from another is currently needed...    Putting on and taking off regular lower body clothing? 2  -AV     Bathing (including washing, rinsing, and drying) 2  -AV     Toileting (which includes using toilet bed pan or urinal) 1  -AV     Putting on and taking off regular upper body clothing 2  -AV     Taking care of personal grooming (such as brushing teeth) 4  -AV     Eating meals 4  -AV     AM-PAC 6 Clicks Score (OT) 15  -AV       Row Name 03/06/25 1146          Functional Assessment    Outcome Measure Options AM-PAC 6 Clicks Daily Activity (OT);Optimal Instrument  -AV       Row Name 03/06/25 1146          Optimal Instrument    Bending/Stooping 4  -AV     Standing 5  -AV     Reaching 1  -AV               User Key  (r) = Recorded By, (t) = Taken By, (c) = Cosigned By      Initials Name Provider Type    Kaz Lux OT Occupational Therapist                    Occupational Therapy  Education       Title: PT OT SLP Therapies (Done)       Topic: Occupational Therapy (Done)       Point: ADL training (Done)       Description:   Instruct learner(s) on proper safety adaptation and remediation techniques during self care or transfers.   Instruct in proper use of assistive devices.                  Learning Progress Summary            Patient Acceptance, E, VU by AV at 3/6/2025 1147    Acceptance, E,D, DU by PG at 2/25/2025 1034                      Point: Home exercise program (Done)       Description:   Instruct learner(s) on appropriate technique for monitoring, assisting and/or progressing therapeutic exercises/activities.                  Learning Progress Summary            Patient Acceptance, E, VU by AV at 3/6/2025 1147    Acceptance, E,D, DU by PG at 2/25/2025 1034                      Point: Precautions (Done)       Description:   Instruct learner(s) on prescribed precautions during self-care and functional transfers.                  Learning Progress Summary            Patient Acceptance, E, VU by AV at 3/6/2025 1147    Acceptance, E,D, DU by PG at 2/25/2025 1034                      Point: Body mechanics (Done)       Description:   Instruct learner(s) on proper positioning and spine alignment during self-care, functional mobility activities and/or exercises.                  Learning Progress Summary            Patient Acceptance, E, VU by AV at 3/6/2025 1147    Acceptance, E,D, DU by PG at 2/25/2025 1034                                      User Key       Initials Effective Dates Name Provider Type Discipline    AV 06/16/21 -  Kaz Eugene, OT Occupational Therapist OT    PG 06/16/21 -  Elvis Paul OT Occupational Therapist OT                  OT Recommendation and Plan  Planned Therapy Interventions (OT): activity tolerance training, BADL retraining, functional balance retraining, occupation/activity based interventions, patient/caregiver education/training, ROM/therapeutic  exercise, strengthening exercise, transfer/mobility retraining  Therapy Frequency (OT): 5 times/wk  Plan of Care Review  Plan of Care Reviewed With: patient  Progress: no change  Outcome Evaluation: Patient performed upper extremity AROM exercises to improve strength and endurance needed to support ADLs.  Continued OT services are indicated to remediate/compensate for deficits to maximize independence.     Time Calculation:         Time Calculation- OT       Row Name 03/06/25 1147             Time Calculation- OT    OT Received On 03/06/25  -AV      OT Goal Re-Cert Due Date 03/15/25  -AV         Timed Charges    87212 - OT Therapeutic Exercise Minutes 23  -AV         Total Minutes    Timed Charges Total Minutes 23  -AV       Total Minutes 23  -AV                User Key  (r) = Recorded By, (t) = Taken By, (c) = Cosigned By      Initials Name Provider Type    Kaz Lux OT Occupational Therapist                  Therapy Charges for Today       Code Description Service Date Service Provider Modifiers Qty    [id number] HC OT THER PROC EA 15 MIN 3/6/2025 Kaz Eugene OT GO 2                 Kaz Eugene OT  3/6/2025

## 2025-03-07 NOTE — PLAN OF CARE
Goal Outcome Evaluation:     Patient discharging back to [facility] with midline in place for continued IV antibiotics. Report communicated to DAIJA Nielsen at [place] & d/c instructions & orders explained; verbalized understanding. Facility to schedule indicated discharge follow-up appointments. Patient awaiting EMS transportation at this time; report communicated to night shift RN.           Problem: Adult Inpatient Plan of Care  Goal: Plan of Care Review  Outcome: Adequate for Care Transition  Goal: Patient-Specific Goal (Individualized)  Outcome: Adequate for Care Transition  Goal: Absence of Hospital-Acquired Illness or Injury  Outcome: Adequate for Care Transition  Intervention: Identify and Manage Fall Risk  Recent Flowsheet Documentation  Taken 3/6/2025 1711 by Tamara Damon RN  Safety Promotion/Fall Prevention:   safety round/check completed   room organization consistent   lighting adjusted   fall prevention program maintained   assistive device/personal items within reach  Taken 3/6/2025 1630 by Tamara Damon RN  Safety Promotion/Fall Prevention:   safety round/check completed   room organization consistent   lighting adjusted   fall prevention program maintained   assistive device/personal items within reach  Taken 3/6/2025 1511 by Tamara Damon RN  Safety Promotion/Fall Prevention:   safety round/check completed   room organization consistent   lighting adjusted   fall prevention program maintained   assistive device/personal items within reach  Taken 3/6/2025 1402 by Tamara Damon RN  Safety Promotion/Fall Prevention:   safety round/check completed   room organization consistent   lighting adjusted   fall prevention program maintained   assistive device/personal items within reach  Taken 3/6/2025 1348 by Tamara Damon RN  Safety Promotion/Fall Prevention:   safety round/check completed   room organization consistent   nonskid shoes/slippers when out of bed   lighting  adjusted   fall prevention program maintained   clutter free environment maintained   assistive device/personal items within reach   activity supervised  Taken 3/6/2025 1203 by Tamara Damon RN  Safety Promotion/Fall Prevention:   safety round/check completed   room organization consistent   lighting adjusted   fall prevention program maintained   assistive device/personal items within reach  Taken 3/6/2025 1106 by Tamara Damon RN  Safety Promotion/Fall Prevention:   safety round/check completed   room organization consistent   nonskid shoes/slippers when out of bed   lighting adjusted   fall prevention program maintained   clutter free environment maintained   assistive device/personal items within reach   activity supervised  Taken 3/6/2025 0902 by Tamara Damon RN  Safety Promotion/Fall Prevention:   safety round/check completed   room organization consistent   nonskid shoes/slippers when out of bed   lighting adjusted   fall prevention program maintained   clutter free environment maintained   assistive device/personal items within reach   activity supervised  Taken 3/6/2025 0718 by Tamara Damon RN  Safety Promotion/Fall Prevention: safety round/check completed  Intervention: Prevent Skin Injury  Recent Flowsheet Documentation  Taken 3/6/2025 0718 by Tamara Damon RN  Skin Protection:   skin sealant/moisture barrier applied   incontinence pads utilized  Intervention: Prevent and Manage VTE (Venous Thromboembolism) Risk  Recent Flowsheet Documentation  Taken 3/6/2025 0718 by Tamara Damon RN  VTE Prevention/Management:   bilateral   SCDs (sequential compression devices) off  Intervention: Prevent Infection  Recent Flowsheet Documentation  Taken 3/6/2025 1711 by Tamara Damon RN  Infection Prevention:   rest/sleep promoted   environmental surveillance performed   equipment surfaces disinfected   hand hygiene promoted  Taken 3/6/2025 1630 by Tamara Damon RN  Infection  Prevention:   rest/sleep promoted   environmental surveillance performed   equipment surfaces disinfected   hand hygiene promoted  Taken 3/6/2025 1511 by Tamara Damon RN  Infection Prevention:   rest/sleep promoted   environmental surveillance performed   equipment surfaces disinfected   hand hygiene promoted  Taken 3/6/2025 1402 by Tamara Damon RN  Infection Prevention:   rest/sleep promoted   environmental surveillance performed   equipment surfaces disinfected   hand hygiene promoted  Taken 3/6/2025 1203 by Tamara Damon RN  Infection Prevention:   rest/sleep promoted   environmental surveillance performed   equipment surfaces disinfected   hand hygiene promoted  Taken 3/6/2025 0718 by Tamara Damon RN  Infection Prevention:   single patient room provided   environmental surveillance performed   rest/sleep promoted   personal protective equipment utilized   equipment surfaces disinfected   hand hygiene promoted  Goal: Optimal Comfort and Wellbeing  Outcome: Adequate for Care Transition  Intervention: Provide Person-Centered Care  Recent Flowsheet Documentation  Taken 3/6/2025 0718 by Tamara Damon RN  Trust Relationship/Rapport:   care explained   choices provided  Goal: Readiness for Transition of Care  Outcome: Adequate for Care Transition

## 2025-03-11 LAB
QT INTERVAL: 417 MS
QT INTERVAL: 450 MS
QTC INTERVAL: 433 MS
QTC INTERVAL: 486 MS

## 2025-08-12 ENCOUNTER — APPOINTMENT (OUTPATIENT)
Dept: CT IMAGING | Facility: HOSPITAL | Age: 45
End: 2025-08-12
Payer: MEDICARE

## 2025-08-12 ENCOUNTER — HOSPITAL ENCOUNTER (EMERGENCY)
Facility: HOSPITAL | Age: 45
Discharge: NURSING, REHAB OR LONG-TERM CARE | End: 2025-08-12
Attending: EMERGENCY MEDICINE | Admitting: EMERGENCY MEDICINE
Payer: MEDICARE

## 2025-08-12 VITALS
TEMPERATURE: 97.8 F | OXYGEN SATURATION: 100 % | HEIGHT: 62 IN | BODY MASS INDEX: 20.16 KG/M2 | RESPIRATION RATE: 15 BRPM | HEART RATE: 82 BPM | WEIGHT: 109.57 LBS | DIASTOLIC BLOOD PRESSURE: 47 MMHG | SYSTOLIC BLOOD PRESSURE: 70 MMHG

## 2025-08-12 DIAGNOSIS — A41.9 SEPSIS, DUE TO UNSPECIFIED ORGANISM, UNSPECIFIED WHETHER ACUTE ORGAN DYSFUNCTION PRESENT: ICD-10-CM

## 2025-08-12 DIAGNOSIS — L02.211 ABDOMINAL WALL ABSCESS: ICD-10-CM

## 2025-08-12 DIAGNOSIS — K63.2 ENTEROCUTANEOUS FISTULA: ICD-10-CM

## 2025-08-12 DIAGNOSIS — N39.0 URINARY TRACT INFECTION WITHOUT HEMATURIA, SITE UNSPECIFIED: Primary | ICD-10-CM

## 2025-08-12 LAB
ALBUMIN SERPL-MCNC: 1.8 G/DL (ref 3.5–5.2)
ALBUMIN/GLOB SERPL: 0.5 G/DL
ALP SERPL-CCNC: 112 U/L (ref 39–117)
ALT SERPL W P-5'-P-CCNC: 8 U/L (ref 1–33)
ANION GAP SERPL CALCULATED.3IONS-SCNC: 7.3 MMOL/L (ref 5–15)
AST SERPL-CCNC: 20 U/L (ref 1–32)
BACTERIA UR QL AUTO: ABNORMAL /HPF
BASOPHILS # BLD AUTO: 0.04 10*3/MM3 (ref 0–0.2)
BASOPHILS NFR BLD AUTO: 0.3 % (ref 0–1.5)
BILIRUB SERPL-MCNC: 2.4 MG/DL (ref 0–1.2)
BILIRUB UR QL STRIP: ABNORMAL
BUN SERPL-MCNC: 18.9 MG/DL (ref 6–20)
BUN/CREAT SERPL: 10.3 (ref 7–25)
CALCIUM SPEC-SCNC: 8.4 MG/DL (ref 8.6–10.5)
CHLORIDE SERPL-SCNC: 98 MMOL/L (ref 98–107)
CLARITY UR: ABNORMAL
CO2 SERPL-SCNC: 26.7 MMOL/L (ref 22–29)
COLOR UR: ABNORMAL
CREAT SERPL-MCNC: 1.83 MG/DL (ref 0.57–1)
D-LACTATE SERPL-SCNC: 2.3 MMOL/L (ref 0.5–2)
D-LACTATE SERPL-SCNC: 2.4 MMOL/L (ref 0.5–2)
DEPRECATED RDW RBC AUTO: 68.1 FL (ref 37–54)
EGFRCR SERPLBLD CKD-EPI 2021: 33.9 ML/MIN/1.73
EOSINOPHIL # BLD AUTO: 0.01 10*3/MM3 (ref 0–0.4)
EOSINOPHIL NFR BLD AUTO: 0.1 % (ref 0.3–6.2)
ERYTHROCYTE [DISTWIDTH] IN BLOOD BY AUTOMATED COUNT: 18.7 % (ref 12.3–15.4)
GLOBULIN UR ELPH-MCNC: 4 GM/DL
GLUCOSE SERPL-MCNC: 91 MG/DL (ref 65–99)
GLUCOSE UR STRIP-MCNC: NEGATIVE MG/DL
HCG INTACT+B SERPL-ACNC: <0.5 MIU/ML
HCT VFR BLD AUTO: 25.5 % (ref 34–46.6)
HGB BLD-MCNC: 8.3 G/DL (ref 12–15.9)
HGB UR QL STRIP.AUTO: ABNORMAL
HOLD SPECIMEN: NORMAL
HOLD SPECIMEN: NORMAL
HYALINE CASTS UR QL AUTO: ABNORMAL /LPF
IMM GRANULOCYTES # BLD AUTO: 0.05 10*3/MM3 (ref 0–0.05)
IMM GRANULOCYTES NFR BLD AUTO: 0.3 % (ref 0–0.5)
KETONES UR QL STRIP: ABNORMAL
LEUKOCYTE ESTERASE UR QL STRIP.AUTO: ABNORMAL
LIPASE SERPL-CCNC: 5 U/L (ref 13–60)
LYMPHOCYTES # BLD AUTO: 1.39 10*3/MM3 (ref 0.7–3.1)
LYMPHOCYTES NFR BLD AUTO: 9.1 % (ref 19.6–45.3)
MCH RBC QN AUTO: 32.4 PG (ref 26.6–33)
MCHC RBC AUTO-ENTMCNC: 32.5 G/DL (ref 31.5–35.7)
MCV RBC AUTO: 99.6 FL (ref 79–97)
MONOCYTES # BLD AUTO: 0.68 10*3/MM3 (ref 0.1–0.9)
MONOCYTES NFR BLD AUTO: 4.4 % (ref 5–12)
NEUTROPHILS NFR BLD AUTO: 13.18 10*3/MM3 (ref 1.7–7)
NEUTROPHILS NFR BLD AUTO: 85.8 % (ref 42.7–76)
NITRITE UR QL STRIP: NEGATIVE
NRBC BLD AUTO-RTO: 0 /100 WBC (ref 0–0.2)
PH UR STRIP.AUTO: 6 [PH] (ref 5–8)
PLATELET # BLD AUTO: 112 10*3/MM3 (ref 140–450)
PMV BLD AUTO: 13 FL (ref 6–12)
POTASSIUM SERPL-SCNC: 4.1 MMOL/L (ref 3.5–5.2)
PROT SERPL-MCNC: 5.8 G/DL (ref 6–8.5)
PROT UR QL STRIP: ABNORMAL
RBC # BLD AUTO: 2.56 10*6/MM3 (ref 3.77–5.28)
RBC # UR STRIP: ABNORMAL /HPF
REF LAB TEST METHOD: ABNORMAL
SODIUM SERPL-SCNC: 132 MMOL/L (ref 136–145)
SP GR UR STRIP: 1.02 (ref 1–1.03)
SQUAMOUS #/AREA URNS HPF: ABNORMAL /HPF
UROBILINOGEN UR QL STRIP: ABNORMAL
WBC # UR STRIP: ABNORMAL /HPF
WBC NRBC COR # BLD AUTO: 15.35 10*3/MM3 (ref 3.4–10.8)
WHOLE BLOOD HOLD COAG: NORMAL
WHOLE BLOOD HOLD SPECIMEN: NORMAL

## 2025-08-12 PROCEDURE — 25810000003 SODIUM CHLORIDE 0.9 % SOLUTION 250 ML FLEX CONT: Performed by: EMERGENCY MEDICINE

## 2025-08-12 PROCEDURE — 96361 HYDRATE IV INFUSION ADD-ON: CPT

## 2025-08-12 PROCEDURE — 83690 ASSAY OF LIPASE: CPT

## 2025-08-12 PROCEDURE — 80053 COMPREHEN METABOLIC PANEL: CPT

## 2025-08-12 PROCEDURE — 99285 EMERGENCY DEPT VISIT HI MDM: CPT

## 2025-08-12 PROCEDURE — 25010000002 VANCOMYCIN 1 G RECONSTITUTED SOLUTION 1 EACH VIAL: Performed by: EMERGENCY MEDICINE

## 2025-08-12 PROCEDURE — 25810000003 SODIUM CHLORIDE 0.9 % SOLUTION: Performed by: EMERGENCY MEDICINE

## 2025-08-12 PROCEDURE — 96368 THER/DIAG CONCURRENT INF: CPT

## 2025-08-12 PROCEDURE — 74176 CT ABD & PELVIS W/O CONTRAST: CPT

## 2025-08-12 PROCEDURE — 99291 CRITICAL CARE FIRST HOUR: CPT

## 2025-08-12 PROCEDURE — 87040 BLOOD CULTURE FOR BACTERIA: CPT | Performed by: EMERGENCY MEDICINE

## 2025-08-12 PROCEDURE — 96365 THER/PROPH/DIAG IV INF INIT: CPT

## 2025-08-12 PROCEDURE — 85025 COMPLETE CBC W/AUTO DIFF WBC: CPT

## 2025-08-12 PROCEDURE — 81001 URINALYSIS AUTO W/SCOPE: CPT | Performed by: EMERGENCY MEDICINE

## 2025-08-12 PROCEDURE — 83605 ASSAY OF LACTIC ACID: CPT | Performed by: EMERGENCY MEDICINE

## 2025-08-12 PROCEDURE — 25010000002 CEFEPIME PER 500 MG: Performed by: EMERGENCY MEDICINE

## 2025-08-12 PROCEDURE — 96366 THER/PROPH/DIAG IV INF ADDON: CPT

## 2025-08-12 PROCEDURE — 84702 CHORIONIC GONADOTROPIN TEST: CPT

## 2025-08-12 PROCEDURE — 36415 COLL VENOUS BLD VENIPUNCTURE: CPT | Performed by: EMERGENCY MEDICINE

## 2025-08-12 RX ORDER — METRONIDAZOLE 500 MG/100ML
500 INJECTION, SOLUTION INTRAVENOUS ONCE
Status: DISCONTINUED | OUTPATIENT
Start: 2025-08-12 | End: 2025-08-12 | Stop reason: HOSPADM

## 2025-08-12 RX ORDER — NOREPINEPHRINE BITARTRATE 0.03 MG/ML
.02-.3 INJECTION, SOLUTION INTRAVENOUS
Status: DISCONTINUED | OUTPATIENT
Start: 2025-08-12 | End: 2025-08-12 | Stop reason: HOSPADM

## 2025-08-12 RX ORDER — SODIUM CHLORIDE 0.9 % (FLUSH) 0.9 %
10 SYRINGE (ML) INJECTION AS NEEDED
Status: DISCONTINUED | OUTPATIENT
Start: 2025-08-12 | End: 2025-08-12 | Stop reason: HOSPADM

## 2025-08-12 RX ADMIN — CEFEPIME 2000 MG: 2 INJECTION, POWDER, FOR SOLUTION INTRAVENOUS at 14:27

## 2025-08-12 RX ADMIN — NOREPINEPHRINE BITARTRATE 0.02 MCG/KG/MIN: 0.03 INJECTION, SOLUTION INTRAVENOUS at 14:37

## 2025-08-12 RX ADMIN — SODIUM CHLORIDE 1000 ML: 9 INJECTION, SOLUTION INTRAVENOUS at 14:06

## 2025-08-12 RX ADMIN — SODIUM CHLORIDE 1000 MG: 9 INJECTION, SOLUTION INTRAVENOUS at 16:24

## 2025-08-12 RX ADMIN — SODIUM CHLORIDE 1000 ML: 9 INJECTION, SOLUTION INTRAVENOUS at 14:23

## 2025-08-12 RX ADMIN — SODIUM CHLORIDE 1000 ML: 9 INJECTION, SOLUTION INTRAVENOUS at 13:08

## 2025-08-17 LAB
BACTERIA SPEC AEROBE CULT: NORMAL
BACTERIA SPEC AEROBE CULT: NORMAL

## (undated) DEVICE — GOWN SURG IMPERV  XLG

## (undated) DEVICE — SUT VIC 3/0 SH 27IN J416H

## (undated) DEVICE — PK MAJ CUST HAR

## (undated) DEVICE — SUT PDS2 0 CT1 27IN Z340H MF VIL

## (undated) DEVICE — PENCL E/S SMOKEEVAC W/TELESCP CANN

## (undated) DEVICE — CYSTO PACK: Brand: MEDLINE INDUSTRIES, INC.

## (undated) DEVICE — Device

## (undated) DEVICE — TOWEL,OR,DSP,ST,BLUE,STD,4/PK,20PK/CS: Brand: MEDLINE

## (undated) DEVICE — LINER SURG CANSTR SXN S/RIGD 1500CC

## (undated) DEVICE — SINGLE-USE BIOPSY FORCEPS: Brand: RADIAL JAW 4

## (undated) DEVICE — BLD SCLPL RIBBACK C/SS SZ10

## (undated) DEVICE — SUT MNCRYL 4/0 PS2 18 IN

## (undated) DEVICE — DRSNG WND GZ CURAD OIL EMULSION 3X8IN LF STRL 1PK

## (undated) DEVICE — SUT SILK 3/0 SH CR8 18IN C013D

## (undated) DEVICE — GLOVE,SURG,SENSICARE SLT,LF,PF,7.5: Brand: MEDLINE

## (undated) DEVICE — SOL IRRG H2O PL/BG 1000ML STRL

## (undated) DEVICE — CONN JET HYDRA H20 AUXILIARY DISP

## (undated) DEVICE — DRSNG PAD ABD 8X10IN STRL

## (undated) DEVICE — Device: Brand: DEFENDO AIR/WATER/SUCTION AND BIOPSY VALVE

## (undated) DEVICE — COLON KIT: Brand: MEDLINE INDUSTRIES, INC.

## (undated) DEVICE — CATH URETRL OPEN END W/CONNECT 5F 70CM

## (undated) DEVICE — SPNG GZ STRL 2S 4X4 16PLY

## (undated) DEVICE — THE STERILE LIGHT HANDLE COVER IS USED WITH STERIS SURGICAL LIGHTING AND VISUALIZATION SYSTEMS.

## (undated) DEVICE — EGD OR ERCP KIT: Brand: MEDLINE INDUSTRIES, INC.

## (undated) DEVICE — DEV OPN LIGASURE CRV 180D 36MM 13.5CM  1P/U

## (undated) DEVICE — SUT SILK 2/0 TIES 18IN A185H

## (undated) DEVICE — SOL IRRG H2O BG 3000ML STRL

## (undated) DEVICE — SUT VIC PLS CTD BR 0 TIE 18IN VIL

## (undated) DEVICE — SUT SILK 3/0 TIES 18IN A184H

## (undated) DEVICE — GLV SURG BIOGEL LTX PF 7 1/2

## (undated) DEVICE — SOL IRR NACL 0.9PCT BT 1000ML

## (undated) DEVICE — MARKER,SKIN,WI/RULER AND LABELS: Brand: MEDLINE

## (undated) DEVICE — SLV SCD KN/LEN ADJ EXPRSS BLENDED MD 1P/U

## (undated) DEVICE — DRSNG WND PRIMAPORE ADHS 2X3IN

## (undated) DEVICE — STPLR SKIN PROXIMATE RH WD

## (undated) DEVICE — NITINOL WIRE WITH HYDROPHILIC TIP: Brand: SENSOR

## (undated) DEVICE — DRN WND RESVR BULB JP SIL 100ML

## (undated) DEVICE — SUT PDS 1 XLH LP 99IN Z881G

## (undated) DEVICE — CYSTO/BLADDER IRRIGATION SET, REGULATING CLAMP

## (undated) DEVICE — SUT SILK 2/0 FS BLK 18IN 685G

## (undated) DEVICE — SOLIDIFIER LIQLOC PLS 1500CC BT

## (undated) DEVICE — SKIN PREP TRAY W/CHG: Brand: MEDLINE INDUSTRIES, INC.